# Patient Record
Sex: MALE | Race: WHITE | NOT HISPANIC OR LATINO | Employment: OTHER | URBAN - METROPOLITAN AREA
[De-identification: names, ages, dates, MRNs, and addresses within clinical notes are randomized per-mention and may not be internally consistent; named-entity substitution may affect disease eponyms.]

---

## 2019-05-01 ENCOUNTER — NURSE TRIAGE (OUTPATIENT)
Dept: NURSING | Facility: CLINIC | Age: 79
End: 2019-05-01

## 2019-05-16 ENCOUNTER — ANESTHESIA EVENT (OUTPATIENT)
Dept: SURGERY | Facility: CLINIC | Age: 79
DRG: 470 | End: 2019-05-16
Payer: COMMERCIAL

## 2019-05-16 ASSESSMENT — COPD QUESTIONNAIRES: COPD: 1

## 2019-05-16 NOTE — ANESTHESIA PREPROCEDURE EVALUATION
Anesthesia Pre-Procedure Evaluation    Patient: Noe Goetz [facility]   MRN: [id number] : 1940          Preoperative Diagnosis: RIGHT KNEE OSTEOARTHROSIS    Procedure(s):  ARTHROPLASTY, KNEE    Past Medical History:   Diagnosis Date     Hypertension      Past Surgical History:   Procedure Laterality Date     ARTHROTOMY SHOULDER, ROTATOR CUFF REPAIR, COMBINED  2012    Procedure: COMBINED ARTHROTOMY SHOULDER, ROTATOR CUFF REPAIR;  Right shoulder biceps tendodesis;  Surgeon: Reynaldo Barnes MD;  Location: [place] OR       Anesthesia Evaluation     . Pt has had prior anesthetic.     No history of anesthetic complications          ROS/MED HX    ENT/Pulmonary: Comment: Hx of bronchitis    (+)sleep apnea, HODA risk factors hypertension, obese, allergic rhinitis, COPD, uses CPAP , . Other pulmonary disease SOB with walking/treadmill , hx of asbestosis.    Neurologic:     (+)other neuro "[employer]--hearing aids    Cardiovascular: Comment: Hx of positive stress test     (+) hypertension--CAD, --. : . . . :. . Previous cardiac testing date:results:date: results:ECG reviewed date:19 results:NSR date: results:          METS/Exercise Tolerance:  >4 METS   Hematologic:  - neg hematologic  ROS       Musculoskeletal:   (+) arthritis,  other musculoskeletal- LBP; DJD, sarcoma left hand      GI/Hepatic:     (+) GERD Other GI/Hepatic hx of ETOH abuse, hepatic steatosis       Renal/Genitourinary: Comment: ED        Endo:     (+) Obesity, .      Psychiatric:     (+) psychiatric history anxiety      Infectious Disease:  - neg infectious disease ROS       Malignancy:      - no malignancy   Other:    (+) H/O Chronic Pain,                        Physical Exam  Normal systems: cardiovascular, pulmonary and dental    Airway   Mallampati: II  TM distance: >3 FB  Neck ROM: full    Dental     Cardiovascular       Pulmonary             Lab Results   Component Value Date    WBC 6.7 2016    HGB 13.1 (L) 2016    HCT 40.1  "09/01/2016     09/01/2016     09/01/2016    POTASSIUM 3.8 09/01/2016    CHLORIDE 105 09/01/2016    CO2 29 09/01/2016    BUN 18 09/01/2016    CR 0.92 09/01/2016     (H) 09/01/2016    CHERY 8.8 09/01/2016    ALBUMIN 3.4 09/01/2016    PROTTOTAL 6.3 (L) 09/01/2016    ALT 20 09/01/2016    AST 13 09/01/2016    ALKPHOS 67 09/01/2016    BILITOTAL 0.4 09/01/2016    LIPASE 38 09/05/2013    PTT 29 09/10/2011    INR 1.03 09/10/2011       Preop Vitals  BP Readings from Last 3 Encounters:   09/01/16 131/83   08/02/16 128/84   03/21/14 110/76    Pulse Readings from Last 3 Encounters:   09/01/16 61   03/21/14 76   09/07/13 95      Resp Readings from Last 3 Encounters:   09/01/16 19   08/02/16 16   03/21/14 18    SpO2 Readings from Last 3 Encounters:   09/01/16 95%   08/02/16 96%   03/21/14 93%      Temp Readings from Last 1 Encounters:   09/01/16 37.1  C (98.7  F) (Oral)    Ht Readings from Last 1 Encounters:   09/06/13 1.727 m (5' 8\")      Wt Readings from Last 1 Encounters:   07/30/12 112.5 kg (248 lb)    Estimated body mass index is 37.71 kg/m  as calculated from the following:    Height as of 9/6/13: 1.727 m (5' 8\").    Weight as of 7/30/12: 112.5 kg (248 lb).       Anesthesia Plan      History & Physical Review  History and physical reviewed and following examination; no interval change.    ASA Status:  3 .    NPO Status:  > 8 hours    Plan for General and Spinal with Propofol and Intravenous induction. Maintenance will be Balanced.    PONV prophylaxis:  Ondansetron (or other 5HT-3) and Dexamethasone or Solumedrol       Postoperative Care  Postoperative pain management:  IV analgesics, Oral pain medications and Peripheral nerve block (Single Shot).      Consents  Anesthetic plan, risks, benefits and alternatives discussed with:  Spouse and Patient..                 Dinora Chase, APRN CRNA  "

## 2019-05-20 ENCOUNTER — HOSPITAL ENCOUNTER (INPATIENT)
Facility: CLINIC | Age: 79
LOS: 2 days | Discharge: HOME | DRG: 470 | End: 2019-05-22
Attending: ORTHOPAEDIC SURGERY | Admitting: ORTHOPAEDIC SURGERY
Payer: COMMERCIAL

## 2019-05-20 ENCOUNTER — ANESTHESIA (OUTPATIENT)
Dept: SURGERY | Facility: CLINIC | Age: 79
DRG: 470 | End: 2019-05-20
Payer: COMMERCIAL

## 2019-05-20 DIAGNOSIS — M17.11 PRIMARY OSTEOARTHRITIS OF RIGHT KNEE: Primary | ICD-10-CM

## 2019-05-20 PROBLEM — J44.89 COPD WITH CHRONIC BRONCHITIS (H): Status: ACTIVE | Noted: 2017-04-26

## 2019-05-20 PROBLEM — F10.11 HISTORY OF ALCOHOL ABUSE: Status: ACTIVE | Noted: 2019-05-20

## 2019-05-20 PROCEDURE — 25800030 ZZH RX IP 258 OP 636: Performed by: NURSE ANESTHETIST, CERTIFIED REGISTERED

## 2019-05-20 PROCEDURE — 25000125 ZZHC RX 250: Performed by: NURSE ANESTHETIST, CERTIFIED REGISTERED

## 2019-05-20 PROCEDURE — 36000063 ZZH SURGERY LEVEL 4 EA 15 ADDTL MIN: Performed by: ORTHOPAEDIC SURGERY

## 2019-05-20 PROCEDURE — 93005 ELECTROCARDIOGRAM TRACING: CPT

## 2019-05-20 PROCEDURE — 27810169 ZZH OR IMPLANT GENERAL: Performed by: ORTHOPAEDIC SURGERY

## 2019-05-20 PROCEDURE — 37000009 ZZH ANESTHESIA TECHNICAL FEE, EACH ADDTL 15 MIN: Performed by: ORTHOPAEDIC SURGERY

## 2019-05-20 PROCEDURE — 25000128 H RX IP 250 OP 636: Performed by: NURSE ANESTHETIST, CERTIFIED REGISTERED

## 2019-05-20 PROCEDURE — 25000132 ZZH RX MED GY IP 250 OP 250 PS 637: Performed by: PHYSICIAN ASSISTANT

## 2019-05-20 PROCEDURE — 71000012 ZZH RECOVERY PHASE 1 LEVEL 1 FIRST HR: Performed by: ORTHOPAEDIC SURGERY

## 2019-05-20 PROCEDURE — 25000132 ZZH RX MED GY IP 250 OP 250 PS 637: Performed by: ORTHOPAEDIC SURGERY

## 2019-05-20 PROCEDURE — 36000093 ZZH SURGERY LEVEL 4 1ST 30 MIN: Performed by: ORTHOPAEDIC SURGERY

## 2019-05-20 PROCEDURE — 71000013 ZZH RECOVERY PHASE 1 LEVEL 1 EA ADDTL HR: Performed by: ORTHOPAEDIC SURGERY

## 2019-05-20 PROCEDURE — 12000000 ZZH R&B MED SURG/OB

## 2019-05-20 PROCEDURE — C1776 JOINT DEVICE (IMPLANTABLE): HCPCS | Performed by: ORTHOPAEDIC SURGERY

## 2019-05-20 PROCEDURE — 25800030 ZZH RX IP 258 OP 636: Performed by: ORTHOPAEDIC SURGERY

## 2019-05-20 PROCEDURE — 25000128 H RX IP 250 OP 636: Performed by: PHYSICIAN ASSISTANT

## 2019-05-20 PROCEDURE — 0SRC0J9 REPLACEMENT OF RIGHT KNEE JOINT WITH SYNTHETIC SUBSTITUTE, CEMENTED, OPEN APPROACH: ICD-10-PCS | Performed by: ORTHOPAEDIC SURGERY

## 2019-05-20 PROCEDURE — 40000275 ZZH STATISTIC RCP TIME EA 10 MIN

## 2019-05-20 PROCEDURE — 37000008 ZZH ANESTHESIA TECHNICAL FEE, 1ST 30 MIN: Performed by: ORTHOPAEDIC SURGERY

## 2019-05-20 PROCEDURE — 40000305 ZZH STATISTIC PRE PROC ASSESS I: Performed by: ORTHOPAEDIC SURGERY

## 2019-05-20 PROCEDURE — 27210794 ZZH OR GENERAL SUPPLY STERILE: Performed by: ORTHOPAEDIC SURGERY

## 2019-05-20 PROCEDURE — 25000128 H RX IP 250 OP 636: Performed by: ORTHOPAEDIC SURGERY

## 2019-05-20 PROCEDURE — 27110028 ZZH OR GENERAL SUPPLY NON-STERILE: Performed by: ORTHOPAEDIC SURGERY

## 2019-05-20 DEVICE — IMPLANTABLE DEVICE: Type: IMPLANTABLE DEVICE | Site: KNEE | Status: FUNCTIONAL

## 2019-05-20 DEVICE — BONE CEMENT DEPUY FAST SET 20GM CMW2: Type: IMPLANTABLE DEVICE | Site: KNEE | Status: FUNCTIONAL

## 2019-05-20 RX ORDER — LIDOCAINE HYDROCHLORIDE AND EPINEPHRINE BITARTRATE 20; .01 MG/ML; MG/ML
INJECTION, SOLUTION SUBCUTANEOUS PRN
Status: DISCONTINUED | OUTPATIENT
Start: 2019-05-20 | End: 2019-05-20

## 2019-05-20 RX ORDER — ONDANSETRON 4 MG/1
4 TABLET, ORALLY DISINTEGRATING ORAL EVERY 6 HOURS PRN
Status: DISCONTINUED | OUTPATIENT
Start: 2019-05-20 | End: 2019-05-22 | Stop reason: HOSPADM

## 2019-05-20 RX ORDER — CEFAZOLIN SODIUM 2 G/100ML
2 INJECTION, SOLUTION INTRAVENOUS
Status: COMPLETED | OUTPATIENT
Start: 2019-05-20 | End: 2019-05-20

## 2019-05-20 RX ORDER — GABAPENTIN 100 MG/1
100 CAPSULE ORAL 3 TIMES DAILY
Status: DISCONTINUED | OUTPATIENT
Start: 2019-05-20 | End: 2019-05-22 | Stop reason: HOSPADM

## 2019-05-20 RX ORDER — DEXAMETHASONE SODIUM PHOSPHATE 4 MG/ML
INJECTION, SOLUTION INTRA-ARTICULAR; INTRALESIONAL; INTRAMUSCULAR; INTRAVENOUS; SOFT TISSUE PRN
Status: DISCONTINUED | OUTPATIENT
Start: 2019-05-20 | End: 2019-05-20

## 2019-05-20 RX ORDER — SIMVASTATIN 20 MG
20 TABLET ORAL AT BEDTIME
Status: DISCONTINUED | OUTPATIENT
Start: 2019-05-20 | End: 2019-05-22 | Stop reason: HOSPADM

## 2019-05-20 RX ORDER — HYDROMORPHONE HYDROCHLORIDE 2 MG/1
2-4 TABLET ORAL EVERY 4 HOURS PRN
Status: DISCONTINUED | OUTPATIENT
Start: 2019-05-20 | End: 2019-05-21

## 2019-05-20 RX ORDER — NALOXONE HYDROCHLORIDE 0.4 MG/ML
.1-.4 INJECTION, SOLUTION INTRAMUSCULAR; INTRAVENOUS; SUBCUTANEOUS
Status: DISCONTINUED | OUTPATIENT
Start: 2019-05-20 | End: 2019-05-22 | Stop reason: HOSPADM

## 2019-05-20 RX ORDER — ROPIVACAINE HYDROCHLORIDE 5 MG/ML
INJECTION, SOLUTION EPIDURAL; INFILTRATION; PERINEURAL PRN
Status: DISCONTINUED | OUTPATIENT
Start: 2019-05-20 | End: 2019-05-20

## 2019-05-20 RX ORDER — ONDANSETRON 2 MG/ML
4 INJECTION INTRAMUSCULAR; INTRAVENOUS EVERY 30 MIN PRN
Status: DISCONTINUED | OUTPATIENT
Start: 2019-05-20 | End: 2019-05-20 | Stop reason: HOSPADM

## 2019-05-20 RX ORDER — HYDROCODONE BITARTRATE AND ACETAMINOPHEN 5; 325 MG/1; MG/1
1 TABLET ORAL EVERY 4 HOURS PRN
Status: ON HOLD | COMMUNITY
Start: 2019-03-28 | End: 2019-05-22

## 2019-05-20 RX ORDER — ONDANSETRON 2 MG/ML
INJECTION INTRAMUSCULAR; INTRAVENOUS PRN
Status: DISCONTINUED | OUTPATIENT
Start: 2019-05-20 | End: 2019-05-20

## 2019-05-20 RX ORDER — LIDOCAINE 40 MG/G
CREAM TOPICAL
Status: DISCONTINUED | OUTPATIENT
Start: 2019-05-20 | End: 2019-05-20 | Stop reason: HOSPADM

## 2019-05-20 RX ORDER — CEFAZOLIN SODIUM 1 G/3ML
1 INJECTION, POWDER, FOR SOLUTION INTRAMUSCULAR; INTRAVENOUS SEE ADMIN INSTRUCTIONS
Status: DISCONTINUED | OUTPATIENT
Start: 2019-05-20 | End: 2019-05-20 | Stop reason: HOSPADM

## 2019-05-20 RX ORDER — DEXTROSE MONOHYDRATE, SODIUM CHLORIDE, AND POTASSIUM CHLORIDE 50; 1.49; 4.5 G/1000ML; G/1000ML; G/1000ML
INJECTION, SOLUTION INTRAVENOUS CONTINUOUS
Status: DISCONTINUED | OUTPATIENT
Start: 2019-05-20 | End: 2019-05-22 | Stop reason: HOSPADM

## 2019-05-20 RX ORDER — FUROSEMIDE 40 MG
40 TABLET ORAL 2 TIMES DAILY
Status: DISCONTINUED | OUTPATIENT
Start: 2019-05-21 | End: 2019-05-22 | Stop reason: HOSPADM

## 2019-05-20 RX ORDER — HYDROXYZINE HYDROCHLORIDE 50 MG/1
50 TABLET, FILM COATED ORAL EVERY 6 HOURS PRN
Status: DISCONTINUED | OUTPATIENT
Start: 2019-05-20 | End: 2019-05-20

## 2019-05-20 RX ORDER — FLUTICASONE PROPIONATE 50 MCG
2 SPRAY, SUSPENSION (ML) NASAL DAILY
Status: DISCONTINUED | OUTPATIENT
Start: 2019-05-20 | End: 2019-05-22 | Stop reason: HOSPADM

## 2019-05-20 RX ORDER — FENTANYL CITRATE 50 UG/ML
25-50 INJECTION, SOLUTION INTRAMUSCULAR; INTRAVENOUS
Status: DISCONTINUED | OUTPATIENT
Start: 2019-05-20 | End: 2019-05-20 | Stop reason: HOSPADM

## 2019-05-20 RX ORDER — DOXAZOSIN 2 MG/1
2 TABLET ORAL AT BEDTIME
Status: DISCONTINUED | OUTPATIENT
Start: 2019-05-20 | End: 2019-05-22 | Stop reason: HOSPADM

## 2019-05-20 RX ORDER — AMOXICILLIN 250 MG
2 CAPSULE ORAL 2 TIMES DAILY
Status: DISCONTINUED | OUTPATIENT
Start: 2019-05-20 | End: 2019-05-22 | Stop reason: HOSPADM

## 2019-05-20 RX ORDER — ACETAMINOPHEN 325 MG/1
975 TABLET ORAL EVERY 8 HOURS
Status: DISCONTINUED | OUTPATIENT
Start: 2019-05-20 | End: 2019-05-21

## 2019-05-20 RX ORDER — DOXAZOSIN 2 MG/1
4 TABLET ORAL AT BEDTIME
Status: DISCONTINUED | OUTPATIENT
Start: 2019-05-20 | End: 2019-05-20

## 2019-05-20 RX ORDER — SODIUM CHLORIDE, SODIUM LACTATE, POTASSIUM CHLORIDE, CALCIUM CHLORIDE 600; 310; 30; 20 MG/100ML; MG/100ML; MG/100ML; MG/100ML
INJECTION, SOLUTION INTRAVENOUS CONTINUOUS
Status: DISCONTINUED | OUTPATIENT
Start: 2019-05-20 | End: 2019-05-20 | Stop reason: HOSPADM

## 2019-05-20 RX ORDER — HYDROMORPHONE HYDROCHLORIDE 1 MG/ML
.3-.5 INJECTION, SOLUTION INTRAMUSCULAR; INTRAVENOUS; SUBCUTANEOUS
Status: DISCONTINUED | OUTPATIENT
Start: 2019-05-20 | End: 2019-05-22 | Stop reason: HOSPADM

## 2019-05-20 RX ORDER — HYDROMORPHONE HYDROCHLORIDE 1 MG/ML
.3-.5 INJECTION, SOLUTION INTRAMUSCULAR; INTRAVENOUS; SUBCUTANEOUS EVERY 5 MIN PRN
Status: DISCONTINUED | OUTPATIENT
Start: 2019-05-20 | End: 2019-05-20 | Stop reason: HOSPADM

## 2019-05-20 RX ORDER — PROPOFOL 10 MG/ML
INJECTION, EMULSION INTRAVENOUS CONTINUOUS PRN
Status: DISCONTINUED | OUTPATIENT
Start: 2019-05-20 | End: 2019-05-20

## 2019-05-20 RX ORDER — FERROUS SULFATE 325(65) MG
325 TABLET ORAL
Status: DISCONTINUED | OUTPATIENT
Start: 2019-05-21 | End: 2019-05-22 | Stop reason: HOSPADM

## 2019-05-20 RX ORDER — ALBUTEROL SULFATE 0.83 MG/ML
2.5 SOLUTION RESPIRATORY (INHALATION) EVERY 4 HOURS PRN
Status: DISCONTINUED | OUTPATIENT
Start: 2019-05-20 | End: 2019-05-20 | Stop reason: HOSPADM

## 2019-05-20 RX ORDER — LIDOCAINE 40 MG/G
CREAM TOPICAL
Status: DISCONTINUED | OUTPATIENT
Start: 2019-05-20 | End: 2019-05-22 | Stop reason: HOSPADM

## 2019-05-20 RX ORDER — LIDOCAINE HYDROCHLORIDE 10 MG/ML
INJECTION, SOLUTION EPIDURAL; INFILTRATION; INTRACAUDAL; PERINEURAL PRN
Status: DISCONTINUED | OUTPATIENT
Start: 2019-05-20 | End: 2019-05-20

## 2019-05-20 RX ORDER — HYDROXYZINE HYDROCHLORIDE 10 MG/1
10 TABLET, FILM COATED ORAL EVERY 6 HOURS PRN
Status: DISCONTINUED | OUTPATIENT
Start: 2019-05-20 | End: 2019-05-21

## 2019-05-20 RX ORDER — HYDROXYZINE HYDROCHLORIDE 25 MG/1
25 TABLET, FILM COATED ORAL EVERY 6 HOURS PRN
Status: DISCONTINUED | OUTPATIENT
Start: 2019-05-20 | End: 2019-05-20

## 2019-05-20 RX ORDER — LIDOCAINE HYDROCHLORIDE 10 MG/ML
INJECTION, SOLUTION INFILTRATION; PERINEURAL PRN
Status: DISCONTINUED | OUTPATIENT
Start: 2019-05-20 | End: 2019-05-20

## 2019-05-20 RX ORDER — GABAPENTIN 600 MG/1
1200 TABLET ORAL AT BEDTIME
Status: DISCONTINUED | OUTPATIENT
Start: 2019-05-20 | End: 2019-05-22 | Stop reason: HOSPADM

## 2019-05-20 RX ORDER — FENTANYL CITRATE 50 UG/ML
INJECTION, SOLUTION INTRAMUSCULAR; INTRAVENOUS PRN
Status: DISCONTINUED | OUTPATIENT
Start: 2019-05-20 | End: 2019-05-20

## 2019-05-20 RX ORDER — AMOXICILLIN 250 MG
1 CAPSULE ORAL 2 TIMES DAILY
Status: DISCONTINUED | OUTPATIENT
Start: 2019-05-20 | End: 2019-05-22 | Stop reason: HOSPADM

## 2019-05-20 RX ORDER — KETOROLAC TROMETHAMINE 15 MG/ML
15 INJECTION, SOLUTION INTRAMUSCULAR; INTRAVENOUS
Status: DISCONTINUED | OUTPATIENT
Start: 2019-05-20 | End: 2019-05-20 | Stop reason: HOSPADM

## 2019-05-20 RX ORDER — ONDANSETRON 2 MG/ML
4 INJECTION INTRAMUSCULAR; INTRAVENOUS EVERY 6 HOURS PRN
Status: DISCONTINUED | OUTPATIENT
Start: 2019-05-20 | End: 2019-05-22 | Stop reason: HOSPADM

## 2019-05-20 RX ORDER — FEXOFENADINE HCL 180 MG/1
180 TABLET ORAL DAILY
Status: DISCONTINUED | OUTPATIENT
Start: 2019-05-21 | End: 2019-05-22 | Stop reason: HOSPADM

## 2019-05-20 RX ORDER — PROCHLORPERAZINE MALEATE 5 MG
5 TABLET ORAL EVERY 6 HOURS PRN
Status: DISCONTINUED | OUTPATIENT
Start: 2019-05-20 | End: 2019-05-22 | Stop reason: HOSPADM

## 2019-05-20 RX ORDER — ACETAMINOPHEN 325 MG/1
650 TABLET ORAL EVERY 4 HOURS PRN
Status: DISCONTINUED | OUTPATIENT
Start: 2019-05-23 | End: 2019-05-21

## 2019-05-20 RX ORDER — NALOXONE HYDROCHLORIDE 0.4 MG/ML
.1-.4 INJECTION, SOLUTION INTRAMUSCULAR; INTRAVENOUS; SUBCUTANEOUS
Status: DISCONTINUED | OUTPATIENT
Start: 2019-05-20 | End: 2019-05-20

## 2019-05-20 RX ORDER — METOPROLOL TARTRATE 1 MG/ML
1-2 INJECTION, SOLUTION INTRAVENOUS EVERY 5 MIN PRN
Status: DISCONTINUED | OUTPATIENT
Start: 2019-05-20 | End: 2019-05-20 | Stop reason: HOSPADM

## 2019-05-20 RX ORDER — LOVASTATIN 20 MG
40 TABLET ORAL AT BEDTIME
Status: DISCONTINUED | OUTPATIENT
Start: 2019-05-20 | End: 2019-05-20 | Stop reason: CLARIF

## 2019-05-20 RX ORDER — ALBUTEROL SULFATE 0.83 MG/ML
2.5 SOLUTION RESPIRATORY (INHALATION) EVERY 6 HOURS PRN
Status: DISCONTINUED | OUTPATIENT
Start: 2019-05-20 | End: 2019-05-22 | Stop reason: HOSPADM

## 2019-05-20 RX ORDER — GABAPENTIN 100 MG/1
100 CAPSULE ORAL
Status: COMPLETED | OUTPATIENT
Start: 2019-05-20 | End: 2019-05-20

## 2019-05-20 RX ORDER — POTASSIUM CHLORIDE 1500 MG/1
20 TABLET, EXTENDED RELEASE ORAL DAILY
Status: DISCONTINUED | OUTPATIENT
Start: 2019-05-21 | End: 2019-05-22 | Stop reason: HOSPADM

## 2019-05-20 RX ORDER — ONDANSETRON 4 MG/1
4 TABLET, ORALLY DISINTEGRATING ORAL EVERY 30 MIN PRN
Status: DISCONTINUED | OUTPATIENT
Start: 2019-05-20 | End: 2019-05-20 | Stop reason: HOSPADM

## 2019-05-20 RX ORDER — BUPIVACAINE HYDROCHLORIDE 7.5 MG/ML
INJECTION, SOLUTION INTRASPINAL PRN
Status: DISCONTINUED | OUTPATIENT
Start: 2019-05-20 | End: 2019-05-20

## 2019-05-20 RX ORDER — EPINEPHRINE 1 MG/ML
INJECTION, SOLUTION, CONCENTRATE INTRAVENOUS PRN
Status: DISCONTINUED | OUTPATIENT
Start: 2019-05-20 | End: 2019-05-20

## 2019-05-20 RX ORDER — CEFAZOLIN SODIUM 2 G/100ML
2 INJECTION, SOLUTION INTRAVENOUS EVERY 8 HOURS
Status: COMPLETED | OUTPATIENT
Start: 2019-05-20 | End: 2019-05-21

## 2019-05-20 RX ORDER — ATENOLOL 50 MG/1
50 TABLET ORAL DAILY
Status: DISCONTINUED | OUTPATIENT
Start: 2019-05-21 | End: 2019-05-22 | Stop reason: HOSPADM

## 2019-05-20 RX ORDER — DULOXETIN HYDROCHLORIDE 30 MG/1
120 CAPSULE, DELAYED RELEASE ORAL EVERY EVENING
Status: DISCONTINUED | OUTPATIENT
Start: 2019-05-20 | End: 2019-05-22 | Stop reason: HOSPADM

## 2019-05-20 RX ADMIN — CEFAZOLIN SODIUM 2 G: 2 INJECTION, SOLUTION INTRAVENOUS at 19:26

## 2019-05-20 RX ADMIN — GABAPENTIN 100 MG: 100 CAPSULE ORAL at 15:51

## 2019-05-20 RX ADMIN — FLUTICASONE FUROATE 1 PUFF: 200 POWDER RESPIRATORY (INHALATION) at 19:26

## 2019-05-20 RX ADMIN — SIMVASTATIN 20 MG: 20 TABLET, FILM COATED ORAL at 21:19

## 2019-05-20 RX ADMIN — EPINEPHRINE 0.2 MG: 1 INJECTION, SOLUTION INTRAMUSCULAR; SUBCUTANEOUS at 11:51

## 2019-05-20 RX ADMIN — CEFAZOLIN SODIUM 2 G: 2 INJECTION, SOLUTION INTRAVENOUS at 11:43

## 2019-05-20 RX ADMIN — GABAPENTIN 1200 MG: 600 TABLET, FILM COATED ORAL at 21:20

## 2019-05-20 RX ADMIN — HYDROMORPHONE HYDROCHLORIDE 4 MG: 2 TABLET ORAL at 16:24

## 2019-05-20 RX ADMIN — ACETAMINOPHEN 975 MG: 325 TABLET, FILM COATED ORAL at 23:24

## 2019-05-20 RX ADMIN — ACETAMINOPHEN 975 MG: 325 TABLET, FILM COATED ORAL at 15:51

## 2019-05-20 RX ADMIN — Medication 5 ML: at 13:18

## 2019-05-20 RX ADMIN — OMEPRAZOLE 20 MG: 20 CAPSULE, DELAYED RELEASE ORAL at 21:20

## 2019-05-20 RX ADMIN — PROPOFOL 50 MCG/KG/MIN: 10 INJECTION, EMULSION INTRAVENOUS at 11:45

## 2019-05-20 RX ADMIN — DEXAMETHASONE SODIUM PHOSPHATE 2 MG: 4 INJECTION, SOLUTION INTRA-ARTICULAR; INTRALESIONAL; INTRAMUSCULAR; INTRAVENOUS; SOFT TISSUE at 13:19

## 2019-05-20 RX ADMIN — HYDROMORPHONE HYDROCHLORIDE 0.5 MG: 1 INJECTION, SOLUTION INTRAMUSCULAR; INTRAVENOUS; SUBCUTANEOUS at 17:32

## 2019-05-20 RX ADMIN — LIDOCAINE HYDROCHLORIDE 5 ML: 10 INJECTION, SOLUTION EPIDURAL; INFILTRATION; INTRACAUDAL; PERINEURAL at 11:45

## 2019-05-20 RX ADMIN — PSYLLIUM HUSK 1 PACKET: 3.4 POWDER ORAL at 19:12

## 2019-05-20 RX ADMIN — DULOXETINE HYDROCHLORIDE 120 MG: 30 CAPSULE, DELAYED RELEASE ORAL at 17:32

## 2019-05-20 RX ADMIN — POTASSIUM CHLORIDE, DEXTROSE MONOHYDRATE AND SODIUM CHLORIDE: 150; 5; 450 INJECTION, SOLUTION INTRAVENOUS at 17:32

## 2019-05-20 RX ADMIN — DOXAZOSIN 2 MG: 2 TABLET ORAL at 21:20

## 2019-05-20 RX ADMIN — ONDANSETRON 4 MG: 2 INJECTION INTRAMUSCULAR; INTRAVENOUS at 12:38

## 2019-05-20 RX ADMIN — SENNOSIDES AND DOCUSATE SODIUM 1 TABLET: 8.6; 5 TABLET ORAL at 21:19

## 2019-05-20 RX ADMIN — LIDOCAINE HYDROCHLORIDE 30 MG: 10 INJECTION, SOLUTION INFILTRATION; PERINEURAL at 11:47

## 2019-05-20 RX ADMIN — SODIUM CHLORIDE, POTASSIUM CHLORIDE, SODIUM LACTATE AND CALCIUM CHLORIDE: 600; 310; 30; 20 INJECTION, SOLUTION INTRAVENOUS at 11:04

## 2019-05-20 RX ADMIN — SODIUM CHLORIDE, POTASSIUM CHLORIDE, SODIUM LACTATE AND CALCIUM CHLORIDE: 600; 310; 30; 20 INJECTION, SOLUTION INTRAVENOUS at 13:34

## 2019-05-20 RX ADMIN — FLUTICASONE PROPIONATE 2 SPRAY: 50 SPRAY, METERED NASAL at 19:25

## 2019-05-20 RX ADMIN — GABAPENTIN 100 MG: 100 CAPSULE ORAL at 11:30

## 2019-05-20 RX ADMIN — ROPIVACAINE HYDROCHLORIDE 15 ML: 5 INJECTION, SOLUTION EPIDURAL; INFILTRATION; PERINEURAL at 13:19

## 2019-05-20 RX ADMIN — BUPIVACAINE HYDROCHLORIDE IN DEXTROSE 1.6 ML: 7.5 INJECTION, SOLUTION SUBARACHNOID at 11:51

## 2019-05-20 RX ADMIN — HYDROXYZINE HYDROCHLORIDE 10 MG: 10 TABLET ORAL at 16:26

## 2019-05-20 RX ADMIN — DEXAMETHASONE SODIUM PHOSPHATE 4 MG: 4 INJECTION, SOLUTION INTRA-ARTICULAR; INTRALESIONAL; INTRAMUSCULAR; INTRAVENOUS; SOFT TISSUE at 12:00

## 2019-05-20 RX ADMIN — MIDAZOLAM 2 MG: 1 INJECTION INTRAMUSCULAR; INTRAVENOUS at 11:40

## 2019-05-20 RX ADMIN — FENTANYL CITRATE 50 MCG: 50 INJECTION, SOLUTION INTRAMUSCULAR; INTRAVENOUS at 11:48

## 2019-05-20 RX ADMIN — LIDOCAINE HYDROCHLORIDE 1 ML: 10 INJECTION, SOLUTION EPIDURAL; INFILTRATION; INTRACAUDAL; PERINEURAL at 11:30

## 2019-05-20 RX ADMIN — FENTANYL CITRATE 50 MCG: 50 INJECTION, SOLUTION INTRAMUSCULAR; INTRAVENOUS at 12:02

## 2019-05-20 RX ADMIN — HYDROMORPHONE HYDROCHLORIDE 4 MG: 2 TABLET ORAL at 21:20

## 2019-05-20 ASSESSMENT — ACTIVITIES OF DAILY LIVING (ADL)
WHICH_OF_THE_ABOVE_FUNCTIONAL_RISKS_HAD_A_RECENT_ONSET_OR_CHANGE?: AMBULATION
ADLS_ACUITY_SCORE: 15
TOILETING: 0-->INDEPENDENT
TRANSFERRING: 0-->INDEPENDENT
DRESS: 0-->INDEPENDENT
FALL_HISTORY_WITHIN_LAST_SIX_MONTHS: NO
AMBULATION: 0-->INDEPENDENT
COGNITION: 0 - NO COGNITION ISSUES REPORTED
ADLS_ACUITY_SCORE: 15
BATHING: 0-->INDEPENDENT
SWALLOWING: 0-->SWALLOWS FOODS/LIQUIDS WITHOUT DIFFICULTY
RETIRED_COMMUNICATION: 0-->UNDERSTANDS/COMMUNICATES WITHOUT DIFFICULTY
RETIRED_EATING: 0-->INDEPENDENT

## 2019-05-20 NOTE — OR NURSING
12 lead showed acute no changes.Chest pain only lasted 10 minutes. Okay received to transfer to med surg.

## 2019-05-20 NOTE — OR NURSING
Pt has a scrape on right shin from 3 days ago . Scabbed and bruised reddened area. Also right wrist and palm swollen since yesterday. No fall just tender uses ice to palm for comfort. preop med given.wife with pt preop

## 2019-05-20 NOTE — OR NURSING
Pt. ready to be transported to med/surg when complained of chest pain across sternum. No pain radiating to other parts of body. Urbano Llanos CRNA notified. Order recieved to do a 12 lead. Nette GUTIERREZ

## 2019-05-20 NOTE — BRIEF OP NOTE
[facility]    Brief Operative Note    Pre-operative diagnosis: RIGHT KNEE OSTEOARTHROSIS  Post-operative diagnosis Primary Osteoarthrosis Right Knee  Procedure: Procedure(s):  ARTHROPLASTY, KNEE  Surgeon: Surgeon(s) and Role:     * Reynaldo Barnes MD - Primary     * Josias Lama PA-C - Assisting  Anesthesia: General   Estimated blood loss: Minimal  Drains: Hemovac  Specimens: * No specimens in log *  Findings:   None.  Complications: None.  Implants:    Implant Name Type Inv. Item Serial No.  Lot No. LRB No. Used   BONE CEMENT DEPUY FAST SET 20GM CMW2 Cement, Bone BONE CEMENT DEPUY FAST SET 20GM CMW2  [employer]C [id number] Right 2   IMP COMP PATELLA JJ 41MM Total Joint Component/Insert IMP COMP PATELLA JJ 41MM  [employer]   [id number] Right 1   IMP COMP FEMORAL DEPUY RT SIZE 5  Total Joint Component/Insert IMP COMP FEMORAL DEPUY RT SIZE 5   [employer]   [id number] Right 1   IMP TIBIAL TRAY MOD 5 SIGMA 1581- Total Joint Component/Insert IMP TIBIAL TRAY MOD 5 SIGMA 1581-  [employer] [id number] Right 1   P.F.C sigma tibial insert fixed bearing curved 5, 8mm    Depuy [id number] Right 1

## 2019-05-20 NOTE — ANESTHESIA PROCEDURE NOTES
Peripheral nerve/Neuraxial procedure note : intrathecal  Pre-Procedure  Performed by  Dinora Chase APRN CRNA   Location: OR      Pre-Anesthestic Checklist: patient identified, IV checked, site marked, risks and benefits discussed, informed consent, monitors and equipment checked, pre-op evaluation and at physician/surgeon's request    Timeout  Correct Patient: Yes   Correct Procedure: Yes   Correct Site: Yes   Correct Laterality: N/A   Correct Position: Yes   Site Marked: N/A   .   Procedure Documentation  ASA 3  Diagnosis:OA.    Procedure:    Intrathecal.  Insertion Site:L3-4  (midline approach)      Patient Prep;mask, sterile gloves, povidone-iodine 7.5% surgical scrub, patient draped.  .  Needle: Rebeca tip Spinal Needle (gauge): 22  Spinal/LP Needle Length (inches): 3.5 # of attempts: 1 and  # of redirects:  1 No introducer used .       Assessment/Narrative  Paresthesias: No.  .  .  clear CSF fluid removed . Time Injected: 11:51  Comments:  VAS pain score prior to injection:    VAS pain score after injection:    FHR stable, pt. Tolerated well.

## 2019-05-20 NOTE — ANESTHESIA CARE TRANSFER NOTE
Patient: Noe Goetz [facility]    Procedure(s):  ARTHROPLASTY, KNEE    Diagnosis: RIGHT KNEE OSTEOARTHROSIS  Diagnosis Additional Information: No value filed.    Anesthesia Type:   No value filed.     Note:  Airway :Nasal Cannula  Patient transferred to:PACU  Handoff Report: Identifed the Patient, Identified the Reponsible Provider, Reviewed the pertinent medical history, Discussed the surgical course, Reviewed Intra-OP anesthesia mangement and issues during anesthesia, Set expectations for post-procedure period and Allowed opportunity for questions and acknowledgement of understanding      Vitals: (Last set prior to Anesthesia Care Transfer)    CRNA VITALS  5/20/2019 1230 - 5/20/2019 1330      5/20/2019             EKG:  NSR                Electronically Signed By: MARCY Francisco CRNA  May 20, 2019  1:31 PM

## 2019-05-20 NOTE — OP NOTE
Procedure Date: 05/20/2019      PREOPERATIVE DIAGNOSIS:  Primary osteoarthrosis, right knee.      POSTOPERATIVE DIAGNOSIS:  Primary osteoarthrosis, right knee.      PROCEDURE:  DePuy Sigma right total knee arthroplasty.      COMPONENTS:   FEMUR:  #5 cruciate-retaining retaining, cemented.   TIBIA:  #5 fixed bearing, cemented inserts.  #8   PATELLA:  41.      SURGEON:  Reynaldo Barnes MD      ASSISTANT:  AILEEN Joseph      ANESTHESIA:  Spinal.      ESTIMATED BLOOD LOSS:  Minimal.      TOURNIQUET TIME:  Approximately 45 minutes at 325 mmHg.      COMPLICATIONS:  None apparent.      INDICATIONS:  Noe is a 79-year-old gentleman who presented with end-stage primary osteoarthrosis of the right knee recalcitrant to conservative treatment.  After alternatives, risks, and possible complications were carefully discussed, the patient desired surgical intervention; therefore, consent was obtained.      OPERATION:  The patient was brought to main operating after spinal anesthesia was obtained, positioned supine.  Tourniquet was placed on the right proximal thigh.  Two grams of Ancef were given intravenously, right lower extremity was prepped and draped in the usual sterile fashion.  The limb was elevated and tourniquet inflated.      A linear longitudinal incision was made for an anterior approach to the right knee.  Subcutaneous tissue divided sharply.  A paramedian arthrotomy was made.  Effusion was evacuated.  I then resected the undersurface of patella with oscillating saw, removed bony fragments, placed lug holes for a 41 mm insert.  A metal tray was placed over the cut surfaces.  I retracted the patella laterally.      With knee in flexion and retractors in place, cut the distal femur.  This is intramedullary guide to fit a #5 femoral component.  Extramedullary guide was used to assist in cutting proximal tibial plateau.  Bony fragment was excised.  I utilized a laminar  to assist in resection of the remains  of the meniscus and posterior osteophytes and debris was removed.      With trial components in place, no soft tissue releases were required.  Immediately had excellent range of motion and good stability.  Therefore, correct for rotation, made cruciform and cut the proximal tibial plateau, placed lug holes in the distal femur and removed trial components.      While we mixed a batch of bone cement on the back table, we pulse-lavaged surfaces clean and meticulously dried them.  We then sequentially cemented tibial tray, the femoral component of the patella.  An 8 mm trial insert was placed in the knee and brought out in extension.  Axial compression was held while I removed excess cement in the doughy stage.      Once cement solidified, we removed the trial spacer and the patellar clamp was placed over the glenoid, removed excess bone and bone cement debris.  Again performed trial reduction, elected to go with a good 8 mm insert, which was loaded and locked into place.  Once again we had full extension, full flexion, stable varus and valgus stress throughout the arc of motion.  The patella tracked centrally.  Therefore, we soaked the knee in weak Betadine solution, the pulse lavage surfaces clean, placed a drain deep within the wound.  I closed the deep fascia with #1 Stratafix, closed subcutaneous tissue with 2-0 Stratafix, completed closure with 3-0 Stratafix.  Prineo was placed on the wound followed by sterile compression bandage.  Hemovac was connected to suction.  Tourniquet was deflated.  The patient was returned to [facility] in stable condition, without apparent complication.         MALINDA BOJORQUEZ MD             D: 2019   T: 2019   MT: ROSA      Name:     ANDRIA SOLORZANO   MRN:      [id number]        Account:        [id number]   :      1940           Procedure Date: 2019      Document: [id number]

## 2019-05-20 NOTE — ANESTHESIA POSTPROCEDURE EVALUATION
Patient: Noe Goetz [facility]    Procedure(s):  ARTHROPLASTY, KNEE    Diagnosis:RIGHT KNEE OSTEOARTHROSIS  Diagnosis Additional Information: No value filed.    Anesthesia Type:  No value filed.    Note:  Anesthesia Post Evaluation    Patient location during evaluation: PACU and Bedside  Patient participation: Able to participate in evaluation but full recovery from regional anesthesia has not yet ocurrred but is anticipated to occur within 48 hours  Level of consciousness: awake and alert  Pain management: adequate  Airway patency: patent  Cardiovascular status: acceptable and hemodynamically stable  Respiratory status: acceptable  Hydration status: acceptable  PONV: none     Anesthetic complications: None          Last vitals:  Vitals:    05/20/19 1303 05/20/19 1312 05/20/19 1319   BP: 97/71 108/72 99/75   Resp: 16 16 16   Temp: 36.4  C (97.5  F)     SpO2: 95% 97% 96%         Electronically Signed By: MARCY Francisco CRNA  May 20, 2019  1:31 PM

## 2019-05-20 NOTE — ANESTHESIA PROCEDURE NOTES
Peripheral nerve/Neuraxial procedure note : Adductor canal  Pre-Procedure  Performed by  Hazel Llanos APRN CRNA   Location: post-op      Pre-Anesthestic Checklist: patient identified, IV checked, site marked, risks and benefits discussed, informed consent, monitors and equipment checked, pre-op evaluation, at physician/surgeon's request and post-op pain management    Timeout  Correct Patient: Yes   Correct Procedure: Yes   Correct Site: Yes   Correct Laterality: Yes   Correct Position: Yes   Site Marked: Yes   .   Procedure Documentation    .    Procedure:  right  Adductor canal.     Ultrasound used to identify targeted nerve, plexus, or vascular marker and placed a needle adjacent to it., Ultrasound was used to visualize the spread of the anesthetic in close proximity to the above stated nerve. A permanent image is entered into the patient's record.  Patient Prep;mask, sterile gloves, chlorhexidine gluconate and isopropyl alcohol, patient draped.  .  Needle: insulated Needle Gauge: 20.    Needle Length (Inches) 4  Insertion Method: Single Shot.       Assessment/Narrative  Paresthesias: No.  .  The placement was negative for: blood aspirated, painful injection and site bleeding.  Bolus given via needle..   Secured via.   Complications: none. Test dose of 5 mL lidocaine 2% w/ 1:200,000 epinephrine at 13:18. .

## 2019-05-20 NOTE — PROGRESS NOTES
[facility] ADMISS[facility] NOTE    Patient admitted to room 2314 at approximately 1455 via cart from surgery. Patient was accompanied by transport tech.     Verbal SBAR report received from Rowan prior to patient arrival.     Patient trasferred to bed via air kaity. Patient alert and oriented X 3. The patient is not having any pain.  . Admission vital signs: Blood pressure 128/84, temperature 97.5  F (36.4  C), temperature source Oral, resp. rate 16, weight 112.5 kg (248 lb), SpO2 96 %. Patient was oriented to plan of care, call light, bed controls, tv, telephone, bathroom and visiting hours.     Risk Assessment    The following safety risks were identified during admission: none. Yellow risk band applied: NO.     Skin Initial Assessment    This writer admitted this patient and completed a full skin assessment and Reddy score in the Adult PCS flowsheet. Appropriate interventions initiated as needed.     Secondary skin check completed by NICHOLE Trevino RN.         Mell Gruber RN BSN

## 2019-05-21 ENCOUNTER — APPOINTMENT (OUTPATIENT)
Dept: OCCUPATIONAL THERAPY | Facility: CLINIC | Age: 79
DRG: 470 | End: 2019-05-21
Attending: ORTHOPAEDIC SURGERY
Payer: COMMERCIAL

## 2019-05-21 ENCOUNTER — APPOINTMENT (OUTPATIENT)
Dept: PHYSICAL THERAPY | Facility: CLINIC | Age: 79
DRG: 470 | End: 2019-05-21
Attending: ORTHOPAEDIC SURGERY
Payer: COMMERCIAL

## 2019-05-21 LAB
CREAT SERPL-MCNC: 1.2 MG/DL (ref 0.66–1.25)
GFR SERPL CREATININE-BSD FRML MDRD: 58 ML/MIN/{1.73_M2}
GLUCOSE SERPL-MCNC: 146 MG/DL (ref 70–99)
HGB BLD-MCNC: 13.3 G/DL (ref 13.3–17.7)
PLATELET # BLD AUTO: 157 10E9/L (ref 150–450)

## 2019-05-21 PROCEDURE — 82565 ASSAY OF CREATININE: CPT | Performed by: ORTHOPAEDIC SURGERY

## 2019-05-21 PROCEDURE — 97535 SELF CARE MNGMENT TRAINING: CPT | Mod: GO

## 2019-05-21 PROCEDURE — 25000132 ZZH RX MED GY IP 250 OP 250 PS 637: Performed by: PHYSICIAN ASSISTANT

## 2019-05-21 PROCEDURE — 82947 ASSAY GLUCOSE BLOOD QUANT: CPT | Performed by: ORTHOPAEDIC SURGERY

## 2019-05-21 PROCEDURE — 25000132 ZZH RX MED GY IP 250 OP 250 PS 637: Performed by: ORTHOPAEDIC SURGERY

## 2019-05-21 PROCEDURE — 99231 SBSQ HOSP IP/OBS SF/LOW 25: CPT | Performed by: PHYSICIAN ASSISTANT

## 2019-05-21 PROCEDURE — 97161 PT EVAL LOW COMPLEX 20 MIN: CPT | Mod: GP | Performed by: PHYSICAL THERAPIST

## 2019-05-21 PROCEDURE — 25000128 H RX IP 250 OP 636: Performed by: ORTHOPAEDIC SURGERY

## 2019-05-21 PROCEDURE — 97165 OT EVAL LOW COMPLEX 30 MIN: CPT | Mod: GO

## 2019-05-21 PROCEDURE — 12000000 ZZH R&B MED SURG/OB

## 2019-05-21 PROCEDURE — 36415 COLL VENOUS BLD VENIPUNCTURE: CPT | Performed by: ORTHOPAEDIC SURGERY

## 2019-05-21 PROCEDURE — 97116 GAIT TRAINING THERAPY: CPT | Mod: GP | Performed by: PHYSICAL THERAPIST

## 2019-05-21 PROCEDURE — 40000193 ZZH STATISTIC PT WARD VISIT: Performed by: PHYSICAL THERAPIST

## 2019-05-21 PROCEDURE — 40000274 ZZH STATISTIC RCP CONSULT EA 30 MIN

## 2019-05-21 PROCEDURE — 85018 HEMOGLOBIN: CPT | Performed by: ORTHOPAEDIC SURGERY

## 2019-05-21 PROCEDURE — 97110 THERAPEUTIC EXERCISES: CPT | Mod: GP | Performed by: PHYSICAL THERAPIST

## 2019-05-21 PROCEDURE — 85049 AUTOMATED PLATELET COUNT: CPT | Performed by: ORTHOPAEDIC SURGERY

## 2019-05-21 RX ORDER — KETOROLAC TROMETHAMINE 15 MG/ML
15 INJECTION, SOLUTION INTRAMUSCULAR; INTRAVENOUS EVERY 8 HOURS PRN
Status: DISCONTINUED | OUTPATIENT
Start: 2019-05-21 | End: 2019-05-22 | Stop reason: HOSPADM

## 2019-05-21 RX ORDER — ALBUTEROL SULFATE 0.83 MG/ML
2.5 SOLUTION RESPIRATORY (INHALATION)
Status: DISCONTINUED | OUTPATIENT
Start: 2019-05-21 | End: 2019-05-22 | Stop reason: HOSPADM

## 2019-05-21 RX ORDER — HYDROCODONE BITARTRATE AND ACETAMINOPHEN 5; 325 MG/1; MG/1
1-2 TABLET ORAL EVERY 4 HOURS PRN
Status: DISCONTINUED | OUTPATIENT
Start: 2019-05-21 | End: 2019-05-22 | Stop reason: HOSPADM

## 2019-05-21 RX ORDER — HYDROXYZINE HYDROCHLORIDE 25 MG/1
25-50 TABLET, FILM COATED ORAL EVERY 4 HOURS PRN
Status: DISCONTINUED | OUTPATIENT
Start: 2019-05-21 | End: 2019-05-22 | Stop reason: HOSPADM

## 2019-05-21 RX ADMIN — SENNOSIDES AND DOCUSATE SODIUM 1 TABLET: 8.6; 5 TABLET ORAL at 09:34

## 2019-05-21 RX ADMIN — ENOXAPARIN SODIUM 40 MG: 40 INJECTION SUBCUTANEOUS at 12:27

## 2019-05-21 RX ADMIN — HYDROCODONE BITARTRATE AND ACETAMINOPHEN 1 TABLET: 5; 325 TABLET ORAL at 22:33

## 2019-05-21 RX ADMIN — ATENOLOL 50 MG: 50 TABLET ORAL at 09:33

## 2019-05-21 RX ADMIN — CEFAZOLIN SODIUM 2 G: 2 INJECTION, SOLUTION INTRAVENOUS at 03:39

## 2019-05-21 RX ADMIN — FUROSEMIDE 40 MG: 40 TABLET ORAL at 20:19

## 2019-05-21 RX ADMIN — KETOROLAC TROMETHAMINE 15 MG: 15 INJECTION, SOLUTION INTRAMUSCULAR; INTRAVENOUS at 17:11

## 2019-05-21 RX ADMIN — OMEPRAZOLE 20 MG: 20 CAPSULE, DELAYED RELEASE ORAL at 20:20

## 2019-05-21 RX ADMIN — FLUTICASONE PROPIONATE 2 SPRAY: 50 SPRAY, METERED NASAL at 09:37

## 2019-05-21 RX ADMIN — SENNOSIDES AND DOCUSATE SODIUM 1 TABLET: 8.6; 5 TABLET ORAL at 20:20

## 2019-05-21 RX ADMIN — HYDROCODONE BITARTRATE AND ACETAMINOPHEN 2 TABLET: 5; 325 TABLET ORAL at 18:30

## 2019-05-21 RX ADMIN — DOXAZOSIN 2 MG: 2 TABLET ORAL at 22:32

## 2019-05-21 RX ADMIN — HYDROMORPHONE HYDROCHLORIDE 4 MG: 2 TABLET ORAL at 05:15

## 2019-05-21 RX ADMIN — HYDROXYZINE HYDROCHLORIDE 10 MG: 10 TABLET ORAL at 01:43

## 2019-05-21 RX ADMIN — HYDROMORPHONE HYDROCHLORIDE 4 MG: 2 TABLET ORAL at 01:43

## 2019-05-21 RX ADMIN — PSYLLIUM HUSK 1 PACKET: 3.4 POWDER ORAL at 09:32

## 2019-05-21 RX ADMIN — HYDROCODONE BITARTRATE AND ACETAMINOPHEN 1 TABLET: 5; 325 TABLET ORAL at 10:10

## 2019-05-21 RX ADMIN — DULOXETINE HYDROCHLORIDE 120 MG: 30 CAPSULE, DELAYED RELEASE ORAL at 16:54

## 2019-05-21 RX ADMIN — SIMVASTATIN 20 MG: 20 TABLET, FILM COATED ORAL at 22:32

## 2019-05-21 RX ADMIN — HYDROMORPHONE HYDROCHLORIDE 0.5 MG: 1 INJECTION, SOLUTION INTRAMUSCULAR; INTRAVENOUS; SUBCUTANEOUS at 16:54

## 2019-05-21 RX ADMIN — GABAPENTIN 1200 MG: 600 TABLET, FILM COATED ORAL at 22:34

## 2019-05-21 RX ADMIN — GABAPENTIN 100 MG: 100 CAPSULE ORAL at 13:41

## 2019-05-21 RX ADMIN — ACETAMINOPHEN 975 MG: 325 TABLET, FILM COATED ORAL at 07:04

## 2019-05-21 RX ADMIN — FEXOFENADINE HYDROCHLORIDE 180 MG: 180 TABLET, FILM COATED ORAL at 09:37

## 2019-05-21 RX ADMIN — GABAPENTIN 100 MG: 100 CAPSULE ORAL at 09:32

## 2019-05-21 RX ADMIN — FERROUS SULFATE TAB 325 MG (65 MG ELEMENTAL FE) 325 MG: 325 (65 FE) TAB at 09:33

## 2019-05-21 RX ADMIN — HYDROCODONE BITARTRATE AND ACETAMINOPHEN 2 TABLET: 5; 325 TABLET ORAL at 13:41

## 2019-05-21 RX ADMIN — FLUTICASONE FUROATE 1 PUFF: 200 POWDER RESPIRATORY (INHALATION) at 09:38

## 2019-05-21 RX ADMIN — FUROSEMIDE 40 MG: 40 TABLET ORAL at 09:33

## 2019-05-21 RX ADMIN — HYDROMORPHONE HYDROCHLORIDE 0.3 MG: 1 INJECTION, SOLUTION INTRAMUSCULAR; INTRAVENOUS; SUBCUTANEOUS at 15:04

## 2019-05-21 RX ADMIN — GABAPENTIN 100 MG: 100 CAPSULE ORAL at 20:19

## 2019-05-21 RX ADMIN — POTASSIUM CHLORIDE 20 MEQ: 1500 TABLET, EXTENDED RELEASE ORAL at 09:33

## 2019-05-21 RX ADMIN — OMEPRAZOLE 20 MG: 20 CAPSULE, DELAYED RELEASE ORAL at 09:34

## 2019-05-21 ASSESSMENT — ACTIVITIES OF DAILY LIVING (ADL)
ADLS_ACUITY_SCORE: 15
ADLS_ACUITY_SCORE: 15
ADLS_ACUITY_SCORE: 17
ADLS_ACUITY_SCORE: 15

## 2019-05-21 NOTE — PLAN OF CARE
Discharge Planner OT   Patient plan for discharge: Home with spouse    Current status: Eval complete. Participated in education on techniques for ADLs. Following treatment pt able to demo ADLs with modified independence-SBA. States wfie can assist with socks as needed- not interested in AE.     Barriers to return to prior living situation: None    Recommendations for discharge: Home with spouse    Rationale for recommendations: Doing well POD #1, has assist from spouse as needed. All IP OT goals met    Occupational Therapy Discharge Summary    Reason for therapy discharge:    All goals and outcomes met, no further needs identified.    Progress towards therapy goal(s). See goals on Care Plan in [facility] electronic health record for goal details.  Goals met    Therapy recommendation(s):    No further OT needs identified at this time.             Entered by: Jud Prak 05/21/2019 12:28 PM

## 2019-05-21 NOTE — PROGRESS NOTES
Discharge Planner PT   Patient plan for discharge: Home  Current status: Rikki completed- see functional status below  Barriers to return to prior living situation: none anticipated  Recommendations for discharge: home, outpatient PT  Rationale for recommendations: POD 1 status       Entered by: Shannon Davenport 05/21/2019 10:52 AM      05/21/19 0800   Quick Adds   Type of Visit Initial PT Evaluation   Living Environment   Lives With spouse   Living Arrangements other (see comments)  ([facility]); one step to enter   Home Accessibility no concerns   Self-Care   Regular Exercise Yes   Activity/Exercise/Self-Care Comment treadmill, pool ex, Nu step    Functional Level Prior   Ambulation 0-->independent   Transferring 0-->independent   Toileting 0-->independent   Bathing 0-->independent   Communication 0-->understands/communicates without difficulty   Swallowing 0-->swallows foods/liquids without difficulty   Cognition 0 - no cognition issues reported   Fall history within last six months no   Which of the above functional risks had a recent onset or change? ambulation   Prior Functional Level Comment PLOF- Pt reports indep. ambulation with no device   General Information   Onset of Illness/Injury or Date of Surgery - Date 05/20/19   Referring Physician Cameron   Patient/Family Goals Statement Pt w/ goal of Returning home   Pertinent History of Current Problem (include personal factors and/or comorbidities that impact the POC) Primary osteoarthrosis, right knee. ; s/p  right total knee arthroplasty   Weight-Bearing Status - RLE weight-bearing as tolerated   General Observations Pt alert- sitting at EOB- reports pain at 5/10    Pain Assessment   Patient Currently in Pain Yes, see Vital Sign flowsheet   Integumentary/Edema   Integumentary/Edema Comments NT- compression ace wrap, hemovac  in place   Right Knee Extension/Flexion ROM   Right Knee Extension/Flexion AROM - degrees   (5-66 degrees )   Bed Mobility   Bed Mobility  "Comments SBA to indep  with sitting> supine   Transfer Skills   Transfer Comments SBA sit> stand w/ RW. SBA    Gait   Gait Comments Pt instructed onWBAT, gait sequence. AMb 60 feet x1 wiith PUW. SBA. steady gait w/ PUW.   Discussed  ht adjustment for home walker    Balance   Balance Comments good dynamic standing balance   Sensory Examination   Sensory Perception no deficits were identified   General Therapy Interventions   Planned Therapy Interventions gait training;ROM;strengthening;home program guidelines   Clinical Impression   Criteria for Skilled Therapeutic Intervention yes, treatment indicated   PT Diagnosis right total knee arthroplasty   Influenced by the following impairments Decreased ROM/ strength, Pain   Functional limitations due to impairments ALtered mobility   Clinical Presentation Stable/Uncomplicated   Clinical Presentation Rationale clinical judgment   Clinical Decision Making (Complexity) Low complexity   Therapy Frequency` 2 times/day   Predicted Duration of Therapy Intervention (days/wks) 1 day   Anticipated Equipment Needs at Discharge front wheeled walker   Anticipated Discharge Disposition Home with Outpatient Therapy   Risk & Benefits of therapy have been explained Yes   Patient, Family & other staff in agreement with plan of care Yes   [facility] AM-PAC  \"6 Clicks\" V.2 Basic Mobility Inpatient Short Form   1. Turning from your back to your side while in a flat bed without using bedrails? 4 - None   2. Moving from lying on your back to sitting on the side of a flat bed without using bedrails? 3 - A Little   3. Moving to and from a bed to a chair (including a wheelchair)? 3 - A Little   4. Standing up from a chair using your arms (e.g., wheelchair, or bedside chair)? 3 - A Little   5. To walk in hospital room? 3 - A Little   6. Climbing 3-5 steps with a railing? 3 - A Little   Basic Mobility Raw Score (Score out of 24.Lower scores equate to lower levels of function) 19   Total " Evaluation Time   Total Evaluation Time (Minutes) 10

## 2019-05-21 NOTE — PROGRESS NOTES
"[facility] Orthopaedics Progress Note      Post-operative Day: 1 Day Post-Op    Procedure(s):  ARTHROPLASTY, KNEE      Subjective:    Pain: minimal - slightly increased after working with PT. Pt states he has had better luck in past with hydrocodone, requests change in medication to this  Chest pain, SOB:  No      Objective:  Blood pressure 128/78, pulse 74, temperature 98.1  F (36.7  C), temperature source Oral, resp. rate 18, height 1.715 m (5' 7.5\"), weight 112.5 kg (248 lb), SpO2 95 %.    Patient Vitals for the past 24 hrs:   BP Temp Temp src Pulse Heart Rate Resp SpO2 Height Weight   05/21/19 0731 128/78 98.1  F (36.7  C) Oral 74 -- 18 95 % -- --   05/21/19 0422 134/79 -- -- 77 -- 18 93 % -- --   05/21/19 0003 119/65 99  F (37.2  C) Oral 73 -- 18 92 % -- --   05/20/19 1725 126/67 98.6  F (37  C) Oral 62 -- 18 92 % -- --   05/20/19 1503 116/77 97.7  F (36.5  C) Oral 55 -- 18 94 % -- --   05/20/19 1502 -- -- -- -- -- -- -- 1.715 m (5' 7.5\") --   05/20/19 1429 128/84 -- -- -- 54 16 96 % -- --   05/20/19 1413 124/79 -- -- -- 55 16 97 % -- --   05/20/19 1406 -- -- -- -- -- -- 98 % -- --   05/20/19 1348 119/76 -- -- -- 55 16 97 % -- --   05/20/19 1332 116/79 -- -- -- 56 16 95 % -- --   05/20/19 1319 99/75 -- -- -- 60 16 96 % -- --   05/20/19 1312 108/72 -- -- -- 61 16 97 % -- --   05/20/19 1303 97/71 97.5  F (36.4  C) Oral -- 63 16 95 % -- --   05/20/19 1058 154/89 98.2  F (36.8  C) Oral -- 64 16 98 % -- 112.5 kg (248 lb)       Wt Readings from Last 4 Encounters:   05/20/19 112.5 kg (248 lb)   07/30/12 112.5 kg (248 lb)   09/10/11 118.4 kg (261 lb)         Motor function, sensation, and circulation intact   Yes  Wound status: incisions are clean dry and intact. dressings c/d/i  Calf tenderness: Bilateral  No    Pertinent Labs   Lab Results: personally reviewed.     Recent Labs   Lab Test 05/21/19  0525 09/01/16  0423 03/21/14 2040 09/06/13  0634  09/10/11  0045   INR  --   --   --   --   --  1.03   HGB 13.3 13.1* " 9.8* 11.8*   < > 14.6   HCT  --  40.1 32.7* 38.1*   < > 44.6   MCV  --  94 75* 86   < > 93    155 178 231   < > 144*   NA  --  140 135 138   < > 132*    < > = values in this interval not displayed.       Plan: Anticoagulation protocol: Lovenox inpatient and then  mg daily at discharge  x 42  days            Pain medications:  dilaudid, tylenol and vistaril currently - will discharge dilaudid and Tylenol and initiate Vicodin 1-2tabs q4h PRN            Weight bearing status:  WBAT            Disposition:  Home w/outpt services anticipated Wednesday             Continue cares and rehabilitation     Report completed by:  Maribell Jones PA-C  Date: 5/21/2019  Time: 9:03 AM

## 2019-05-21 NOTE — PROGRESS NOTES
"   05/21/19 1000   Quick Adds   Type of Visit Initial Occupational Therapy Evaluation   Living Environment   Lives With spouse   Living Arrangements other (see comments)  ([facility])   Home Accessibility no concerns   Living Environment Comment walk-in shower with built in shower chair.    Self-Care   Regular Exercise Yes   Equipment Currently Used at Home raised toilet;shower chair   Activity/Exercise/Self-Care Comment treadmill, pool ex, Nu step.    Functional Level   Ambulation 0-->independent   Transferring 0-->independent   Toileting 0-->independent  (has sink right next to it. )   Bathing 0-->independent   Dressing 2-->assistive person  (wife assists with socks/shoes at baseline. )   Eating 0-->independent   Communication 0-->understands/communicates without difficulty   Swallowing 0-->swallows foods/liquids without difficulty   Cognition 0 - no cognition issues reported   Fall history within last six months yes   Number of times patient has fallen within last six months 1   Prior Functional Level Comment fall 3 weeks ago. able to get up from fall.    General Information   Onset of Illness/Injury or Date of Surgery - Date 05/20/19   Referring Physician Reynaldo Barnes MD   Patient/Family Goals Statement to return home with assist from wife.    Additional Occupational Profile Info/Pertinent History of Current Problem s/p R TKA   Weight-Bearing Status - RLE weight-bearing as tolerated   Cognitive Status Examination   Orientation orientation to person, place and time   Pain Assessment   Patient Currently in Pain Yes, see Vital Sign flowsheet  (\"5/10\")   Transfer Skill: Bed to Chair/Chair to Bed   Level of [place]: Bed to Chair stand-by assist   Physical Assist/Nonphysical Assist: Bed to Chair supervision   Weight-Bearing Restrictions weight-bearing as tolerated   Assistive Device - Transfer Skill Bed to Chair Chair to Bed Rehab Eval standard walker   Transfer Skill: Sit to Stand   Level of " "[place]: Sit/Stand stand-by assist   Physical Assist/Nonphysical Assist: Sit/Stand supervision   Transfer Skill: Sit to Stand weight-bearing as tolerated   Assistive Device for Transfer: Sit/Stand standard walker   Transfer Skill: Toilet Transfer   Level of [place]: Toilet stand-by assist   Physical Assist/Nonphysical Assist: Toilet supervision   Weight-Bearing Restrictions: Toilet weight-bearing as tolerated   Assistive Device standard walker   Upper Body Dressing   Level of [place]: Dress Upper Body independent   Lower Body Dressing   Level of [place]: Dress Lower Body   (able to demo SBA for pants/underwear. )   Instrumental Activities of Daily Living (IADL)   IADL Comments wife states she can assist with IADLs upon discharge.    Activities of Daily Living Analysis   Impairments Contributing to Impaired Activities of Daily Living pain;post surgical precautions   General Therapy Interventions   Planned Therapy Interventions ADL retraining   Clinical Impression   Criteria for Skilled Therapeutic Interventions Met yes, treatment indicated   OT Diagnosis decreased independence with ADLs and functional mobility   Influenced by the following impairments decreased ROM in knee, pain   Assessment of Occupational Performance 1-3 Performance Deficits   Identified Performance Deficits LB dressing, car transfer shower transfer   Clinical Decision Making (Complexity) Low complexity   Therapy Frequency daily   Predicted Duration of Therapy Intervention (days/wks) 1x treat   Anticipated Discharge Disposition Home with Assist   Risks and Benefits of Treatment have been explained. Yes   Patient, Family & other staff in agreement with plan of care Yes   [facility] AM-PAC  \"6 Clicks\" Daily Activity Inpatient Short Form   1. Putting on and taking off regular lower body clothing? 3 - A Little   2. Bathing (including washing, rinsing, drying)? 3 - A Little   3. Toileting, which includes using toilet, bedpan " or urinal? 4 - None   4. Putting on and taking off regular upper body clothing? 4 - None   5. Taking care of personal grooming such as brushing teeth? 4 - None   6. Eating meals? 4 - None   Daily Activity Raw Score (Score out of 24.Lower scores equate to lower levels of function) 22   Total Evaluation Time   Total Evaluation Time (Minutes) 6

## 2019-05-21 NOTE — PLAN OF CARE
Alert and oriented. VSS. Up with standby assist w/walker.  new dressing applied, C/D/I. Hemovac accidentally removed by patient. Reported inadequate pain control, Md paged and PRN dose of Toradol received and given to the patient with stated adequate results.  Will continue to apply cold and use of IS.

## 2019-05-21 NOTE — PROGRESS NOTES
[facility] Medicine Progress Note  Date of Service: 05/21/2019    Assessment & Plan   Noe Goetz [facility] is a 79 year old male who presented on 5/20/2019 for scheduled Procedure(s):  ARTHROPLASTY, KNEE by Reynaldo Barnes MD and is being followed by the hospital medicine service for co-management of acute and/or chronic perioperative medical problems.      S/p Procedure(s):  ARTHROPLASTY, KNEE   1 Day Post-Op    - pain control, wound cares, physical therapy, occupational therapy and DVT prophylaxis per orthopedic surgery service    CAD   Stress echo 10/1/14 showed no evidence of stress-induced ischemia.  - Continue PTA atenolol 50 mg, doxazosin.  - Resume  mg.    COPD with chronic bronchitis   No PFTs available in Epic. Managed at home with albuterol MDI and Advair.   - Albuterol nebs PRN  - Continue Advair    Essential hypertension  Outpatient BP is reviewed, appears well controlled with atenolol 50 mg daily, Lasix 40 mg daily, and doxazosin 4 mg.  - Continue atenolol, Lasix, doxazosin.    Intractable back pain  Cervical spondylarthritis  - Continue Cymbalta.  - Further pain management per ortho.    Esophageal reflux  - Continue omeprazole 20 mg.    Hyperlipidemia  Managed with lovastatin 40 mg at home.  Switch to simvastatin 20 mg for admission due to formulary.  - Simvastatin 20 mg.    Sleep apnea  Brought CPAP from home.  - Use home CPAP.        DVT Prophylaxis: as per orthopedic surgery service - Lovenox inpatient, then Aspirin 325 mg daily x 42 days  Code Status: Full Code    Lines: Left PIV   Kennedy catheter: No    Discussion: Medically, the patient appears well and stable.    Disposition: Anticipate discharge 1-2 days.     Discussed assessment and plan with Dr. Ramos.    Attestation:  I have reviewed today's vital signs, notes, medications, labs and imaging.    Jordan Ochoa PA-C       Interval History   Slept well overnight, some knee pain last night  but improving this morning.  Pain is been well controlled.  He describes some intermittent wheezing is responding well to his home albuterol MDI, does seem a bit worse than it has been at home.  No fevers, chills, cough, rhinorrhea, or nasal congestion.  Lungs are clear.    Producing urine and flatus and reports a small bowel movement this morning.  Tolerating p.o. without difficulty.    Denies headache, dizziness, lightheadedness, chest pain, palpitations, cough, dyspnea, nausea, vomiting, abdominal pain, numbness/tingling in extremities.    Physical Exam   Temp:  [97.5  F (36.4  C)-99  F (37.2  C)] 98.1  F (36.7  C)  Pulse:  [55-77] 74  Heart Rate:  [54-64] 54  Resp:  [16-18] 18  BP: ()/(65-89) 128/78  SpO2:  [92 %-98 %] 95 %    Weights:   Vitals:    05/20/19 1058   Weight: 112.5 kg (248 lb)    Body mass index is 38.27 kg/m .    General: Appears calm, comfortable. Answers questions quickly and appropriately with clear speech.  CV: RRR, clear S1/S2 without murmur, rub, or gallop.  Respiratory: CTA bilaterally. Normal respiratory effort.  GI: Soft, nontender. Bowel sounds active.  Skin: Warm and dry. Dressing on right knee is clean and dry, no surrounding erythema.  Musculoskeletal: Moving all extremities well except decreased movement in right leg due to pain and dressing.  Toes are pink and warm bilaterally. Negative Rosanne's bilaterally.  Neuro: Memory and cognition appear normal. CN II - XII grossly intact. Symmetrical extremity strength. Sensation grossly intact to light touch in all extremities.     Data   Recent Labs   Lab 05/21/19  0525   HGB 13.3      CR 1.20   *       Recent Labs   Lab 05/21/19  0525   *        Unresulted Labs Ordered in the Past 30 Days of this Admission     No orders found for last 61 day(s).           Imaging  No results found for this or any previous visit (from the past 24 hour(s)).     I reviewed all new labs and imaging results over the last 24 hours. I  personally reviewed the EKG tracing showing SB without ischemic changes.    Medications     dextrose 5% and 0.45% NaCl + KCl 20 mEq/L 125 mL/hr at 05/20/19 2116       atenolol  50 mg Oral Daily     doxazosin  2 mg Oral At Bedtime     DULoxetine  120 mg Oral QPM     enoxaparin  40 mg Subcutaneous Q24H     ferrous sulfate  325 mg Oral Daily with breakfast     fexofenadine  180 mg Oral Daily     fluticasone  1 puff Inhalation Daily     fluticasone  2 spray Both Nostrils Daily     fluticasone-salmeterol  1 puff Inhalation Q12H     furosemide  40 mg Oral BID     gabapentin  100 mg Oral TID     gabapentin  1,200 mg Oral At Bedtime     omeprazole  20 mg Oral BID     potassium chloride  20 mEq Oral Daily     psyllium  1 packet Oral Daily     senna-docusate  1 tablet Oral BID    Or     senna-docusate  2 tablet Oral BID     simvastatin  20 mg Oral At Bedtime     sodium chloride (PF)  3 mL Intracatheter Q8H       Jordan Ochoa PA-C

## 2019-05-22 ENCOUNTER — APPOINTMENT (OUTPATIENT)
Dept: PHYSICAL THERAPY | Facility: CLINIC | Age: 79
DRG: 470 | End: 2019-05-22
Attending: ORTHOPAEDIC SURGERY
Payer: COMMERCIAL

## 2019-05-22 VITALS
HEIGHT: 68 IN | SYSTOLIC BLOOD PRESSURE: 130 MMHG | OXYGEN SATURATION: 92 % | TEMPERATURE: 98.1 F | HEART RATE: 72 BPM | DIASTOLIC BLOOD PRESSURE: 72 MMHG | BODY MASS INDEX: 37.59 KG/M2 | RESPIRATION RATE: 20 BRPM | WEIGHT: 248 LBS

## 2019-05-22 LAB — HGB BLD-MCNC: 12.7 G/DL (ref 13.3–17.7)

## 2019-05-22 PROCEDURE — 97116 GAIT TRAINING THERAPY: CPT | Mod: GP | Performed by: PHYSICAL THERAPIST

## 2019-05-22 PROCEDURE — 25000132 ZZH RX MED GY IP 250 OP 250 PS 637: Performed by: ORTHOPAEDIC SURGERY

## 2019-05-22 PROCEDURE — 99231 SBSQ HOSP IP/OBS SF/LOW 25: CPT | Performed by: PHYSICIAN ASSISTANT

## 2019-05-22 PROCEDURE — 40000193 ZZH STATISTIC PT WARD VISIT: Performed by: PHYSICAL THERAPIST

## 2019-05-22 PROCEDURE — 25000132 ZZH RX MED GY IP 250 OP 250 PS 637: Performed by: PHYSICIAN ASSISTANT

## 2019-05-22 PROCEDURE — 85018 HEMOGLOBIN: CPT | Performed by: ORTHOPAEDIC SURGERY

## 2019-05-22 PROCEDURE — 25000128 H RX IP 250 OP 636: Performed by: ORTHOPAEDIC SURGERY

## 2019-05-22 PROCEDURE — 36415 COLL VENOUS BLD VENIPUNCTURE: CPT | Performed by: ORTHOPAEDIC SURGERY

## 2019-05-22 RX ORDER — AMOXICILLIN 250 MG
1-2 CAPSULE ORAL 2 TIMES DAILY
COMMUNITY
Start: 2019-05-22

## 2019-05-22 RX ORDER — ASPIRIN 325 MG
325 TABLET ORAL DAILY
Qty: 40 TABLET | Refills: 0 | Status: SHIPPED | OUTPATIENT
Start: 2019-05-22 | End: 2024-01-01

## 2019-05-22 RX ORDER — HYDROCODONE BITARTRATE AND ACETAMINOPHEN 5; 325 MG/1; MG/1
1-2 TABLET ORAL EVERY 4 HOURS PRN
Qty: 60 TABLET | Refills: 0 | Status: SHIPPED | OUTPATIENT
Start: 2019-05-22 | End: 2024-01-01

## 2019-05-22 RX ORDER — HYDROXYZINE HYDROCHLORIDE 25 MG/1
25-50 TABLET, FILM COATED ORAL EVERY 6 HOURS PRN
Qty: 60 TABLET | Refills: 1 | Status: SHIPPED | OUTPATIENT
Start: 2019-05-22 | End: 2024-01-01

## 2019-05-22 RX ADMIN — ENOXAPARIN SODIUM 40 MG: 40 INJECTION SUBCUTANEOUS at 11:27

## 2019-05-22 RX ADMIN — FEXOFENADINE HYDROCHLORIDE 180 MG: 180 TABLET, FILM COATED ORAL at 07:54

## 2019-05-22 RX ADMIN — ATENOLOL 50 MG: 50 TABLET ORAL at 07:53

## 2019-05-22 RX ADMIN — HYDROCODONE BITARTRATE AND ACETAMINOPHEN 2 TABLET: 5; 325 TABLET ORAL at 07:53

## 2019-05-22 RX ADMIN — SENNOSIDES AND DOCUSATE SODIUM 1 TABLET: 8.6; 5 TABLET ORAL at 07:53

## 2019-05-22 RX ADMIN — PSYLLIUM HUSK 1 PACKET: 3.4 POWDER ORAL at 07:52

## 2019-05-22 RX ADMIN — FUROSEMIDE 40 MG: 40 TABLET ORAL at 07:53

## 2019-05-22 RX ADMIN — HYDROXYZINE HYDROCHLORIDE 25 MG: 25 TABLET ORAL at 01:38

## 2019-05-22 RX ADMIN — FERROUS SULFATE TAB 325 MG (65 MG ELEMENTAL FE) 325 MG: 325 (65 FE) TAB at 07:53

## 2019-05-22 RX ADMIN — HYDROCODONE BITARTRATE AND ACETAMINOPHEN 2 TABLET: 5; 325 TABLET ORAL at 11:52

## 2019-05-22 RX ADMIN — GABAPENTIN 100 MG: 100 CAPSULE ORAL at 07:53

## 2019-05-22 RX ADMIN — HYDROCODONE BITARTRATE AND ACETAMINOPHEN 1 TABLET: 5; 325 TABLET ORAL at 01:36

## 2019-05-22 RX ADMIN — POTASSIUM CHLORIDE 20 MEQ: 1500 TABLET, EXTENDED RELEASE ORAL at 07:53

## 2019-05-22 RX ADMIN — OMEPRAZOLE 20 MG: 20 CAPSULE, DELAYED RELEASE ORAL at 07:53

## 2019-05-22 RX ADMIN — FLUTICASONE PROPIONATE 2 SPRAY: 50 SPRAY, METERED NASAL at 07:52

## 2019-05-22 ASSESSMENT — ACTIVITIES OF DAILY LIVING (ADL)
ADLS_ACUITY_SCORE: 17

## 2019-05-22 NOTE — DISCHARGE SUMMARY
[facility] Orthopedics Discharge Summary                                  [facility]     [place] [facility] [id number]   Age: 79 year old  PCP: Semmler, Steven Duane, [phone] 1940     Date of Admission:  5/20/2019  Date of Discharge::  5/22/2019  Discharge Physician:  Maribell Jones    Code status:  Full Code    Admission Information:  Admission Diagnosis:  RIGHT KNEE OSTEOARTHROSIS  Right knee DJD    Post-Operative Day: 2 Days Post-Op     Reason for admission:  The patient was admitted for the following:Procedure(s) (LRB):  ARTHROPLASTY, KNEE (Right)    Active Problems:    Intractable back pain    Back pain    Right knee DJD    CAD (coronary artery disease)    Cervical spondylarthritis    COPD with chronic bronchitis (H)    Esophageal reflux    Essential hypertension    Hyperlipidemia    Sleep apnea    History of alcohol abuse      Allergies:  Nkda [no known drug allergies]    Following the procedure noted above the patient was transferred to the post-op floor and started on:    Therapy:  physical therapy and occupational therapy  Anticoagulation Plan: Lovenox inpatient and then  mg daily at discharge  for 42 days  Pain Management: norco and vistaril  Weight bearing status: Weight bearing as tolerated     The patient was followed and co-managed by the hospitalist service during the inpatient treatment course  Complications:  None  Consultations:  None     Pertinent Labs   Lab Results: personally reviewed.     Recent Labs   Lab Test 05/22/19  0501 05/21/19  0525 09/01/16  0423 03/21/14  2040 09/06/13  0634  09/10/11  0045   INR  --   --   --   --   --   --  1.03   HGB 12.7* 13.3 13.1* 9.8* 11.8*   < > 14.6   HCT  --   --  40.1 32.7* 38.1*   < > 44.6   MCV  --   --  94 75* 86   < > 93   PLT  --  157 155 178 231   < > 144*   NA  --   --  140 135 138   < > 132*    < > = values in this interval not displayed.          Discharge Information:  Condition at discharge:  Stable  Discharge destination:  Discharged to home     Medications at discharge:  Current Discharge Medication List      START taking these medications    Details   hydrOXYzine (ATARAX) 25 MG tablet Take 1-2 tablets (25-50 mg) by mouth every 6 hours as needed for itching or other (For breakthrough pain.  Pain rating 1-5 give 25mg; pain rating 6-10 give 50mg)  Qty: 60 tablet, Refills: 1    Associated Diagnoses: Primary osteoarthritis of right knee      order for DME Equipment being ordered: Walker ()  Treatment Diagnosis: R knee osteoarthritis s/p R TKA  Qty: 1 Units, Refills: 0    Associated Diagnoses: Primary osteoarthritis of right knee      senna-docusate (SENOKOT-S/PERICOLACE) 8.6-50 MG tablet Take 1-2 tablets by mouth 2 times daily    Associated Diagnoses: Primary osteoarthritis of right knee         CONTINUE these medications which have CHANGED    Details   aspirin (ASA) 325 MG tablet Take 1 tablet (325 mg) by mouth daily  Qty: 40 tablet, Refills: 0    Associated Diagnoses: Primary osteoarthritis of right knee      HYDROcodone-acetaminophen (NORCO) 5-325 MG tablet Take 1-2 tablets by mouth every 4 hours as needed for severe pain  Qty: 60 tablet, Refills: 0    Associated Diagnoses: Primary osteoarthritis of right knee         CONTINUE these medications which have NOT CHANGED    Details   albuterol 90 MCG/ACT inhaler Inhale 2 puffs into the lungs every 6 hours as needed.        ATENOLOL 50 MG OR TABS 1 TABLET ORALLY DAILY      doxazosin (CARDURA) 4 MG tablet Take 2 mg by mouth At Bedtime       DULOXETINE HCL PO Take 120 mg by mouth every evening      ferrous sulfate (IRON) 325 (65 FE) MG tablet Take 325 mg by mouth daily (with breakfast)      FEXOFENADINE HCL PO Take 180 mg by mouth daily      fluticasone (FLONASE) 50 MCG/ACT nasal spray Spray 2 sprays into both nostrils daily      fluticasone-salmeterol (ADVAIR) 250-50 MCG/DOSE inhaler Inhale 1 puff into the lungs every 12 hours      FUROSEMIDE PO Take 40  mg by mouth 2 times daily Am and 5 pm      GABAPENTIN PO Take 1,200 mg by mouth At Bedtime      LOVASTATIN PO Take 40 mg by mouth At Bedtime.        multivitamin  with lutein (OCUVITE WITH LTEIN) CAPS Take 1 capsule by mouth every evening      Potassium Chloride (KLOR-CON PO) Take 20 mEq by mouth daily      PRILOSEC OTC 20 MG OR TBEC 1 Capsule by mouth TWICE DAILY      psyllium (METAMUCIL) 58.6 % POWD Take 1 Tablespoonful by mouth daily      VITAMIN D, CHOLECALCIFEROL, PO Take 2,000 Units by mouth daily       fluticasone (FLOVENT DISKUS) 250 MCG/BLIST AEPB Inhale 1 puff into the lungs every 12 hours         STOP taking these medications       aspirin (ASA) 81 MG EC tablet Comments:   Reason for Stopping:         oxyCODONE-acetaminophen (PERCOCET) 5-325 MG per tablet Comments:   Reason for Stopping:                          Follow-Up Care:  Patient should be seen in the office in 10-14 days by the Orthopedic Surgeon/Physician Assistant.  Call [phone] for appointment or questions.    Maribell Jones

## 2019-05-22 NOTE — DISCHARGE INSTRUCTIONS
Plan: Anticoagulation protocol: Lovenox inpatient and then  mg daily at discharge  x 42  days            Pain medications:  norco and vistaril            Weight bearing status:  WBAT            Disposition:  Home today             Continue cares and rehabilitation     Make follow up appointment with [facility],

## 2019-05-22 NOTE — PROGRESS NOTES
[facility] Medicine Progress Note  Date of Service: 05/22/2019    Assessment & Plan   Noe Goetz [facility] is a 79 year old male who presented on 5/20/2019 for scheduled Procedure(s):  ARTHROPLASTY, KNEE by Reynaldo Barnes MD and is being followed by the hospital medicine service for co-management of acute and/or chronic perioperative medical problems.      S/p Procedure(s):  ARTHROPLASTY, KNEE   2 Days Post-Op    - pain control, wound cares, physical therapy, occupational therapy and DVT prophylaxis per orthopedic surgery service    CAD   Stress echo 10/1/14 showed no evidence of stress-induced ischemia.  - Continue PTA atenolol 50 mg, doxazosin.  - Resume  mg.    COPD with chronic bronchitis   No PFTs available in Epic. Managed at home with albuterol MDI and Advair.   - Albuterol nebs PRN  - Continue Advair    Essential hypertension  Outpatient BP is reviewed, appears well controlled with atenolol 50 mg daily, Lasix 40 mg daily, and doxazosin 4 mg.  - Continue atenolol, Lasix, doxazosin.    Intractable back pain  Cervical spondylarthritis  - Continue Cymbalta.  - Further pain management per ortho.    Esophageal reflux  - Continue omeprazole 20 mg.    Hyperlipidemia  Managed with lovastatin 40 mg at home.  Switch to simvastatin 20 mg for admission due to formulary.  - Simvastatin 20 mg.    Sleep apnea  Brought CPAP from home.  - Use home CPAP.        DVT Prophylaxis: as per orthopedic surgery service - Lovenox inpatient, then Aspirin 325 mg daily x 42 days  Code Status: Full Code    Lines: Left PIV   Kennedy catheter: No    Discussion: Medically, the patient appears Well and stable. No medical barriers to discharge today.    Disposition: Anticipate discharge this afternoon.     Discussed assessment and plan with Dr. Ramos.    Attestation:  I have reviewed today's vital signs, notes, medications, labs and imaging.    Jordan Ochoa PA-C       Interval History    Slept well overnight.  Pain is well controlled.  Had some mild wheezing after physical therapy this morning which resolved quickly with his home albuterol MDI.    Producing urine and flatus.  Small BM this morning.    Denies fevers, chills, headache, dizziness, chest pain, palpitations, dyspnea, TATUM, numbness/tingling in extremities    Physical Exam   Temp:  [98  F (36.7  C)-98.8  F (37.1  C)] 98.1  F (36.7  C)  Pulse:  [72] 72  Heart Rate:  [62-76] 76  Resp:  [18-20] 20  BP: (130-145)/(72-80) 130/72  SpO2:  [92 %-94 %] 92 %    Weights:   Vitals:    05/20/19 1058   Weight: 112.5 kg (248 lb)    Body mass index is 38.27 kg/m .    General: Appears calm, comfortable. Answers questions quickly and appropriately with clear speech.  CV: RRR, clear S1/S2 without murmur, rub, or gallop.  Respiratory: CTA bilaterally. Normal respiratory effort.  GI: Soft, nontender. Bowel sounds active.  Skin: Warm and dry. Dressing on right knee is clean and dry, no surrounding erythema.  Musculoskeletal: Moving all extremities well except decreased movement in right leg due to pain.  Toes are pink and warm bilaterally. Negative Rosanne's bilaterally.  Neuro: Memory and cognition appear normal. CN II - XII grossly intact. Symmetrical extremity strength. Sensation grossly intact to light touch in all extremities.     Data   Recent Labs   Lab 05/22/19  0501 05/21/19  0525   HGB 12.7* 13.3   PLT  --  157   CR  --  1.20   GLC  --  146*       Recent Labs   Lab 05/21/19  0525   *        Unresulted Labs Ordered in the Past 30 Days of this Admission     No orders found from 3/21/2019 to 5/21/2019.           Imaging  No results found for this or any previous visit (from the past 24 hour(s)).     I reviewed all new labs and imaging results over the last 24 hours. I personally reviewed no images or EKG's today.    Medications     dextrose 5% and 0.45% NaCl + KCl 20 mEq/L 125 mL/hr at 05/20/19 2116       atenolol  50 mg Oral Daily     doxazosin   2 mg Oral At Bedtime     DULoxetine  120 mg Oral QPM     enoxaparin  40 mg Subcutaneous Q24H     ferrous sulfate  325 mg Oral Daily with breakfast     fexofenadine  180 mg Oral Daily     fluticasone  2 spray Both Nostrils Daily     fluticasone-salmeterol  1 puff Inhalation Q12H     furosemide  40 mg Oral BID     gabapentin  100 mg Oral TID     gabapentin  1,200 mg Oral At Bedtime     omeprazole  20 mg Oral BID     potassium chloride  20 mEq Oral Daily     psyllium  1 packet Oral Daily     senna-docusate  1 tablet Oral BID    Or     senna-docusate  2 tablet Oral BID     simvastatin  20 mg Oral At Bedtime     sodium chloride (PF)  3 mL Intracatheter Q8H       Jordan Ochoa PA-C

## 2019-05-22 NOTE — PROGRESS NOTES
"Patient alert & oriented, wearing his own CPAP.  Patient up to BATHROOM independently without help,  \"Could not wait, as it was I didn't make it.\"  Patient awoke during nite stating, \"Only 1 pain pill, I am starting to itch all over my head & back.\"  No redness seen, patient has umesh tone to his skin.  Norco X1 administered with 25 mg Atarax to help with itching.  Pain is much better controlled now.  Encouraged patient not to get up on his own, BA on for safety.  "

## 2019-05-22 NOTE — PLAN OF CARE
Physical Therapy Discharge Summary    Reason for therapy discharge:    Discharged to home.    Progress towards therapy goal(s). See goals on Care Plan in [facility] electronic health record for goal details.  Goals met    Pt ready to discharge to home; modified indep. W/ transfers. AMb 160 feet x1 with RW and SBA; Practiced stair amb-  NBQC and railing, cues for sequence./ SBA   ;.     HEP issued    Therapy recommendation(s):    Continued therapy is recommended.  Rationale/Recommendations:  outpatient PT to progress ROM/ strength and gait activities .

## 2019-05-22 NOTE — PROGRESS NOTES
"[facility] Orthopaedics Progress Note      Post-operative Day: 2 Days Post-Op    Procedure(s):  ARTHROPLASTY, KNEE      Subjective:    Pain: minimal - much better controlled after changing to Norco, tolerating well  Chest pain, SOB:  No      Objective:  Blood pressure 130/72, pulse 72, temperature 98.1  F (36.7  C), temperature source Oral, resp. rate 20, height 1.715 m (5' 7.5\"), weight 112.5 kg (248 lb), SpO2 92 %.    Patient Vitals for the past 24 hrs:   BP Temp Temp src Pulse Heart Rate Resp SpO2   05/22/19 0757 130/72 98.1  F (36.7  C) Oral -- 76 20 --   05/21/19 2243 145/80 98.8  F (37.1  C) Oral 72 -- 18 92 %   05/21/19 1720 -- -- -- -- -- 20 --   05/21/19 1557 145/79 98  F (36.7  C) Oral -- 62 18 94 %   05/21/19 1415 -- -- -- -- -- 18 --   05/21/19 1100 -- -- -- -- -- 18 --       Wt Readings from Last 4 Encounters:   05/20/19 112.5 kg (248 lb)   07/30/12 112.5 kg (248 lb)   09/10/11 118.4 kg (261 lb)         Motor function, sensation, and circulation intact   Yes  Wound status: incisions are clean dry and intact. Yes  Calf tenderness: Bilateral  No    Pertinent Labs   Lab Results: personally reviewed.     Recent Labs   Lab Test 05/22/19  0501 05/21/19  0525 09/01/16  0423 03/21/14  2040 09/06/13  0634  09/10/11  0045   INR  --   --   --   --   --   --  1.03   HGB 12.7* 13.3 13.1* 9.8* 11.8*   < > 14.6   HCT  --   --  40.1 32.7* 38.1*   < > 44.6   MCV  --   --  94 75* 86   < > 93   PLT  --  157 155 178 231   < > 144*   NA  --   --  140 135 138   < > 132*    < > = values in this interval not displayed.       Plan: Anticoagulation protocol: Lovenox inpatient and then  mg daily at discharge  x 42  days            Pain medications:  norco and vistaril            Weight bearing status:  WBAT            Disposition:  Home today             Continue cares and rehabilitation     Report completed by:  Maribell Jones PA-C  Date: 5/22/2019  Time: 10:50 AM    "

## 2019-05-22 NOTE — PROGRESS NOTES
[facility] DISCHARGE NOTE    Patient discharged to home at 1:16 PM via wheel chair. Accompanied by spouse and staff. Discharge instructions reviewed with patient and spouse, opportunity offered to ask questions. Prescriptions sent to patients preferred pharmacy. All belongings sent with patient.    Danii Song

## 2019-05-24 ENCOUNTER — HOSPITAL ENCOUNTER (OUTPATIENT)
Dept: PHYSICAL THERAPY | Facility: CLINIC | Age: 79
Setting detail: THERAPIES SERIES
End: 2019-05-24
Attending: ORTHOPAEDIC SURGERY
Payer: COMMERCIAL

## 2019-05-24 PROCEDURE — 97116 GAIT TRAINING THERAPY: CPT | Mod: GP | Performed by: PHYSICAL THERAPIST

## 2019-05-24 PROCEDURE — 97161 PT EVAL LOW COMPLEX 20 MIN: CPT | Mod: GP | Performed by: PHYSICAL THERAPIST

## 2019-05-24 PROCEDURE — 97110 THERAPEUTIC EXERCISES: CPT | Mod: GP | Performed by: PHYSICAL THERAPIST

## 2019-05-24 NOTE — PROGRESS NOTES
PHYSICAL THERAPY INITIAL EVALUATION  05/24/19 1300   General Information   Type of Visit Initial OP Ortho PT Evaluation   Start of Care Date 05/24/19   Referring Physician Dr. Barnes   Patient/Family Goals Statement Get the knee back to normal, bend the knee without any pain   Orders Evaluate and Treat   Date of Order 05/20/19   Certification Required? No   Medical Diagnosis right TKA   Surgical/Medical history reviewed Yes   Precautions/Limitations no known precautions/limitations   Body Part(s)   Body Part(s) Knee   Presentation and Etiology   Pertinent history of current problem (include personal factors and/or comorbidities that impact the POC) Patient had R TKA on 5/20/19 by Dr. Barnes. Had a 2 day hospital stay and discharged to home. Currently using the cane to get around. Has 2 week post op appointment on 5/31.  Current HEP: ankle pumps, thigh squeezes, hamstring sets, heel slides, SAQ, SLR, seated knee extension, seated knee bends.    Impairments A. Pain;E. Decreased flexibility;F. Decreased strength and endurance   Functional Limitations perform activities of daily living;perform desired leisure / sports activities   Symptom Location R knee    How/Where did it occur Other  (post-surgical )   Onset date of current episode/exacerbation 05/20/19   Chronicity New   Pain rating (0-10 point scale) Best (/10);Worst (/10)   Best (/10) 5   Worst (/10) 7   Pain quality C. Aching;F. Stabbing   Frequency of pain/symptoms B. Intermittent   Pain/symptoms exacerbated by B. Walking;J. ADL   Pain/symptoms eased by A. Sitting;C. Rest   Progression of symptoms since onset: Improved   Prior Level of Function   Prior Level of Function-Mobility independent   Prior Level of Function-ADLs independent   Current Level of Function   Patient role/employment history F. Retired   Home/community accessibility [facility], 1 level. 1 step going into the house, garage is a half step down   Current equipment-Gait/Locomotion Standard cane    Fall Risk Screen   Fall screen completed by PT   Have you fallen 2 or more times in the past year? No   Have you fallen and had an injury in the past year? No   Is patient a fall risk? No   Functional Scales   Functional Scales Other   Other Scales  LEFS: 30/80   Knee Objective Findings   Side (if bilateral, select both right and left) Left   Observation incision is in-tact, no drainage. Leg is swollen and lower leg is slightly red but normal per patient report and similar to L LE. Pt notes issues with swelling in both legs.   Integumentary  Joint Line Swelling: R 40.7 cm L 47.5 cm   Gait/Locomotion using standard cane in R UE with excessive elbow flexion    Palpation tender posterior knee   Accessory Motion/Joint Mobility hypomobil patella   Left Knee Extension AROM 7   Left Knee Flexion AROM 98   Left Quad Set Strength fair   L VMO Strength fair   Left Hamstring Flexibility severe tight   Left Quadricep Flexibility severe tight   Planned Therapy Interventions   Planned Therapy Interventions balance training;gait training;joint mobilization;manual therapy;neuromuscular re-education;ROM;strengthening;stretching   Planned Modality Interventions   Planned Modality Interventions Cryotherapy   Clinical Impression   Criteria for Skilled Therapeutic Interventions Met yes, treatment indicated   PT Diagnosis R knee pain, decreased ROM, decreased strength and impaired gait s/p R TKA   Influenced by the following impairments R knee pain, decreased ROM, decreased strength and impaired gait s/p R TKA   Functional limitations due to impairments walking, stairs, squatting, standing, lifting   Clinical Presentation Stable/Uncomplicated   Clinical Presentation Rationale pain well controlled, no complications with surgery   Clinical Decision Making (Complexity) Low complexity   Therapy Frequency 2 times/Week   Predicted Duration of Therapy Intervention (days/wks) 6 weeks   Risk & Benefits of therapy have been explained Yes    Patient, Family & other staff in agreement with plan of care Yes   Clinical Impression Comments s/p R TKA on 5/20/19 by Dr. Barnes   Education Assessment   Preferred Learning Style Listening;Demonstration;Pictures/video   Barriers to Learning No barriers   ORTHO GOALS   PT Ortho Eval Goals 1;2;3;4   Ortho Goal 1   Goal Identifier 1   Goal Description Patient will be able to walk without an AD with minimal to no limp and no imbalance.   Target Date 07/05/19   Ortho Goal 2   Goal Identifier 2   Goal Description Patient will be able to ascend/descend stairs with 1 rail, reciprocal pattern with minimal pain or difficulty and demonstrating a normal gait pattern.    Target Date 07/05/19   Ortho Goal 3   Goal Identifier 3   Goal Description Patient will be able to squat to pick items up off floor without difficulty or pain.    Target Date 07/05/19   Ortho Goal 4   Goal Identifier 4   Goal Description Patient will be independent and compliant with HEP to aid functional recovery.    Target Date 07/05/19   Total Evaluation Time   PT Eval, Low Complexity Minutes (70323) 15       Please contact me with any questions or concerns.  Thank you for your referral.    Kelsea Sagastume, PT, DPT, OCS  Physical Therapist, Orthopedic Certified Specialist  [facility]  [phone]

## 2019-05-28 ENCOUNTER — APPOINTMENT (OUTPATIENT)
Dept: ULTRASOUND IMAGING | Facility: CLINIC | Age: 79
End: 2019-05-28
Attending: EMERGENCY MEDICINE
Payer: COMMERCIAL

## 2019-05-28 ENCOUNTER — HOSPITAL ENCOUNTER (EMERGENCY)
Facility: CLINIC | Age: 79
Discharge: HOME | End: 2019-05-28
Attending: EMERGENCY MEDICINE | Admitting: EMERGENCY MEDICINE
Payer: COMMERCIAL

## 2019-05-28 VITALS
OXYGEN SATURATION: 95 % | TEMPERATURE: 97.9 F | BODY MASS INDEX: 41.37 KG/M2 | SYSTOLIC BLOOD PRESSURE: 121 MMHG | RESPIRATION RATE: 18 BRPM | WEIGHT: 273 LBS | DIASTOLIC BLOOD PRESSURE: 75 MMHG | HEIGHT: 68 IN | HEART RATE: 68 BPM

## 2019-05-28 DIAGNOSIS — L03.115 CELLULITIS OF RIGHT LEG: ICD-10-CM

## 2019-05-28 DIAGNOSIS — M79.89 RIGHT LEG SWELLING: ICD-10-CM

## 2019-05-28 PROCEDURE — 99284 EMERGENCY DEPT VISIT MOD MDM: CPT | Mod: Z6 | Performed by: EMERGENCY MEDICINE

## 2019-05-28 PROCEDURE — 93971 EXTREMITY STUDY: CPT | Mod: RT

## 2019-05-28 PROCEDURE — 99284 EMERGENCY DEPT VISIT MOD MDM: CPT | Mod: 25 | Performed by: EMERGENCY MEDICINE

## 2019-05-28 RX ORDER — CEPHALEXIN 500 MG/1
500 CAPSULE ORAL 4 TIMES DAILY
Qty: 40 CAPSULE | Refills: 0 | Status: SHIPPED | OUTPATIENT
Start: 2019-05-28 | End: 2019-06-07

## 2019-05-28 ASSESSMENT — ENCOUNTER SYMPTOMS
FEVER: 0
ABDOMINAL PAIN: 0
SHORTNESS OF BREATH: 0

## 2019-05-28 ASSESSMENT — MIFFLIN-ST. JEOR: SCORE: 1929.88

## 2019-05-28 NOTE — ED PROVIDER NOTES
History     Chief Complaint   Patient presents with     Post-op Problem     RLE red,swollen and painful-had rt total knee     HPI  Noe Morales is a 79 year old male who has past medical history significant for right total knee arthroplasty that was performed on May 20, and patient was hospitalized for 2 nights, and now presents to the emergency department because of red, swollen right lower extremity, primarily in the calf region.  Patient is concerned regarding possibility of clot.  He denies any fever, or chills.  Patient is on furosemide twice daily.  No recent changes in those medications.  Denies any prior history of blood clot.  No left lower extremity symptoms.  Does have chronic lower extremity skin discolorations, which has not changed.    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     Priority: Medium     CAD (coronary artery disease) 07/15/2010     Priority: Medium     Sleep apnea 03/15/2007     Priority: Medium     Esophageal reflux 01/17/2007     Priority: Medium     Essential hypertension 01/17/2007     Priority: Medium     Hay fever 01/17/2007     Priority: Medium     Asbestosis (H) 01/17/2007     Priority: Medium     Pos but stable ct lung with plaques 2007 no change in 2010,       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, 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     Priority: Medium     Overview:   1998-excised       Other and unspecified disc disorder 01/17/2007     Priority: Medium      Overview:   lumbar          Past Medical History:    Past Medical History:   Diagnosis Date     Hypertension        Past Surgical History:    Past Surgical History:   Procedure Laterality Date     ARTHROPLASTY KNEE Right 5/20/2019    Procedure: ARTHROPLASTY, KNEE;  Surgeon: Reynaldo Barnes MD;  Location: [facility] OR     ARTHROTOMY SHOULDER, ROTATOR CUFF REPAIR, COMBINED  7/9/2012    Procedure: COMBINED ARTHROTOMY SHOULDER, ROTATOR CUFF REPAIR;  Right shoulder biceps tendodesis;  Surgeon: Reynaldo Barnes MD;  Location: [facility] OR       Family History:    No family history on file.    Social History:  Marital Status:   [2]  Social History     Tobacco Use     Smoking status: Never Smoker   Substance Use Topics     Alcohol use: Not on file     Drug use: Not on file        Medications:      albuterol 90 MCG/ACT inhaler   aspirin (ASA) 325 MG tablet   ATENOLOL 50 MG OR TABS   cephALEXin (KEFLEX) 500 MG capsule   doxazosin (CARDURA) 4 MG tablet   DULOXETINE HCL PO   ferrous sulfate (IRON) 325 (65 FE) MG tablet   FEXOFENADINE HCL PO   fluticasone (FLONASE) 50 MCG/ACT nasal spray   fluticasone-salmeterol (ADVAIR) 250-50 MCG/DOSE inhaler   FUROSEMIDE PO   GABAPENTIN PO   HYDROcodone-acetaminophen (NORCO) 5-325 MG tablet   hydrOXYzine (ATARAX) 25 MG tablet   LOVASTATIN PO   Magnesium 70 MG CAPS   multivitamin  with lutein (OCUVITE WITH LTEIN) CAPS   Potassium Chloride (KLOR-CON PO)   PRILOSEC OTC 20 MG OR TBEC   psyllium (METAMUCIL) 58.6 % POWD   VITAMIN D, CHOLECALCIFEROL, PO   order for DME   senna-docusate (SENOKOT-S/PERICOLACE) 8.6-50 MG tablet         Review of Systems   Constitutional: Negative for fever.   Respiratory: Negative for shortness of breath.    Cardiovascular: Negative for chest pain.   Gastrointestinal: Negative for abdominal pain.   Musculoskeletal:        Right leg swelling and redness     All other systems reviewed and are negative.      Physical Exam   BP: 119/77  Pulse: 68  Heart Rate:  "70  Temp: 97.9  F (36.6  C)  Resp: 16  Height: 171.5 cm (5' 7.5\")  Weight: 123.8 kg (273 lb)  SpO2: 95 %      Physical Exam  /75   Pulse 68   Temp 97.9  F (36.6  C) (Temporal)   Resp 18   Ht 1.715 m (5' 7.5\")   Wt 123.8 kg (273 lb)   SpO2 95%   BMI 42.13 kg/m    General: alert and in no acute distress  Head: atraumatic, normocephalic  Abd: Soft, nontender, nondistended, no peritoneal signs  Musculoskel/Extremities: Right lower extremity with notable edema.  The incision site which is vertical and bandage in place appears clean, dry, and intact.  No surrounding erythema of the wound itself.  Does have distal right leg swelling, and redness, definitely different compared to the left.  Pitting edema is present.                Neuro: Patient awake, alert, oriented, speech is fluent,   Psychiatric: affect/mood normal, cooperative, normal judgement/insight and memory intact      ED Course        Procedures               Critical Care time:  none               Results for orders placed or performed during the hospital encounter of 05/28/19 (from the past 24 hour(s))   US Lower Extremity Venous Duplex Right    Narrative    ULTRASOUND VENOUS LOWER EXTREMITY UNILATERAL RIGHT  5/28/2019 3:22 PM     HISTORY: Recent right knee replacement.  Pain and swelling.    COMPARISON: None.    TECHNIQUE: Ultrasound gray scale, Color Doppler flow, and spectral  Doppler waveform analysis performed.    FINDINGS: The right common femoral, superficial femoral, popliteal and  posterior tibial veins are patent and fully compressible and  demonstrate normal venous Doppler flow. The visualized greater  saphenous vein is negative for thrombus. For comparison the left  common femoral vein was evaluated and was unremarkable. Fluid  collection in the popliteal fossa noted measuring 7.9 x 1.3 x 4.2 cm  which is nonspecific in the postoperative setting.      Impression    IMPRESSION: No DVT demonstrated.    SVETA POMPA MD       Medications " - No data to display    Assessments & Plan (with Medical Decision Making)  79 year old male, with recent total knee arthroplasty that was performed, presenting to the emergency department with pain, in addition to swelling primarily of the right calf region.  Symptoms present over the past couple days.  Concern is for possible DVT versus cellulitis versus postoperative changes versus edematous changes.    Ultrasound is performed that shows no evidence of DVT.  Patient with slight amounts of redness, which may be reactive secondary to the swelling, however given the procedure, with notable change of the right compared to left, will treat for cellulitis with Keflex.  Follow-up with primary care provider as needed.  Return if worsening symptoms.  Keep leg elevated when possible at home.     I have reviewed the nursing notes.    I have reviewed the findings, diagnosis, plan and need for follow up with the patient.          Medication List      Started    cephALEXin 500 MG capsule  Commonly known as:  KEFLEX  500 mg, Oral, 4 TIMES DAILY            Final diagnoses:   Cellulitis of right leg   Right leg swelling       5/28/2019   [facility] EMERGENCY DEPARTMENT     Darryl Mooney MD  05/28/19 5423

## 2019-05-28 NOTE — ED AVS SNAPSHOT
[facility]  [address] [address]  Phone:  [phone]  Fax:  [phone]                                    Noe Goetz [facility]   MRN: [id number]    Department:  [facility]   Date of Visit:  5/28/2019           After Visit Summary Signature Page    I have received my discharge instructions, and my questions have been answered. I have discussed any challenges I see with this plan with the nurse or doctor.    ..........................................................................................................................................  Patient/Patient Representative Signature      ..........................................................................................................................................  Patient Representative Print Name and Relationship to Patient    ..................................................               ................................................  Date                                   Time    ..........................................................................................................................................  Reviewed by Signature/Title    ...................................................              ..............................................  Date                                               Time          22EPIC Rev 08/18

## 2019-05-28 NOTE — DISCHARGE INSTRUCTIONS
No clot on ultrasound.    Keep the leg elevated above the heart  Be seen if worsening redness or fever.

## 2019-05-31 ENCOUNTER — HOSPITAL ENCOUNTER (OUTPATIENT)
Dept: PHYSICAL THERAPY | Facility: CLINIC | Age: 79
Setting detail: THERAPIES SERIES
End: 2019-05-31
Attending: ORTHOPAEDIC SURGERY
Payer: COMMERCIAL

## 2019-05-31 ENCOUNTER — AMBULATORY - HEALTHEAST (OUTPATIENT)
Dept: OTHER | Facility: CLINIC | Age: 79
End: 2019-05-31

## 2019-05-31 ENCOUNTER — DOCUMENTATION ONLY (OUTPATIENT)
Dept: OTHER | Facility: CLINIC | Age: 79
End: 2019-05-31

## 2019-05-31 PROCEDURE — 97110 THERAPEUTIC EXERCISES: CPT | Mod: GP | Performed by: PHYSICAL THERAPIST

## 2019-06-06 ENCOUNTER — HOSPITAL ENCOUNTER (OUTPATIENT)
Dept: PHYSICAL THERAPY | Facility: CLINIC | Age: 79
Setting detail: THERAPIES SERIES
End: 2019-06-06
Attending: ORTHOPAEDIC SURGERY
Payer: COMMERCIAL

## 2019-06-06 PROCEDURE — 97110 THERAPEUTIC EXERCISES: CPT | Mod: GP | Performed by: PHYSICAL THERAPIST

## 2019-06-11 ENCOUNTER — HOSPITAL ENCOUNTER (OUTPATIENT)
Dept: PHYSICAL THERAPY | Facility: CLINIC | Age: 79
Setting detail: THERAPIES SERIES
End: 2019-06-11
Attending: ORTHOPAEDIC SURGERY
Payer: COMMERCIAL

## 2019-06-11 PROCEDURE — 97110 THERAPEUTIC EXERCISES: CPT | Mod: GP | Performed by: PHYSICAL THERAPIST

## 2019-06-14 ENCOUNTER — HOSPITAL ENCOUNTER (OUTPATIENT)
Dept: PHYSICAL THERAPY | Facility: CLINIC | Age: 79
Setting detail: THERAPIES SERIES
End: 2019-06-14
Attending: ORTHOPAEDIC SURGERY
Payer: COMMERCIAL

## 2019-06-14 PROCEDURE — 97110 THERAPEUTIC EXERCISES: CPT | Mod: GP | Performed by: PHYSICAL THERAPIST

## 2019-06-17 ENCOUNTER — HOSPITAL ENCOUNTER (OUTPATIENT)
Dept: PHYSICAL THERAPY | Facility: CLINIC | Age: 79
Setting detail: THERAPIES SERIES
End: 2019-06-17
Attending: ORTHOPAEDIC SURGERY
Payer: COMMERCIAL

## 2019-06-17 PROCEDURE — 97110 THERAPEUTIC EXERCISES: CPT | Mod: GP | Performed by: PHYSICAL THERAPIST

## 2019-06-25 ENCOUNTER — HOSPITAL ENCOUNTER (OUTPATIENT)
Dept: PHYSICAL THERAPY | Facility: CLINIC | Age: 79
Setting detail: THERAPIES SERIES
End: 2019-06-25
Attending: ORTHOPAEDIC SURGERY
Payer: COMMERCIAL

## 2019-06-25 PROCEDURE — 97110 THERAPEUTIC EXERCISES: CPT | Mod: GP | Performed by: PHYSICAL THERAPIST

## 2019-06-25 NOTE — PROGRESS NOTES
Outpatient Physical Therapy Progress Note     Patient: Noe Goetz [facility]  : 1940    Beginning/End Dates of Reporting Period:  2019 to 2019    Referring Provider: Dr. Barnes    Therapy Diagnosis: s/p R TKA     Client Self Report: Knee still hurts, still needing the pain pills.     Objective Measurements:  Objective Measure: R Knee AAROM  Details: pre-tx: 0 - 3 - 120. post-tx: 0 - 0 - 120    Objective Measure: R Knee PROM  Details: 0 - 0 - 120       Goals:  Goal Identifier 1   Goal Description Patient will be able to walk without an AD with minimal to no limp and no imbalance.   Target Date 19   Date Met  19   Progress:     Goal Identifier 2   Goal Description Patient will be able to ascend/descend stairs with 1 rail, reciprocal pattern with minimal pain or difficulty and demonstrating a normal gait pattern.    Target Date 19   Date Met  19   Progress:     Goal Identifier 3   Goal Description Patient will be able to squat to pick items up off floor without difficulty or pain.    Target Date 19   Date Met  (still very difficult)   Progress:     Goal Identifier 4   Goal Description Patient will be independent and compliant with HEP to aid functional recovery.    Target Date 19   Date Met  (compliant, continuing to progress)   Progress:     Progress Toward Goals:   Progress this reporting period: Patient progressing very well. No longer needs assistive device. Going up/down stairs with reciprocal pattern. ROM after stretching is 0-120. Still having some pain and weakness, especially with squatting. Patient would benefit from just a few more visits to focus on strengthening to help with pain.      Plan:  1x/week to work on strengthening    Discharge:  No        Please contact me with any questions or concerns.  Thank you for your referral.    Kelsea Sagastume, PT, DPT, OCS  Physical Therapist, Orthopedic Certified Specialist  [facility]  [facility]  [phone]

## 2019-07-09 ENCOUNTER — HOSPITAL ENCOUNTER (OUTPATIENT)
Dept: PHYSICAL THERAPY | Facility: CLINIC | Age: 79
Setting detail: THERAPIES SERIES
End: 2019-07-09
Attending: ORTHOPAEDIC SURGERY
Payer: COMMERCIAL

## 2019-07-09 PROCEDURE — 97110 THERAPEUTIC EXERCISES: CPT | Mod: GP | Performed by: PHYSICAL THERAPIST

## 2019-07-19 ENCOUNTER — HOSPITAL ENCOUNTER (OUTPATIENT)
Dept: PHYSICAL THERAPY | Facility: CLINIC | Age: 79
Setting detail: THERAPIES SERIES
End: 2019-07-19
Attending: ORTHOPAEDIC SURGERY
Payer: COMMERCIAL

## 2019-07-19 PROCEDURE — 97110 THERAPEUTIC EXERCISES: CPT | Mod: GP | Performed by: PHYSICAL THERAPIST

## 2019-07-22 NOTE — ADDENDUM NOTE
Encounter addended by: Kelsea Sagastume, PT on: 7/22/2019 7:12 AM   Actions taken: Sign clinical note, Flowsheet accepted, Episode resolved

## 2019-07-22 NOTE — PROGRESS NOTES
PHYSICAL THERAPY DISCHARGE NOTE  07/19/19 1600   Signing Clinician's Name / Credentials   Signing clinician's name / credentials Kelsea Sagastume, PT, DPT, OCS   Session Number   Session Number 8 [employer]   Progress Note/Recertification   Progress Note Due Date 07/05/19   Adult Goals   PT Ortho Eval Goals 1;2;3;4   Ortho Goal 1   Goal Identifier 1   Goal Description Patient will be able to walk without an AD with minimal to no limp and no imbalance.   Target Date 07/05/19   Date Met 06/25/19   Ortho Goal 2   Goal Identifier 2   Goal Description Patient will be able to ascend/descend stairs with 1 rail, reciprocal pattern with minimal pain or difficulty and demonstrating a normal gait pattern.    Target Date 07/05/19   Date Met 06/25/19   Ortho Goal 3   Goal Identifier 3   Goal Description Patient will be able to squat to pick items up off floor without difficulty or pain.    Target Date 07/05/19   Date Met 07/19/19   Ortho Goal 4   Goal Identifier 4   Goal Description Patient will be independent and compliant with HEP to aid functional recovery.    Target Date 07/05/19   Date Met 07/19/19   Subjective Report   Subjective Report Patient reports exercies going well, been doing them more. Having less pain.    Objective Measures   Objective Measures Objective Measure 1;Objective Measure 2   Objective Measure 1   Objective Measure R Knee AAROM   Details pre-tx: 0 - 0 - 120. post-tx: 0 - 0 - 120   Objective Measure 2   Objective Measure R Knee PROM   Details 0 - 0 - 120   Treatment Interventions   Interventions Therapeutic Procedure/Exercise;Gait Training;Neuromuscular Re-education   Therapeutic Procedure/exercise   Therapeutic Procedures: strength, endurance, ROM, flexibillity minutes (00797) 23   Skilled Intervention HEP instruction, ROM, strength to decrease pain and improve function   Patient Response improved ROM and strength   Treatment Detail bike seat 8 L5 x 5 min, supine knee extension str x 1 min, supine heel  "slides w/sheet x 10. . LAQ 9# x 20. mini squats x 20. 6\" lateral step up x 20. stairs recipcrocal pattern x 2  a little pain descending but much improves strength and confidence as well as less pain. instr pt in continuatino of home [facility] and [facility] for at least 6 months post-op.    Neuromuscular Re-education   Neuromuscular re-ed of mvmt, balance, coord, kinesthetic sense, posture, proprioception minutes (89509) 2   Skilled Intervention balance, proprioception to increase stability with gait   Patient Response see below   Treatment Detail tandem walk - still minor LOB but much improved   Education   Learner Patient;Significant Other   Readiness Eager   Method Booklet/handout;Explanation;Demonstration   Response Verbalizes Understanding;Demonstrates Understanding   Plan   Home program above ex   Updates to plan of care all goals met   Plan for next session d/c with HEP   Total Session Time   Timed Code Treatment Minutes 25   Total Treatment Time (sum of timed and untimed services) 25, te2   AMBULATORY CLINICS ONLY-MEDICAL AND TREATMENT DIAGNOSIS   PT Diagnosis R knee pain, decreased ROM, decreased strength and impaired gait s/p R TKA       Please contact me with any questions or concerns.  Thank you for your referral.    Kelsea Sagastume, PT, DPT, OCS  Physical Therapist, Orthopedic Certified Specialist  [facility]  [phone]    "

## 2021-03-14 ENCOUNTER — HEALTH MAINTENANCE LETTER (OUTPATIENT)
Age: 81
End: 2021-03-14

## 2021-07-26 ENCOUNTER — HOSPITAL ENCOUNTER (EMERGENCY)
Facility: CLINIC | Age: 81
Discharge: HOME | End: 2021-07-26
Payer: MEDICARE

## 2021-09-20 ENCOUNTER — HOSPITAL ENCOUNTER (EMERGENCY)
Facility: CLINIC | Age: 81
Discharge: LEFT BEFORE DISCHARGE | End: 2021-09-20
Payer: COMMERCIAL

## 2021-09-20 VITALS
OXYGEN SATURATION: 94 % | SYSTOLIC BLOOD PRESSURE: 107 MMHG | DIASTOLIC BLOOD PRESSURE: 74 MMHG | HEART RATE: 70 BPM | BODY MASS INDEX: 41.66 KG/M2 | TEMPERATURE: 98.1 F | WEIGHT: 270 LBS | RESPIRATION RATE: 16 BRPM

## 2021-09-25 ENCOUNTER — APPOINTMENT (OUTPATIENT)
Dept: GENERAL RADIOLOGY | Facility: CLINIC | Age: 81
End: 2021-09-25
Attending: EMERGENCY MEDICINE
Payer: COMMERCIAL

## 2021-09-25 ENCOUNTER — PATIENT OUTREACH (OUTPATIENT)
Dept: CARE COORDINATION | Facility: CLINIC | Age: 81
End: 2021-09-25

## 2021-09-25 ENCOUNTER — HOSPITAL ENCOUNTER (EMERGENCY)
Facility: CLINIC | Age: 81
Discharge: HOME | End: 2021-09-25
Attending: EMERGENCY MEDICINE | Admitting: EMERGENCY MEDICINE
Payer: COMMERCIAL

## 2021-09-25 VITALS
SYSTOLIC BLOOD PRESSURE: 149 MMHG | OXYGEN SATURATION: 95 % | TEMPERATURE: 98.2 F | RESPIRATION RATE: 16 BRPM | DIASTOLIC BLOOD PRESSURE: 84 MMHG | HEART RATE: 63 BPM

## 2021-09-25 DIAGNOSIS — U07.1 PNEUMONIA DUE TO 2019 NOVEL CORONAVIRUS: ICD-10-CM

## 2021-09-25 DIAGNOSIS — J12.82 PNEUMONIA DUE TO 2019 NOVEL CORONAVIRUS: ICD-10-CM

## 2021-09-25 DIAGNOSIS — U07.1 COVID-19: ICD-10-CM

## 2021-09-25 LAB
ALBUMIN SERPL-MCNC: 3.1 G/DL (ref 3.4–5)
ALP SERPL-CCNC: 64 U/L (ref 40–150)
ALT SERPL W P-5'-P-CCNC: 49 U/L (ref 0–70)
ANION GAP SERPL CALCULATED.3IONS-SCNC: 5 MMOL/L (ref 3–14)
AST SERPL W P-5'-P-CCNC: 48 U/L (ref 0–45)
BASE EXCESS BLDV CALC-SCNC: 5.2 MMOL/L (ref -7.7–1.9)
BASOPHILS # BLD AUTO: 0 10E3/UL (ref 0–0.2)
BASOPHILS NFR BLD AUTO: 0 %
BILIRUB SERPL-MCNC: 0.6 MG/DL (ref 0.2–1.3)
BUN SERPL-MCNC: 9 MG/DL (ref 7–30)
CALCIUM SERPL-MCNC: 8.7 MG/DL (ref 8.5–10.1)
CHLORIDE BLD-SCNC: 100 MMOL/L (ref 94–109)
CO2 SERPL-SCNC: 27 MMOL/L (ref 20–32)
CREAT SERPL-MCNC: 0.79 MG/DL (ref 0.66–1.25)
CRP SERPL-MCNC: 30.7 MG/L (ref 0–8)
D DIMER PPP FEU-MCNC: 0.66 UG/ML FEU (ref 0–0.5)
EOSINOPHIL # BLD AUTO: 0.1 10E3/UL (ref 0–0.7)
EOSINOPHIL NFR BLD AUTO: 2 %
ERYTHROCYTE [DISTWIDTH] IN BLOOD BY AUTOMATED COUNT: 12.8 % (ref 10–15)
GFR SERPL CREATININE-BSD FRML MDRD: 85 ML/MIN/1.73M2
GLUCOSE BLD-MCNC: 106 MG/DL (ref 70–99)
HCO3 BLDV-SCNC: 29 MMOL/L (ref 21–28)
HCT VFR BLD AUTO: 44.2 % (ref 40–53)
HGB BLD-MCNC: 15.1 G/DL (ref 13.3–17.7)
IMM GRANULOCYTES # BLD: 0 10E3/UL
IMM GRANULOCYTES NFR BLD: 1 %
LYMPHOCYTES # BLD AUTO: 1.3 10E3/UL (ref 0.8–5.3)
LYMPHOCYTES NFR BLD AUTO: 20 %
MCH RBC QN AUTO: 31.5 PG (ref 26.5–33)
MCHC RBC AUTO-ENTMCNC: 34.2 G/DL (ref 31.5–36.5)
MCV RBC AUTO: 92 FL (ref 78–100)
MONOCYTES # BLD AUTO: 0.8 10E3/UL (ref 0–1.3)
MONOCYTES NFR BLD AUTO: 12 %
NEUTROPHILS # BLD AUTO: 4.2 10E3/UL (ref 1.6–8.3)
NEUTROPHILS NFR BLD AUTO: 65 %
NRBC # BLD AUTO: 0 10E3/UL
NRBC BLD AUTO-RTO: 0 /100
O2/TOTAL GAS SETTING VFR VENT: 0 %
PCO2 BLDV: 40 MM HG (ref 40–50)
PH BLDV: 7.47 [PH] (ref 7.32–7.43)
PLATELET # BLD AUTO: 172 10E3/UL (ref 150–450)
PO2 BLDV: 30 MM HG (ref 25–47)
POTASSIUM BLD-SCNC: 4.3 MMOL/L (ref 3.4–5.3)
PROT SERPL-MCNC: 6.8 G/DL (ref 6.8–8.8)
RBC # BLD AUTO: 4.8 10E6/UL (ref 4.4–5.9)
SODIUM SERPL-SCNC: 132 MMOL/L (ref 133–144)
WBC # BLD AUTO: 6.4 10E3/UL (ref 4–11)

## 2021-09-25 PROCEDURE — 80053 COMPREHEN METABOLIC PANEL: CPT | Performed by: EMERGENCY MEDICINE

## 2021-09-25 PROCEDURE — 250N000013 HC RX MED GY IP 250 OP 250 PS 637: Performed by: EMERGENCY MEDICINE

## 2021-09-25 PROCEDURE — 258N000003 HC RX IP 258 OP 636: Performed by: EMERGENCY MEDICINE

## 2021-09-25 PROCEDURE — 250N000011 HC RX IP 250 OP 636: Performed by: EMERGENCY MEDICINE

## 2021-09-25 PROCEDURE — 99285 EMERGENCY DEPT VISIT HI MDM: CPT | Mod: 25 | Performed by: EMERGENCY MEDICINE

## 2021-09-25 PROCEDURE — 71045 X-RAY EXAM CHEST 1 VIEW: CPT

## 2021-09-25 PROCEDURE — 85379 FIBRIN DEGRADATION QUANT: CPT | Performed by: EMERGENCY MEDICINE

## 2021-09-25 PROCEDURE — 36415 COLL VENOUS BLD VENIPUNCTURE: CPT | Performed by: EMERGENCY MEDICINE

## 2021-09-25 PROCEDURE — M0243 CASIRIVI AND IMDEVI INFUSION: HCPCS

## 2021-09-25 PROCEDURE — 85014 HEMATOCRIT: CPT | Performed by: EMERGENCY MEDICINE

## 2021-09-25 PROCEDURE — 93010 ELECTROCARDIOGRAM REPORT: CPT | Performed by: EMERGENCY MEDICINE

## 2021-09-25 PROCEDURE — 93005 ELECTROCARDIOGRAM TRACING: CPT | Performed by: EMERGENCY MEDICINE

## 2021-09-25 PROCEDURE — 86140 C-REACTIVE PROTEIN: CPT | Performed by: EMERGENCY MEDICINE

## 2021-09-25 PROCEDURE — 82803 BLOOD GASES ANY COMBINATION: CPT | Performed by: EMERGENCY MEDICINE

## 2021-09-25 RX ORDER — IBUPROFEN 400 MG/1
400 TABLET, FILM COATED ORAL ONCE
Status: COMPLETED | OUTPATIENT
Start: 2021-09-25 | End: 2021-09-25

## 2021-09-25 RX ADMIN — CASIRIVIMAB AND IMDEVIMAB: 600; 600 INJECTION, SOLUTION, CONCENTRATE INTRAVENOUS at 07:02

## 2021-09-25 RX ADMIN — IBUPROFEN 400 MG: 400 TABLET ORAL at 07:04

## 2021-09-25 NOTE — PROGRESS NOTES
Care Coordination ED Discharge Follow up Note  Pt on home virtual monitoring program for COVID-19, call made to do assessment, verify follow-up appt and offer care coordination, no answer. VM is wife's, no consent to communicate. RN will call back tomorrow.

## 2021-09-25 NOTE — ED PROVIDER NOTES
History     Chief Complaint   Patient presents with     Shortness of Breath     HPI  Noe Morales is a 81 year old male who is vaccinated against COVID-19 and presents with fever, headache, cough, body aches, fatigue, lightheadedness with the known diagnosis of COVID-19.  Symptoms ongoing for the past 4 days.  He has been using acetaminophen.  Rates his symptoms as severe.  Cough is nonproductive.  No vomiting or diarrhea.  No abdominal pain.  No rash.    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     Priority: Medium     CAD (coronary artery disease) 07/15/2010     Priority: Medium     Sleep apnea 03/15/2007     Priority: Medium     Esophageal reflux 01/17/2007     Priority: Medium     Essential hypertension 01/17/2007     Priority: Medium     Hay fever 01/17/2007     Priority: Medium     Asbestosis (H) 01/17/2007     Priority: Medium     Pos but stable ct lung with plaques 2007 no change in 2010,       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, 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     Priority: Medium     Overview:   1998-excised       Other and unspecified disc disorder 01/17/2007     Priority: Medium     Overview:   lumbar          Past Medical History:    Past Medical History:   Diagnosis Date     Hypertension        Past Surgical History:    Past Surgical History:   Procedure Laterality Date     ARTHROPLASTY KNEE Right 5/20/2019    Procedure: ARTHROPLASTY, KNEE;  Surgeon: Reynaldo Barnes MD;   Location: [facility] OR     ARTHROTOMY SHOULDER, ROTATOR CUFF REPAIR, COMBINED  7/9/2012    Procedure: COMBINED ARTHROTOMY SHOULDER, ROTATOR CUFF REPAIR;  Right shoulder biceps tendodesis;  Surgeon: Reynaldo Barnes MD;  Location: [facility] OR       Family History:    No family history on file.    Social History:  Marital Status:   [2]  Social History     Tobacco Use     Smoking status: Never Smoker   Substance Use Topics     Alcohol use: Not on file     Drug use: Not on file        Medications:    albuterol 90 MCG/ACT inhaler  aspirin (ASA) 325 MG tablet  ATENOLOL 50 MG OR TABS  doxazosin (CARDURA) 4 MG tablet  DULOXETINE HCL PO  ferrous sulfate (IRON) 325 (65 FE) MG tablet  FEXOFENADINE HCL PO  fluticasone (FLONASE) 50 MCG/ACT nasal spray  fluticasone-salmeterol (ADVAIR) 250-50 MCG/DOSE inhaler  FUROSEMIDE PO  GABAPENTIN PO  HYDROcodone-acetaminophen (NORCO) 5-325 MG tablet  hydrOXYzine (ATARAX) 25 MG tablet  LOVASTATIN PO  Magnesium 70 MG CAPS  multivitamin  with lutein (OCUVITE WITH LTEIN) CAPS  order for DME  Potassium Chloride (KLOR-CON PO)  PRILOSEC OTC 20 MG OR TBEC  psyllium (METAMUCIL) 58.6 % POWD  senna-docusate (SENOKOT-S/PERICOLACE) 8.6-50 MG tablet  VITAMIN D, CHOLECALCIFEROL, PO          Review of Systems  Pertinent positives and negatives listed in the HPI, all other systems reviewed and are negative.    Physical Exam   BP: (!) 165/88  Pulse: 64  Temp: 98.2  F (36.8  C)  Resp: 20  SpO2: 95 %      Physical Exam  Vitals and nursing note reviewed.   Constitutional:       General: He is in acute distress.      Appearance: He is well-developed. He is not diaphoretic.   HENT:      Head: Normocephalic and atraumatic.      Right Ear: External ear normal.      Left Ear: External ear normal.      Nose: Nose normal.   Eyes:      General: No scleral icterus.     Conjunctiva/sclera: Conjunctivae normal.   Cardiovascular:      Rate and Rhythm: Normal rate and regular rhythm.   Pulmonary:      Effort: Pulmonary  effort is normal. No respiratory distress.      Breath sounds: No stridor.   Abdominal:      General: There is no distension.      Palpations: Abdomen is soft.      Tenderness: There is no abdominal tenderness.   Musculoskeletal:      Cervical back: Normal range of motion.   Skin:     General: Skin is warm and dry.   Neurological:      Mental Status: He is alert and oriented to person, place, and time.   Psychiatric:         Behavior: Behavior normal.         ED Course        Procedures              EKG Interpretation:      Interpreted by Leonel Guthrie MD  Time reviewed: 0600  Symptoms at time of EKG: dyspnea   Rhythm: normal sinus   Rate: normal  Axis: normal  Ectopy: none  Conduction: Nonspecific QRS widening  ST Segments/ T Waves: No ST-T wave changes  Q Waves: none  Comparison to prior: Unchanged    Clinical Impression: normal EKG    Critical Care time:  none               Results for orders placed or performed during the hospital encounter of 09/25/21 (from the past 24 hour(s))   CBC with platelets differential    Narrative    The following orders were created for panel order CBC with platelets differential.  Procedure                               Abnormality         Status                     ---------                               -----------         ------                     CBC with platelets and d...[[id number]]                      Final result                 Please view results for these tests on the individual orders.   Comprehensive metabolic panel   Result Value Ref Range    Sodium 132 (L) 133 - 144 mmol/L    Potassium 4.3 3.4 - 5.3 mmol/L    Chloride 100 94 - 109 mmol/L    Carbon Dioxide (CO2) 27 20 - 32 mmol/L    Anion Gap 5 3 - 14 mmol/L    Urea Nitrogen 9 7 - 30 mg/dL    Creatinine 0.79 0.66 - 1.25 mg/dL    Calcium 8.7 8.5 - 10.1 mg/dL    Glucose 106 (H) 70 - 99 mg/dL    Alkaline Phosphatase 64 40 - 150 U/L    AST 48 (H) 0 - 45 U/L    ALT 49 0 - 70 U/L    Protein Total 6.8 6.8 - 8.8 g/dL     Albumin 3.1 (L) 3.4 - 5.0 g/dL    Bilirubin Total 0.6 0.2 - 1.3 mg/dL    GFR Estimate 85 >60 mL/min/1.73m2   Blood gas venous   Result Value Ref Range    pH Venous 7.47 (H) 7.32 - 7.43    pCO2 Venous 40 40 - 50 mm Hg    pO2 Venous 30 25 - 47 mm Hg    Bicarbonate Venous 29 (H) 21 - 28 mmol/L    Base Excess/Deficit (+/-) 5.2 (H) -7.7 - 1.9 mmol/L    FIO2 0    CRP inflammation   Result Value Ref Range    CRP Inflammation 30.7 (H) 0.0 - 8.0 mg/L   D dimer quantitative   Result Value Ref Range    D-Dimer Quantitative 0.66 (H) 0.00 - 0.50 ug/mL FEU    Narrative    This D-dimer assay is intended for use in conjunction with a clinical pretest probability assessment model to exclude pulmonary embolism (PE) and deep venous thrombosis (DVT) in outpatients suspected of PE or DVT. The cut-off value is 0.50 ug/mL FEU.   CBC with platelets and differential   Result Value Ref Range    WBC Count 6.4 4.0 - 11.0 10e3/uL    RBC Count 4.80 4.40 - 5.90 10e6/uL    Hemoglobin 15.1 13.3 - 17.7 g/dL    Hematocrit 44.2 40.0 - 53.0 %    MCV 92 78 - 100 fL    MCH 31.5 26.5 - 33.0 pg    MCHC 34.2 31.5 - 36.5 g/dL    RDW 12.8 10.0 - 15.0 %    Platelet Count 172 150 - 450 10e3/uL    % Neutrophils 65 %    % Lymphocytes 20 %    % Monocytes 12 %    % Eosinophils 2 %    % Basophils 0 %    % Immature Granulocytes 1 %    NRBCs per 100 WBC 0 <1 /100    Absolute Neutrophils 4.2 1.6 - 8.3 10e3/uL    Absolute Lymphocytes 1.3 0.8 - 5.3 10e3/uL    Absolute Monocytes 0.8 0.0 - 1.3 10e3/uL    Absolute Eosinophils 0.1 0.0 - 0.7 10e3/uL    Absolute Basophils 0.0 0.0 - 0.2 10e3/uL    Absolute Immature Granulocytes 0.0 <=0.0 10e3/uL    Absolute NRBCs 0.0 10e3/uL   [place] Draw *Canceled*    Narrative    The following orders were created for panel order [place] Draw.  Procedure                               Abnormality         Status                     ---------                               -----------         ------                       Please view results  for these tests on the individual orders.   [place] Draw *Canceled*    Narrative    The following orders were created for panel order [place] Draw.  Procedure                               Abnormality         Status                     ---------                               -----------         ------                     Extra Red Top Tube[[id number]]                                                          Extra Green Top (Lithium...[[id number]]                                                   Please view results for these tests on the individual orders.   XR Chest Port 1 View    Narrative    EXAM: XR CHEST PORT 1 VIEW  LOCATION: [facility]  DATE/TIME: 9/25/2021 5:57 AM    INDICATION: covid.  cough.  sob  COMPARISON: None.      Impression    IMPRESSION: Cardiac silhouette is enlarged. Mild patchy airspace opacities in the left perihilar and right basilar regions concerning for pneumonia. No visible pneumothorax. Partially imaged left shoulder reverse arthroplasty.       Medications   ibuprofen (ADVIL/MOTRIN) tablet 400 mg (400 mg Oral Given 9/25/21 0704)   casirivimab-imdevimab (REGEN-COV) 1,200 mg in sodium chloride 0.9 % 60 mL intermittent infusion ( Intravenous Stopped 9/25/21 0742)       Assessments & Plan (with Medical Decision Making)   81-year-old male who is vaccinated against COVID-19 and presents with known COVID-19 infection.  Blood pressure is 124/72, temperature is 36.8  C, heart 74, SPO2 is 94% on room air.  Blood cell count is 6.4 and hemoglobin 15.1, no signs of anemia.  VBG is reassuring with a pH of 7.47.  His CRP is only mildly elevated at 30.7 and his D-dimer is in the normal range when adjusted for age.  His sodium is only very mildly low at 132 but otherwise his electrolytes are normal and he has normal kidney function with a creatinine of 0.79.  Chest x-ray obtained, images reviewed independently as well as radiology read reviewed, positive for bilateral airspace  opacities consistent with COVID-19 infection.  He is given ibuprofen here.  He is well-appearing and breathing comfortably on room air although he is very anxious about his symptoms and illness.  Given his age and obesity he qualifies for [employer] and is given an infusion here in the emergency department.  He is observed for several hours and is well-appearing and maintaining oxygen saturations on normal room air.  He is safe to discharge home with a home pulse oximeter for monitoring and close outpatient follow-up with instructions to return if he has worsening symptoms or other concerns.  The patient is in agreement with this plan.    I have reviewed the nursing notes.    I have reviewed the findings, diagnosis, plan and need for follow up with the patient.       Discharge Medication List as of 9/25/2021  8:39 AM          Final diagnoses:   COVID-19   Pneumonia due to 2019 novel coronavirus       9/25/2021   [facility] EMERGENCY DEPT     Leonel Guthrie MD  09/25/21 8836

## 2021-09-27 ENCOUNTER — PATIENT OUTREACH (OUTPATIENT)
Dept: CARE COORDINATION | Facility: CLINIC | Age: 81
End: 2021-09-27

## 2021-09-27 NOTE — TELEPHONE ENCOUNTER
Care Coordination Hospital/ED Discharge Follow up Note    Hospital/ED Discharge date: 9/    Reason/Diagnosis for Hospital/ED visit: shortness of breath    Are you feeling better, the same, or worse since your Hospital/ED visit? unchanged    Symptoms:     Cough -  Occasional; sometimes dry and sometimes productive    Shortness of breath:  improved shortness of breath since last visit     Chest pain:  No    Fever: No    Current temperature:  NA    Headache:  Yes    Sore throat:  No    Nasal congestion:  No    Nausea/vomiting/diarrhea:  No    Body aches/joint pains:  Yes    Fatigue:  Yes    Home treatment measures used and outcome:  NA    Pulse Oximeter/Oxygen Questions:    Were you sent home with a pulse oximeter?  Yes    Are you currently utilizing the pulse oximeter?  Yes    Do you understand how to use the home oximeter?  Yes    What are your current oxygen saturation levels?  88% at rest before returning back to bed this morning per wife    Oxygen saturation levels in ED/IP:  95%    Were you sent home with home oxygen?  No      Medications:    Were you prescribed any new medications?  No        Follow Up:    Do you have a follow up appointment scheduled with your PCP or specialist?  No-wife to consider    Do you have a plan in place in the event of an emergency?  Yes    If patient is established or planning to establish primary care within [facility], Care Coordination was offered. Care Coordination accepted/declined:  NA    GetWell Loop invitation received?  Yes    GetWell Loop invitation accepted, pending patient activation or declined:  yes    Merna Goodson RN  [facility] Care Coordination    
- HTN. Normotensive   - Hold Valsartan in the setting of SHEYLA.   - C/w Carvedilol
HTN. Normotensive   - Hold valsartan in the setting of SHEYLA.   - C/w carvedilol
- Hx of CHF. TTE in 2017 showing EF 62%, nm LV, mild MR and AS.   - C/w Carvedilol 6.125mg BID   - Outpatient follow up with Dr. Po Jimenes Cardiologist, repeat ECHO as outpt.
HTN. Normotensive   - Hold valsartan in the setting of SHEYLA.   - C/w carvedilol

## 2021-10-24 ENCOUNTER — HEALTH MAINTENANCE LETTER (OUTPATIENT)
Age: 81
End: 2021-10-24

## 2022-04-10 ENCOUNTER — HEALTH MAINTENANCE LETTER (OUTPATIENT)
Age: 82
End: 2022-04-10

## 2022-10-15 ENCOUNTER — HEALTH MAINTENANCE LETTER (OUTPATIENT)
Age: 82
End: 2022-10-15

## 2022-12-06 ENCOUNTER — HOSPITAL ENCOUNTER (EMERGENCY)
Facility: CLINIC | Age: 82
Discharge: HOME | End: 2022-12-06
Attending: EMERGENCY MEDICINE | Admitting: EMERGENCY MEDICINE
Payer: COMMERCIAL

## 2022-12-06 ENCOUNTER — HOSPITAL ENCOUNTER (OUTPATIENT)
Dept: ULTRASOUND IMAGING | Facility: CLINIC | Age: 82
Discharge: HOME | End: 2022-12-06
Attending: ORTHOPAEDIC SURGERY | Admitting: ORTHOPAEDIC SURGERY
Payer: COMMERCIAL

## 2022-12-06 VITALS
OXYGEN SATURATION: 92 % | WEIGHT: 275 LBS | HEART RATE: 86 BPM | TEMPERATURE: 99.3 F | BODY MASS INDEX: 41.68 KG/M2 | HEIGHT: 68 IN | RESPIRATION RATE: 20 BRPM | SYSTOLIC BLOOD PRESSURE: 147 MMHG | DIASTOLIC BLOOD PRESSURE: 88 MMHG

## 2022-12-06 DIAGNOSIS — R09.89 SUSPECTED DEEP VEIN THROMBOSIS: ICD-10-CM

## 2022-12-06 DIAGNOSIS — M79.669 CALF PAIN: ICD-10-CM

## 2022-12-06 DIAGNOSIS — I82.441 ACUTE DEEP VEIN THROMBOSIS (DVT) OF TIBIAL VEIN OF RIGHT LOWER EXTREMITY (H): ICD-10-CM

## 2022-12-06 DIAGNOSIS — M79.89 SWELLING OF CALF: ICD-10-CM

## 2022-12-06 PROCEDURE — 93970 EXTREMITY STUDY: CPT

## 2022-12-06 PROCEDURE — 99283 EMERGENCY DEPT VISIT LOW MDM: CPT | Performed by: EMERGENCY MEDICINE

## 2022-12-06 PROCEDURE — 99284 EMERGENCY DEPT VISIT MOD MDM: CPT | Performed by: EMERGENCY MEDICINE

## 2022-12-06 RX ORDER — APIXABAN 5 MG (74)
KIT ORAL
Qty: 74 EACH | Refills: 0 | Status: SHIPPED | OUTPATIENT
Start: 2022-12-06 | End: 2023-01-05

## 2022-12-06 NOTE — DISCHARGE INSTRUCTIONS
Start taking apixaban to decrease the risk of further blood clots.  Do not take ibuprofen, naproxen, or aspirin while you are taking this medication.  Return to the emergency department for worsening symptoms or other concerns.  Follow-up in your primary clinic for recheck in the next 2 to 3 weeks.

## 2022-12-06 NOTE — ED TRIAGE NOTES
RLE swelling-a few months, from imaging-DVT seen on RLE ultrasound     Triage Assessment     Row Name 12/06/22 3533       Triage Assessment (Adult)    Airway WDL WDL       Respiratory WDL    Respiratory WDL WDL       Skin Circulation/Temperature WDL    Skin Circulation/Temperature WDL WDL       Cardiac WDL    Cardiac WDL WDL       Peripheral/Neurovascular WDL    Peripheral Neurovascular WDL X;neurovascular assessment lower  RLE swelling       Cognitive/Neuro/Behavioral WDL    Cognitive/Neuro/Behavioral WDL WDL

## 2022-12-06 NOTE — ED PROVIDER NOTES
History     Chief Complaint   Patient presents with     Deep Vein Thrombosis     RLE swelling-a few months, from imaging-DVT seen on RLE ultrasound     HPI  Noe Goetz [facility] is a 82 year old male who presents from imaging for evaluation of swelling of his right lower extremity.  Ongoing for the past several weeks.  His primary provider ordered an ultrasound of the lower extremity for evaluation which he had done today and they sent him here for further evaluation.  He says it feels stable, no increasing pain.  It has been sore slightly for this time.  No new rash.  No new injury.  He denies any chest pain or worsening of his breathing.  No fevers or chills.    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     Priority: Medium     CAD (coronary artery disease) 07/15/2010     Priority: Medium     Sleep apnea 03/15/2007     Priority: Medium     Esophageal reflux 01/17/2007     Priority: Medium     Essential hypertension 01/17/2007     Priority: Medium     Hay fever 01/17/2007     Priority: Medium     Asbestosis (H) 01/17/2007     Priority: Medium     Pos but stable ct lung with plaques 2007 no change in 2010,       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, 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     Priority: Medium     Overview:   1998-excised       Other and unspecified disc disorder 01/17/2007     Priority: Medium     Overview:   lumbar          Past Medical History:    Past Medical History:   Diagnosis Date      "Hypertension        Past Surgical History:    Past Surgical History:   Procedure Laterality Date     ARTHROPLASTY KNEE Right 5/20/2019    Procedure: ARTHROPLASTY, KNEE;  Surgeon: Reynaldo Barnes MD;  Location: [facility] OR     ARTHROTOMY SHOULDER, ROTATOR CUFF REPAIR, COMBINED  7/9/2012    Procedure: COMBINED ARTHROTOMY SHOULDER, ROTATOR CUFF REPAIR;  Right shoulder biceps tendodesis;  Surgeon: Reynaldo Barnes MD;  Location: [facility] OR       Family History:    No family history on file.    Social History:  Marital Status:   [2]  Social History     Tobacco Use     Smoking status: Never        Medications:    Apixaban Starter Pack (ELIQUIS DVT/PE STARTER PACK) 5 MG TBPK  albuterol 90 MCG/ACT inhaler  aspirin (ASA) 325 MG tablet  ATENOLOL 50 MG OR TABS  doxazosin (CARDURA) 4 MG tablet  DULOXETINE HCL PO  ferrous sulfate (IRON) 325 (65 FE) MG tablet  FEXOFENADINE HCL PO  fluticasone (FLONASE) 50 MCG/ACT nasal spray  fluticasone-salmeterol (ADVAIR) 250-50 MCG/DOSE inhaler  FUROSEMIDE PO  GABAPENTIN PO  HYDROcodone-acetaminophen (NORCO) 5-325 MG tablet  hydrOXYzine (ATARAX) 25 MG tablet  LOVASTATIN PO  Magnesium 70 MG CAPS  multivitamin  with lutein (OCUVITE WITH LTEIN) CAPS  order for DME  Potassium Chloride (KLOR-CON PO)  PRILOSEC OTC 20 MG OR TBEC  psyllium (METAMUCIL) 58.6 % POWD  senna-docusate (SENOKOT-S/PERICOLACE) 8.6-50 MG tablet  VITAMIN D, CHOLECALCIFEROL, PO          Review of Systems  Pertinent positives and negatives listed in the HPI, all other systems reviewed and are negative.    Physical Exam   BP: (!) 147/88  Pulse: 86  Temp: 99.3  F (37.4  C)  Resp: 20  Height: 172.7 cm (5' 8\")  Weight: 124.7 kg (275 lb)  SpO2: 92 %      Physical Exam  Constitutional:       General: He is not in acute distress.     Appearance: He is well-developed. He is not diaphoretic.   HENT:      Head: Normocephalic and atraumatic.   Eyes:      General: No scleral icterus.  Musculoskeletal:      Cervical back: Normal range " of motion and neck supple.      Right lower leg: Edema present.   Skin:     General: Skin is warm and dry.      Findings: No rash.   Neurological:      Mental Status: He is alert and oriented to person, place, and time.         ED Course                 Procedures              Critical Care time:  none               Results for orders placed or performed during the hospital encounter of 12/06/22 (from the past 24 hour(s))   US Lower Extremity Venous Duplex Bilateral    Narrative    US LOWER EXTREMITY VENOUS DUPLEX BILATERAL 12/6/2022 4:28 PM    HISTORY: Bilateral leg pain.    TECHNIQUE: Imaged deep venous structures of each lower extremity  include the common femoral veins, superficial femoral veins, popliteal  veins, and visualized posterior deep calf veins.  Color flow and  spectral Doppler with waveform analysis performed.    COMPARISON: None.    FINDINGS: No evidence of DVT through the deep venous system of the  right thigh. In the right popliteal vein in the calf there is  nonocclusive thrombus. Some nonocclusive thrombus is seen in the right  greater saphenous vein through its mid to distal portion as well.    No evidence for DVT in the left lower extremity.      Impression    IMPRESSION:   1. The study is positive for nonocclusive DVT in the right posterior  tibial vein.  2. No evidence for DVT in the left lower extremity.  3. These findings were called to Dr. Cohen upon completion of  the examination.       Medications - No data to display    Assessments & Plan (with Medical Decision Making)   82-year-old male presents from imaging for right lower extremity swelling and pain.  Vital signs are reassuring with a temperature of 37.4  C, heart is 86, SPO2 is 92% on room air.  No signs of cellulitis on exam.  Ultrasound of the lower extremity was reviewed by myself, I also read the radiology read, positive for DVT.  I will start the patient on apixaban.  He is told to protect his head and avoid head  injuries, return if he has any falls or head injuries for a recheck.  He is told to avoid NSAIDs while he is on apixaban and to follow-up with his primary doctor in the next 2 to 3 weeks for recheck.  The patient is in agreement with this plan.    I have reviewed the nursing notes.    I have reviewed the findings, diagnosis, plan and need for follow up with the patient.       New Prescriptions    APIXABAN STARTER PACK (ELIQUIS DVT/PE STARTER PACK) 5 MG TBPK    Take 10 mg by mouth 2 times daily for 7 days, THEN 5 mg 2 times daily for 23 days.       Final diagnoses:   Acute deep vein thrombosis (DVT) of tibial vein of right lower extremity (H)       12/6/2022   [facility] EMERGENCY DEPT     Leonel Guthrie MD  12/06/22 5233

## 2023-01-01 ENCOUNTER — ANCILLARY PROCEDURE (OUTPATIENT)
Dept: GENERAL RADIOLOGY | Facility: CLINIC | Age: 83
End: 2023-01-01
Attending: STUDENT IN AN ORGANIZED HEALTH CARE EDUCATION/TRAINING PROGRAM
Payer: COMMERCIAL

## 2023-01-01 ENCOUNTER — PRE VISIT (OUTPATIENT)
Dept: SURGERY | Facility: CLINIC | Age: 83
End: 2023-01-01

## 2023-01-01 ENCOUNTER — LAB REQUISITION (OUTPATIENT)
Dept: LAB | Facility: CLINIC | Age: 83
End: 2023-01-01
Payer: COMMERCIAL

## 2023-01-01 ENCOUNTER — HOSPITAL ENCOUNTER (OUTPATIENT)
Facility: CLINIC | Age: 83
End: 2023-01-01
Attending: STUDENT IN AN ORGANIZED HEALTH CARE EDUCATION/TRAINING PROGRAM | Admitting: STUDENT IN AN ORGANIZED HEALTH CARE EDUCATION/TRAINING PROGRAM
Payer: COMMERCIAL

## 2023-01-01 ENCOUNTER — OFFICE VISIT (OUTPATIENT)
Dept: ORTHOPEDICS | Facility: CLINIC | Age: 83
End: 2023-01-01
Payer: COMMERCIAL

## 2023-01-01 ENCOUNTER — TELEPHONE (OUTPATIENT)
Dept: ORTHOPEDICS | Facility: CLINIC | Age: 83
End: 2023-01-01
Payer: COMMERCIAL

## 2023-01-01 ENCOUNTER — PRE VISIT (OUTPATIENT)
Dept: ORTHOPEDICS | Facility: CLINIC | Age: 83
End: 2023-01-01

## 2023-01-01 DIAGNOSIS — S61.402A: Primary | ICD-10-CM

## 2023-01-01 DIAGNOSIS — S61.402A: ICD-10-CM

## 2023-01-01 DIAGNOSIS — R22.32 LOCALIZED SWELLING, MASS AND LUMP, LEFT UPPER LIMB: ICD-10-CM

## 2023-01-01 LAB
BACTERIA TISS BX CULT: NO GROWTH
BACTERIA TISS BX CULT: NO GROWTH
BACTERIA TISS BX CULT: NORMAL
GRAM STAIN RESULT: NORMAL
GRAM STAIN RESULT: NORMAL
PATH REPORT.COMMENTS IMP SPEC: NORMAL
PATH REPORT.COMMENTS IMP SPEC: NORMAL
PATH REPORT.FINAL DX SPEC: NORMAL
PATH REPORT.GROSS SPEC: NORMAL
PATH REPORT.RELEVANT HX SPEC: NORMAL
PHOTO IMAGE: NORMAL

## 2023-01-01 PROCEDURE — 87205 SMEAR GRAM STAIN: CPT | Mod: ORL | Performed by: ORTHOPAEDIC SURGERY

## 2023-01-01 PROCEDURE — 87176 TISSUE HOMOGENIZATION CULTR: CPT | Mod: ORL | Performed by: ORTHOPAEDIC SURGERY

## 2023-01-01 PROCEDURE — 88305 TISSUE EXAM BY PATHOLOGIST: CPT | Mod: 26 | Performed by: PATHOLOGY

## 2023-01-01 PROCEDURE — 88305 TISSUE EXAM BY PATHOLOGIST: CPT | Mod: TC,ORL | Performed by: ORTHOPAEDIC SURGERY

## 2023-01-01 PROCEDURE — 87102 FUNGUS ISOLATION CULTURE: CPT | Mod: ORL | Performed by: ORTHOPAEDIC SURGERY

## 2023-01-01 PROCEDURE — 99204 OFFICE O/P NEW MOD 45 MIN: CPT | Performed by: STUDENT IN AN ORGANIZED HEALTH CARE EDUCATION/TRAINING PROGRAM

## 2023-01-01 PROCEDURE — 87075 CULTR BACTERIA EXCEPT BLOOD: CPT | Mod: ORL | Performed by: ORTHOPAEDIC SURGERY

## 2023-01-01 PROCEDURE — 73130 X-RAY EXAM OF HAND: CPT | Mod: LT | Performed by: RADIOLOGY

## 2023-01-01 RX ORDER — CIPROFLOXACIN 500 MG/1
500 TABLET, FILM COATED ORAL 2 TIMES DAILY
Qty: 28 TABLET | Refills: 0 | Status: SHIPPED | OUTPATIENT
Start: 2023-01-01 | End: 2024-01-01

## 2023-01-01 RX ORDER — CEFAZOLIN SODIUM IN 0.9 % NACL 3 G/100 ML
3 INTRAVENOUS SOLUTION, PIGGYBACK (ML) INTRAVENOUS
Status: CANCELLED | OUTPATIENT
Start: 2023-01-01

## 2023-01-01 RX ORDER — HYDROCODONE BITARTRATE AND ACETAMINOPHEN 5; 325 MG/1; MG/1
1 TABLET ORAL EVERY 6 HOURS PRN
Qty: 15 TABLET | Refills: 0 | Status: SHIPPED | OUTPATIENT
Start: 2023-01-01 | End: 2023-01-01

## 2023-01-01 RX ORDER — DOXYCYCLINE 100 MG/1
100 CAPSULE ORAL 2 TIMES DAILY
Qty: 28 CAPSULE | Refills: 0 | Status: SHIPPED | OUTPATIENT
Start: 2023-01-01 | End: 2024-01-01

## 2023-01-01 RX ORDER — CEFAZOLIN SODIUM IN 0.9 % NACL 3 G/100 ML
3 INTRAVENOUS SOLUTION, PIGGYBACK (ML) INTRAVENOUS SEE ADMIN INSTRUCTIONS
Status: CANCELLED | OUTPATIENT
Start: 2023-01-01

## 2023-03-27 ENCOUNTER — LAB REQUISITION (OUTPATIENT)
Dept: LAB | Facility: CLINIC | Age: 83
End: 2023-03-27
Payer: COMMERCIAL

## 2023-03-27 DIAGNOSIS — M10.9 GOUT, UNSPECIFIED: ICD-10-CM

## 2023-03-27 DIAGNOSIS — L08.9 LOCAL INFECTION OF THE SKIN AND SUBCUTANEOUS TISSUE, UNSPECIFIED: ICD-10-CM

## 2023-03-27 PROCEDURE — 87077 CULTURE AEROBIC IDENTIFY: CPT | Mod: ORL | Performed by: PHYSICIAN ASSISTANT

## 2023-03-27 PROCEDURE — 87075 CULTR BACTERIA EXCEPT BLOOD: CPT | Mod: ORL | Performed by: PHYSICIAN ASSISTANT

## 2023-03-31 LAB
BACTERIA SPEC CULT: ABNORMAL

## 2023-04-04 LAB — BACTERIA SPEC CULT: NORMAL

## 2023-06-01 ENCOUNTER — HEALTH MAINTENANCE LETTER (OUTPATIENT)
Age: 83
End: 2023-06-01

## 2023-11-14 NOTE — NURSING NOTE
Reason For Visit:   Chief Complaint   Patient presents with    Consult     Consult for left hand surgical wound that is not healing       Primary MD: Semmler, Steven Duane  Ref. MD: Dr. Canales    Age: 83 year old    ?  No      There were no vitals taken for this visit.      Pain Assessment  Patient Currently in Pain: Yes  0-10 Pain Scale: 8  Primary Pain Location: Hand (left)  Pain Descriptors: Stabbing, Intermittent  Alleviating Factors: Pain medication    Hand Dominance Evaluation  Hand Dominance: Right          QuickDASH Assessment       No data to display                   Current Outpatient Medications   Medication Sig Dispense Refill    albuterol 90 MCG/ACT inhaler Inhale 2 puffs into the lungs every 6 hours as needed.        aspirin (ASA) 325 MG tablet Take 1 tablet (325 mg) by mouth daily 40 tablet 0    ATENOLOL 50 MG OR TABS 1 TABLET ORALLY DAILY      doxazosin (CARDURA) 4 MG tablet Take 2 mg by mouth At Bedtime       DULOXETINE HCL PO Take 120 mg by mouth every evening      ferrous sulfate (IRON) 325 (65 FE) MG tablet Take 325 mg by mouth daily (with breakfast)      FEXOFENADINE HCL PO Take 180 mg by mouth daily      fluticasone (FLONASE) 50 MCG/ACT nasal spray Spray 2 sprays into both nostrils daily      fluticasone-salmeterol (ADVAIR) 250-50 MCG/DOSE inhaler Inhale 1 puff into the lungs every 12 hours      FUROSEMIDE PO Take 40 mg by mouth 2 times daily Am and 5 pm      GABAPENTIN PO Take 1,200 mg by mouth At Bedtime      HYDROcodone-acetaminophen (NORCO) 5-325 MG tablet Take 1-2 tablets by mouth every 4 hours as needed for severe pain 60 tablet 0    hydrOXYzine (ATARAX) 25 MG tablet Take 1-2 tablets (25-50 mg) by mouth every 6 hours as needed for itching or other (For breakthrough pain.  Pain rating 1-5 give 25mg; pain rating 6-10 give 50mg) 60 tablet 1    LOVASTATIN PO Take 40 mg by mouth At Bedtime.        Magnesium 70 MG CAPS Take 1 capsule by mouth every evening      multivitamin  with  lutein (OCUVITE WITH LTEIN) CAPS Take 1 capsule by mouth every evening      order for DME Equipment being ordered: Walker ()  Treatment Diagnosis: R knee osteoarthritis s/p R TKA 1 Units 0    Potassium Chloride (KLOR-CON PO) Take 20 mEq by mouth daily      PRILOSEC OTC 20 MG OR TBEC 1 Capsule by mouth TWICE DAILY      psyllium (METAMUCIL) 58.6 % POWD Take 1 Tablespoonful by mouth daily      senna-docusate (SENOKOT-S/PERICOLACE) 8.6-50 MG tablet Take 1-2 tablets by mouth 2 times daily      VITAMIN D, CHOLECALCIFEROL, PO Take 2,000 Units by mouth daily          Allergies   Allergen Reactions    Nkda [No Known Drug Allergy]        Mamie Bryan, ATC

## 2023-11-14 NOTE — LETTER
11/14/2023         RE: Noe Morales  [address] [address]        Dear Colleague,    Thank you for referring your patient, Noe Morales, to the [facility] [place]. Please see a copy of my visit note below.    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 Patient was diagnosed with sarcoma in 1985 and had excision with 39 XRT treatments extending into the 1990s. Patient re-developed a L palm wound 3-4 months ago, was evaluated at [place], biopsied by dermatology to reveal no evidence of recurrence and underwent a tenosynovectomy on 10/9. Since then, the incisions broke down and the tendons became exposed. He has pain, no fevers, some drainage and remains on Bactrim.     ROS: Negative, see HPI  PMH: Obesity, CPOD, nondiabetic  PSH: As above  Medications: , Atarax, Albuterol, Atenolol, statin, Doxazosin, Lasix, Prilosec  Allergies: None  SH: Nonsmoker, no tobacco or nicotine use  FH: No bleeding or clotting issues, or problems with anesthesia    Examination:  Nonlabored breathing  Not distressed  Left palm wound with exposed denuded and desiccated flexor tendons and surrounding skin irritation but no obvious cellulitis, no purulence, some serous drainage  L MF with some blanching erythema and tenderness  L MF is stiff especially at the PIP joint with no motion  Minimal sensation at the L MF tip, but entire finger is viable  Left Bishnu's test is ulnar dominant    XR: Extensive osteopenia and small joint OA    Path 4/4/23: Mild spongiotic dermatitis, no malignancy    MRI 9/19/23: Tenosynovitis    Path/Cultures 10/9/23: No malignancy, negative cultures    A/P: 83M RHD NS hx L palm epithelioid sarcoma, 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  Since patient has exposed denuded tendons and an open wound with associated pain, it is very reasonable to  consider additional surgical intervention.  The wound will need to be debrided along with skin margins of the wound for permanent pathology to ensure no recurrence of sarcoma.  Additionally, we will likely take postdebridement cultures.  Finally, the wound will have to be reconstructed.  Since some of the tendon will need to be debrided, this may further compromise and already limited functioning middle finger.  If so, it may be best to amputate the middle finger while preserving the skin for a fillet flap wound reconstruction of the palm.  This would be the simplest solution that does not involve borrowing tissue for example from the forearm using an ipsilateral reverse radial forearm flap or doing a random patterned thoracoabdominal flap reconstruction that involves a second surgery.  Patient is very reasonable and would like the middle finger to be removed and is agreeable to a fillet flap reconstruction.  We did discuss that given the radiation to the palm, despite surgery, patient may still develop wound healing issues which may require prolonged wound care, additional intervention and/or surgery.  Patient understands all this.  -Discussed the risks of surgery, including but not limited to: Infection, bleeding, pain, poor scarring, injury to nerves or tendons, neuroma formation, complex regional pain syndrome, prolonged wound healing, loss of fillet flap, need for additional revision surgery, need for more proximal level amputation, anesthesia related complication, DVT, PE, death.  Despite these risks, patient consents to and would like to proceed with left palm wound irrigation and debridement, possible complex closure, left middle finger amputation with fillet flap reconstruction of the left palm wound.  -Case request placed  -To be scheduled within the next few weeks  -Continue antibiotics for now  -A total of 45 minutes was devoted to review of chart, direct face-to-face patient counseling and documentation  during this encounter, exclusive of any procedure performed.    Sean Carlos MD, PhD

## 2023-11-14 NOTE — TELEPHONE ENCOUNTER
Action November 14, 2023 10:07 AM MT   Action Taken Sent a STAT request to [facility] for imaging. *RESOLVED*       DIAGNOSIS:    APPOINTMENT DATE: 11/14/2023   NOTES STATUS DETAILS   OFFICE NOTE from referring provider SELF    OFFICE NOTE from other specialist [facility]   OPERATIVE REPORT [facility] 10/09/2023, 05/17/2023, 05/02/2023 - [facility] - [facility]    MEDICATION LIST [facility]    MRI PACS [facility]:  09/19/2032 - LT Hand   XRAYS (IMAGES & REPORTS) PACS [facility]:  03/27/2023, 01/05/2023 - LT Hand  12/07/2022, 07/17/2017 - Bilateral Hand

## 2023-11-16 NOTE — TELEPHONE ENCOUNTER
COPD, sleep apnea, hypertension. I think this Pt should see PAC for clearance.     Procedure: Left palm wound irrigation and debridement, possible complex closure, left middle finger amputation with fillet flap reconstructions of the left palm wound  Facility: [facility]  Length: 75 minutes  Anesthesia: Local, MAC  Post-op appointments needed: 2 weeks with surgeon or PA, 6 weeks with surgeon only.  Surgery packet/instructions given to patient?  Yes     Berry Cazares RN

## 2023-11-16 NOTE — TELEPHONE ENCOUNTER
RN Care Coordinator: Berry Cazares; [phone]    Surgery is scheduled with Dr. Carlos  Date: 11/30   Location: [facility]        H&P to be completed by: PAC clinic - scheduled on 11/20, video visit    Post-op: 12/8 with Dr. Carlos  Additional visits: N/A - no additional visits requested        Patient will receive surgery arrival and start time from PAC. Patient is aware that if times change after they see PAC, they will receive a call from the pre-admission nurses 1-2 days prior to surgery with updated arrival time and NPO instructions.       Spoke with Joana and was able to confirm all scheduled information.       Patient questions/concerns: N/A       Surgery packet was provided to patient during appointment    __    Keila Nolan, on 11/16/2023 at 10:27 AM  P: [phone]

## 2023-11-16 NOTE — TELEPHONE ENCOUNTER
FUTURE VISIT INFORMATION      SURGERY INFORMATION:  Date: 11/30/23  Location: [facility]  Surgeon:  Sean Carlos MD   Anesthesia Type:  MAC with Local  Procedure: LEFT PALM WOUND IRRIGATION AND DEBRIDEMENT, LEFT MIDDLE FINGER AMPUTATION WITH FILLET FLAP RECONSTRUCTION OF THE LEFT PALM WOUND POSSIBLE COMPLEX CLOSURE,   Consult: ov 11/14/23    RECORDS REQUESTED FROM:       Primary Care Provider: Semmler, Steven Duane, MD  - Jael    Pertinent Medical History: COPD, CAD, hypertension    Most recent EKG+ Tracing: 10/4/23- Jael

## 2023-11-16 NOTE — TELEPHONE ENCOUNTER
It was brought to our attention that this Pt has [employer], which was not know at the time of scheduling the appt with Dr. Moran. I am not sure if Pt was referred directly to Dr Carlos (formally) or even if that would matter. Will send a message to our PA team to see what options we have.     Berry Cazares RN

## 2023-11-16 NOTE — TELEPHONE ENCOUNTER
[facility] Call Center    Phone Message    May a detailed message be left on voicemail: yes     Reason for Call: Other: Joana Liu was seen yesterday 11/15 by Dr. Carlos and was told that someone would be calling them yesterday to schedule surgery for Noe and they have not received a call. Please call patient to get them scheduled for surgery she wants to speak to someone as soon as possible. Thank you     Action Taken: Other: [facility]    Travel Screening: Not Applicable

## 2023-11-20 NOTE — TELEPHONE ENCOUNTER
Discussed with financial team, Pt will need to cancel surgery. I relayed this to Joana. Financial team called as well. Joana is going to look into health systems that are covered and let us know so we can help with a referral. They may also request a referral from the provider that referred to Dr. Carlos. Pre-op physical cancelled today (PAC) and post op appt cancelled.    Berry Cazares RNCC

## 2023-11-20 NOTE — TELEPHONE ENCOUNTER
"Received voicemail from Joana on 11/20.    Joana is requesting a referral to a \"plastic surgeon outside of the [facility] system\". She is requesting a call back at [phone].    Called Joana to confirm that her voicemail was received. She stated she is on the phone with [employer] and they informed her there is no certification on file. Writer explained that [facility] does not accept [employer], so it was likely not submitted.    Informed her that she should see who is in network with [facility] for us to send the referral. Joana would like Dr. Carlos to provide names of surgeons he would recommend, and then Joana will verify with [employer] that they are in network so we may send the referral.    Informed Joana that someone from the team will reach out with names of providers within the next few days. Routing to Dr. Carlos and RNCC Justin    Joana is appreciative of this.  __    Keila Nolan, Senior Perioperative Coordinator, on 11/20/2023  P: [phone]    "

## 2023-11-28 NOTE — TELEPHONE ENCOUNTER
Spoke with Joana regarding rescheduling surgery with Dr. Carlos to early January as their insurance will no longer be [employer] in 2024.    RNCC Berry discussed the patient's request to delay surgery until 2024 with Dr. Carlos.    Surgery is scheduled with Dr. Carlos  Date: 1/10   Location: [facility]        H&P to be completed by: PAC clinic - scheduled on 12/22, video visit    Post-op: 1/19 with Dr. Carlos  Additional visits: N/A - no additional visits requested        Patient will receive a phone call from pre-admission nurses 1-2 days prior to surgery with arrival time and NPO instructions.       Patient questions/concerns:  Joana would like to verify that Dr. Carlos is comfortable doing this procedure in [place]. Writer already received approval from anesthesia prior to scheduling, but will send a message to Dr. Carlos as well.        Surgery packet to be sent via [employer]      __    Keila Nolan, Senior Perioperative Coordinator, on 11/28/2023 at 11:08 AM  P: [phone]

## 2023-11-29 NOTE — TELEPHONE ENCOUNTER
FUTURE VISIT INFORMATION      SURGERY INFORMATION:  Date: 1/10/24  Location:  OR  Surgeon:  Sean Carlos MD  Anesthesia Type:  MAC with Local  Procedure: LEFT PALM WOUND IRRIGATION AND DEBRIDEMENT; LEFT MIDDLE FINGER AMPUTATION WITH FILLET FLAP RECONSTRUCTION OF THE LEFT PALM WOUND; POSSIBLE COMPLEX CLOSURE  Consult: 11/14/23    RECORDS REQUESTED FROM:       Primary Care Provider: Semmler, Steven Duane, MD - [facility]     Pertinent Medical History: COPD, CAD, hypertension     Most recent EKG+ Tracing: 10/4/23- Jael

## 2024-01-01 ENCOUNTER — TELEPHONE (OUTPATIENT)
Dept: PLASTIC SURGERY | Facility: CLINIC | Age: 84
End: 2024-01-01
Payer: COMMERCIAL

## 2024-01-01 ENCOUNTER — APPOINTMENT (OUTPATIENT)
Dept: CT IMAGING | Facility: CLINIC | Age: 84
DRG: 299 | End: 2024-01-01
Attending: EMERGENCY MEDICINE
Payer: COMMERCIAL

## 2024-01-01 ENCOUNTER — APPOINTMENT (OUTPATIENT)
Dept: ULTRASOUND IMAGING | Facility: CLINIC | Age: 84
DRG: 299 | End: 2024-01-01
Payer: COMMERCIAL

## 2024-01-01 ENCOUNTER — APPOINTMENT (OUTPATIENT)
Dept: GENERAL RADIOLOGY | Facility: CLINIC | Age: 84
End: 2024-01-01
Attending: ORTHOPAEDIC SURGERY
Payer: COMMERCIAL

## 2024-01-01 ENCOUNTER — APPOINTMENT (OUTPATIENT)
Dept: GENERAL RADIOLOGY | Facility: CLINIC | Age: 84
DRG: 299 | End: 2024-01-01
Attending: SURGERY
Payer: COMMERCIAL

## 2024-01-01 ENCOUNTER — HOSPITAL ENCOUNTER (INPATIENT)
Facility: CLINIC | Age: 84
LOS: 6 days | DRG: 299 | End: 2024-08-16
Attending: SURGERY | Admitting: SURGERY
Payer: COMMERCIAL

## 2024-01-01 ENCOUNTER — THERAPY VISIT (OUTPATIENT)
Dept: PHYSICAL THERAPY | Facility: CLINIC | Age: 84
End: 2024-01-01
Payer: COMMERCIAL

## 2024-01-01 ENCOUNTER — HOSPITAL ENCOUNTER (OUTPATIENT)
Dept: ULTRASOUND IMAGING | Facility: CLINIC | Age: 84
Discharge: HOME | End: 2024-05-10
Attending: PODIATRIST | Admitting: PODIATRIST
Payer: COMMERCIAL

## 2024-01-01 ENCOUNTER — TRANSCRIBE ORDERS (OUTPATIENT)
Dept: OTHER | Age: 84
End: 2024-01-01

## 2024-01-01 ENCOUNTER — OFFICE VISIT (OUTPATIENT)
Dept: SURGERY | Facility: CLINIC | Age: 84
End: 2024-01-01
Payer: COMMERCIAL

## 2024-01-01 ENCOUNTER — HOSPITAL ENCOUNTER (EMERGENCY)
Facility: CLINIC | Age: 84
Discharge: ANOTHER HOSPITAL | DRG: 299 | End: 2024-08-10
Attending: EMERGENCY MEDICINE | Admitting: EMERGENCY MEDICINE
Payer: COMMERCIAL

## 2024-01-01 ENCOUNTER — ANESTHESIA EVENT (OUTPATIENT)
Dept: SURGERY | Facility: AMBULATORY SURGERY CENTER | Age: 84
End: 2024-01-01
Payer: COMMERCIAL

## 2024-01-01 ENCOUNTER — APPOINTMENT (OUTPATIENT)
Dept: CARDIOLOGY | Facility: CLINIC | Age: 84
DRG: 299 | End: 2024-01-01
Payer: COMMERCIAL

## 2024-01-01 ENCOUNTER — ANESTHESIA EVENT (OUTPATIENT)
Dept: SURGERY | Facility: CLINIC | Age: 84
End: 2024-01-01
Payer: COMMERCIAL

## 2024-01-01 ENCOUNTER — PRE VISIT (OUTPATIENT)
Dept: SURGERY | Facility: CLINIC | Age: 84
End: 2024-01-01

## 2024-01-01 ENCOUNTER — APPOINTMENT (OUTPATIENT)
Dept: CT IMAGING | Facility: CLINIC | Age: 84
DRG: 299 | End: 2024-01-01
Payer: COMMERCIAL

## 2024-01-01 ENCOUNTER — ANESTHESIA (OUTPATIENT)
Dept: SURGERY | Facility: CLINIC | Age: 84
End: 2024-01-01
Payer: COMMERCIAL

## 2024-01-01 ENCOUNTER — NURSE TRIAGE (OUTPATIENT)
Dept: NURSING | Facility: CLINIC | Age: 84
End: 2024-01-01
Payer: COMMERCIAL

## 2024-01-01 ENCOUNTER — OFFICE VISIT (OUTPATIENT)
Dept: ORTHOPEDICS | Facility: CLINIC | Age: 84
End: 2024-01-01
Payer: COMMERCIAL

## 2024-01-01 ENCOUNTER — APPOINTMENT (OUTPATIENT)
Dept: GENERAL RADIOLOGY | Facility: CLINIC | Age: 84
DRG: 177 | End: 2024-01-01
Attending: EMERGENCY MEDICINE
Payer: COMMERCIAL

## 2024-01-01 ENCOUNTER — ANESTHESIA (OUTPATIENT)
Dept: SURGERY | Facility: AMBULATORY SURGERY CENTER | Age: 84
End: 2024-01-01
Payer: COMMERCIAL

## 2024-01-01 ENCOUNTER — HOSPITAL ENCOUNTER (INPATIENT)
Facility: CLINIC | Age: 84
LOS: 1 days | Discharge: HOME | DRG: 177 | End: 2024-02-10
Attending: EMERGENCY MEDICINE | Admitting: HOSPITALIST
Payer: COMMERCIAL

## 2024-01-01 ENCOUNTER — OFFICE VISIT (OUTPATIENT)
Dept: VASCULAR SURGERY | Facility: CLINIC | Age: 84
End: 2024-01-01
Attending: FAMILY MEDICINE
Payer: COMMERCIAL

## 2024-01-01 ENCOUNTER — HOSPITAL ENCOUNTER (OUTPATIENT)
Facility: AMBULATORY SURGERY CENTER | Age: 84
Discharge: HOME | End: 2024-01-10
Attending: STUDENT IN AN ORGANIZED HEALTH CARE EDUCATION/TRAINING PROGRAM | Admitting: STUDENT IN AN ORGANIZED HEALTH CARE EDUCATION/TRAINING PROGRAM
Payer: COMMERCIAL

## 2024-01-01 ENCOUNTER — MYC MEDICAL ADVICE (OUTPATIENT)
Dept: ORTHOPEDICS | Facility: CLINIC | Age: 84
End: 2024-01-01
Payer: COMMERCIAL

## 2024-01-01 ENCOUNTER — THERAPY VISIT (OUTPATIENT)
Dept: PHYSICAL THERAPY | Facility: CLINIC | Age: 84
End: 2024-01-01
Attending: PHYSICIAN ASSISTANT
Payer: COMMERCIAL

## 2024-01-01 ENCOUNTER — HOSPITAL ENCOUNTER (OUTPATIENT)
Facility: CLINIC | Age: 84
Discharge: HOME | End: 2024-08-08
Attending: ORTHOPAEDIC SURGERY | Admitting: ORTHOPAEDIC SURGERY
Payer: COMMERCIAL

## 2024-01-01 ENCOUNTER — ALLIED HEALTH/NURSE VISIT (OUTPATIENT)
Dept: NURSING | Facility: CLINIC | Age: 84
End: 2024-01-01
Payer: COMMERCIAL

## 2024-01-01 ENCOUNTER — TELEPHONE (OUTPATIENT)
Dept: PLASTIC SURGERY | Facility: CLINIC | Age: 84
End: 2024-01-01

## 2024-01-01 ENCOUNTER — HOSPITAL ENCOUNTER (EMERGENCY)
Facility: CLINIC | Age: 84
Discharge: HOME | End: 2024-04-17
Attending: PHYSICIAN ASSISTANT | Admitting: PHYSICIAN ASSISTANT
Payer: COMMERCIAL

## 2024-01-01 ENCOUNTER — VIRTUAL VISIT (OUTPATIENT)
Dept: SURGERY | Facility: CLINIC | Age: 84
End: 2024-01-01
Payer: COMMERCIAL

## 2024-01-01 ENCOUNTER — APPOINTMENT (OUTPATIENT)
Dept: OCCUPATIONAL THERAPY | Facility: CLINIC | Age: 84
End: 2024-01-01
Attending: ORTHOPAEDIC SURGERY
Payer: COMMERCIAL

## 2024-01-01 ENCOUNTER — ANCILLARY PROCEDURE (OUTPATIENT)
Dept: ULTRASOUND IMAGING | Facility: CLINIC | Age: 84
End: 2024-01-01
Attending: NURSE ANESTHETIST, CERTIFIED REGISTERED
Payer: COMMERCIAL

## 2024-01-01 ENCOUNTER — APPOINTMENT (OUTPATIENT)
Dept: PHYSICAL THERAPY | Facility: CLINIC | Age: 84
DRG: 177 | End: 2024-01-01
Payer: COMMERCIAL

## 2024-01-01 ENCOUNTER — TELEPHONE (OUTPATIENT)
Dept: VASCULAR SURGERY | Facility: CLINIC | Age: 84
End: 2024-01-01
Payer: COMMERCIAL

## 2024-01-01 VITALS
SYSTOLIC BLOOD PRESSURE: 103 MMHG | DIASTOLIC BLOOD PRESSURE: 57 MMHG | WEIGHT: 280 LBS | HEART RATE: 85 BPM | OXYGEN SATURATION: 95 % | TEMPERATURE: 98.7 F | BODY MASS INDEX: 43.95 KG/M2 | HEIGHT: 67 IN | RESPIRATION RATE: 16 BRPM

## 2024-01-01 VITALS
SYSTOLIC BLOOD PRESSURE: 124 MMHG | TEMPERATURE: 98.1 F | OXYGEN SATURATION: 95 % | RESPIRATION RATE: 17 BRPM | WEIGHT: 288 LBS | BODY MASS INDEX: 43.79 KG/M2 | HEART RATE: 83 BPM | DIASTOLIC BLOOD PRESSURE: 73 MMHG

## 2024-01-01 VITALS
BODY MASS INDEX: 48.69 KG/M2 | DIASTOLIC BLOOD PRESSURE: 69 MMHG | TEMPERATURE: 100.7 F | WEIGHT: 310.85 LBS | SYSTOLIC BLOOD PRESSURE: 118 MMHG | OXYGEN SATURATION: 90 % | RESPIRATION RATE: 35 BRPM | HEART RATE: 79 BPM

## 2024-01-01 VITALS
DIASTOLIC BLOOD PRESSURE: 82 MMHG | BODY MASS INDEX: 43.18 KG/M2 | WEIGHT: 284 LBS | SYSTOLIC BLOOD PRESSURE: 126 MMHG | TEMPERATURE: 97.7 F | HEART RATE: 66 BPM

## 2024-01-01 VITALS
BODY MASS INDEX: 44.57 KG/M2 | RESPIRATION RATE: 18 BRPM | HEIGHT: 67 IN | HEART RATE: 71 BPM | OXYGEN SATURATION: 91 % | TEMPERATURE: 98 F | WEIGHT: 284 LBS | SYSTOLIC BLOOD PRESSURE: 110 MMHG | DIASTOLIC BLOOD PRESSURE: 56 MMHG

## 2024-01-01 VITALS
OXYGEN SATURATION: 95 % | RESPIRATION RATE: 18 BRPM | HEART RATE: 86 BPM | TEMPERATURE: 98.1 F | DIASTOLIC BLOOD PRESSURE: 85 MMHG | SYSTOLIC BLOOD PRESSURE: 137 MMHG

## 2024-01-01 VITALS — HEART RATE: 70 BPM | SYSTOLIC BLOOD PRESSURE: 135 MMHG | OXYGEN SATURATION: 93 % | DIASTOLIC BLOOD PRESSURE: 80 MMHG

## 2024-01-01 VITALS
OXYGEN SATURATION: 100 % | SYSTOLIC BLOOD PRESSURE: 125 MMHG | HEART RATE: 83 BPM | RESPIRATION RATE: 24 BRPM | TEMPERATURE: 97.9 F | DIASTOLIC BLOOD PRESSURE: 63 MMHG

## 2024-01-01 VITALS
OXYGEN SATURATION: 92 % | DIASTOLIC BLOOD PRESSURE: 63 MMHG | RESPIRATION RATE: 16 BRPM | TEMPERATURE: 97.1 F | HEART RATE: 71 BPM | SYSTOLIC BLOOD PRESSURE: 114 MMHG

## 2024-01-01 VITALS — DIASTOLIC BLOOD PRESSURE: 78 MMHG | HEART RATE: 70 BPM | OXYGEN SATURATION: 94 % | SYSTOLIC BLOOD PRESSURE: 134 MMHG

## 2024-01-01 DIAGNOSIS — E66.813 CLASS 3 SEVERE OBESITY WITH BODY MASS INDEX (BMI) OF 40.0 TO 44.9 IN ADULT, UNSPECIFIED OBESITY TYPE, UNSPECIFIED WHETHER SERIOUS COMORBIDITY PRESENT (H): ICD-10-CM

## 2024-01-01 DIAGNOSIS — Z01.818 PREOP EXAMINATION: Primary | ICD-10-CM

## 2024-01-01 DIAGNOSIS — S61.402A: ICD-10-CM

## 2024-01-01 DIAGNOSIS — R09.02 HYPOXIA: ICD-10-CM

## 2024-01-01 DIAGNOSIS — M54.50 LOW BACK PAIN POTENTIALLY ASSOCIATED WITH SPINAL STENOSIS: Primary | ICD-10-CM

## 2024-01-01 DIAGNOSIS — R60.0 LOWER EXTREMITY EDEMA: ICD-10-CM

## 2024-01-01 DIAGNOSIS — F10.11 HISTORY OF ALCOHOL ABUSE: ICD-10-CM

## 2024-01-01 DIAGNOSIS — I83.014 VENOUS STASIS ULCER OF RIGHT MIDFOOT LIMITED TO BREAKDOWN OF SKIN WITH VARICOSE VEINS (H): ICD-10-CM

## 2024-01-01 DIAGNOSIS — Z96.611 STATUS POST REVERSE TOTAL REPLACEMENT OF RIGHT SHOULDER: Primary | ICD-10-CM

## 2024-01-01 DIAGNOSIS — S61.402A: Primary | ICD-10-CM

## 2024-01-01 DIAGNOSIS — J44.89 COPD WITH CHRONIC BRONCHITIS (H): ICD-10-CM

## 2024-01-01 DIAGNOSIS — T81.42XA INFECTION OF DEEP INCISIONAL SURGICAL SITE AFTER PROCEDURE, INITIAL ENCOUNTER: ICD-10-CM

## 2024-01-01 DIAGNOSIS — I73.9 PAD (PERIPHERAL ARTERY DISEASE) (H): Primary | ICD-10-CM

## 2024-01-01 DIAGNOSIS — K42.9 UMBILICAL HERNIA WITHOUT OBSTRUCTION AND WITHOUT GANGRENE: Primary | ICD-10-CM

## 2024-01-01 DIAGNOSIS — L97.411 VENOUS STASIS ULCER OF RIGHT MIDFOOT LIMITED TO BREAKDOWN OF SKIN WITH VARICOSE VEINS (H): Primary | ICD-10-CM

## 2024-01-01 DIAGNOSIS — G47.30 SLEEP APNEA, UNSPECIFIED TYPE: ICD-10-CM

## 2024-01-01 DIAGNOSIS — L97.912 SKIN ULCER OF RIGHT LOWER LEG WITH FAT LAYER EXPOSED (H): Primary | ICD-10-CM

## 2024-01-01 DIAGNOSIS — S61.402S: Primary | ICD-10-CM

## 2024-01-01 DIAGNOSIS — U07.1 COVID-19 VIRUS INFECTION: ICD-10-CM

## 2024-01-01 DIAGNOSIS — I71.00 AORTIC DISSECTION (H): Primary | ICD-10-CM

## 2024-01-01 DIAGNOSIS — E66.9 OBESITY, UNSPECIFIED CLASSIFICATION, UNSPECIFIED OBESITY TYPE, UNSPECIFIED WHETHER SERIOUS COMORBIDITY PRESENT: Primary | ICD-10-CM

## 2024-01-01 DIAGNOSIS — E66.01 CLASS 3 SEVERE OBESITY WITH BODY MASS INDEX (BMI) OF 40.0 TO 44.9 IN ADULT, UNSPECIFIED OBESITY TYPE, UNSPECIFIED WHETHER SERIOUS COMORBIDITY PRESENT (H): ICD-10-CM

## 2024-01-01 DIAGNOSIS — I73.9 PAD (PERIPHERAL ARTERY DISEASE) (H): ICD-10-CM

## 2024-01-01 DIAGNOSIS — L97.411 VENOUS STASIS ULCER OF RIGHT MIDFOOT LIMITED TO BREAKDOWN OF SKIN WITH VARICOSE VEINS (H): ICD-10-CM

## 2024-01-01 DIAGNOSIS — I83.014 VENOUS STASIS ULCER OF RIGHT MIDFOOT LIMITED TO BREAKDOWN OF SKIN WITH VARICOSE VEINS (H): Primary | ICD-10-CM

## 2024-01-01 DIAGNOSIS — L97.519 RIGHT FOOT ULCER (H): ICD-10-CM

## 2024-01-01 DIAGNOSIS — I71.03 DISSECTION OF THORACOABDOMINAL AORTA (H): ICD-10-CM

## 2024-01-01 LAB
ALBUMIN SERPL BCG-MCNC: 3.2 G/DL (ref 3.5–5.2)
ALBUMIN SERPL BCG-MCNC: 3.3 G/DL (ref 3.5–5.2)
ALBUMIN SERPL BCG-MCNC: 3.4 G/DL (ref 3.5–5.2)
ALBUMIN SERPL BCG-MCNC: 3.6 G/DL (ref 3.5–5.2)
ALBUMIN SERPL BCG-MCNC: 4.1 G/DL (ref 3.5–5.2)
ALBUMIN UR-MCNC: 10 MG/DL
ALBUMIN UR-MCNC: NEGATIVE MG/DL
ALLEN'S TEST: ABNORMAL
ALLEN'S TEST: YES
ALP SERPL-CCNC: 54 U/L (ref 40–150)
ALP SERPL-CCNC: 58 U/L (ref 40–150)
ALP SERPL-CCNC: 59 U/L (ref 40–150)
ALP SERPL-CCNC: 62 U/L (ref 40–150)
ALT SERPL W P-5'-P-CCNC: 10 U/L (ref 0–70)
ALT SERPL W P-5'-P-CCNC: 10 U/L (ref 0–70)
ALT SERPL W P-5'-P-CCNC: 11 U/L (ref 0–70)
ALT SERPL W P-5'-P-CCNC: 19 U/L (ref 0–70)
ANION GAP SERPL CALCULATED.3IONS-SCNC: 10 MMOL/L (ref 7–15)
ANION GAP SERPL CALCULATED.3IONS-SCNC: 11 MMOL/L (ref 7–15)
ANION GAP SERPL CALCULATED.3IONS-SCNC: 12 MMOL/L (ref 7–15)
ANION GAP SERPL CALCULATED.3IONS-SCNC: 12 MMOL/L (ref 7–15)
ANION GAP SERPL CALCULATED.3IONS-SCNC: 13 MMOL/L (ref 7–15)
ANION GAP SERPL CALCULATED.3IONS-SCNC: 14 MMOL/L (ref 7–15)
ANION GAP SERPL CALCULATED.3IONS-SCNC: 23 MMOL/L (ref 7–15)
ANION GAP SERPL CALCULATED.3IONS-SCNC: 9 MMOL/L (ref 7–15)
APPEARANCE UR: CLEAR
APPEARANCE UR: CLEAR
AST SERPL W P-5'-P-CCNC: 20 U/L (ref 0–45)
AST SERPL W P-5'-P-CCNC: 21 U/L (ref 0–45)
AST SERPL W P-5'-P-CCNC: 24 U/L (ref 0–45)
AST SERPL W P-5'-P-CCNC: 24 U/L (ref 0–45)
ATRIAL RATE - MUSE: 75 BPM
ATRIAL RATE - MUSE: 77 BPM
BACTERIA BLD CULT: NO GROWTH
BACTERIA UR CULT: NO GROWTH
BASE EXCESS BLDA CALC-SCNC: -1.9 MMOL/L (ref -3–3)
BASE EXCESS BLDA CALC-SCNC: -2.9 MMOL/L (ref -3–3)
BASE EXCESS BLDA CALC-SCNC: 0.1 MMOL/L (ref -3–3)
BASE EXCESS BLDA CALC-SCNC: 1.9 MMOL/L (ref -3–3)
BASE EXCESS BLDA CALC-SCNC: 3.2 MMOL/L (ref -3–3)
BASOPHILS # BLD AUTO: 0 10E3/UL (ref 0–0.2)
BASOPHILS # BLD AUTO: 0 10E3/UL (ref 0–0.2)
BASOPHILS # BLD MANUAL: 0 10E3/UL (ref 0–0.2)
BASOPHILS NFR BLD AUTO: 0 %
BASOPHILS NFR BLD AUTO: 1 %
BASOPHILS NFR BLD MANUAL: 0 %
BILIRUB SERPL-MCNC: 0.6 MG/DL
BILIRUB SERPL-MCNC: 0.8 MG/DL
BILIRUB SERPL-MCNC: 0.9 MG/DL
BILIRUB SERPL-MCNC: 1 MG/DL
BILIRUB UR QL STRIP: NEGATIVE
BILIRUB UR QL STRIP: NEGATIVE
BUN SERPL-MCNC: 15.1 MG/DL (ref 8–23)
BUN SERPL-MCNC: 15.6 MG/DL (ref 8–23)
BUN SERPL-MCNC: 17.1 MG/DL (ref 8–23)
BUN SERPL-MCNC: 22.1 MG/DL (ref 8–23)
BUN SERPL-MCNC: 24.8 MG/DL (ref 8–23)
BUN SERPL-MCNC: 32.8 MG/DL (ref 8–23)
BUN SERPL-MCNC: 35.9 MG/DL (ref 8–23)
BUN SERPL-MCNC: 38.5 MG/DL (ref 8–23)
BUN SERPL-MCNC: 40.9 MG/DL (ref 8–23)
BUN SERPL-MCNC: 43.7 MG/DL (ref 8–23)
BUN SERPL-MCNC: 44.2 MG/DL (ref 8–23)
BUN SERPL-MCNC: 45.2 MG/DL (ref 8–23)
CA-I BLD-MCNC: 4.5 MG/DL (ref 4.4–5.2)
CALCIUM SERPL-MCNC: 6.1 MG/DL (ref 8.8–10.4)
CALCIUM SERPL-MCNC: 8.4 MG/DL (ref 8.8–10.4)
CALCIUM SERPL-MCNC: 8.5 MG/DL (ref 8.8–10.4)
CALCIUM SERPL-MCNC: 8.5 MG/DL (ref 8.8–10.4)
CALCIUM SERPL-MCNC: 8.6 MG/DL (ref 8.8–10.4)
CALCIUM SERPL-MCNC: 8.6 MG/DL (ref 8.8–10.4)
CALCIUM SERPL-MCNC: 8.7 MG/DL (ref 8.8–10.4)
CALCIUM SERPL-MCNC: 8.9 MG/DL (ref 8.8–10.4)
CALCIUM SERPL-MCNC: 9 MG/DL (ref 8.8–10.2)
CALCIUM SERPL-MCNC: 9.1 MG/DL (ref 8.8–10.2)
CHLORIDE SERPL-SCNC: 100 MMOL/L (ref 98–107)
CHLORIDE SERPL-SCNC: 100 MMOL/L (ref 98–107)
CHLORIDE SERPL-SCNC: 103 MMOL/L (ref 98–107)
CHLORIDE SERPL-SCNC: 105 MMOL/L (ref 98–107)
CHLORIDE SERPL-SCNC: 108 MMOL/L (ref 98–107)
CHLORIDE SERPL-SCNC: 109 MMOL/L (ref 98–107)
CHLORIDE SERPL-SCNC: 113 MMOL/L (ref 98–107)
CHLORIDE SERPL-SCNC: 116 MMOL/L (ref 98–107)
CHLORIDE SERPL-SCNC: 118 MMOL/L (ref 98–107)
CHLORIDE SERPL-SCNC: 122 MMOL/L (ref 98–107)
CHLORIDE SERPL-SCNC: 96 MMOL/L (ref 98–107)
CHLORIDE SERPL-SCNC: 97 MMOL/L (ref 98–107)
COHGB MFR BLD: 91.8 % (ref 95–96)
COHGB MFR BLD: 93 % (ref 95–96)
COHGB MFR BLD: 96.9 % (ref 95–96)
COHGB MFR BLD: 97 % (ref 95–96)
COHGB MFR BLD: 98 % (ref 95–96)
COLOR UR AUTO: YELLOW
COLOR UR AUTO: YELLOW
CREAT SERPL-MCNC: 0.93 MG/DL (ref 0.67–1.17)
CREAT SERPL-MCNC: 0.96 MG/DL (ref 0.67–1.17)
CREAT SERPL-MCNC: 0.98 MG/DL (ref 0.67–1.17)
CREAT SERPL-MCNC: 0.98 MG/DL (ref 0.67–1.17)
CREAT SERPL-MCNC: 0.99 MG/DL (ref 0.67–1.17)
CREAT SERPL-MCNC: 1.07 MG/DL (ref 0.67–1.17)
CREAT SERPL-MCNC: 1.16 MG/DL (ref 0.67–1.17)
CREAT SERPL-MCNC: 1.24 MG/DL (ref 0.67–1.17)
CREAT SERPL-MCNC: 1.34 MG/DL (ref 0.67–1.17)
CREAT SERPL-MCNC: 1.44 MG/DL (ref 0.67–1.17)
CREAT SERPL-MCNC: 1.45 MG/DL (ref 0.67–1.17)
CREAT SERPL-MCNC: 1.57 MG/DL (ref 0.67–1.17)
CREAT SERPL-MCNC: 1.68 MG/DL (ref 0.67–1.17)
CRP SERPL-MCNC: 275.85 MG/L
CRP SERPL-MCNC: 90.6 MG/L
DEPRECATED HCO3 PLAS-SCNC: 26 MMOL/L (ref 22–29)
DEPRECATED HCO3 PLAS-SCNC: 28 MMOL/L (ref 22–29)
DIASTOLIC BLOOD PRESSURE - MUSE: NORMAL MMHG
DIASTOLIC BLOOD PRESSURE - MUSE: NORMAL MMHG
EGFRCR SERPLBLD CKD-EPI 2021: 40 ML/MIN/1.73M2
EGFRCR SERPLBLD CKD-EPI 2021: 44 ML/MIN/1.73M2
EGFRCR SERPLBLD CKD-EPI 2021: 48 ML/MIN/1.73M2
EGFRCR SERPLBLD CKD-EPI 2021: 49 ML/MIN/1.73M2
EGFRCR SERPLBLD CKD-EPI 2021: 53 ML/MIN/1.73M2
EGFRCR SERPLBLD CKD-EPI 2021: 58 ML/MIN/1.73M2
EGFRCR SERPLBLD CKD-EPI 2021: 63 ML/MIN/1.73M2
EGFRCR SERPLBLD CKD-EPI 2021: 70 ML/MIN/1.73M2
EGFRCR SERPLBLD CKD-EPI 2021: 76 ML/MIN/1.73M2
EGFRCR SERPLBLD CKD-EPI 2021: 77 ML/MIN/1.73M2
EGFRCR SERPLBLD CKD-EPI 2021: 77 ML/MIN/1.73M2
EGFRCR SERPLBLD CKD-EPI 2021: 79 ML/MIN/1.73M2
EGFRCR SERPLBLD CKD-EPI 2021: 82 ML/MIN/1.73M2
EOSINOPHIL # BLD AUTO: 0 10E3/UL (ref 0–0.7)
EOSINOPHIL # BLD AUTO: 0.1 10E3/UL (ref 0–0.7)
EOSINOPHIL # BLD MANUAL: 0 10E3/UL (ref 0–0.7)
EOSINOPHIL NFR BLD AUTO: 1 %
EOSINOPHIL NFR BLD AUTO: 1 %
EOSINOPHIL NFR BLD MANUAL: 0 %
ERYTHROCYTE [DISTWIDTH] IN BLOOD BY AUTOMATED COUNT: 13 % (ref 10–15)
ERYTHROCYTE [DISTWIDTH] IN BLOOD BY AUTOMATED COUNT: 13.1 % (ref 10–15)
ERYTHROCYTE [DISTWIDTH] IN BLOOD BY AUTOMATED COUNT: 13.2 % (ref 10–15)
ERYTHROCYTE [DISTWIDTH] IN BLOOD BY AUTOMATED COUNT: 13.2 % (ref 10–15)
ERYTHROCYTE [DISTWIDTH] IN BLOOD BY AUTOMATED COUNT: 13.4 % (ref 10–15)
ERYTHROCYTE [DISTWIDTH] IN BLOOD BY AUTOMATED COUNT: 13.4 % (ref 10–15)
ERYTHROCYTE [DISTWIDTH] IN BLOOD BY AUTOMATED COUNT: 13.5 % (ref 10–15)
ERYTHROCYTE [DISTWIDTH] IN BLOOD BY AUTOMATED COUNT: 13.5 % (ref 10–15)
ERYTHROCYTE [DISTWIDTH] IN BLOOD BY AUTOMATED COUNT: 13.9 % (ref 10–15)
ERYTHROCYTE [DISTWIDTH] IN BLOOD BY AUTOMATED COUNT: 14.1 % (ref 10–15)
ERYTHROCYTE [DISTWIDTH] IN BLOOD BY AUTOMATED COUNT: 14.4 % (ref 10–15)
ERYTHROCYTE [SEDIMENTATION RATE] IN BLOOD BY WESTERGREN METHOD: 18 MM/HR (ref 0–20)
FLUAV RNA SPEC QL NAA+PROBE: NEGATIVE
FLUBV RNA RESP QL NAA+PROBE: NEGATIVE
GLUCOSE BLDC GLUCOMTR-MCNC: 109 MG/DL (ref 70–99)
GLUCOSE BLDC GLUCOMTR-MCNC: 111 MG/DL (ref 70–99)
GLUCOSE BLDC GLUCOMTR-MCNC: 114 MG/DL (ref 70–99)
GLUCOSE BLDC GLUCOMTR-MCNC: 116 MG/DL (ref 70–99)
GLUCOSE BLDC GLUCOMTR-MCNC: 131 MG/DL (ref 70–99)
GLUCOSE BLDC GLUCOMTR-MCNC: 131 MG/DL (ref 70–99)
GLUCOSE BLDC GLUCOMTR-MCNC: 133 MG/DL (ref 70–99)
GLUCOSE BLDC GLUCOMTR-MCNC: 135 MG/DL (ref 70–99)
GLUCOSE BLDC GLUCOMTR-MCNC: 136 MG/DL (ref 70–99)
GLUCOSE BLDC GLUCOMTR-MCNC: 137 MG/DL (ref 70–99)
GLUCOSE BLDC GLUCOMTR-MCNC: 139 MG/DL (ref 70–99)
GLUCOSE BLDC GLUCOMTR-MCNC: 142 MG/DL (ref 70–99)
GLUCOSE BLDC GLUCOMTR-MCNC: 145 MG/DL (ref 70–99)
GLUCOSE BLDC GLUCOMTR-MCNC: 151 MG/DL (ref 70–99)
GLUCOSE BLDC GLUCOMTR-MCNC: 153 MG/DL (ref 70–99)
GLUCOSE BLDC GLUCOMTR-MCNC: 155 MG/DL (ref 70–99)
GLUCOSE BLDC GLUCOMTR-MCNC: 156 MG/DL (ref 70–99)
GLUCOSE BLDC GLUCOMTR-MCNC: 158 MG/DL (ref 70–99)
GLUCOSE BLDC GLUCOMTR-MCNC: 159 MG/DL (ref 70–99)
GLUCOSE BLDC GLUCOMTR-MCNC: 161 MG/DL (ref 70–99)
GLUCOSE BLDC GLUCOMTR-MCNC: 162 MG/DL (ref 70–99)
GLUCOSE BLDC GLUCOMTR-MCNC: 167 MG/DL (ref 70–99)
GLUCOSE BLDC GLUCOMTR-MCNC: 94 MG/DL (ref 70–99)
GLUCOSE SERPL-MCNC: 101 MG/DL (ref 70–99)
GLUCOSE SERPL-MCNC: 117 MG/DL (ref 70–99)
GLUCOSE SERPL-MCNC: 118 MG/DL (ref 70–99)
GLUCOSE SERPL-MCNC: 125 MG/DL (ref 70–99)
GLUCOSE SERPL-MCNC: 132 MG/DL (ref 70–99)
GLUCOSE SERPL-MCNC: 134 MG/DL (ref 70–99)
GLUCOSE SERPL-MCNC: 141 MG/DL (ref 70–99)
GLUCOSE SERPL-MCNC: 142 MG/DL (ref 70–99)
GLUCOSE SERPL-MCNC: 143 MG/DL (ref 70–99)
GLUCOSE SERPL-MCNC: 147 MG/DL (ref 70–99)
GLUCOSE SERPL-MCNC: 153 MG/DL (ref 70–99)
GLUCOSE SERPL-MCNC: 157 MG/DL (ref 70–99)
GLUCOSE SERPL-MCNC: 165 MG/DL (ref 70–99)
GLUCOSE UR STRIP-MCNC: NEGATIVE MG/DL
GLUCOSE UR STRIP-MCNC: NEGATIVE MG/DL
HCO3 BLD-SCNC: 20 MMOL/L (ref 21–28)
HCO3 BLD-SCNC: 23 MMOL/L (ref 21–28)
HCO3 BLD-SCNC: 24 MMOL/L (ref 21–28)
HCO3 BLD-SCNC: 26 MMOL/L (ref 21–28)
HCO3 BLD-SCNC: 28 MMOL/L (ref 21–28)
HCO3 SERPL-SCNC: 18 MMOL/L (ref 22–29)
HCO3 SERPL-SCNC: 20 MMOL/L (ref 22–29)
HCO3 SERPL-SCNC: 21 MMOL/L (ref 22–29)
HCO3 SERPL-SCNC: 22 MMOL/L (ref 22–29)
HCO3 SERPL-SCNC: 22 MMOL/L (ref 22–29)
HCO3 SERPL-SCNC: 23 MMOL/L (ref 22–29)
HCO3 SERPL-SCNC: 24 MMOL/L (ref 22–29)
HCO3 SERPL-SCNC: 24 MMOL/L (ref 22–29)
HCO3 SERPL-SCNC: 27 MMOL/L (ref 22–29)
HCO3 SERPL-SCNC: 27 MMOL/L (ref 22–29)
HCT VFR BLD AUTO: 22.2 % (ref 40–53)
HCT VFR BLD AUTO: 27.8 % (ref 40–53)
HCT VFR BLD AUTO: 29.3 % (ref 40–53)
HCT VFR BLD AUTO: 29.9 % (ref 40–53)
HCT VFR BLD AUTO: 29.9 % (ref 40–53)
HCT VFR BLD AUTO: 30.2 % (ref 40–53)
HCT VFR BLD AUTO: 32 % (ref 40–53)
HCT VFR BLD AUTO: 35.1 % (ref 40–53)
HCT VFR BLD AUTO: 39.7 % (ref 40–53)
HCT VFR BLD AUTO: 41.5 % (ref 40–53)
HCT VFR BLD AUTO: 41.7 % (ref 40–53)
HGB BLD-MCNC: 10.2 G/DL (ref 13.3–17.7)
HGB BLD-MCNC: 10.3 G/DL (ref 13.3–17.7)
HGB BLD-MCNC: 10.6 G/DL (ref 13.3–17.7)
HGB BLD-MCNC: 11.5 G/DL (ref 13.3–17.7)
HGB BLD-MCNC: 13.1 G/DL (ref 13.3–17.7)
HGB BLD-MCNC: 13.5 G/DL (ref 13.3–17.7)
HGB BLD-MCNC: 13.7 G/DL (ref 13.3–17.7)
HGB BLD-MCNC: 14.1 G/DL (ref 13.3–17.7)
HGB BLD-MCNC: 7.2 G/DL (ref 13.3–17.7)
HGB BLD-MCNC: 9.3 G/DL (ref 13.3–17.7)
HGB BLD-MCNC: 9.8 G/DL (ref 13.3–17.7)
HGB BLD-MCNC: 9.8 G/DL (ref 13.3–17.7)
HGB UR QL STRIP: ABNORMAL
HGB UR QL STRIP: NEGATIVE
HOLD SPECIMEN: NORMAL
HYALINE CASTS: 1 /LPF
IMM GRANULOCYTES # BLD: 0 10E3/UL
IMM GRANULOCYTES # BLD: 0.1 10E3/UL
IMM GRANULOCYTES NFR BLD: 1 %
IMM GRANULOCYTES NFR BLD: 1 %
INTERPRETATION ECG - MUSE: NORMAL
INTERPRETATION ECG - MUSE: NORMAL
KETONES UR STRIP-MCNC: NEGATIVE MG/DL
KETONES UR STRIP-MCNC: NEGATIVE MG/DL
LACTATE SERPL-SCNC: 0.9 MMOL/L (ref 0.7–2)
LACTATE SERPL-SCNC: 1.1 MMOL/L (ref 0.7–2)
LEUKOCYTE ESTERASE UR QL STRIP: ABNORMAL
LEUKOCYTE ESTERASE UR QL STRIP: NEGATIVE
LVEF ECHO: NORMAL
LYMPHOCYTES # BLD AUTO: 0.6 10E3/UL (ref 0.8–5.3)
LYMPHOCYTES # BLD AUTO: 0.9 10E3/UL (ref 0.8–5.3)
LYMPHOCYTES # BLD MANUAL: 0.7 10E3/UL (ref 0.8–5.3)
LYMPHOCYTES NFR BLD AUTO: 8 %
LYMPHOCYTES NFR BLD AUTO: 9 %
LYMPHOCYTES NFR BLD MANUAL: 6 %
MAGNESIUM SERPL-MCNC: 1.7 MG/DL (ref 1.7–2.3)
MAGNESIUM SERPL-MCNC: 2.1 MG/DL (ref 1.7–2.3)
MAGNESIUM SERPL-MCNC: 2.3 MG/DL (ref 1.7–2.3)
MAGNESIUM SERPL-MCNC: 2.4 MG/DL (ref 1.7–2.3)
MAGNESIUM SERPL-MCNC: 2.4 MG/DL (ref 1.7–2.3)
MAGNESIUM SERPL-MCNC: 2.5 MG/DL (ref 1.7–2.3)
MAGNESIUM SERPL-MCNC: 2.5 MG/DL (ref 1.7–2.3)
MAGNESIUM SERPL-MCNC: 2.6 MG/DL (ref 1.7–2.3)
MCH RBC QN AUTO: 30.3 PG (ref 26.5–33)
MCH RBC QN AUTO: 30.3 PG (ref 26.5–33)
MCH RBC QN AUTO: 30.4 PG (ref 26.5–33)
MCH RBC QN AUTO: 30.6 PG (ref 26.5–33)
MCH RBC QN AUTO: 30.8 PG (ref 26.5–33)
MCH RBC QN AUTO: 31.2 PG (ref 26.5–33)
MCH RBC QN AUTO: 31.3 PG (ref 26.5–33)
MCH RBC QN AUTO: 31.4 PG (ref 26.5–33)
MCH RBC QN AUTO: 31.5 PG (ref 26.5–33)
MCH RBC QN AUTO: 31.8 PG (ref 26.5–33)
MCH RBC QN AUTO: 31.9 PG (ref 26.5–33)
MCHC RBC AUTO-ENTMCNC: 32.4 G/DL (ref 31.5–36.5)
MCHC RBC AUTO-ENTMCNC: 32.5 G/DL (ref 31.5–36.5)
MCHC RBC AUTO-ENTMCNC: 32.8 G/DL (ref 31.5–36.5)
MCHC RBC AUTO-ENTMCNC: 32.8 G/DL (ref 31.5–36.5)
MCHC RBC AUTO-ENTMCNC: 33.1 G/DL (ref 31.5–36.5)
MCHC RBC AUTO-ENTMCNC: 33.4 G/DL (ref 31.5–36.5)
MCHC RBC AUTO-ENTMCNC: 33.5 G/DL (ref 31.5–36.5)
MCHC RBC AUTO-ENTMCNC: 33.8 G/DL (ref 31.5–36.5)
MCHC RBC AUTO-ENTMCNC: 34.1 G/DL (ref 31.5–36.5)
MCHC RBC AUTO-ENTMCNC: 34.1 G/DL (ref 31.5–36.5)
MCHC RBC AUTO-ENTMCNC: 34.5 G/DL (ref 31.5–36.5)
MCV RBC AUTO: 91 FL (ref 78–100)
MCV RBC AUTO: 91 FL (ref 78–100)
MCV RBC AUTO: 92 FL (ref 78–100)
MCV RBC AUTO: 93 FL (ref 78–100)
MCV RBC AUTO: 94 FL (ref 78–100)
MCV RBC AUTO: 95 FL (ref 78–100)
MCV RBC AUTO: 97 FL (ref 78–100)
MONOCYTES # BLD AUTO: 1.2 10E3/UL (ref 0–1.3)
MONOCYTES # BLD AUTO: 1.2 10E3/UL (ref 0–1.3)
MONOCYTES # BLD MANUAL: 0.4 10E3/UL (ref 0–1.3)
MONOCYTES NFR BLD AUTO: 12 %
MONOCYTES NFR BLD AUTO: 15 %
MONOCYTES NFR BLD MANUAL: 4 %
MRSA DNA SPEC QL NAA+PROBE: POSITIVE
MUCOUS THREADS #/AREA URNS LPF: PRESENT /LPF
NEUTROPHILS # BLD AUTO: 6 10E3/UL (ref 1.6–8.3)
NEUTROPHILS # BLD AUTO: 7.4 10E3/UL (ref 1.6–8.3)
NEUTROPHILS # BLD MANUAL: 10.1 10E3/UL (ref 1.6–8.3)
NEUTROPHILS NFR BLD AUTO: 74 %
NEUTROPHILS NFR BLD AUTO: 77 %
NEUTROPHILS NFR BLD MANUAL: 90 %
NITRATE UR QL: NEGATIVE
NITRATE UR QL: NEGATIVE
NRBC # BLD AUTO: 0 10E3/UL
NRBC BLD AUTO-RTO: 0 /100
O2/TOTAL GAS SETTING VFR VENT: 100 %
O2/TOTAL GAS SETTING VFR VENT: 100 %
O2/TOTAL GAS SETTING VFR VENT: 15 %
O2/TOTAL GAS SETTING VFR VENT: 55 %
O2/TOTAL GAS SETTING VFR VENT: 6 %
P AXIS - MUSE: 45 DEGREES
P AXIS - MUSE: 58 DEGREES
PATH REPORT.COMMENTS IMP SPEC: NORMAL
PATH REPORT.COMMENTS IMP SPEC: NORMAL
PATH REPORT.FINAL DX SPEC: NORMAL
PATH REPORT.GROSS SPEC: NORMAL
PATH REPORT.MICROSCOPIC SPEC OTHER STN: NORMAL
PATH REPORT.RELEVANT HX SPEC: NORMAL
PCO2 BLD: 29 MM HG (ref 35–45)
PCO2 BLD: 35 MM HG (ref 35–45)
PCO2 BLD: 37 MM HG (ref 35–45)
PCO2 BLD: 38 MM HG (ref 35–45)
PCO2 BLD: 44 MM HG (ref 35–45)
PEEP: 6 CM H2O
PH BLD: 7.4 [PH] (ref 7.35–7.45)
PH BLD: 7.41 [PH] (ref 7.35–7.45)
PH BLD: 7.45 [PH] (ref 7.35–7.45)
PH UR STRIP: 6 [PH] (ref 5–7)
PH UR STRIP: 6 [PH] (ref 5–7)
PHOSPHATE SERPL-MCNC: 1.5 MG/DL (ref 2.5–4.5)
PHOSPHATE SERPL-MCNC: 2.4 MG/DL (ref 2.5–4.5)
PHOSPHATE SERPL-MCNC: 2.6 MG/DL (ref 2.5–4.5)
PHOSPHATE SERPL-MCNC: 3.1 MG/DL (ref 2.5–4.5)
PHOSPHATE SERPL-MCNC: 3.4 MG/DL (ref 2.5–4.5)
PHOSPHATE SERPL-MCNC: 3.4 MG/DL (ref 2.5–4.5)
PHOSPHATE SERPL-MCNC: 3.6 MG/DL (ref 2.5–4.5)
PHOSPHATE SERPL-MCNC: 3.8 MG/DL (ref 2.5–4.5)
PHOSPHATE SERPL-MCNC: 4.2 MG/DL (ref 2.5–4.5)
PHOTO IMAGE: NORMAL
PLAT MORPH BLD: ABNORMAL
PLATELET # BLD AUTO: 129 10E3/UL (ref 150–450)
PLATELET # BLD AUTO: 139 10E3/UL (ref 150–450)
PLATELET # BLD AUTO: 142 10E3/UL (ref 150–450)
PLATELET # BLD AUTO: 148 10E3/UL (ref 150–450)
PLATELET # BLD AUTO: 176 10E3/UL (ref 150–450)
PLATELET # BLD AUTO: 187 10E3/UL (ref 150–450)
PLATELET # BLD AUTO: 226 10E3/UL (ref 150–450)
PLATELET # BLD AUTO: 227 10E3/UL (ref 150–450)
PLATELET # BLD AUTO: 271 10E3/UL (ref 150–450)
PLATELET # BLD AUTO: 281 10E3/UL (ref 150–450)
PLATELET # BLD AUTO: 330 10E3/UL (ref 150–450)
PO2 BLD: 60 MM HG (ref 80–105)
PO2 BLD: 64 MM HG (ref 80–105)
PO2 BLD: 79 MM HG (ref 80–105)
PO2 BLD: 81 MM HG (ref 80–105)
PO2 BLD: 91 MM HG (ref 80–105)
POTASSIUM SERPL-SCNC: 2.6 MMOL/L (ref 3.4–5.3)
POTASSIUM SERPL-SCNC: 3.4 MMOL/L (ref 3.4–5.3)
POTASSIUM SERPL-SCNC: 3.4 MMOL/L (ref 3.4–5.3)
POTASSIUM SERPL-SCNC: 3.5 MMOL/L (ref 3.4–5.3)
POTASSIUM SERPL-SCNC: 3.5 MMOL/L (ref 3.4–5.3)
POTASSIUM SERPL-SCNC: 3.6 MMOL/L (ref 3.4–5.3)
POTASSIUM SERPL-SCNC: 3.8 MMOL/L (ref 3.4–5.3)
POTASSIUM SERPL-SCNC: 3.8 MMOL/L (ref 3.4–5.3)
POTASSIUM SERPL-SCNC: 3.9 MMOL/L (ref 3.4–5.3)
POTASSIUM SERPL-SCNC: 4 MMOL/L (ref 3.4–5.3)
POTASSIUM SERPL-SCNC: 4 MMOL/L (ref 3.4–5.3)
POTASSIUM SERPL-SCNC: 4.3 MMOL/L (ref 3.4–5.3)
POTASSIUM SERPL-SCNC: 4.4 MMOL/L (ref 3.4–5.3)
PR INTERVAL - MUSE: 188 MS
PR INTERVAL - MUSE: 196 MS
PROCALCITONIN SERPL IA-MCNC: 0.13 NG/ML
PROCALCITONIN SERPL IA-MCNC: 0.25 NG/ML
PROCALCITONIN SERPL IA-MCNC: 0.33 NG/ML
PROT SERPL-MCNC: 6 G/DL (ref 6.4–8.3)
PROT SERPL-MCNC: 6 G/DL (ref 6.4–8.3)
PROT SERPL-MCNC: 6.2 G/DL (ref 6.4–8.3)
PROT SERPL-MCNC: 6.9 G/DL (ref 6.4–8.3)
QRS DURATION - MUSE: 112 MS
QRS DURATION - MUSE: 126 MS
QT - MUSE: 398 MS
QT - MUSE: 402 MS
QTC - MUSE: 448 MS
QTC - MUSE: 450 MS
R AXIS - MUSE: 66 DEGREES
R AXIS - MUSE: 68 DEGREES
RBC # BLD AUTO: 2.38 10E6/UL (ref 4.4–5.9)
RBC # BLD AUTO: 3.06 10E6/UL (ref 4.4–5.9)
RBC # BLD AUTO: 3.2 10E6/UL (ref 4.4–5.9)
RBC # BLD AUTO: 3.23 10E6/UL (ref 4.4–5.9)
RBC # BLD AUTO: 3.27 10E6/UL (ref 4.4–5.9)
RBC # BLD AUTO: 3.28 10E6/UL (ref 4.4–5.9)
RBC # BLD AUTO: 3.44 10E6/UL (ref 4.4–5.9)
RBC # BLD AUTO: 3.68 10E6/UL (ref 4.4–5.9)
RBC # BLD AUTO: 4.29 10E6/UL (ref 4.4–5.9)
RBC # BLD AUTO: 4.3 10E6/UL (ref 4.4–5.9)
RBC # BLD AUTO: 4.44 10E6/UL (ref 4.4–5.9)
RBC MORPH BLD: ABNORMAL
RBC URINE: 1 /HPF
RBC URINE: 7 /HPF
RSV RNA SPEC NAA+PROBE: NEGATIVE
SA TARGET DNA: POSITIVE
SAO2 % BLDA: 90 % (ref 92–100)
SAO2 % BLDA: 91 % (ref 92–100)
SAO2 % BLDA: 96 % (ref 92–100)
SARS-COV-2 RNA RESP QL NAA+PROBE: POSITIVE
SODIUM SERPL-SCNC: 132 MMOL/L (ref 135–145)
SODIUM SERPL-SCNC: 134 MMOL/L (ref 135–145)
SODIUM SERPL-SCNC: 137 MMOL/L (ref 135–145)
SODIUM SERPL-SCNC: 137 MMOL/L (ref 135–145)
SODIUM SERPL-SCNC: 138 MMOL/L (ref 135–145)
SODIUM SERPL-SCNC: 139 MMOL/L (ref 135–145)
SODIUM SERPL-SCNC: 142 MMOL/L (ref 135–145)
SODIUM SERPL-SCNC: 143 MMOL/L (ref 135–145)
SODIUM SERPL-SCNC: 147 MMOL/L (ref 135–145)
SODIUM SERPL-SCNC: 150 MMOL/L (ref 135–145)
SODIUM SERPL-SCNC: 151 MMOL/L (ref 135–145)
SODIUM SERPL-SCNC: 163 MMOL/L (ref 135–145)
SODIUM UR-SCNC: 40 MMOL/L
SP GR UR STRIP: 1 (ref 1–1.03)
SP GR UR STRIP: 1.01 (ref 1–1.03)
SYSTOLIC BLOOD PRESSURE - MUSE: NORMAL MMHG
SYSTOLIC BLOOD PRESSURE - MUSE: NORMAL MMHG
T AXIS - MUSE: 61 DEGREES
T AXIS - MUSE: 62 DEGREES
TROPONIN T SERPL HS-MCNC: 10 NG/L
TROPONIN T SERPL HS-MCNC: 16 NG/L
UROBILINOGEN UR STRIP-MCNC: NORMAL MG/DL
UROBILINOGEN UR STRIP-MCNC: NORMAL MG/DL
VANCOMYCIN SERPL-MCNC: 16.1 UG/ML
VENTRICULAR RATE- MUSE: 75 BPM
VENTRICULAR RATE- MUSE: 77 BPM
WBC # BLD AUTO: 10.2 10E3/UL (ref 4–11)
WBC # BLD AUTO: 10.8 10E3/UL (ref 4–11)
WBC # BLD AUTO: 11.2 10E3/UL (ref 4–11)
WBC # BLD AUTO: 12.2 10E3/UL (ref 4–11)
WBC # BLD AUTO: 12.4 10E3/UL (ref 4–11)
WBC # BLD AUTO: 12.4 10E3/UL (ref 4–11)
WBC # BLD AUTO: 6.1 10E3/UL (ref 4–11)
WBC # BLD AUTO: 6.4 10E3/UL (ref 4–11)
WBC # BLD AUTO: 7.9 10E3/UL (ref 4–11)
WBC # BLD AUTO: 9.5 10E3/UL (ref 4–11)
WBC # BLD AUTO: 9.6 10E3/UL (ref 4–11)
WBC URINE: 89 /HPF
WBC URINE: <1 /HPF

## 2024-01-01 PROCEDURE — 250N000013 HC RX MED GY IP 250 OP 250 PS 637: Performed by: INTERNAL MEDICINE

## 2024-01-01 PROCEDURE — 82805 BLOOD GASES W/O2 SATURATION: CPT | Performed by: SURGERY

## 2024-01-01 PROCEDURE — 999N000157 HC STATISTIC RCP TIME EA 10 MIN

## 2024-01-01 PROCEDURE — 82565 ASSAY OF CREATININE: CPT | Performed by: INTERNAL MEDICINE

## 2024-01-01 PROCEDURE — 83735 ASSAY OF MAGNESIUM: CPT

## 2024-01-01 PROCEDURE — 80202 ASSAY OF VANCOMYCIN: CPT | Performed by: INTERNAL MEDICINE

## 2024-01-01 PROCEDURE — 97161 PT EVAL LOW COMPLEX 20 MIN: CPT | Mod: GP | Performed by: PHYSICAL MEDICINE & REHABILITATION

## 2024-01-01 PROCEDURE — 250N000011 HC RX IP 250 OP 636: Performed by: STUDENT IN AN ORGANIZED HEALTH CARE EDUCATION/TRAINING PROGRAM

## 2024-01-01 PROCEDURE — 84484 ASSAY OF TROPONIN QUANT: CPT

## 2024-01-01 PROCEDURE — 94640 AIRWAY INHALATION TREATMENT: CPT

## 2024-01-01 PROCEDURE — 99232 SBSQ HOSP IP/OBS MODERATE 35: CPT | Mod: GC | Performed by: SURGERY

## 2024-01-01 PROCEDURE — 258N000003 HC RX IP 258 OP 636: Performed by: NURSE ANESTHETIST, CERTIFIED REGISTERED

## 2024-01-01 PROCEDURE — 250N000009 HC RX 250: Performed by: PHYSICIAN ASSISTANT

## 2024-01-01 PROCEDURE — 80053 COMPREHEN METABOLIC PANEL: CPT | Performed by: INTERNAL MEDICINE

## 2024-01-01 PROCEDURE — 250N000013 HC RX MED GY IP 250 OP 250 PS 637

## 2024-01-01 PROCEDURE — 82947 ASSAY GLUCOSE BLOOD QUANT: CPT | Performed by: ORTHOPAEDIC SURGERY

## 2024-01-01 PROCEDURE — 250N000009 HC RX 250: Performed by: NURSE ANESTHETIST, CERTIFIED REGISTERED

## 2024-01-01 PROCEDURE — 85027 COMPLETE CBC AUTOMATED: CPT | Performed by: EMERGENCY MEDICINE

## 2024-01-01 PROCEDURE — 710N000009 HC RECOVERY PHASE 1, LEVEL 1, PER MIN: Performed by: ORTHOPAEDIC SURGERY

## 2024-01-01 PROCEDURE — 250N000011 HC RX IP 250 OP 636: Performed by: NURSE ANESTHETIST, CERTIFIED REGISTERED

## 2024-01-01 PROCEDURE — 36415 COLL VENOUS BLD VENIPUNCTURE: CPT | Performed by: EMERGENCY MEDICINE

## 2024-01-01 PROCEDURE — 250N000011 HC RX IP 250 OP 636: Performed by: SURGERY

## 2024-01-01 PROCEDURE — 200N000001 HC R&B ICU

## 2024-01-01 PROCEDURE — 120N000001 HC R&B MED SURG/OB

## 2024-01-01 PROCEDURE — 83735 ASSAY OF MAGNESIUM: CPT | Performed by: INTERNAL MEDICINE

## 2024-01-01 PROCEDURE — 250N000013 HC RX MED GY IP 250 OP 250 PS 637: Performed by: STUDENT IN AN ORGANIZED HEALTH CARE EDUCATION/TRAINING PROGRAM

## 2024-01-01 PROCEDURE — 250N000013 HC RX MED GY IP 250 OP 250 PS 637: Performed by: PHYSICIAN ASSISTANT

## 2024-01-01 PROCEDURE — 84100 ASSAY OF PHOSPHORUS: CPT | Performed by: INTERNAL MEDICINE

## 2024-01-01 PROCEDURE — 36415 COLL VENOUS BLD VENIPUNCTURE: CPT | Performed by: HOSPITALIST

## 2024-01-01 PROCEDURE — 99223 1ST HOSP IP/OBS HIGH 75: CPT | Performed by: NURSE PRACTITIONER

## 2024-01-01 PROCEDURE — 250N000013 HC RX MED GY IP 250 OP 250 PS 637: Performed by: SURGERY

## 2024-01-01 PROCEDURE — 250N000009 HC RX 250: Performed by: ORTHOPAEDIC SURGERY

## 2024-01-01 PROCEDURE — 83605 ASSAY OF LACTIC ACID: CPT | Performed by: EMERGENCY MEDICINE

## 2024-01-01 PROCEDURE — 99213 OFFICE O/P EST LOW 20 MIN: CPT | Performed by: PHYSICIAN ASSISTANT

## 2024-01-01 PROCEDURE — 36569 INSJ PICC 5 YR+ W/O IMAGING: CPT

## 2024-01-01 PROCEDURE — 85027 COMPLETE CBC AUTOMATED: CPT | Performed by: INTERNAL MEDICINE

## 2024-01-01 PROCEDURE — 250N000011 HC RX IP 250 OP 636: Performed by: HOSPITALIST

## 2024-01-01 PROCEDURE — 99207 PR APP CREDIT; MD BILLING SHARED VISIT: CPT | Performed by: STUDENT IN AN ORGANIZED HEALTH CARE EDUCATION/TRAINING PROGRAM

## 2024-01-01 PROCEDURE — 99207 PR NO BILLABLE SERVICE THIS VISIT: CPT | Performed by: NURSE PRACTITIONER

## 2024-01-01 PROCEDURE — 36415 COLL VENOUS BLD VENIPUNCTURE: CPT | Performed by: STUDENT IN AN ORGANIZED HEALTH CARE EDUCATION/TRAINING PROGRAM

## 2024-01-01 PROCEDURE — 272N000001 HC OR GENERAL SUPPLY STERILE: Performed by: ORTHOPAEDIC SURGERY

## 2024-01-01 PROCEDURE — 87040 BLOOD CULTURE FOR BACTERIA: CPT | Performed by: EMERGENCY MEDICINE

## 2024-01-01 PROCEDURE — 94660 CPAP INITIATION&MGMT: CPT

## 2024-01-01 PROCEDURE — 250N000011 HC RX IP 250 OP 636: Performed by: PHYSICIAN ASSISTANT

## 2024-01-01 PROCEDURE — 99291 CRITICAL CARE FIRST HOUR: CPT | Mod: GC | Performed by: INTERNAL MEDICINE

## 2024-01-01 PROCEDURE — 250N000009 HC RX 250: Performed by: STUDENT IN AN ORGANIZED HEALTH CARE EDUCATION/TRAINING PROGRAM

## 2024-01-01 PROCEDURE — 250N000009 HC RX 250: Performed by: INTERNAL MEDICINE

## 2024-01-01 PROCEDURE — 36600 WITHDRAWAL OF ARTERIAL BLOOD: CPT

## 2024-01-01 PROCEDURE — 258N000003 HC RX IP 258 OP 636: Performed by: ORTHOPAEDIC SURGERY

## 2024-01-01 PROCEDURE — 84145 PROCALCITONIN (PCT): CPT | Performed by: EMERGENCY MEDICINE

## 2024-01-01 PROCEDURE — 370N000017 HC ANESTHESIA TECHNICAL FEE, PER MIN: Performed by: ORTHOPAEDIC SURGERY

## 2024-01-01 PROCEDURE — 85048 AUTOMATED LEUKOCYTE COUNT: CPT | Performed by: INTERNAL MEDICINE

## 2024-01-01 PROCEDURE — 85007 BL SMEAR W/DIFF WBC COUNT: CPT | Performed by: EMERGENCY MEDICINE

## 2024-01-01 PROCEDURE — 99024 POSTOP FOLLOW-UP VISIT: CPT | Performed by: STUDENT IN AN ORGANIZED HEALTH CARE EDUCATION/TRAINING PROGRAM

## 2024-01-01 PROCEDURE — C9290 INJ, BUPIVACAINE LIPOSOME: HCPCS

## 2024-01-01 PROCEDURE — 88311 DECALCIFY TISSUE: CPT | Performed by: PATHOLOGY

## 2024-01-01 PROCEDURE — 250N000011 HC RX IP 250 OP 636

## 2024-01-01 PROCEDURE — 250N000011 HC RX IP 250 OP 636: Mod: JZ | Performed by: SURGERY

## 2024-01-01 PROCEDURE — 250N000013 HC RX MED GY IP 250 OP 250 PS 637: Performed by: HOSPITALIST

## 2024-01-01 PROCEDURE — 81001 URINALYSIS AUTO W/SCOPE: CPT | Performed by: STUDENT IN AN ORGANIZED HEALTH CARE EDUCATION/TRAINING PROGRAM

## 2024-01-01 PROCEDURE — 97110 THERAPEUTIC EXERCISES: CPT | Mod: GO

## 2024-01-01 PROCEDURE — 272N000272 HC CONTINUOUS NEBULIZER MICRO PUMP

## 2024-01-01 PROCEDURE — 250N000011 HC RX IP 250 OP 636: Performed by: INTERNAL MEDICINE

## 2024-01-01 PROCEDURE — 93970 EXTREMITY STUDY: CPT

## 2024-01-01 PROCEDURE — 999N000065 XR SHOULDER RIGHT PORT G/E 2 VIEWS: Mod: RT

## 2024-01-01 PROCEDURE — 99222 1ST HOSP IP/OBS MODERATE 55: CPT | Performed by: HOSPITALIST

## 2024-01-01 PROCEDURE — 99222 1ST HOSP IP/OBS MODERATE 55: CPT | Performed by: SURGERY

## 2024-01-01 PROCEDURE — 84100 ASSAY OF PHOSPHORUS: CPT

## 2024-01-01 PROCEDURE — 250N000011 HC RX IP 250 OP 636: Performed by: ORTHOPAEDIC SURGERY

## 2024-01-01 PROCEDURE — 94640 AIRWAY INHALATION TREATMENT: CPT | Mod: 76

## 2024-01-01 PROCEDURE — 96374 THER/PROPH/DIAG INJ IV PUSH: CPT | Performed by: EMERGENCY MEDICINE

## 2024-01-01 PROCEDURE — 93010 ELECTROCARDIOGRAM REPORT: CPT | Performed by: INTERNAL MEDICINE

## 2024-01-01 PROCEDURE — 84100 ASSAY OF PHOSPHORUS: CPT | Performed by: STUDENT IN AN ORGANIZED HEALTH CARE EDUCATION/TRAINING PROGRAM

## 2024-01-01 PROCEDURE — 250N000009 HC RX 250: Performed by: EMERGENCY MEDICINE

## 2024-01-01 PROCEDURE — 97110 THERAPEUTIC EXERCISES: CPT | Mod: GP | Performed by: PHYSICAL MEDICINE & REHABILITATION

## 2024-01-01 PROCEDURE — 93922 UPR/L XTREMITY ART 2 LEVELS: CPT

## 2024-01-01 PROCEDURE — 99291 CRITICAL CARE FIRST HOUR: CPT | Mod: GC | Performed by: SURGERY

## 2024-01-01 PROCEDURE — 99204 OFFICE O/P NEW MOD 45 MIN: CPT | Mod: VID | Performed by: NURSE PRACTITIONER

## 2024-01-01 PROCEDURE — 14350 FILLETED FINGER/TOE FLAP: CPT

## 2024-01-01 PROCEDURE — 87186 SC STD MICRODIL/AGAR DIL: CPT

## 2024-01-01 PROCEDURE — 93005 ELECTROCARDIOGRAM TRACING: CPT

## 2024-01-01 PROCEDURE — 85027 COMPLETE CBC AUTOMATED: CPT | Performed by: PHYSICIAN ASSISTANT

## 2024-01-01 PROCEDURE — 360N000077 HC SURGERY LEVEL 4, PER MIN: Performed by: ORTHOPAEDIC SURGERY

## 2024-01-01 PROCEDURE — 84145 PROCALCITONIN (PCT): CPT | Performed by: INTERNAL MEDICINE

## 2024-01-01 PROCEDURE — 82805 BLOOD GASES W/O2 SATURATION: CPT | Performed by: STUDENT IN AN ORGANIZED HEALTH CARE EDUCATION/TRAINING PROGRAM

## 2024-01-01 PROCEDURE — C1776 JOINT DEVICE (IMPLANTABLE): HCPCS | Performed by: ORTHOPAEDIC SURGERY

## 2024-01-01 PROCEDURE — G8916 PT W IV AB GIVEN ON TIME: HCPCS

## 2024-01-01 PROCEDURE — 86140 C-REACTIVE PROTEIN: CPT | Performed by: EMERGENCY MEDICINE

## 2024-01-01 PROCEDURE — 82565 ASSAY OF CREATININE: CPT | Performed by: PHYSICIAN ASSISTANT

## 2024-01-01 PROCEDURE — 82330 ASSAY OF CALCIUM: CPT | Performed by: STUDENT IN AN ORGANIZED HEALTH CARE EDUCATION/TRAINING PROGRAM

## 2024-01-01 PROCEDURE — 84484 ASSAY OF TROPONIN QUANT: CPT | Performed by: FAMILY MEDICINE

## 2024-01-01 PROCEDURE — 96360 HYDRATION IV INFUSION INIT: CPT | Performed by: EMERGENCY MEDICINE

## 2024-01-01 PROCEDURE — C1713 ANCHOR/SCREW BN/BN,TIS/BN: HCPCS | Performed by: ORTHOPAEDIC SURGERY

## 2024-01-01 PROCEDURE — 85027 COMPLETE CBC AUTOMATED: CPT | Performed by: STUDENT IN AN ORGANIZED HEALTH CARE EDUCATION/TRAINING PROGRAM

## 2024-01-01 PROCEDURE — 99203 OFFICE O/P NEW LOW 30 MIN: CPT | Mod: 25 | Performed by: PODIATRIST

## 2024-01-01 PROCEDURE — 82962 GLUCOSE BLOOD TEST: CPT

## 2024-01-01 PROCEDURE — 71045 X-RAY EXAM CHEST 1 VIEW: CPT

## 2024-01-01 PROCEDURE — 36415 COLL VENOUS BLD VENIPUNCTURE: CPT | Performed by: PHYSICIAN ASSISTANT

## 2024-01-01 PROCEDURE — 82310 ASSAY OF CALCIUM: CPT

## 2024-01-01 PROCEDURE — 999N000141 HC STATISTIC PRE-PROCEDURE NURSING ASSESSMENT: Performed by: ORTHOPAEDIC SURGERY

## 2024-01-01 PROCEDURE — 80048 BASIC METABOLIC PNL TOTAL CA: CPT | Performed by: HOSPITALIST

## 2024-01-01 PROCEDURE — 99215 OFFICE O/P EST HI 40 MIN: CPT | Performed by: FAMILY MEDICINE

## 2024-01-01 PROCEDURE — G0463 HOSPITAL OUTPT CLINIC VISIT: HCPCS | Performed by: PHYSICIAN ASSISTANT

## 2024-01-01 PROCEDURE — 84132 ASSAY OF SERUM POTASSIUM: CPT | Performed by: INTERNAL MEDICINE

## 2024-01-01 PROCEDURE — 99291 CRITICAL CARE FIRST HOUR: CPT | Mod: 25 | Performed by: EMERGENCY MEDICINE

## 2024-01-01 PROCEDURE — 99285 EMERGENCY DEPT VISIT HI MDM: CPT | Performed by: EMERGENCY MEDICINE

## 2024-01-01 PROCEDURE — 99291 CRITICAL CARE FIRST HOUR: CPT | Performed by: EMERGENCY MEDICINE

## 2024-01-01 PROCEDURE — G0463 HOSPITAL OUTPT CLINIC VISIT: HCPCS | Performed by: PODIATRIST

## 2024-01-01 PROCEDURE — 99223 1ST HOSP IP/OBS HIGH 75: CPT | Mod: 25 | Performed by: SURGERY

## 2024-01-01 PROCEDURE — 87637 SARSCOV2&INF A&B&RSV AMP PRB: CPT | Performed by: EMERGENCY MEDICINE

## 2024-01-01 PROCEDURE — 272N000482 HC MASK, CPAP TOTAL HEADGEAR, LATEX FREE

## 2024-01-01 PROCEDURE — 999N000287 HC ICU ADULT ROUNDING, EACH 10 MINS

## 2024-01-01 PROCEDURE — 999N000111 HC STATISTIC OT IP EVAL DEFER: Performed by: OCCUPATIONAL THERAPIST

## 2024-01-01 PROCEDURE — 99214 OFFICE O/P EST MOD 30 MIN: CPT | Performed by: FAMILY MEDICINE

## 2024-01-01 PROCEDURE — 99285 EMERGENCY DEPT VISIT HI MDM: CPT | Mod: 25 | Performed by: EMERGENCY MEDICINE

## 2024-01-01 PROCEDURE — HZ2ZZZZ DETOXIFICATION SERVICES FOR SUBSTANCE ABUSE TREATMENT: ICD-10-PCS | Performed by: SURGERY

## 2024-01-01 PROCEDURE — 80048 BASIC METABOLIC PNL TOTAL CA: CPT | Performed by: INTERNAL MEDICINE

## 2024-01-01 PROCEDURE — 99291 CRITICAL CARE FIRST HOUR: CPT | Mod: 25 | Performed by: INTERNAL MEDICINE

## 2024-01-01 PROCEDURE — 36620 INSERTION CATHETER ARTERY: CPT

## 2024-01-01 PROCEDURE — 83735 ASSAY OF MAGNESIUM: CPT | Performed by: STUDENT IN AN ORGANIZED HEALTH CARE EDUCATION/TRAINING PROGRAM

## 2024-01-01 PROCEDURE — 82374 ASSAY BLOOD CARBON DIOXIDE: CPT | Performed by: INTERNAL MEDICINE

## 2024-01-01 PROCEDURE — 999N000040 HC STATISTIC CONSULT NO CHARGE VASC ACCESS

## 2024-01-01 PROCEDURE — 36620 INSERTION CATHETER ARTERY: CPT | Mod: GC | Performed by: SURGERY

## 2024-01-01 PROCEDURE — 271N000001 HC OR GENERAL SUPPLY NON-STERILE: Performed by: ORTHOPAEDIC SURGERY

## 2024-01-01 PROCEDURE — 36415 COLL VENOUS BLD VENIPUNCTURE: CPT | Performed by: INTERNAL MEDICINE

## 2024-01-01 PROCEDURE — 250N000009 HC RX 250: Performed by: HOSPITALIST

## 2024-01-01 PROCEDURE — G8918 PT W/O PREOP ORDER IV AB PRO: HCPCS

## 2024-01-01 PROCEDURE — 250N000009 HC RX 250: Performed by: SURGERY

## 2024-01-01 PROCEDURE — 255N000002 HC RX 255 OP 636: Performed by: SURGERY

## 2024-01-01 PROCEDURE — 96361 HYDRATE IV INFUSION ADD-ON: CPT | Performed by: EMERGENCY MEDICINE

## 2024-01-01 PROCEDURE — 250N000011 HC RX IP 250 OP 636: Mod: JZ | Performed by: STUDENT IN AN ORGANIZED HEALTH CARE EDUCATION/TRAINING PROGRAM

## 2024-01-01 PROCEDURE — 250N000025 HC SEVOFLURANE, PER MIN: Performed by: ORTHOPAEDIC SURGERY

## 2024-01-01 PROCEDURE — 74174 CTA ABD&PLVS W/CONTRAST: CPT

## 2024-01-01 PROCEDURE — 84132 ASSAY OF SERUM POTASSIUM: CPT | Performed by: PHYSICIAN ASSISTANT

## 2024-01-01 PROCEDURE — 250N000013 HC RX MED GY IP 250 OP 250 PS 637: Performed by: ORTHOPAEDIC SURGERY

## 2024-01-01 PROCEDURE — 81001 URINALYSIS AUTO W/SCOPE: CPT | Performed by: EMERGENCY MEDICINE

## 2024-01-01 PROCEDURE — 36415 COLL VENOUS BLD VENIPUNCTURE: CPT

## 2024-01-01 PROCEDURE — 26951 AMPUTATION OF FINGER/THUMB: CPT | Mod: F2

## 2024-01-01 PROCEDURE — 258N000003 HC RX IP 258 OP 636

## 2024-01-01 PROCEDURE — 99214 OFFICE O/P EST MOD 30 MIN: CPT

## 2024-01-01 PROCEDURE — 97161 PT EVAL LOW COMPLEX 20 MIN: CPT | Mod: GP | Performed by: PHYSICAL THERAPIST

## 2024-01-01 PROCEDURE — 97165 OT EVAL LOW COMPLEX 30 MIN: CPT | Mod: GO

## 2024-01-01 PROCEDURE — 999N000065 XR CHEST PORT 1 VIEW

## 2024-01-01 PROCEDURE — 999N000065 XR ABDOMEN PORT 1 VIEW

## 2024-01-01 PROCEDURE — 99233 SBSQ HOSP IP/OBS HIGH 50: CPT | Performed by: NURSE PRACTITIONER

## 2024-01-01 PROCEDURE — 36415 COLL VENOUS BLD VENIPUNCTURE: CPT | Performed by: FAMILY MEDICINE

## 2024-01-01 PROCEDURE — 84300 ASSAY OF URINE SODIUM: CPT

## 2024-01-01 PROCEDURE — 99222 1ST HOSP IP/OBS MODERATE 55: CPT | Mod: GC | Performed by: SURGERY

## 2024-01-01 PROCEDURE — 97597 DBRDMT OPN WND 1ST 20 CM/<: CPT | Performed by: PODIATRIST

## 2024-01-01 PROCEDURE — 99213 OFFICE O/P EST LOW 20 MIN: CPT | Performed by: STUDENT IN AN ORGANIZED HEALTH CARE EDUCATION/TRAINING PROGRAM

## 2024-01-01 PROCEDURE — 93306 TTE W/DOPPLER COMPLETE: CPT | Mod: 26 | Performed by: INTERNAL MEDICINE

## 2024-01-01 PROCEDURE — 88305 TISSUE EXAM BY PATHOLOGIST: CPT | Performed by: PATHOLOGY

## 2024-01-01 PROCEDURE — 74018 RADEX ABDOMEN 1 VIEW: CPT

## 2024-01-01 PROCEDURE — 80053 COMPREHEN METABOLIC PANEL: CPT | Performed by: EMERGENCY MEDICINE

## 2024-01-01 PROCEDURE — 272N000452 HC KIT SHRLOCK 5FR POWER PICC TRIPLE LUMEN

## 2024-01-01 PROCEDURE — 85025 COMPLETE CBC W/AUTO DIFF WBC: CPT | Performed by: EMERGENCY MEDICINE

## 2024-01-01 PROCEDURE — 85018 HEMOGLOBIN: CPT | Performed by: ORTHOPAEDIC SURGERY

## 2024-01-01 PROCEDURE — 250N000011 HC RX IP 250 OP 636: Performed by: EMERGENCY MEDICINE

## 2024-01-01 PROCEDURE — 87086 URINE CULTURE/COLONY COUNT: CPT | Performed by: STUDENT IN AN ORGANIZED HEALTH CARE EDUCATION/TRAINING PROGRAM

## 2024-01-01 PROCEDURE — 258N000003 HC RX IP 258 OP 636: Performed by: EMERGENCY MEDICINE

## 2024-01-01 PROCEDURE — 250N000009 HC RX 250

## 2024-01-01 PROCEDURE — 85027 COMPLETE CBC AUTOMATED: CPT | Performed by: HOSPITALIST

## 2024-01-01 PROCEDURE — 5A09557 ASSISTANCE WITH RESPIRATORY VENTILATION, GREATER THAN 96 CONSECUTIVE HOURS, CONTINUOUS POSITIVE AIRWAY PRESSURE: ICD-10-PCS | Performed by: SURGERY

## 2024-01-01 PROCEDURE — 71260 CT THORAX DX C+: CPT

## 2024-01-01 PROCEDURE — 999N000208 ECHOCARDIOGRAM COMPLETE

## 2024-01-01 PROCEDURE — 80048 BASIC METABOLIC PNL TOTAL CA: CPT | Performed by: STUDENT IN AN ORGANIZED HEALTH CARE EDUCATION/TRAINING PROGRAM

## 2024-01-01 PROCEDURE — 87640 STAPH A DNA AMP PROBE: CPT

## 2024-01-01 PROCEDURE — 85652 RBC SED RATE AUTOMATED: CPT | Performed by: EMERGENCY MEDICINE

## 2024-01-01 PROCEDURE — G8907 PT DOC NO EVENTS ON DISCHARG: HCPCS

## 2024-01-01 PROCEDURE — G0463 HOSPITAL OUTPT CLINIC VISIT: HCPCS | Performed by: FAMILY MEDICINE

## 2024-01-01 PROCEDURE — 87641 MR-STAPH DNA AMP PROBE: CPT

## 2024-01-01 PROCEDURE — 96375 TX/PRO/DX INJ NEW DRUG ADDON: CPT | Performed by: EMERGENCY MEDICINE

## 2024-01-01 PROCEDURE — 99207 PR NO CHARGE NURSE ONLY: CPT

## 2024-01-01 PROCEDURE — 82040 ASSAY OF SERUM ALBUMIN: CPT | Performed by: EMERGENCY MEDICINE

## 2024-01-01 PROCEDURE — 36415 COLL VENOUS BLD VENIPUNCTURE: CPT | Performed by: ORTHOPAEDIC SURGERY

## 2024-01-01 DEVICE — IMPLANTABLE DEVICE: Type: IMPLANTABLE DEVICE | Site: SHOULDER | Status: FUNCTIONAL

## 2024-01-01 DEVICE — IMPLANTABLE DEVICE
Type: IMPLANTABLE DEVICE | Site: SHOULDER | Status: FUNCTIONAL
Brand: COMPREHENSIVE® REVERSE SHOULDER

## 2024-01-01 DEVICE — IMPLANTABLE DEVICE
Type: IMPLANTABLE DEVICE | Site: SHOULDER | Status: FUNCTIONAL
Brand: COMPREHENSIVE® PROLONG®

## 2024-01-01 DEVICE — IMPLANTABLE DEVICE
Type: IMPLANTABLE DEVICE | Site: SHOULDER | Status: FUNCTIONAL
Brand: COMPREHENSIVE® SHOULDER SYSTEM

## 2024-01-01 DEVICE — IMPLANTABLE DEVICE
Type: IMPLANTABLE DEVICE | Site: SHOULDER | Status: FUNCTIONAL
Brand: COMPREHENSIVE®

## 2024-01-01 DEVICE — IMPLANTABLE DEVICE
Type: IMPLANTABLE DEVICE | Site: SHOULDER | Status: FUNCTIONAL
Brand: COMPREHENSIVE REVERSE SHOULDER

## 2024-01-01 RX ORDER — LIDOCAINE 40 MG/G
CREAM TOPICAL
Status: DISCONTINUED | OUTPATIENT
Start: 2024-01-01 | End: 2024-01-01

## 2024-01-01 RX ORDER — DULOXETIN HYDROCHLORIDE 30 MG/1
120 CAPSULE, DELAYED RELEASE ORAL EVERY EVENING
Status: DISCONTINUED | OUTPATIENT
Start: 2024-01-01 | End: 2024-01-01 | Stop reason: HOSPADM

## 2024-01-01 RX ORDER — ACETAMINOPHEN 325 MG/1
975 TABLET ORAL EVERY 8 HOURS
Status: DISCONTINUED | OUTPATIENT
Start: 2024-01-01 | End: 2024-01-01

## 2024-01-01 RX ORDER — GABAPENTIN 300 MG/1
600-1200 CAPSULE ORAL 3 TIMES DAILY
Status: DISCONTINUED | OUTPATIENT
Start: 2024-01-01 | End: 2024-01-01

## 2024-01-01 RX ORDER — ENOXAPARIN SODIUM 100 MG/ML
40 INJECTION SUBCUTANEOUS EVERY 12 HOURS
Status: DISCONTINUED | OUTPATIENT
Start: 2024-01-01 | End: 2024-01-01 | Stop reason: HOSPADM

## 2024-01-01 RX ORDER — ONDANSETRON 2 MG/ML
4 INJECTION INTRAMUSCULAR; INTRAVENOUS EVERY 6 HOURS PRN
Status: DISCONTINUED | OUTPATIENT
Start: 2024-01-01 | End: 2024-01-01 | Stop reason: HOSPADM

## 2024-01-01 RX ORDER — GABAPENTIN 600 MG/1
600 TABLET ORAL 2 TIMES DAILY
COMMUNITY

## 2024-01-01 RX ORDER — HYDROMORPHONE HCL IN WATER/PF 6 MG/30 ML
0.2 PATIENT CONTROLLED ANALGESIA SYRINGE INTRAVENOUS
Status: DISCONTINUED | OUTPATIENT
Start: 2024-01-01 | End: 2024-01-01 | Stop reason: HOSPADM

## 2024-01-01 RX ORDER — GLYCOPYRROLATE 0.2 MG/ML
0.2 INJECTION, SOLUTION INTRAMUSCULAR; INTRAVENOUS ONCE
Status: CANCELLED | OUTPATIENT
Start: 2024-01-01 | End: 2024-01-01

## 2024-01-01 RX ORDER — FUROSEMIDE 10 MG/ML
40 INJECTION INTRAMUSCULAR; INTRAVENOUS EVERY 12 HOURS
Status: DISCONTINUED | OUTPATIENT
Start: 2024-01-01 | End: 2024-01-01

## 2024-01-01 RX ORDER — TAMSULOSIN HYDROCHLORIDE 0.4 MG/1
0.4 CAPSULE ORAL DAILY
Status: DISCONTINUED | OUTPATIENT
Start: 2024-01-01 | End: 2024-01-01

## 2024-01-01 RX ORDER — CLONIDINE HYDROCHLORIDE 0.1 MG/1
0.2 TABLET ORAL EVERY 8 HOURS
Status: DISCONTINUED | OUTPATIENT
Start: 2024-01-01 | End: 2024-01-01

## 2024-01-01 RX ORDER — ONDANSETRON 4 MG/1
4 TABLET, FILM COATED ORAL EVERY 6 HOURS PRN
Status: DISCONTINUED | OUTPATIENT
Start: 2024-01-01 | End: 2024-01-01

## 2024-01-01 RX ORDER — GABAPENTIN 600 MG/1
600 TABLET ORAL 2 TIMES DAILY
Status: DISCONTINUED | OUTPATIENT
Start: 2024-01-01 | End: 2024-01-01 | Stop reason: HOSPADM

## 2024-01-01 RX ORDER — ATENOLOL 50 MG/1
50 TABLET ORAL EVERY MORNING
Status: DISCONTINUED | OUTPATIENT
Start: 2024-01-01 | End: 2024-01-01

## 2024-01-01 RX ORDER — CARBOXYMETHYLCELLULOSE SODIUM 5 MG/ML
1-2 SOLUTION/ DROPS OPHTHALMIC
Status: DISCONTINUED | OUTPATIENT
Start: 2024-01-01 | End: 2024-01-01 | Stop reason: HOSPADM

## 2024-01-01 RX ORDER — CHOLECALCIFEROL (VITAMIN D3) 50 MCG
50 TABLET ORAL EVERY MORNING
COMMUNITY

## 2024-01-01 RX ORDER — ATENOLOL 50 MG/1
50 TABLET ORAL EVERY MORNING
Status: DISCONTINUED | OUTPATIENT
Start: 2024-01-01 | End: 2024-01-01 | Stop reason: HOSPADM

## 2024-01-01 RX ORDER — ACETAMINOPHEN 325 MG/1
975 TABLET ORAL ONCE
Status: DISCONTINUED | OUTPATIENT
Start: 2024-01-01 | End: 2024-01-01

## 2024-01-01 RX ORDER — AMOXICILLIN 250 MG
2 CAPSULE ORAL 2 TIMES DAILY PRN
Status: DISCONTINUED | OUTPATIENT
Start: 2024-01-01 | End: 2024-01-01

## 2024-01-01 RX ORDER — GABAPENTIN 600 MG/1
1200 TABLET ORAL AT BEDTIME
Status: DISCONTINUED | OUTPATIENT
Start: 2024-01-01 | End: 2024-01-01 | Stop reason: HOSPADM

## 2024-01-01 RX ORDER — ONDANSETRON 4 MG/1
4 TABLET, ORALLY DISINTEGRATING ORAL EVERY 30 MIN PRN
Status: DISCONTINUED | OUTPATIENT
Start: 2024-01-01 | End: 2024-01-01 | Stop reason: HOSPADM

## 2024-01-01 RX ORDER — ALLOPURINOL 300 MG/1
300 TABLET ORAL DAILY
Status: DISCONTINUED | OUTPATIENT
Start: 2024-01-01 | End: 2024-01-01

## 2024-01-01 RX ORDER — IOPAMIDOL 755 MG/ML
72 INJECTION, SOLUTION INTRAVASCULAR ONCE
Status: COMPLETED | OUTPATIENT
Start: 2024-01-01 | End: 2024-01-01

## 2024-01-01 RX ORDER — IOPAMIDOL 755 MG/ML
137 INJECTION, SOLUTION INTRAVASCULAR ONCE
Status: COMPLETED | OUTPATIENT
Start: 2024-01-01 | End: 2024-01-01

## 2024-01-01 RX ORDER — POTASSIUM CHLORIDE 1500 MG/1
20 TABLET, EXTENDED RELEASE ORAL DAILY
COMMUNITY
Start: 2024-01-01

## 2024-01-01 RX ORDER — GLYCOPYRROLATE 0.2 MG/ML
0.2 INJECTION, SOLUTION INTRAMUSCULAR; INTRAVENOUS EVERY 4 HOURS PRN
Status: DISCONTINUED | OUTPATIENT
Start: 2024-01-01 | End: 2024-01-01 | Stop reason: HOSPADM

## 2024-01-01 RX ORDER — NALOXONE HYDROCHLORIDE 0.4 MG/ML
0.2 INJECTION, SOLUTION INTRAMUSCULAR; INTRAVENOUS; SUBCUTANEOUS
Status: DISCONTINUED | OUTPATIENT
Start: 2024-01-01 | End: 2024-01-01 | Stop reason: HOSPADM

## 2024-01-01 RX ORDER — NALOXONE HYDROCHLORIDE 0.4 MG/ML
0.4 INJECTION, SOLUTION INTRAMUSCULAR; INTRAVENOUS; SUBCUTANEOUS
Status: DISCONTINUED | OUTPATIENT
Start: 2024-01-01 | End: 2024-01-01

## 2024-01-01 RX ORDER — GLYCOPYRROLATE 0.2 MG/ML
INJECTION, SOLUTION INTRAMUSCULAR; INTRAVENOUS PRN
Status: DISCONTINUED | OUTPATIENT
Start: 2024-01-01 | End: 2024-01-01

## 2024-01-01 RX ORDER — GLYCOPYRROLATE 0.2 MG/ML
0.1 INJECTION, SOLUTION INTRAMUSCULAR; INTRAVENOUS EVERY 4 HOURS PRN
Status: CANCELLED | OUTPATIENT
Start: 2024-01-01

## 2024-01-01 RX ORDER — POTASSIUM CHLORIDE 20MEQ/15ML
20 LIQUID (ML) ORAL ONCE
Status: COMPLETED | OUTPATIENT
Start: 2024-01-01 | End: 2024-01-01

## 2024-01-01 RX ORDER — HYDROCODONE BITARTRATE AND ACETAMINOPHEN 5; 325 MG/1; MG/1
1 TABLET ORAL EVERY 12 HOURS PRN
Qty: 10 TABLET | Refills: 0 | Status: ON HOLD | OUTPATIENT
Start: 2024-01-01 | End: 2024-01-01

## 2024-01-01 RX ORDER — VANCOMYCIN HYDROCHLORIDE 1 G/200ML
1000 INJECTION, SOLUTION INTRAVENOUS ONCE
Status: COMPLETED | OUTPATIENT
Start: 2024-01-01 | End: 2024-01-01

## 2024-01-01 RX ORDER — GABAPENTIN 250 MG/5ML
800 SOLUTION ORAL AT BEDTIME
Status: DISCONTINUED | OUTPATIENT
Start: 2024-01-01 | End: 2024-01-01

## 2024-01-01 RX ORDER — ACETAMINOPHEN 325 MG/1
650 TABLET ORAL EVERY 4 HOURS PRN
Status: SHIPPED
Start: 2024-01-01

## 2024-01-01 RX ORDER — METHOCARBAMOL 500 MG/1
500 TABLET, FILM COATED ORAL 4 TIMES DAILY PRN
Status: DISCONTINUED | OUTPATIENT
Start: 2024-01-01 | End: 2024-01-01 | Stop reason: HOSPADM

## 2024-01-01 RX ORDER — FINASTERIDE 5 MG/1
5 TABLET, FILM COATED ORAL EVERY EVENING
Status: DISCONTINUED | OUTPATIENT
Start: 2024-01-01 | End: 2024-01-01 | Stop reason: HOSPADM

## 2024-01-01 RX ORDER — POTASSIUM CHLORIDE 1.5 G/1.58G
40 POWDER, FOR SOLUTION ORAL ONCE
Status: COMPLETED | OUTPATIENT
Start: 2024-01-01 | End: 2024-01-01

## 2024-01-01 RX ORDER — PROPOFOL 10 MG/ML
INJECTION, EMULSION INTRAVENOUS CONTINUOUS PRN
Status: DISCONTINUED | OUTPATIENT
Start: 2024-01-01 | End: 2024-01-01

## 2024-01-01 RX ORDER — SODIUM CHLORIDE, SODIUM LACTATE, POTASSIUM CHLORIDE, CALCIUM CHLORIDE 600; 310; 30; 20 MG/100ML; MG/100ML; MG/100ML; MG/100ML
INJECTION, SOLUTION INTRAVENOUS CONTINUOUS
Status: DISCONTINUED | OUTPATIENT
Start: 2024-01-01 | End: 2024-01-01 | Stop reason: HOSPADM

## 2024-01-01 RX ORDER — OXYCODONE HYDROCHLORIDE 5 MG/1
5 TABLET ORAL EVERY 12 HOURS PRN
Qty: 12 TABLET | Refills: 0 | Status: SHIPPED | OUTPATIENT
Start: 2024-01-01 | End: 2024-01-01

## 2024-01-01 RX ORDER — FUROSEMIDE 40 MG
40 TABLET ORAL 2 TIMES DAILY
Status: DISCONTINUED | OUTPATIENT
Start: 2024-01-01 | End: 2024-01-01 | Stop reason: HOSPADM

## 2024-01-01 RX ORDER — LOVASTATIN 40 MG
40 TABLET ORAL AT BEDTIME
Status: ON HOLD | COMMUNITY
Start: 2024-01-01 | End: 2024-01-01

## 2024-01-01 RX ORDER — POLYETHYLENE GLYCOL 3350 17 G/17G
17 POWDER, FOR SOLUTION ORAL DAILY PRN
Status: DISCONTINUED | OUTPATIENT
Start: 2024-01-01 | End: 2024-01-01 | Stop reason: HOSPADM

## 2024-01-01 RX ORDER — DOXYCYCLINE 100 MG/1
100 CAPSULE ORAL 2 TIMES DAILY
Qty: 14 CAPSULE | Refills: 0 | Status: ON HOLD | OUTPATIENT
Start: 2024-01-01 | End: 2024-01-01

## 2024-01-01 RX ORDER — CIPROFLOXACIN 500 MG/1
500 TABLET, FILM COATED ORAL 2 TIMES DAILY
Status: DISCONTINUED | OUTPATIENT
Start: 2024-01-01 | End: 2024-01-01 | Stop reason: HOSPADM

## 2024-01-01 RX ORDER — FUROSEMIDE 40 MG
40 TABLET ORAL 2 TIMES DAILY
COMMUNITY

## 2024-01-01 RX ORDER — ONDANSETRON 4 MG/1
4 TABLET, ORALLY DISINTEGRATING ORAL EVERY 6 HOURS PRN
Status: DISCONTINUED | OUTPATIENT
Start: 2024-01-01 | End: 2024-01-01 | Stop reason: HOSPADM

## 2024-01-01 RX ORDER — QUETIAPINE FUMARATE 25 MG/1
25 TABLET, FILM COATED ORAL 3 TIMES DAILY
Status: DISCONTINUED | OUTPATIENT
Start: 2024-01-01 | End: 2024-01-01 | Stop reason: HOSPADM

## 2024-01-01 RX ORDER — POTASSIUM CHLORIDE 1500 MG/1
20 TABLET, EXTENDED RELEASE ORAL ONCE
Status: COMPLETED | OUTPATIENT
Start: 2024-01-01 | End: 2024-01-01

## 2024-01-01 RX ORDER — GABAPENTIN 250 MG/5ML
300 SOLUTION ORAL 2 TIMES DAILY
Status: DISCONTINUED | OUTPATIENT
Start: 2024-01-01 | End: 2024-01-01

## 2024-01-01 RX ORDER — ALBUTEROL SULFATE 90 UG/1
2 AEROSOL, METERED RESPIRATORY (INHALATION) EVERY 4 HOURS PRN
Status: DISCONTINUED | OUTPATIENT
Start: 2024-01-01 | End: 2024-01-01 | Stop reason: HOSPADM

## 2024-01-01 RX ORDER — CLONIDINE HYDROCHLORIDE 0.2 MG/1
0.2 TABLET ORAL EVERY 8 HOURS
Status: DISCONTINUED | OUTPATIENT
Start: 2024-01-01 | End: 2024-01-01

## 2024-01-01 RX ORDER — FOLIC ACID 5 MG/ML
1 INJECTION, SOLUTION INTRAMUSCULAR; INTRAVENOUS; SUBCUTANEOUS ONCE
Status: COMPLETED | OUTPATIENT
Start: 2024-01-01 | End: 2024-01-01

## 2024-01-01 RX ORDER — NALOXONE HYDROCHLORIDE 0.4 MG/ML
0.2 INJECTION, SOLUTION INTRAMUSCULAR; INTRAVENOUS; SUBCUTANEOUS
Status: DISCONTINUED | OUTPATIENT
Start: 2024-01-01 | End: 2024-01-01

## 2024-01-01 RX ORDER — NALOXONE HYDROCHLORIDE 0.4 MG/ML
0.4 INJECTION, SOLUTION INTRAMUSCULAR; INTRAVENOUS; SUBCUTANEOUS
Status: DISCONTINUED | OUTPATIENT
Start: 2024-01-01 | End: 2024-01-01 | Stop reason: HOSPADM

## 2024-01-01 RX ORDER — DIPHENHYDRAMINE HCL 12.5 MG/5ML
12.5 SOLUTION ORAL EVERY 6 HOURS PRN
Status: DISCONTINUED | OUTPATIENT
Start: 2024-01-01 | End: 2024-01-01 | Stop reason: HOSPADM

## 2024-01-01 RX ORDER — AMOXICILLIN 250 MG
1 CAPSULE ORAL 2 TIMES DAILY PRN
Status: DISCONTINUED | OUTPATIENT
Start: 2024-01-01 | End: 2024-01-01 | Stop reason: HOSPADM

## 2024-01-01 RX ORDER — FLUTICASONE FUROATE, UMECLIDINIUM BROMIDE AND VILANTEROL TRIFENATATE 100; 62.5; 25 UG/1; UG/1; UG/1
1 POWDER RESPIRATORY (INHALATION) DAILY
COMMUNITY

## 2024-01-01 RX ORDER — NALOXONE HYDROCHLORIDE 0.4 MG/ML
0.1 INJECTION, SOLUTION INTRAMUSCULAR; INTRAVENOUS; SUBCUTANEOUS
Status: DISCONTINUED | OUTPATIENT
Start: 2024-01-01 | End: 2024-01-01 | Stop reason: HOSPADM

## 2024-01-01 RX ORDER — FLUTICASONE FUROATE AND VILANTEROL 100; 25 UG/1; UG/1
1 POWDER RESPIRATORY (INHALATION) DAILY
Status: DISCONTINUED | OUTPATIENT
Start: 2024-01-01 | End: 2024-01-01 | Stop reason: HOSPADM

## 2024-01-01 RX ORDER — PANTOPRAZOLE SODIUM 40 MG/1
40 TABLET, DELAYED RELEASE ORAL
Status: DISCONTINUED | OUTPATIENT
Start: 2024-01-01 | End: 2024-01-01 | Stop reason: HOSPADM

## 2024-01-01 RX ORDER — POTASSIUM CHLORIDE 1500 MG/1
20 TABLET, EXTENDED RELEASE ORAL DAILY
Status: DISCONTINUED | OUTPATIENT
Start: 2024-01-01 | End: 2024-01-01 | Stop reason: HOSPADM

## 2024-01-01 RX ORDER — ASPIRIN 81 MG/1
81 TABLET ORAL 2 TIMES DAILY
Status: DISCONTINUED | OUTPATIENT
Start: 2024-01-01 | End: 2024-01-01

## 2024-01-01 RX ORDER — ESMOLOL HYDROCHLORIDE 20 MG/ML
50-300 INJECTION, SOLUTION INTRAVENOUS CONTINUOUS
Status: DISCONTINUED | OUTPATIENT
Start: 2024-01-01 | End: 2024-01-01

## 2024-01-01 RX ORDER — GABAPENTIN 600 MG/1
600 TABLET ORAL 3 TIMES DAILY
Status: DISCONTINUED | OUTPATIENT
Start: 2024-01-01 | End: 2024-01-01

## 2024-01-01 RX ORDER — MUPIROCIN 20 MG/G
OINTMENT TOPICAL 2 TIMES DAILY
Status: DISCONTINUED | OUTPATIENT
Start: 2024-01-01 | End: 2024-01-01

## 2024-01-01 RX ORDER — ACETAMINOPHEN 325 MG/10.15ML
650 LIQUID ORAL EVERY 4 HOURS PRN
Status: DISCONTINUED | OUTPATIENT
Start: 2024-01-01 | End: 2024-01-01

## 2024-01-01 RX ORDER — LIDOCAINE 40 MG/G
CREAM TOPICAL
Status: DISCONTINUED | OUTPATIENT
Start: 2024-01-01 | End: 2024-01-01 | Stop reason: HOSPADM

## 2024-01-01 RX ORDER — VANCOMYCIN HYDROCHLORIDE 1 G/200ML
1000 INJECTION, SOLUTION INTRAVENOUS EVERY 24 HOURS
Status: DISCONTINUED | OUTPATIENT
Start: 2024-01-01 | End: 2024-01-01

## 2024-01-01 RX ORDER — ATORVASTATIN CALCIUM 10 MG/1
10 TABLET, FILM COATED ORAL AT BEDTIME
Status: DISCONTINUED | OUTPATIENT
Start: 2024-01-01 | End: 2024-01-01

## 2024-01-01 RX ORDER — OXYCODONE HYDROCHLORIDE 5 MG/1
10 TABLET ORAL EVERY 4 HOURS PRN
Status: DISCONTINUED | OUTPATIENT
Start: 2024-01-01 | End: 2024-01-01 | Stop reason: HOSPADM

## 2024-01-01 RX ORDER — GABAPENTIN 300 MG/1
300 CAPSULE ORAL 2 TIMES DAILY
Status: DISCONTINUED | OUTPATIENT
Start: 2024-01-01 | End: 2024-01-01

## 2024-01-01 RX ORDER — CIPROFLOXACIN 500 MG/1
500 TABLET, FILM COATED ORAL 2 TIMES DAILY
Qty: 14 TABLET | Refills: 0 | Status: ON HOLD | OUTPATIENT
Start: 2024-01-01 | End: 2024-01-01

## 2024-01-01 RX ORDER — FUROSEMIDE 10 MG/ML
40 INJECTION INTRAMUSCULAR; INTRAVENOUS ONCE
Status: COMPLETED | OUTPATIENT
Start: 2024-01-01 | End: 2024-01-01

## 2024-01-01 RX ORDER — FUROSEMIDE 40 MG
40 TABLET ORAL 2 TIMES DAILY
Status: DISCONTINUED | OUTPATIENT
Start: 2024-01-01 | End: 2024-01-01

## 2024-01-01 RX ORDER — HYDROMORPHONE HCL IN WATER/PF 6 MG/30 ML
0.4 PATIENT CONTROLLED ANALGESIA SYRINGE INTRAVENOUS
Status: DISCONTINUED | OUTPATIENT
Start: 2024-01-01 | End: 2024-01-01 | Stop reason: HOSPADM

## 2024-01-01 RX ORDER — OXYCODONE HYDROCHLORIDE 5 MG/1
5 TABLET ORAL EVERY 4 HOURS PRN
Status: DISCONTINUED | OUTPATIENT
Start: 2024-01-01 | End: 2024-01-01 | Stop reason: HOSPADM

## 2024-01-01 RX ORDER — AMOXICILLIN 250 MG
2 CAPSULE ORAL 2 TIMES DAILY PRN
Status: DISCONTINUED | OUTPATIENT
Start: 2024-01-01 | End: 2024-01-01 | Stop reason: HOSPADM

## 2024-01-01 RX ORDER — FENTANYL CITRATE 50 UG/ML
INJECTION, SOLUTION INTRAMUSCULAR; INTRAVENOUS PRN
Status: DISCONTINUED | OUTPATIENT
Start: 2024-01-01 | End: 2024-01-01

## 2024-01-01 RX ORDER — ATENOLOL 25 MG/1
25 TABLET ORAL 2 TIMES DAILY
Status: DISCONTINUED | OUTPATIENT
Start: 2024-01-01 | End: 2024-01-01

## 2024-01-01 RX ORDER — HYDROMORPHONE HCL IN WATER/PF 6 MG/30 ML
0.4 PATIENT CONTROLLED ANALGESIA SYRINGE INTRAVENOUS EVERY 5 MIN PRN
Status: DISCONTINUED | OUTPATIENT
Start: 2024-01-01 | End: 2024-01-01 | Stop reason: HOSPADM

## 2024-01-01 RX ORDER — DULOXETIN HYDROCHLORIDE 60 MG/1
120 CAPSULE, DELAYED RELEASE ORAL DAILY
Status: DISCONTINUED | OUTPATIENT
Start: 2024-01-01 | End: 2024-01-01

## 2024-01-01 RX ORDER — POLYETHYLENE GLYCOL 3350 17 G/17G
17 POWDER, FOR SOLUTION ORAL DAILY
Status: DISCONTINUED | OUTPATIENT
Start: 2024-01-01 | End: 2024-01-01 | Stop reason: HOSPADM

## 2024-01-01 RX ORDER — DOXYCYCLINE 100 MG/1
100 CAPSULE ORAL 2 TIMES DAILY
Status: DISCONTINUED | OUTPATIENT
Start: 2024-01-01 | End: 2024-01-01 | Stop reason: HOSPADM

## 2024-01-01 RX ORDER — OXYCODONE HYDROCHLORIDE 5 MG/1
10 TABLET ORAL
Status: DISCONTINUED | OUTPATIENT
Start: 2024-01-01 | End: 2024-01-01 | Stop reason: HOSPADM

## 2024-01-01 RX ORDER — ALBUTEROL SULFATE 90 UG/1
2 AEROSOL, METERED RESPIRATORY (INHALATION) EVERY 4 HOURS PRN
COMMUNITY
Start: 2024-01-01

## 2024-01-01 RX ORDER — ACETAMINOPHEN 325 MG/1
975 TABLET ORAL ONCE
Status: COMPLETED | OUTPATIENT
Start: 2024-01-01 | End: 2024-01-01

## 2024-01-01 RX ORDER — AMINO ACIDS/PROTEIN HYDROLYS 11G-40/45
1 LIQUID IN PACKET (ML) ORAL 2 TIMES DAILY
Status: DISCONTINUED | OUTPATIENT
Start: 2024-01-01 | End: 2024-01-01

## 2024-01-01 RX ORDER — ONDANSETRON 2 MG/ML
4 INJECTION INTRAMUSCULAR; INTRAVENOUS EVERY 30 MIN PRN
Status: DISCONTINUED | OUTPATIENT
Start: 2024-01-01 | End: 2024-01-01 | Stop reason: HOSPADM

## 2024-01-01 RX ORDER — FOLIC ACID 1 MG/1
1 TABLET ORAL DAILY
Status: DISCONTINUED | OUTPATIENT
Start: 2024-01-01 | End: 2024-01-01

## 2024-01-01 RX ORDER — PIPERACILLIN SODIUM, TAZOBACTAM SODIUM 3; .375 G/15ML; G/15ML
3.38 INJECTION, POWDER, LYOPHILIZED, FOR SOLUTION INTRAVENOUS EVERY 6 HOURS
Status: DISCONTINUED | OUTPATIENT
Start: 2024-01-01 | End: 2024-01-01

## 2024-01-01 RX ORDER — OXYCODONE HYDROCHLORIDE 5 MG/1
5 TABLET ORAL
Status: COMPLETED | OUTPATIENT
Start: 2024-01-01 | End: 2024-01-01

## 2024-01-01 RX ORDER — LOVASTATIN 40 MG
40 TABLET ORAL AT BEDTIME
COMMUNITY

## 2024-01-01 RX ORDER — FENTANYL CITRATE 50 UG/ML
50 INJECTION, SOLUTION INTRAMUSCULAR; INTRAVENOUS EVERY 5 MIN PRN
Status: DISCONTINUED | OUTPATIENT
Start: 2024-01-01 | End: 2024-01-01 | Stop reason: HOSPADM

## 2024-01-01 RX ORDER — DEXTROSE MONOHYDRATE 100 MG/ML
INJECTION, SOLUTION INTRAVENOUS CONTINUOUS PRN
Status: DISCONTINUED | OUTPATIENT
Start: 2024-01-01 | End: 2024-01-01 | Stop reason: HOSPADM

## 2024-01-01 RX ORDER — POTASSIUM CHLORIDE 20MEQ/15ML
40 LIQUID (ML) ORAL ONCE
Status: COMPLETED | OUTPATIENT
Start: 2024-01-01 | End: 2024-01-01

## 2024-01-01 RX ORDER — LORAZEPAM 1 MG/1
1-2 TABLET ORAL EVERY 30 MIN PRN
Status: DISCONTINUED | OUTPATIENT
Start: 2024-01-01 | End: 2024-01-01 | Stop reason: HOSPADM

## 2024-01-01 RX ORDER — VIT C/E/ZN/COPPR/LUTEIN/ZEAXAN 60 MG-6 MG
1 CAPSULE ORAL EVERY MORNING
Status: DISCONTINUED | OUTPATIENT
Start: 2024-01-01 | End: 2024-01-01

## 2024-01-01 RX ORDER — DEXAMETHASONE SODIUM PHOSPHATE 4 MG/ML
INJECTION, SOLUTION INTRA-ARTICULAR; INTRALESIONAL; INTRAMUSCULAR; INTRAVENOUS; SOFT TISSUE PRN
Status: DISCONTINUED | OUTPATIENT
Start: 2024-01-01 | End: 2024-01-01

## 2024-01-01 RX ORDER — GABAPENTIN 300 MG/1
300 CAPSULE ORAL ONCE
Status: COMPLETED | OUTPATIENT
Start: 2024-01-01 | End: 2024-01-01

## 2024-01-01 RX ORDER — METHOCARBAMOL 500 MG/1
500 TABLET, FILM COATED ORAL 4 TIMES DAILY PRN
Qty: 60 TABLET | Refills: 1 | Status: SHIPPED | OUTPATIENT
Start: 2024-01-01

## 2024-01-01 RX ORDER — AMLODIPINE BESYLATE 10 MG/1
10 TABLET ORAL DAILY
Status: DISCONTINUED | OUTPATIENT
Start: 2024-01-01 | End: 2024-01-01

## 2024-01-01 RX ORDER — DOXAZOSIN 2 MG/1
2 TABLET ORAL AT BEDTIME
Status: DISCONTINUED | OUTPATIENT
Start: 2024-01-01 | End: 2024-01-01 | Stop reason: HOSPADM

## 2024-01-01 RX ORDER — CEFAZOLIN SODIUM 1 G/3ML
INJECTION, POWDER, FOR SOLUTION INTRAMUSCULAR; INTRAVENOUS PRN
Status: DISCONTINUED | OUTPATIENT
Start: 2024-01-01 | End: 2024-01-01

## 2024-01-01 RX ORDER — MUPIROCIN 20 MG/G
OINTMENT TOPICAL
COMMUNITY
Start: 2023-01-01

## 2024-01-01 RX ORDER — DEXMEDETOMIDINE HYDROCHLORIDE 4 UG/ML
.1-1.2 INJECTION, SOLUTION INTRAVENOUS CONTINUOUS
Status: DISCONTINUED | OUTPATIENT
Start: 2024-01-01 | End: 2024-01-01 | Stop reason: HOSPADM

## 2024-01-01 RX ORDER — FUROSEMIDE 10 MG/ML
80 INJECTION INTRAMUSCULAR; INTRAVENOUS ONCE
Status: DISCONTINUED | OUTPATIENT
Start: 2024-01-01 | End: 2024-01-01

## 2024-01-01 RX ORDER — FUROSEMIDE 10 MG/ML
80 INJECTION INTRAMUSCULAR; INTRAVENOUS ONCE
Status: COMPLETED | OUTPATIENT
Start: 2024-01-01 | End: 2024-01-01

## 2024-01-01 RX ORDER — LIDOCAINE 50 MG/G
OINTMENT TOPICAL DAILY PRN
Status: CANCELLED | OUTPATIENT
Start: 2024-01-01

## 2024-01-01 RX ORDER — CEFAZOLIN SODIUM/WATER 3 G/30 ML
3 SYRINGE (ML) INTRAVENOUS SEE ADMIN INSTRUCTIONS
Status: DISCONTINUED | OUTPATIENT
Start: 2024-01-01 | End: 2024-01-01 | Stop reason: HOSPADM

## 2024-01-01 RX ORDER — FINASTERIDE 5 MG/1
5 TABLET, FILM COATED ORAL DAILY
Status: DISCONTINUED | OUTPATIENT
Start: 2024-01-01 | End: 2024-01-01

## 2024-01-01 RX ORDER — NICOTINE POLACRILEX 4 MG
15-30 LOZENGE BUCCAL
Status: DISCONTINUED | OUTPATIENT
Start: 2024-01-01 | End: 2024-01-01 | Stop reason: HOSPADM

## 2024-01-01 RX ORDER — GUAIFENESIN/DEXTROMETHORPHAN 100-10MG/5
10 SYRUP ORAL EVERY 4 HOURS PRN
Status: DISCONTINUED | OUTPATIENT
Start: 2024-01-01 | End: 2024-01-01 | Stop reason: HOSPADM

## 2024-01-01 RX ORDER — FLUTICASONE PROPIONATE 50 MCG
2 SPRAY, SUSPENSION (ML) NASAL DAILY
Status: DISCONTINUED | OUTPATIENT
Start: 2024-01-01 | End: 2024-01-01

## 2024-01-01 RX ORDER — IPRATROPIUM BROMIDE AND ALBUTEROL SULFATE 2.5; .5 MG/3ML; MG/3ML
3 SOLUTION RESPIRATORY (INHALATION)
Status: DISCONTINUED | OUTPATIENT
Start: 2024-01-01 | End: 2024-01-01

## 2024-01-01 RX ORDER — HYDRALAZINE HYDROCHLORIDE 20 MG/ML
10 INJECTION INTRAMUSCULAR; INTRAVENOUS EVERY 4 HOURS PRN
Status: DISCONTINUED | OUTPATIENT
Start: 2024-01-01 | End: 2024-01-01 | Stop reason: HOSPADM

## 2024-01-01 RX ORDER — CEFAZOLIN 2 G/1
2 INJECTION, POWDER, FOR SOLUTION INTRAVENOUS ONCE
Status: COMPLETED | OUTPATIENT
Start: 2024-01-01 | End: 2024-01-01

## 2024-01-01 RX ORDER — ACETAMINOPHEN 325 MG/10.15ML
975 LIQUID ORAL EVERY 8 HOURS
Status: DISCONTINUED | OUTPATIENT
Start: 2024-01-01 | End: 2024-01-01

## 2024-01-01 RX ORDER — FINASTERIDE 5 MG/1
5 TABLET, FILM COATED ORAL DAILY
COMMUNITY

## 2024-01-01 RX ORDER — BUMETANIDE 0.25 MG/ML
2 INJECTION INTRAMUSCULAR; INTRAVENOUS ONCE
Status: COMPLETED | OUTPATIENT
Start: 2024-01-01 | End: 2024-01-01

## 2024-01-01 RX ORDER — ESMOLOL HYDROCHLORIDE 20 MG/ML
50-300 INJECTION, SOLUTION INTRAVENOUS CONTINUOUS
Status: DISCONTINUED | OUTPATIENT
Start: 2024-01-01 | End: 2024-01-01 | Stop reason: HOSPADM

## 2024-01-01 RX ORDER — HYDRALAZINE HYDROCHLORIDE 50 MG/1
50 TABLET, FILM COATED ORAL 3 TIMES DAILY
Status: DISCONTINUED | OUTPATIENT
Start: 2024-01-01 | End: 2024-01-01

## 2024-01-01 RX ORDER — FENTANYL CITRATE 50 UG/ML
25-100 INJECTION, SOLUTION INTRAMUSCULAR; INTRAVENOUS
Status: DISCONTINUED | OUTPATIENT
Start: 2024-01-01 | End: 2024-01-01 | Stop reason: HOSPADM

## 2024-01-01 RX ORDER — MEPERIDINE HYDROCHLORIDE 25 MG/ML
12.5 INJECTION INTRAMUSCULAR; INTRAVENOUS; SUBCUTANEOUS EVERY 5 MIN PRN
Status: DISCONTINUED | OUTPATIENT
Start: 2024-01-01 | End: 2024-01-01 | Stop reason: HOSPADM

## 2024-01-01 RX ORDER — ATENOLOL 25 MG/1
100 TABLET ORAL 2 TIMES DAILY
Status: DISCONTINUED | OUTPATIENT
Start: 2024-01-01 | End: 2024-01-01

## 2024-01-01 RX ORDER — POTASSIUM CHLORIDE 1.5 G/1.58G
20 POWDER, FOR SOLUTION ORAL EVERY MORNING
Status: DISCONTINUED | OUTPATIENT
Start: 2024-01-01 | End: 2024-01-01 | Stop reason: HOSPADM

## 2024-01-01 RX ORDER — ACETAMINOPHEN 325 MG/1
650 TABLET ORAL EVERY 4 HOURS PRN
Status: DISCONTINUED | OUTPATIENT
Start: 2024-01-01 | End: 2024-01-01 | Stop reason: HOSPADM

## 2024-01-01 RX ORDER — ATROPINE SULFATE 10 MG/ML
2 SOLUTION/ DROPS OPHTHALMIC EVERY 4 HOURS PRN
Status: DISCONTINUED | OUTPATIENT
Start: 2024-01-01 | End: 2024-01-01 | Stop reason: HOSPADM

## 2024-01-01 RX ORDER — CEFAZOLIN SODIUM/WATER 3 G/30 ML
3 SYRINGE (ML) INTRAVENOUS
Status: COMPLETED | OUTPATIENT
Start: 2024-01-01 | End: 2024-01-01

## 2024-01-01 RX ORDER — PANTOPRAZOLE SODIUM 40 MG/1
40 TABLET, DELAYED RELEASE ORAL
Status: DISCONTINUED | OUTPATIENT
Start: 2024-01-01 | End: 2024-01-01

## 2024-01-01 RX ORDER — ATORVASTATIN CALCIUM 10 MG/1
10 TABLET, FILM COATED ORAL EVERY EVENING
Status: DISCONTINUED | OUTPATIENT
Start: 2024-01-01 | End: 2024-01-01 | Stop reason: HOSPADM

## 2024-01-01 RX ORDER — AMOXICILLIN 250 MG
1 CAPSULE ORAL 2 TIMES DAILY PRN
Status: DISCONTINUED | OUTPATIENT
Start: 2024-01-01 | End: 2024-01-01

## 2024-01-01 RX ORDER — FLUTICASONE FUROATE AND VILANTEROL 100; 25 UG/1; UG/1
1 POWDER RESPIRATORY (INHALATION) DAILY
Status: DISCONTINUED | OUTPATIENT
Start: 2024-01-01 | End: 2024-01-01

## 2024-01-01 RX ORDER — FOLIC ACID 5 MG/ML
1 INJECTION, SOLUTION INTRAMUSCULAR; INTRAVENOUS; SUBCUTANEOUS DAILY
Status: COMPLETED | OUTPATIENT
Start: 2024-01-01 | End: 2024-01-01

## 2024-01-01 RX ORDER — OLANZAPINE 5 MG/1
5 TABLET, ORALLY DISINTEGRATING ORAL EVERY 6 HOURS PRN
Status: DISCONTINUED | OUTPATIENT
Start: 2024-01-01 | End: 2024-01-01 | Stop reason: HOSPADM

## 2024-01-01 RX ORDER — ACETAMINOPHEN 325 MG/1
650 TABLET ORAL EVERY 4 HOURS PRN
Status: DISCONTINUED | OUTPATIENT
Start: 2024-01-01 | End: 2024-01-01

## 2024-01-01 RX ORDER — ATENOLOL 50 MG/1
50 TABLET ORAL 2 TIMES DAILY
Status: DISCONTINUED | OUTPATIENT
Start: 2024-01-01 | End: 2024-01-01

## 2024-01-01 RX ORDER — ALBUTEROL SULFATE 0.83 MG/ML
2.5 SOLUTION RESPIRATORY (INHALATION) EVERY 4 HOURS PRN
Status: DISCONTINUED | OUTPATIENT
Start: 2024-01-01 | End: 2024-01-01 | Stop reason: HOSPADM

## 2024-01-01 RX ORDER — GABAPENTIN 600 MG/1
1200 TABLET ORAL AT BEDTIME
COMMUNITY
Start: 2024-01-01

## 2024-01-01 RX ORDER — MULTIPLE VITAMINS W/ MINERALS TAB 9MG-400MCG
1 TAB ORAL DAILY
Status: DISCONTINUED | OUTPATIENT
Start: 2024-01-01 | End: 2024-01-01

## 2024-01-01 RX ORDER — OXYCODONE HYDROCHLORIDE 5 MG/1
5 TABLET ORAL EVERY 8 HOURS PRN
Qty: 12 TABLET | Refills: 0 | Status: CANCELLED | OUTPATIENT
Start: 2024-01-01

## 2024-01-01 RX ORDER — SALIVA STIMULANT COMB. NO.3
1 SPRAY, NON-AEROSOL (ML) MUCOUS MEMBRANE
Status: DISCONTINUED | OUTPATIENT
Start: 2024-01-01 | End: 2024-01-01 | Stop reason: HOSPADM

## 2024-01-01 RX ORDER — FERROUS SULFATE 325(65) MG
325 TABLET ORAL
Status: DISCONTINUED | OUTPATIENT
Start: 2024-01-01 | End: 2024-01-01

## 2024-01-01 RX ORDER — LIDOCAINE HYDROCHLORIDE 20 MG/ML
INJECTION, SOLUTION INFILTRATION; PERINEURAL PRN
Status: DISCONTINUED | OUTPATIENT
Start: 2024-01-01 | End: 2024-01-01

## 2024-01-01 RX ORDER — CLONIDINE HYDROCHLORIDE 0.1 MG/1
0.1 TABLET ORAL EVERY 8 HOURS
Status: DISCONTINUED | OUTPATIENT
Start: 2024-01-01 | End: 2024-01-01

## 2024-01-01 RX ORDER — ASPIRIN 81 MG/1
81 TABLET ORAL 2 TIMES DAILY
Status: DISCONTINUED | OUTPATIENT
Start: 2024-01-01 | End: 2024-01-01 | Stop reason: HOSPADM

## 2024-01-01 RX ORDER — DEXAMETHASONE SODIUM PHOSPHATE 4 MG/ML
4 INJECTION, SOLUTION INTRA-ARTICULAR; INTRALESIONAL; INTRAMUSCULAR; INTRAVENOUS; SOFT TISSUE
Status: DISCONTINUED | OUTPATIENT
Start: 2024-01-01 | End: 2024-01-01 | Stop reason: HOSPADM

## 2024-01-01 RX ORDER — PROCHLORPERAZINE MALEATE 5 MG
5 TABLET ORAL EVERY 6 HOURS PRN
Status: DISCONTINUED | OUTPATIENT
Start: 2024-01-01 | End: 2024-01-01 | Stop reason: HOSPADM

## 2024-01-01 RX ORDER — GABAPENTIN 300 MG/1
600 CAPSULE ORAL 2 TIMES DAILY
Status: DISCONTINUED | OUTPATIENT
Start: 2024-01-01 | End: 2024-01-01

## 2024-01-01 RX ORDER — CIPROFLOXACIN 500 MG/1
500 TABLET, FILM COATED ORAL 2 TIMES DAILY
Qty: 28 TABLET | Refills: 0 | Status: ON HOLD | OUTPATIENT
Start: 2024-01-01 | End: 2024-01-01

## 2024-01-01 RX ORDER — HEPARIN SODIUM 10000 [USP'U]/ML
7500 INJECTION, SOLUTION INTRAVENOUS; SUBCUTANEOUS EVERY 8 HOURS
Status: DISCONTINUED | OUTPATIENT
Start: 2024-01-01 | End: 2024-01-01

## 2024-01-01 RX ORDER — BUPIVACAINE HYDROCHLORIDE 5 MG/ML
INJECTION, SOLUTION EPIDURAL; INTRACAUDAL
Status: COMPLETED | OUTPATIENT
Start: 2024-01-01 | End: 2024-01-01

## 2024-01-01 RX ORDER — HYDROCODONE BITARTRATE AND ACETAMINOPHEN 5; 325 MG/1; MG/1
1 TABLET ORAL EVERY 6 HOURS PRN
Qty: 12 TABLET | Refills: 0 | Status: SHIPPED | OUTPATIENT
Start: 2024-01-01 | End: 2024-01-01

## 2024-01-01 RX ORDER — HYDRALAZINE HYDROCHLORIDE 10 MG/1
10 TABLET, FILM COATED ORAL EVERY 4 HOURS PRN
Status: DISCONTINUED | OUTPATIENT
Start: 2024-01-01 | End: 2024-01-01 | Stop reason: HOSPADM

## 2024-01-01 RX ORDER — LOVASTATIN 20 MG
40 TABLET ORAL AT BEDTIME
Status: DISCONTINUED | OUTPATIENT
Start: 2024-01-01 | End: 2024-01-01

## 2024-01-01 RX ORDER — DEXMEDETOMIDINE HYDROCHLORIDE 4 UG/ML
.1-.6 INJECTION, SOLUTION INTRAVENOUS CONTINUOUS
Status: DISCONTINUED | OUTPATIENT
Start: 2024-01-01 | End: 2024-01-01

## 2024-01-01 RX ORDER — DULOXETIN HYDROCHLORIDE 60 MG/1
120 CAPSULE, DELAYED RELEASE ORAL DAILY
COMMUNITY
Start: 2024-01-01

## 2024-01-01 RX ORDER — CHOLECALCIFEROL (VITAMIN D3) 50 MCG
50 TABLET ORAL EVERY MORNING
Status: DISCONTINUED | OUTPATIENT
Start: 2024-01-01 | End: 2024-01-01

## 2024-01-01 RX ORDER — EPHEDRINE SULFATE 50 MG/ML
INJECTION, SOLUTION INTRAMUSCULAR; INTRAVENOUS; SUBCUTANEOUS PRN
Status: DISCONTINUED | OUTPATIENT
Start: 2024-01-01 | End: 2024-01-01

## 2024-01-01 RX ORDER — FAMOTIDINE 20 MG/1
20 TABLET, FILM COATED ORAL 2 TIMES DAILY
Status: DISCONTINUED | OUTPATIENT
Start: 2024-01-01 | End: 2024-01-01 | Stop reason: HOSPADM

## 2024-01-01 RX ORDER — HYDROMORPHONE HYDROCHLORIDE 1 MG/ML
0.5 INJECTION, SOLUTION INTRAMUSCULAR; INTRAVENOUS; SUBCUTANEOUS
Status: DISCONTINUED | OUTPATIENT
Start: 2024-01-01 | End: 2024-01-01 | Stop reason: HOSPADM

## 2024-01-01 RX ORDER — ALLOPURINOL 300 MG/1
300 TABLET ORAL DAILY
Status: DISCONTINUED | OUTPATIENT
Start: 2024-01-01 | End: 2024-01-01 | Stop reason: HOSPADM

## 2024-01-01 RX ORDER — HYDROMORPHONE HYDROCHLORIDE 1 MG/ML
0.5 INJECTION, SOLUTION INTRAMUSCULAR; INTRAVENOUS; SUBCUTANEOUS ONCE
Status: COMPLETED | OUTPATIENT
Start: 2024-01-01 | End: 2024-01-01

## 2024-01-01 RX ORDER — AMOXICILLIN 250 MG
1 CAPSULE ORAL 2 TIMES DAILY
Status: DISCONTINUED | OUTPATIENT
Start: 2024-01-01 | End: 2024-01-01 | Stop reason: HOSPADM

## 2024-01-01 RX ORDER — FENTANYL CITRATE 50 UG/ML
25 INJECTION, SOLUTION INTRAMUSCULAR; INTRAVENOUS EVERY 5 MIN PRN
Status: DISCONTINUED | OUTPATIENT
Start: 2024-01-01 | End: 2024-01-01 | Stop reason: HOSPADM

## 2024-01-01 RX ORDER — AMOXICILLIN 250 MG
1-2 CAPSULE ORAL 2 TIMES DAILY
Status: DISCONTINUED | OUTPATIENT
Start: 2024-01-01 | End: 2024-01-01

## 2024-01-01 RX ORDER — GLYCOPYRROLATE 1 MG/1
2 TABLET ORAL EVERY 4 HOURS PRN
Status: DISCONTINUED | OUTPATIENT
Start: 2024-01-01 | End: 2024-01-01 | Stop reason: HOSPADM

## 2024-01-01 RX ORDER — FLUMAZENIL 0.1 MG/ML
0.2 INJECTION, SOLUTION INTRAVENOUS
Status: DISCONTINUED | OUTPATIENT
Start: 2024-01-01 | End: 2024-01-01 | Stop reason: HOSPADM

## 2024-01-01 RX ORDER — METHOCARBAMOL 500 MG/1
500 TABLET, FILM COATED ORAL EVERY 6 HOURS PRN
Status: DISCONTINUED | OUTPATIENT
Start: 2024-01-01 | End: 2024-01-01 | Stop reason: HOSPADM

## 2024-01-01 RX ORDER — ASPIRIN 81 MG/1
81 TABLET ORAL 2 TIMES DAILY
Status: SHIPPED
Start: 2024-01-01

## 2024-01-01 RX ORDER — TRANEXAMIC ACID 650 MG/1
1950 TABLET ORAL ONCE
Status: COMPLETED | OUTPATIENT
Start: 2024-01-01 | End: 2024-01-01

## 2024-01-01 RX ORDER — KETAMINE HYDROCHLORIDE 10 MG/ML
INJECTION INTRAMUSCULAR; INTRAVENOUS PRN
Status: DISCONTINUED | OUTPATIENT
Start: 2024-01-01 | End: 2024-01-01

## 2024-01-01 RX ORDER — SENNOSIDES 8.6 MG
2 CAPSULE ORAL 2 TIMES DAILY
Status: ON HOLD | COMMUNITY
End: 2024-01-01

## 2024-01-01 RX ORDER — LORAZEPAM 2 MG/ML
1-2 INJECTION INTRAMUSCULAR EVERY 30 MIN PRN
Status: DISCONTINUED | OUTPATIENT
Start: 2024-01-01 | End: 2024-01-01 | Stop reason: HOSPADM

## 2024-01-01 RX ORDER — CALCIUM CARBONATE 500 MG/1
1000 TABLET, CHEWABLE ORAL 4 TIMES DAILY PRN
Status: DISCONTINUED | OUTPATIENT
Start: 2024-01-01 | End: 2024-01-01 | Stop reason: HOSPADM

## 2024-01-01 RX ORDER — HYDRALAZINE HYDROCHLORIDE 20 MG/ML
2.5-5 INJECTION INTRAMUSCULAR; INTRAVENOUS EVERY 10 MIN PRN
Status: DISCONTINUED | OUTPATIENT
Start: 2024-01-01 | End: 2024-01-01 | Stop reason: HOSPADM

## 2024-01-01 RX ORDER — FUROSEMIDE 40 MG
40 TABLET ORAL DAILY
Status: DISCONTINUED | OUTPATIENT
Start: 2024-01-01 | End: 2024-01-01

## 2024-01-01 RX ORDER — DOXYCYCLINE 100 MG/1
100 CAPSULE ORAL 2 TIMES DAILY
Qty: 28 CAPSULE | Refills: 0 | Status: ON HOLD | OUTPATIENT
Start: 2024-01-01 | End: 2024-01-01

## 2024-01-01 RX ORDER — QUETIAPINE FUMARATE 50 MG/1
50 TABLET, FILM COATED ORAL ONCE
Status: COMPLETED | OUTPATIENT
Start: 2024-01-01 | End: 2024-01-01

## 2024-01-01 RX ORDER — OXYCODONE HYDROCHLORIDE 5 MG/1
5-10 TABLET ORAL EVERY 4 HOURS PRN
Qty: 26 TABLET | Refills: 0 | Status: SHIPPED | OUTPATIENT
Start: 2024-01-01

## 2024-01-01 RX ORDER — DEXTROSE MONOHYDRATE 25 G/50ML
25-50 INJECTION, SOLUTION INTRAVENOUS
Status: DISCONTINUED | OUTPATIENT
Start: 2024-01-01 | End: 2024-01-01 | Stop reason: HOSPADM

## 2024-01-01 RX ORDER — FUROSEMIDE 10 MG/ML
20 INJECTION INTRAMUSCULAR; INTRAVENOUS ONCE
Status: COMPLETED | OUTPATIENT
Start: 2024-01-01 | End: 2024-01-01

## 2024-01-01 RX ORDER — HALOPERIDOL 5 MG/ML
2.5-5 INJECTION INTRAMUSCULAR EVERY 4 HOURS PRN
Status: DISCONTINUED | OUTPATIENT
Start: 2024-01-01 | End: 2024-01-01 | Stop reason: HOSPADM

## 2024-01-01 RX ORDER — DOXAZOSIN 1 MG/1
2 TABLET ORAL AT BEDTIME
Status: DISCONTINUED | OUTPATIENT
Start: 2024-01-01 | End: 2024-01-01

## 2024-01-01 RX ORDER — ONDANSETRON 4 MG/1
4 TABLET, FILM COATED ORAL EVERY 6 HOURS PRN
Qty: 12 TABLET | Refills: 0 | Status: SHIPPED | OUTPATIENT
Start: 2024-01-01 | End: 2024-01-01

## 2024-01-01 RX ORDER — THIAMINE HYDROCHLORIDE 100 MG/ML
200 INJECTION, SOLUTION INTRAMUSCULAR; INTRAVENOUS 3 TIMES DAILY
Status: DISPENSED | OUTPATIENT
Start: 2024-01-01 | End: 2024-01-01

## 2024-01-01 RX ORDER — ONDANSETRON 2 MG/ML
INJECTION INTRAMUSCULAR; INTRAVENOUS PRN
Status: DISCONTINUED | OUTPATIENT
Start: 2024-01-01 | End: 2024-01-01

## 2024-01-01 RX ORDER — ASPIRIN 81 MG/1
81 TABLET, CHEWABLE ORAL 2 TIMES DAILY
Status: DISCONTINUED | OUTPATIENT
Start: 2024-01-01 | End: 2024-01-01

## 2024-01-01 RX ORDER — GABAPENTIN 300 MG/1
1200 CAPSULE ORAL AT BEDTIME
Status: DISCONTINUED | OUTPATIENT
Start: 2024-01-01 | End: 2024-01-01

## 2024-01-01 RX ORDER — CEPHALEXIN 500 MG/1
500 CAPSULE ORAL 4 TIMES DAILY
Qty: 20 CAPSULE | Refills: 0 | Status: ON HOLD | OUTPATIENT
Start: 2024-01-01 | End: 2024-01-01

## 2024-01-01 RX ORDER — GINSENG 100 MG
CAPSULE ORAL PRN
Status: DISCONTINUED | OUTPATIENT
Start: 2024-01-01 | End: 2024-01-01 | Stop reason: HOSPADM

## 2024-01-01 RX ORDER — CEFAZOLIN 2 G/1
2 INJECTION, POWDER, FOR SOLUTION INTRAVENOUS EVERY 8 HOURS
Status: COMPLETED | OUTPATIENT
Start: 2024-01-01 | End: 2024-01-01

## 2024-01-01 RX ORDER — MINERAL OIL/HYDROPHIL PETROLAT
OINTMENT (GRAM) TOPICAL
Status: DISCONTINUED | OUTPATIENT
Start: 2024-01-01 | End: 2024-01-01 | Stop reason: HOSPADM

## 2024-01-01 RX ORDER — ALLOPURINOL 300 MG/1
300 TABLET ORAL DAILY
COMMUNITY
Start: 2024-01-01

## 2024-01-01 RX ORDER — ATORVASTATIN CALCIUM 10 MG/1
10 TABLET, FILM COATED ORAL AT BEDTIME
Status: DISCONTINUED | OUTPATIENT
Start: 2024-01-01 | End: 2024-01-01 | Stop reason: HOSPADM

## 2024-01-01 RX ORDER — IPRATROPIUM BROMIDE AND ALBUTEROL SULFATE 2.5; .5 MG/3ML; MG/3ML
3 SOLUTION RESPIRATORY (INHALATION) EVERY 4 HOURS PRN
Status: DISCONTINUED | OUTPATIENT
Start: 2024-01-01 | End: 2024-01-01 | Stop reason: HOSPADM

## 2024-01-01 RX ORDER — DEXMEDETOMIDINE HYDROCHLORIDE 4 UG/ML
.1-.2 INJECTION, SOLUTION INTRAVENOUS CONTINUOUS
Status: DISCONTINUED | OUTPATIENT
Start: 2024-01-01 | End: 2024-01-01

## 2024-01-01 RX ORDER — ASPIRIN 81 MG/1
81 TABLET ORAL 2 TIMES DAILY
Status: SHIPPED
Start: 2024-01-01 | End: 2024-01-01

## 2024-01-01 RX ORDER — HYDRALAZINE HYDROCHLORIDE 10 MG/1
10 TABLET, FILM COATED ORAL EVERY 4 HOURS PRN
Status: DISCONTINUED | OUTPATIENT
Start: 2024-01-01 | End: 2024-01-01

## 2024-01-01 RX ORDER — HYDROCODONE BITARTRATE AND ACETAMINOPHEN 5; 325 MG/1; MG/1
1 TABLET ORAL EVERY 12 HOURS PRN
Qty: 12 TABLET | Refills: 0 | Status: SHIPPED | OUTPATIENT
Start: 2024-01-01 | End: 2024-01-01

## 2024-01-01 RX ORDER — HEPARIN SODIUM 5000 [USP'U]/.5ML
5000 INJECTION, SOLUTION INTRAVENOUS; SUBCUTANEOUS EVERY 8 HOURS
Status: DISCONTINUED | OUTPATIENT
Start: 2024-01-01 | End: 2024-01-01

## 2024-01-01 RX ORDER — SENNOSIDES 8.6 MG
1 TABLET ORAL 2 TIMES DAILY PRN
Status: DISCONTINUED | OUTPATIENT
Start: 2024-01-01 | End: 2024-01-01 | Stop reason: HOSPADM

## 2024-01-01 RX ORDER — LIDOCAINE 40 MG/G
CREAM TOPICAL
Status: CANCELLED | OUTPATIENT
Start: 2024-01-01

## 2024-01-01 RX ORDER — QUETIAPINE FUMARATE 25 MG/1
25 TABLET, FILM COATED ORAL ONCE
Status: COMPLETED | OUTPATIENT
Start: 2024-01-01 | End: 2024-01-01

## 2024-01-01 RX ORDER — PROPOFOL 10 MG/ML
INJECTION, EMULSION INTRAVENOUS PRN
Status: DISCONTINUED | OUTPATIENT
Start: 2024-01-01 | End: 2024-01-01

## 2024-01-01 RX ORDER — ACETAMINOPHEN 325 MG/1
975 TABLET ORAL EVERY 8 HOURS
Status: DISCONTINUED | OUTPATIENT
Start: 2024-01-01 | End: 2024-01-01 | Stop reason: HOSPADM

## 2024-01-01 RX ORDER — OXYCODONE HYDROCHLORIDE 5 MG/1
5 TABLET ORAL EVERY 6 HOURS PRN
Qty: 12 TABLET | Refills: 0 | Status: SHIPPED | OUTPATIENT
Start: 2024-01-01 | End: 2024-01-01

## 2024-01-01 RX ORDER — SODIUM CHLORIDE, SODIUM LACTATE, POTASSIUM CHLORIDE, CALCIUM CHLORIDE 600; 310; 30; 20 MG/100ML; MG/100ML; MG/100ML; MG/100ML
INJECTION, SOLUTION INTRAVENOUS CONTINUOUS
Status: DISCONTINUED | OUTPATIENT
Start: 2024-01-01 | End: 2024-01-01

## 2024-01-01 RX ORDER — HYDRALAZINE HYDROCHLORIDE 25 MG/1
25 TABLET, FILM COATED ORAL 3 TIMES DAILY
Status: DISCONTINUED | OUTPATIENT
Start: 2024-01-01 | End: 2024-01-01

## 2024-01-01 RX ORDER — BISACODYL 10 MG
10 SUPPOSITORY, RECTAL RECTAL DAILY PRN
Status: DISCONTINUED | OUTPATIENT
Start: 2024-01-01 | End: 2024-01-01 | Stop reason: HOSPADM

## 2024-01-01 RX ORDER — SULFAMETHOXAZOLE/TRIMETHOPRIM 800-160 MG
TABLET ORAL
Status: ON HOLD | COMMUNITY
Start: 2023-01-01 | End: 2024-01-01

## 2024-01-01 RX ORDER — HYDRALAZINE HYDROCHLORIDE 25 MG/1
50 TABLET, FILM COATED ORAL 3 TIMES DAILY
Status: DISCONTINUED | OUTPATIENT
Start: 2024-01-01 | End: 2024-01-01

## 2024-01-01 RX ORDER — HYDROXYZINE HYDROCHLORIDE 25 MG/1
25 TABLET, FILM COATED ORAL EVERY 6 HOURS PRN
Status: DISCONTINUED | OUTPATIENT
Start: 2024-01-01 | End: 2024-01-01 | Stop reason: HOSPADM

## 2024-01-01 RX ORDER — BISACODYL 10 MG
10 SUPPOSITORY, RECTAL RECTAL
Status: DISCONTINUED | OUTPATIENT
Start: 2024-08-19 | End: 2024-01-01 | Stop reason: HOSPADM

## 2024-01-01 RX ORDER — HYDRALAZINE HYDROCHLORIDE 20 MG/ML
10 INJECTION INTRAMUSCULAR; INTRAVENOUS EVERY 4 HOURS PRN
Status: DISCONTINUED | OUTPATIENT
Start: 2024-01-01 | End: 2024-01-01

## 2024-01-01 RX ORDER — TAMSULOSIN HYDROCHLORIDE 0.4 MG/1
0.4 CAPSULE ORAL DAILY
COMMUNITY
Start: 2024-01-01

## 2024-01-01 RX ORDER — HYDROMORPHONE HYDROCHLORIDE 1 MG/ML
0.5 INJECTION, SOLUTION INTRAMUSCULAR; INTRAVENOUS; SUBCUTANEOUS
Status: DISCONTINUED | OUTPATIENT
Start: 2024-01-01 | End: 2024-01-01

## 2024-01-01 RX ORDER — VANCOMYCIN 2 GRAM/500 ML IN 0.9 % SODIUM CHLORIDE INTRAVENOUS
2000 ONCE
Status: COMPLETED | OUTPATIENT
Start: 2024-01-01 | End: 2024-01-01

## 2024-01-01 RX ADMIN — GABAPENTIN 600 MG: 300 CAPSULE ORAL at 09:28

## 2024-01-01 RX ADMIN — ATENOLOL 25 MG: 25 TABLET ORAL at 08:10

## 2024-01-01 RX ADMIN — ASPIRIN 81 MG: 81 TABLET, COATED ORAL at 20:37

## 2024-01-01 RX ADMIN — METHOCARBAMOL 500 MG: 500 TABLET ORAL at 23:10

## 2024-01-01 RX ADMIN — NICARDIPINE HYDROCHLORIDE 5 MG/HR: 0.2 INJECTION INTRAVENOUS at 23:43

## 2024-01-01 RX ADMIN — NICARDIPINE HYDROCHLORIDE 15 MG/HR: 0.2 INJECTION INTRAVENOUS at 02:45

## 2024-01-01 RX ADMIN — GABAPENTIN 1200 MG: 300 CAPSULE ORAL at 21:01

## 2024-01-01 RX ADMIN — GLYCOPYRROLATE 0.2 MG: 0.2 INJECTION, SOLUTION INTRAMUSCULAR; INTRAVENOUS at 12:00

## 2024-01-01 RX ADMIN — PANTOPRAZOLE SODIUM 40 MG: 40 TABLET, DELAYED RELEASE ORAL at 09:29

## 2024-01-01 RX ADMIN — Medication 3 G: at 11:51

## 2024-01-01 RX ADMIN — SENNOSIDES AND DOCUSATE SODIUM 2 TABLET: 50; 8.6 TABLET ORAL at 20:27

## 2024-01-01 RX ADMIN — NICARDIPINE HYDROCHLORIDE 10 MG/HR: 0.2 INJECTION INTRAVENOUS at 03:37

## 2024-01-01 RX ADMIN — DEXMEDETOMIDINE HYDROCHLORIDE 1.2 MCG/KG/HR: 400 INJECTION INTRAVENOUS at 08:12

## 2024-01-01 RX ADMIN — DEXMEDETOMIDINE HYDROCHLORIDE 1.2 MCG/KG/HR: 400 INJECTION INTRAVENOUS at 14:12

## 2024-01-01 RX ADMIN — CEFAZOLIN 2 G: 2 INJECTION, POWDER, LYOPHILIZED, FOR SOLUTION INTRAVENOUS at 03:16

## 2024-01-01 RX ADMIN — HUMAN ALBUMIN MICROSPHERES AND PERFLUTREN 9 ML: 10; .22 INJECTION, SOLUTION INTRAVENOUS at 09:39

## 2024-01-01 RX ADMIN — PROPOFOL 50 MCG/KG/MIN: 10 INJECTION, EMULSION INTRAVENOUS at 08:03

## 2024-01-01 RX ADMIN — PROPOFOL 80 MG: 10 INJECTION, EMULSION INTRAVENOUS at 08:07

## 2024-01-01 RX ADMIN — HYDROMORPHONE HYDROCHLORIDE 0.2 MG: 0.2 INJECTION, SOLUTION INTRAMUSCULAR; INTRAVENOUS; SUBCUTANEOUS at 21:18

## 2024-01-01 RX ADMIN — LORAZEPAM 2 MG: 2 INJECTION INTRAMUSCULAR; INTRAVENOUS at 22:48

## 2024-01-01 RX ADMIN — ESMOLOL HYDROCHLORIDE IN SODIUM CHLORIDE 50 MCG/KG/MIN: 20 INJECTION INTRAVENOUS at 14:17

## 2024-01-01 RX ADMIN — HYDROMORPHONE HYDROCHLORIDE 0.2 MG: 0.2 INJECTION, SOLUTION INTRAMUSCULAR; INTRAVENOUS; SUBCUTANEOUS at 09:51

## 2024-01-01 RX ADMIN — TAMSULOSIN HYDROCHLORIDE 0.4 MG: 0.4 CAPSULE ORAL at 18:29

## 2024-01-01 RX ADMIN — DULOXETINE HYDROCHLORIDE 120 MG: 60 CAPSULE, DELAYED RELEASE ORAL at 09:20

## 2024-01-01 RX ADMIN — ATENOLOL 25 MG: 25 TABLET ORAL at 21:39

## 2024-01-01 RX ADMIN — LORAZEPAM 2 MG: 2 INJECTION INTRAMUSCULAR; INTRAVENOUS at 00:52

## 2024-01-01 RX ADMIN — IPRATROPIUM BROMIDE AND ALBUTEROL 1 PUFF: 20; 100 SPRAY, METERED RESPIRATORY (INHALATION) at 08:00

## 2024-01-01 RX ADMIN — HYDROMORPHONE HYDROCHLORIDE 0.2 MG: 0.2 INJECTION, SOLUTION INTRAMUSCULAR; INTRAVENOUS; SUBCUTANEOUS at 14:08

## 2024-01-01 RX ADMIN — DEXMEDETOMIDINE HYDROCHLORIDE 1 MCG/KG/HR: 400 INJECTION INTRAVENOUS at 23:53

## 2024-01-01 RX ADMIN — ALLOPURINOL 300 MG: 300 TABLET ORAL at 08:50

## 2024-01-01 RX ADMIN — FAMOTIDINE 20 MG: 20 TABLET, FILM COATED ORAL at 08:17

## 2024-01-01 RX ADMIN — IPRATROPIUM BROMIDE AND ALBUTEROL SULFATE 3 ML: 2.5; .5 SOLUTION RESPIRATORY (INHALATION) at 18:45

## 2024-01-01 RX ADMIN — HYDROMORPHONE HYDROCHLORIDE 0.2 MG: 0.2 INJECTION, SOLUTION INTRAMUSCULAR; INTRAVENOUS; SUBCUTANEOUS at 10:03

## 2024-01-01 RX ADMIN — MIDAZOLAM 1 MG: 1 INJECTION INTRAMUSCULAR; INTRAVENOUS at 12:55

## 2024-01-01 RX ADMIN — PIPERACILLIN AND TAZOBACTAM 3.38 G: 3; .375 INJECTION, POWDER, FOR SOLUTION INTRAVENOUS at 21:02

## 2024-01-01 RX ADMIN — GABAPENTIN 600 MG: 600 TABLET, FILM COATED ORAL at 08:01

## 2024-01-01 RX ADMIN — PANTOPRAZOLE SODIUM 40 MG: 40 TABLET, DELAYED RELEASE ORAL at 10:30

## 2024-01-01 RX ADMIN — LORAZEPAM 2 MG: 2 INJECTION INTRAMUSCULAR; INTRAVENOUS at 07:10

## 2024-01-01 RX ADMIN — NICARDIPINE HYDROCHLORIDE 7.5 MG/HR: 0.2 INJECTION INTRAVENOUS at 08:14

## 2024-01-01 RX ADMIN — HEPARIN SODIUM 7500 UNITS: 10000 INJECTION, SOLUTION INTRAVENOUS; SUBCUTANEOUS at 04:10

## 2024-01-01 RX ADMIN — ACETAMINOPHEN 975 MG: 325 SUSPENSION ORAL at 11:43

## 2024-01-01 RX ADMIN — NICARDIPINE HYDROCHLORIDE 15 MG/HR: 0.2 INJECTION INTRAVENOUS at 02:49

## 2024-01-01 RX ADMIN — LORAZEPAM 2 MG: 1 TABLET ORAL at 10:21

## 2024-01-01 RX ADMIN — NICARDIPINE HYDROCHLORIDE 15 MG/HR: 0.2 INJECTION INTRAVENOUS at 19:29

## 2024-01-01 RX ADMIN — THIAMINE HCL TAB 100 MG 100 MG: 100 TAB at 15:40

## 2024-01-01 RX ADMIN — SODIUM CHLORIDE, POTASSIUM CHLORIDE, SODIUM LACTATE AND CALCIUM CHLORIDE: 600; 310; 30; 20 INJECTION, SOLUTION INTRAVENOUS at 12:45

## 2024-01-01 RX ADMIN — HYDROMORPHONE HYDROCHLORIDE 0.2 MG: 0.2 INJECTION, SOLUTION INTRAMUSCULAR; INTRAVENOUS; SUBCUTANEOUS at 16:13

## 2024-01-01 RX ADMIN — THIAMINE HCL TAB 100 MG 100 MG: 100 TAB at 15:02

## 2024-01-01 RX ADMIN — CLONIDINE HYDROCHLORIDE 0.2 MG: 0.2 TABLET ORAL at 14:08

## 2024-01-01 RX ADMIN — METHOCARBAMOL 500 MG: 500 TABLET ORAL at 08:15

## 2024-01-01 RX ADMIN — GABAPENTIN 1200 MG: 600 TABLET, FILM COATED ORAL at 21:02

## 2024-01-01 RX ADMIN — HALOPERIDOL LACTATE 5 MG: 5 INJECTION, SOLUTION INTRAMUSCULAR at 19:52

## 2024-01-01 RX ADMIN — NICARDIPINE HYDROCHLORIDE 10 MG/HR: 0.2 INJECTION INTRAVENOUS at 18:26

## 2024-01-01 RX ADMIN — GABAPENTIN 600 MG: 300 CAPSULE ORAL at 12:09

## 2024-01-01 RX ADMIN — TAMSULOSIN HYDROCHLORIDE 0.4 MG: 0.4 CAPSULE ORAL at 08:15

## 2024-01-01 RX ADMIN — OXYCODONE HYDROCHLORIDE 5 MG: 5 TABLET ORAL at 19:20

## 2024-01-01 RX ADMIN — HYDRALAZINE HYDROCHLORIDE 25 MG: 25 TABLET ORAL at 08:06

## 2024-01-01 RX ADMIN — VANCOMYCIN HYDROCHLORIDE 1000 MG: 1 INJECTION, SOLUTION INTRAVENOUS at 00:00

## 2024-01-01 RX ADMIN — IPRATROPIUM BROMIDE AND ALBUTEROL 1 PUFF: 20; 100 SPRAY, METERED RESPIRATORY (INHALATION) at 13:08

## 2024-01-01 RX ADMIN — FERROUS SULFATE TAB 325 MG (65 MG ELEMENTAL FE) 325 MG: 325 (65 FE) TAB at 08:10

## 2024-01-01 RX ADMIN — THIAMINE HYDROCHLORIDE 200 MG: 100 INJECTION, SOLUTION INTRAMUSCULAR; INTRAVENOUS at 15:41

## 2024-01-01 RX ADMIN — FOLIC ACID 1 MG: 5 INJECTION, SOLUTION INTRAMUSCULAR; INTRAVENOUS; SUBCUTANEOUS at 10:53

## 2024-01-01 RX ADMIN — DEXMEDETOMIDINE HYDROCHLORIDE 1 MCG/KG/HR: 400 INJECTION INTRAVENOUS at 15:26

## 2024-01-01 RX ADMIN — HYDROMORPHONE HYDROCHLORIDE 0.2 MG: 0.2 INJECTION, SOLUTION INTRAMUSCULAR; INTRAVENOUS; SUBCUTANEOUS at 13:36

## 2024-01-01 RX ADMIN — IPRATROPIUM BROMIDE AND ALBUTEROL SULFATE 3 ML: .5; 3 SOLUTION RESPIRATORY (INHALATION) at 19:04

## 2024-01-01 RX ADMIN — HEPARIN SODIUM 7500 UNITS: 10000 INJECTION, SOLUTION INTRAVENOUS; SUBCUTANEOUS at 14:07

## 2024-01-01 RX ADMIN — ATORVASTATIN CALCIUM 10 MG: 10 TABLET, FILM COATED ORAL at 22:10

## 2024-01-01 RX ADMIN — SODIUM CHLORIDE 74 ML: 9 INJECTION, SOLUTION INTRAVENOUS at 05:07

## 2024-01-01 RX ADMIN — HYDROMORPHONE HYDROCHLORIDE 0.2 MG: 0.2 INJECTION, SOLUTION INTRAMUSCULAR; INTRAVENOUS; SUBCUTANEOUS at 05:49

## 2024-01-01 RX ADMIN — ATENOLOL 100 MG: 25 TABLET ORAL at 09:52

## 2024-01-01 RX ADMIN — ASPIRIN 81 MG: 81 TABLET, COATED ORAL at 09:22

## 2024-01-01 RX ADMIN — LORAZEPAM 2 MG: 2 INJECTION INTRAMUSCULAR; INTRAVENOUS at 06:51

## 2024-01-01 RX ADMIN — ASPIRIN 81 MG: 81 TABLET, COATED ORAL at 20:11

## 2024-01-01 RX ADMIN — AMLODIPINE BESYLATE 10 MG: 10 TABLET ORAL at 08:10

## 2024-01-01 RX ADMIN — FINASTERIDE 5 MG: 5 TABLET, FILM COATED ORAL at 12:09

## 2024-01-01 RX ADMIN — HYDROMORPHONE HYDROCHLORIDE 0.2 MG: 0.2 INJECTION, SOLUTION INTRAMUSCULAR; INTRAVENOUS; SUBCUTANEOUS at 19:00

## 2024-01-01 RX ADMIN — QUETIAPINE FUMARATE 50 MG: 50 TABLET ORAL at 18:29

## 2024-01-01 RX ADMIN — AMLODIPINE BESYLATE 10 MG: 10 TABLET ORAL at 08:05

## 2024-01-01 RX ADMIN — MAGNESIUM HYDROXIDE 30 ML: 400 SUSPENSION ORAL at 01:24

## 2024-01-01 RX ADMIN — FUROSEMIDE 40 MG: 10 INJECTION, SOLUTION INTRAMUSCULAR; INTRAVENOUS at 15:40

## 2024-01-01 RX ADMIN — NICARDIPINE HYDROCHLORIDE 15 MG/HR: 0.2 INJECTION INTRAVENOUS at 13:32

## 2024-01-01 RX ADMIN — ATENOLOL 50 MG: 50 TABLET ORAL at 20:59

## 2024-01-01 RX ADMIN — Medication 1 TABLET: at 08:10

## 2024-01-01 RX ADMIN — DEXMEDETOMIDINE HYDROCHLORIDE 0.9 MCG/KG/HR: 400 INJECTION INTRAVENOUS at 08:58

## 2024-01-01 RX ADMIN — THIAMINE HCL TAB 100 MG 100 MG: 100 TAB at 09:22

## 2024-01-01 RX ADMIN — LORAZEPAM 2 MG: 2 INJECTION INTRAMUSCULAR; INTRAVENOUS at 22:00

## 2024-01-01 RX ADMIN — DEXMEDETOMIDINE HYDROCHLORIDE 1.2 MCG/KG/HR: 400 INJECTION INTRAVENOUS at 05:34

## 2024-01-01 RX ADMIN — THIAMINE HCL TAB 100 MG 100 MG: 100 TAB at 22:12

## 2024-01-01 RX ADMIN — PIPERACILLIN AND TAZOBACTAM 3.38 G: 3; .375 INJECTION, POWDER, FOR SOLUTION INTRAVENOUS at 09:16

## 2024-01-01 RX ADMIN — CLONIDINE HYDROCHLORIDE 0.2 MG: 0.1 TABLET ORAL at 13:25

## 2024-01-01 RX ADMIN — METHOCARBAMOL 500 MG: 500 TABLET ORAL at 19:00

## 2024-01-01 RX ADMIN — DULOXETINE HYDROCHLORIDE 120 MG: 60 CAPSULE, DELAYED RELEASE ORAL at 11:56

## 2024-01-01 RX ADMIN — ACETAMINOPHEN 975 MG: 325 TABLET, FILM COATED ORAL at 12:09

## 2024-01-01 RX ADMIN — NICARDIPINE HYDROCHLORIDE 10 MG/HR: 0.2 INJECTION INTRAVENOUS at 11:57

## 2024-01-01 RX ADMIN — HYDROMORPHONE HYDROCHLORIDE 0.2 MG: 0.2 INJECTION, SOLUTION INTRAMUSCULAR; INTRAVENOUS; SUBCUTANEOUS at 22:59

## 2024-01-01 RX ADMIN — CLONIDINE HYDROCHLORIDE 0.2 MG: 0.2 TABLET ORAL at 22:59

## 2024-01-01 RX ADMIN — ACETAMINOPHEN 975 MG: 325 TABLET ORAL at 06:35

## 2024-01-01 RX ADMIN — TAMSULOSIN HYDROCHLORIDE 0.4 MG: 0.4 CAPSULE ORAL at 08:22

## 2024-01-01 RX ADMIN — ESMOLOL HYDROCHLORIDE IN SODIUM CHLORIDE 100 MCG/KG/MIN: 20 INJECTION INTRAVENOUS at 06:57

## 2024-01-01 RX ADMIN — HYDRALAZINE HYDROCHLORIDE 50 MG: 50 TABLET ORAL at 08:22

## 2024-01-01 RX ADMIN — MUPIROCIN: 20 OINTMENT TOPICAL at 21:35

## 2024-01-01 RX ADMIN — FUROSEMIDE 40 MG: 40 TABLET ORAL at 09:11

## 2024-01-01 RX ADMIN — SENNOSIDES AND DOCUSATE SODIUM 2 TABLET: 50; 8.6 TABLET ORAL at 08:10

## 2024-01-01 RX ADMIN — LORAZEPAM 2 MG: 2 INJECTION INTRAMUSCULAR; INTRAVENOUS at 08:20

## 2024-01-01 RX ADMIN — ASPIRIN 81 MG: 81 TABLET, COATED ORAL at 09:19

## 2024-01-01 RX ADMIN — DEXMEDETOMIDINE HYDROCHLORIDE 1.2 MCG/KG/HR: 400 INJECTION INTRAVENOUS at 02:55

## 2024-01-01 RX ADMIN — HYDRALAZINE HYDROCHLORIDE 50 MG: 50 TABLET ORAL at 22:11

## 2024-01-01 RX ADMIN — IOPAMIDOL 72 ML: 755 INJECTION, SOLUTION INTRAVENOUS at 09:58

## 2024-01-01 RX ADMIN — LORAZEPAM 2 MG: 2 INJECTION INTRAMUSCULAR; INTRAVENOUS at 03:14

## 2024-01-01 RX ADMIN — DEXMEDETOMIDINE HYDROCHLORIDE 1.2 MCG/KG/HR: 400 INJECTION INTRAVENOUS at 11:46

## 2024-01-01 RX ADMIN — POLYETHYLENE GLYCOL 3350 17 G: 17 POWDER, FOR SOLUTION ORAL at 19:00

## 2024-01-01 RX ADMIN — GABAPENTIN 600 MG: 600 TABLET, FILM COATED ORAL at 13:08

## 2024-01-01 RX ADMIN — ATENOLOL 50 MG: 50 TABLET ORAL at 08:01

## 2024-01-01 RX ADMIN — FINASTERIDE 5 MG: 5 TABLET, FILM COATED ORAL at 09:19

## 2024-01-01 RX ADMIN — LORAZEPAM 2 MG: 1 TABLET ORAL at 15:33

## 2024-01-01 RX ADMIN — DEXMEDETOMIDINE HYDROCHLORIDE 1 MCG/KG/HR: 400 INJECTION INTRAVENOUS at 21:26

## 2024-01-01 RX ADMIN — AMLODIPINE BESYLATE 10 MG: 10 TABLET ORAL at 18:29

## 2024-01-01 RX ADMIN — FINASTERIDE 5 MG: 5 TABLET, FILM COATED ORAL at 08:47

## 2024-01-01 RX ADMIN — HEPARIN SODIUM 7500 UNITS: 10000 INJECTION, SOLUTION INTRAVENOUS; SUBCUTANEOUS at 12:07

## 2024-01-01 RX ADMIN — LORAZEPAM 2 MG: 2 INJECTION INTRAMUSCULAR; INTRAVENOUS at 13:36

## 2024-01-01 RX ADMIN — ESMOLOL HYDROCHLORIDE IN SODIUM CHLORIDE 50 MCG/KG/MIN: 20 INJECTION INTRAVENOUS at 17:58

## 2024-01-01 RX ADMIN — HEPARIN SODIUM 5000 UNITS: 5000 INJECTION, SOLUTION INTRAVENOUS; SUBCUTANEOUS at 10:53

## 2024-01-01 RX ADMIN — QUETIAPINE FUMARATE 25 MG: 25 TABLET ORAL at 22:10

## 2024-01-01 RX ADMIN — THIAMINE HCL TAB 100 MG 100 MG: 100 TAB at 17:18

## 2024-01-01 RX ADMIN — NICARDIPINE HYDROCHLORIDE 15 MG/HR: 0.2 INJECTION INTRAVENOUS at 22:09

## 2024-01-01 RX ADMIN — CLONIDINE HYDROCHLORIDE 0.1 MG: 0.1 TABLET ORAL at 21:39

## 2024-01-01 RX ADMIN — AMLODIPINE BESYLATE 10 MG: 10 TABLET ORAL at 08:22

## 2024-01-01 RX ADMIN — CEFAZOLIN SODIUM 2 G: 1 INJECTION, POWDER, FOR SOLUTION INTRAMUSCULAR; INTRAVENOUS at 08:12

## 2024-01-01 RX ADMIN — HYDROMORPHONE HYDROCHLORIDE 0.2 MG: 0.2 INJECTION, SOLUTION INTRAMUSCULAR; INTRAVENOUS; SUBCUTANEOUS at 03:22

## 2024-01-01 RX ADMIN — NICARDIPINE HYDROCHLORIDE 12.5 MG/HR: 0.2 INJECTION INTRAVENOUS at 03:53

## 2024-01-01 RX ADMIN — FERROUS SULFATE TAB 325 MG (65 MG ELEMENTAL FE) 325 MG: 325 (65 FE) TAB at 08:15

## 2024-01-01 RX ADMIN — DEXMEDETOMIDINE HYDROCHLORIDE 0.8 MCG/KG/HR: 400 INJECTION INTRAVENOUS at 08:08

## 2024-01-01 RX ADMIN — HYDROMORPHONE HYDROCHLORIDE 0.2 MG: 0.2 INJECTION, SOLUTION INTRAMUSCULAR; INTRAVENOUS; SUBCUTANEOUS at 01:53

## 2024-01-01 RX ADMIN — SENNOSIDES AND DOCUSATE SODIUM 1 TABLET: 50; 8.6 TABLET ORAL at 21:02

## 2024-01-01 RX ADMIN — IPRATROPIUM BROMIDE AND ALBUTEROL SULFATE 3 ML: .5; 3 SOLUTION RESPIRATORY (INHALATION) at 07:56

## 2024-01-01 RX ADMIN — LORAZEPAM 2 MG: 2 INJECTION INTRAMUSCULAR; INTRAVENOUS at 00:58

## 2024-01-01 RX ADMIN — CIPROFLOXACIN 500 MG: 500 TABLET, FILM COATED ORAL at 08:01

## 2024-01-01 RX ADMIN — GABAPENTIN 1200 MG: 300 CAPSULE ORAL at 21:38

## 2024-01-01 RX ADMIN — FOLIC ACID 1 MG: 5 INJECTION, SOLUTION INTRAMUSCULAR; INTRAVENOUS; SUBCUTANEOUS at 12:03

## 2024-01-01 RX ADMIN — TRAMADOL HYDROCHLORIDE 50 MG: 50 TABLET, FILM COATED ORAL at 20:58

## 2024-01-01 RX ADMIN — MIDAZOLAM 1 MG: 1 INJECTION INTRAMUSCULAR; INTRAVENOUS at 11:00

## 2024-01-01 RX ADMIN — QUETIAPINE FUMARATE 25 MG: 25 TABLET ORAL at 21:01

## 2024-01-01 RX ADMIN — IPRATROPIUM BROMIDE AND ALBUTEROL SULFATE 3 ML: .5; 3 SOLUTION RESPIRATORY (INHALATION) at 07:01

## 2024-01-01 RX ADMIN — OXYCODONE HYDROCHLORIDE 5 MG: 5 TABLET ORAL at 10:12

## 2024-01-01 RX ADMIN — DOXYCYCLINE HYCLATE 100 MG: 100 CAPSULE ORAL at 08:01

## 2024-01-01 RX ADMIN — NICARDIPINE HYDROCHLORIDE 2.5 MG/HR: 0.2 INJECTION INTRAVENOUS at 10:42

## 2024-01-01 RX ADMIN — DOXAZOSIN 2 MG: 2 TABLET ORAL at 21:02

## 2024-01-01 RX ADMIN — DEXMEDETOMIDINE HYDROCHLORIDE 1 MCG/KG/HR: 400 INJECTION INTRAVENOUS at 18:25

## 2024-01-01 RX ADMIN — SENNOSIDES AND DOCUSATE SODIUM 2 TABLET: 50; 8.6 TABLET ORAL at 08:23

## 2024-01-01 RX ADMIN — NICARDIPINE HYDROCHLORIDE 15 MG/HR: 0.2 INJECTION INTRAVENOUS at 15:27

## 2024-01-01 RX ADMIN — SENNOSIDES AND DOCUSATE SODIUM 1 TABLET: 50; 8.6 TABLET ORAL at 21:01

## 2024-01-01 RX ADMIN — QUETIAPINE FUMARATE 25 MG: 25 TABLET ORAL at 08:04

## 2024-01-01 RX ADMIN — DEXMEDETOMIDINE HYDROCHLORIDE 0.2 MCG/KG/HR: 400 INJECTION INTRAVENOUS at 15:03

## 2024-01-01 RX ADMIN — FERROUS SULFATE TAB 325 MG (65 MG ELEMENTAL FE) 325 MG: 325 (65 FE) TAB at 09:29

## 2024-01-01 RX ADMIN — Medication 40 MG: at 06:57

## 2024-01-01 RX ADMIN — ESMOLOL HYDROCHLORIDE IN SODIUM CHLORIDE 100 MCG/KG/MIN: 20 INJECTION INTRAVENOUS at 04:57

## 2024-01-01 RX ADMIN — IPRATROPIUM BROMIDE AND ALBUTEROL SULFATE 3 ML: .5; 3 SOLUTION RESPIRATORY (INHALATION) at 11:41

## 2024-01-01 RX ADMIN — NICARDIPINE HYDROCHLORIDE 15 MG/HR: 0.2 INJECTION INTRAVENOUS at 14:15

## 2024-01-01 RX ADMIN — QUETIAPINE FUMARATE 25 MG: 25 TABLET ORAL at 08:22

## 2024-01-01 RX ADMIN — POLYETHYLENE GLYCOL 3350 17 G: 17 POWDER, FOR SOLUTION ORAL at 18:29

## 2024-01-01 RX ADMIN — PIPERACILLIN AND TAZOBACTAM 3.38 G: 3; .375 INJECTION, POWDER, FOR SOLUTION INTRAVENOUS at 10:17

## 2024-01-01 RX ADMIN — HYDRALAZINE HYDROCHLORIDE 25 MG: 25 TABLET ORAL at 17:18

## 2024-01-01 RX ADMIN — NICARDIPINE HYDROCHLORIDE 12.5 MG/HR: 0.2 INJECTION INTRAVENOUS at 17:37

## 2024-01-01 RX ADMIN — FUROSEMIDE 40 MG: 40 TABLET ORAL at 18:29

## 2024-01-01 RX ADMIN — HEPARIN SODIUM 5000 UNITS: 5000 INJECTION, SOLUTION INTRAVENOUS; SUBCUTANEOUS at 23:43

## 2024-01-01 RX ADMIN — ACETAMINOPHEN 975 MG: 325 TABLET, FILM COATED ORAL at 10:26

## 2024-01-01 RX ADMIN — ESMOLOL HYDROCHLORIDE IN SODIUM CHLORIDE 100 MCG/KG/MIN: 20 INJECTION INTRAVENOUS at 14:31

## 2024-01-01 RX ADMIN — DULOXETINE HYDROCHLORIDE 120 MG: 60 CAPSULE, DELAYED RELEASE ORAL at 18:29

## 2024-01-01 RX ADMIN — IPRATROPIUM BROMIDE AND ALBUTEROL SULFATE 3 ML: .5; 3 SOLUTION RESPIRATORY (INHALATION) at 11:22

## 2024-01-01 RX ADMIN — PIPERACILLIN AND TAZOBACTAM 3.38 G: 3; .375 INJECTION, POWDER, FOR SOLUTION INTRAVENOUS at 04:32

## 2024-01-01 RX ADMIN — DEXMEDETOMIDINE HYDROCHLORIDE 0.9 MCG/KG/HR: 400 INJECTION INTRAVENOUS at 06:40

## 2024-01-01 RX ADMIN — FINASTERIDE 5 MG: 5 TABLET, FILM COATED ORAL at 10:35

## 2024-01-01 RX ADMIN — Medication 1 TABLET: at 08:14

## 2024-01-01 RX ADMIN — DEXMEDETOMIDINE HYDROCHLORIDE 0.2 MCG/KG/HR: 400 INJECTION INTRAVENOUS at 12:32

## 2024-01-01 RX ADMIN — AMLODIPINE BESYLATE 10 MG: 10 TABLET ORAL at 12:07

## 2024-01-01 RX ADMIN — ROCURONIUM BROMIDE 100 MG: 50 INJECTION, SOLUTION INTRAVENOUS at 12:00

## 2024-01-01 RX ADMIN — PROPOFOL 20 MG: 10 INJECTION, EMULSION INTRAVENOUS at 08:11

## 2024-01-01 RX ADMIN — SENNOSIDES AND DOCUSATE SODIUM 2 TABLET: 50; 8.6 TABLET ORAL at 20:11

## 2024-01-01 RX ADMIN — ACETAMINOPHEN 650 MG: 325 TABLET, FILM COATED ORAL at 09:29

## 2024-01-01 RX ADMIN — ACETAMINOPHEN 975 MG: 325 TABLET, FILM COATED ORAL at 11:10

## 2024-01-01 RX ADMIN — ACETAMINOPHEN 975 MG: 325 TABLET, FILM COATED ORAL at 03:40

## 2024-01-01 RX ADMIN — ATENOLOL 25 MG: 25 TABLET ORAL at 20:37

## 2024-01-01 RX ADMIN — PIPERACILLIN AND TAZOBACTAM 3.38 G: 3; .375 INJECTION, POWDER, FOR SOLUTION INTRAVENOUS at 15:40

## 2024-01-01 RX ADMIN — LIDOCAINE HYDROCHLORIDE 80 MG: 20 INJECTION, SOLUTION INFILTRATION; PERINEURAL at 08:02

## 2024-01-01 RX ADMIN — ATENOLOL 50 MG: 50 TABLET ORAL at 12:07

## 2024-01-01 RX ADMIN — Medication 50 MCG: at 08:11

## 2024-01-01 RX ADMIN — PANTOPRAZOLE SODIUM 40 MG: 40 TABLET, DELAYED RELEASE ORAL at 12:07

## 2024-01-01 RX ADMIN — GABAPENTIN 1200 MG: 300 CAPSULE ORAL at 22:11

## 2024-01-01 RX ADMIN — FINASTERIDE 5 MG: 5 TABLET, FILM COATED ORAL at 18:29

## 2024-01-01 RX ADMIN — SODIUM CHLORIDE, POTASSIUM CHLORIDE, SODIUM LACTATE AND CALCIUM CHLORIDE: 600; 310; 30; 20 INJECTION, SOLUTION INTRAVENOUS at 15:23

## 2024-01-01 RX ADMIN — MIDAZOLAM 1 MG: 1 INJECTION INTRAMUSCULAR; INTRAVENOUS at 09:44

## 2024-01-01 RX ADMIN — ACETAMINOPHEN 975 MG: 325 SUSPENSION ORAL at 04:14

## 2024-01-01 RX ADMIN — Medication 50 MCG: at 09:21

## 2024-01-01 RX ADMIN — FENTANYL CITRATE 50 MCG: 50 INJECTION INTRAMUSCULAR; INTRAVENOUS at 11:52

## 2024-01-01 RX ADMIN — NICARDIPINE HYDROCHLORIDE 15 MG/HR: 0.2 INJECTION INTRAVENOUS at 09:37

## 2024-01-01 RX ADMIN — DEXMEDETOMIDINE HYDROCHLORIDE 1.2 MCG/KG/HR: 400 INJECTION INTRAVENOUS at 05:32

## 2024-01-01 RX ADMIN — ALLOPURINOL 300 MG: 300 TABLET ORAL at 12:08

## 2024-01-01 RX ADMIN — IPRATROPIUM BROMIDE AND ALBUTEROL SULFATE 3 ML: .5; 3 SOLUTION RESPIRATORY (INHALATION) at 10:14

## 2024-01-01 RX ADMIN — LORAZEPAM 2 MG: 2 INJECTION INTRAMUSCULAR; INTRAVENOUS at 06:49

## 2024-01-01 RX ADMIN — ACETAMINOPHEN 975 MG: 325 TABLET, FILM COATED ORAL at 03:15

## 2024-01-01 RX ADMIN — ALLOPURINOL 300 MG: 300 TABLET ORAL at 08:22

## 2024-01-01 RX ADMIN — SODIUM CHLORIDE 1000 ML: 9 INJECTION, SOLUTION INTRAVENOUS at 21:45

## 2024-01-01 RX ADMIN — PIPERACILLIN AND TAZOBACTAM 3.38 G: 3; .375 INJECTION, POWDER, FOR SOLUTION INTRAVENOUS at 17:12

## 2024-01-01 RX ADMIN — HYDROMORPHONE HYDROCHLORIDE 0.2 MG: 0.2 INJECTION, SOLUTION INTRAMUSCULAR; INTRAVENOUS; SUBCUTANEOUS at 16:52

## 2024-01-01 RX ADMIN — GABAPENTIN 600 MG: 300 CAPSULE ORAL at 12:08

## 2024-01-01 RX ADMIN — PIPERACILLIN AND TAZOBACTAM 3.38 G: 3; .375 INJECTION, POWDER, FOR SOLUTION INTRAVENOUS at 09:15

## 2024-01-01 RX ADMIN — HYDRALAZINE HYDROCHLORIDE 25 MG: 25 TABLET ORAL at 15:40

## 2024-01-01 RX ADMIN — ACETAMINOPHEN 975 MG: 325 TABLET, FILM COATED ORAL at 21:39

## 2024-01-01 RX ADMIN — DEXMEDETOMIDINE HYDROCHLORIDE 1.2 MCG/KG/HR: 400 INJECTION INTRAVENOUS at 15:46

## 2024-01-01 RX ADMIN — HYDROMORPHONE HYDROCHLORIDE 0.2 MG: 0.2 INJECTION, SOLUTION INTRAMUSCULAR; INTRAVENOUS; SUBCUTANEOUS at 14:06

## 2024-01-01 RX ADMIN — SENNOSIDES AND DOCUSATE SODIUM 1 TABLET: 50; 8.6 TABLET ORAL at 09:21

## 2024-01-01 RX ADMIN — ATENOLOL 50 MG: 50 TABLET ORAL at 08:17

## 2024-01-01 RX ADMIN — HYDROMORPHONE HYDROCHLORIDE 0.2 MG: 0.2 INJECTION, SOLUTION INTRAMUSCULAR; INTRAVENOUS; SUBCUTANEOUS at 13:50

## 2024-01-01 RX ADMIN — LORAZEPAM 2 MG: 2 INJECTION INTRAMUSCULAR; INTRAVENOUS at 17:04

## 2024-01-01 RX ADMIN — ENOXAPARIN SODIUM 40 MG: 100 INJECTION SUBCUTANEOUS at 02:20

## 2024-01-01 RX ADMIN — HEPARIN SODIUM 7500 UNITS: 10000 INJECTION, SOLUTION INTRAVENOUS; SUBCUTANEOUS at 03:40

## 2024-01-01 RX ADMIN — LORAZEPAM 2 MG: 2 INJECTION INTRAMUSCULAR; INTRAVENOUS at 02:09

## 2024-01-01 RX ADMIN — ASPIRIN 81 MG: 81 TABLET, COATED ORAL at 08:15

## 2024-01-01 RX ADMIN — Medication 40 MG: at 09:01

## 2024-01-01 RX ADMIN — Medication 40 MG: at 08:21

## 2024-01-01 RX ADMIN — CLONIDINE HYDROCHLORIDE 0.2 MG: 0.1 TABLET ORAL at 22:10

## 2024-01-01 RX ADMIN — ESMOLOL HYDROCHLORIDE IN SODIUM CHLORIDE 75 MCG/KG/MIN: 20 INJECTION INTRAVENOUS at 14:35

## 2024-01-01 RX ADMIN — ACETAMINOPHEN 975 MG: 325 TABLET, FILM COATED ORAL at 20:37

## 2024-01-01 RX ADMIN — PANTOPRAZOLE SODIUM 40 MG: 40 TABLET, DELAYED RELEASE ORAL at 08:11

## 2024-01-01 RX ADMIN — CLONIDINE HYDROCHLORIDE 0.1 MG: 0.1 TABLET ORAL at 06:37

## 2024-01-01 RX ADMIN — DULOXETINE HYDROCHLORIDE 120 MG: 60 CAPSULE, DELAYED RELEASE ORAL at 09:21

## 2024-01-01 RX ADMIN — NICARDIPINE HYDROCHLORIDE 7.5 MG/HR: 0.2 INJECTION INTRAVENOUS at 13:50

## 2024-01-01 RX ADMIN — FUROSEMIDE 40 MG: 40 TABLET ORAL at 12:07

## 2024-01-01 RX ADMIN — ESMOLOL HYDROCHLORIDE IN SODIUM CHLORIDE 50 MCG/KG/MIN: 20 INJECTION INTRAVENOUS at 10:37

## 2024-01-01 RX ADMIN — DEXAMETHASONE SODIUM PHOSPHATE 10 MG: 4 INJECTION, SOLUTION INTRA-ARTICULAR; INTRALESIONAL; INTRAMUSCULAR; INTRAVENOUS; SOFT TISSUE at 12:00

## 2024-01-01 RX ADMIN — ACETAMINOPHEN 975 MG: 325 SUSPENSION ORAL at 20:28

## 2024-01-01 RX ADMIN — HYDRALAZINE HYDROCHLORIDE 50 MG: 25 TABLET ORAL at 22:11

## 2024-01-01 RX ADMIN — MUPIROCIN: 20 OINTMENT TOPICAL at 20:56

## 2024-01-01 RX ADMIN — HALOPERIDOL LACTATE 5 MG: 5 INJECTION, SOLUTION INTRAMUSCULAR at 11:43

## 2024-01-01 RX ADMIN — LORAZEPAM 2 MG: 2 INJECTION INTRAMUSCULAR; INTRAVENOUS at 21:25

## 2024-01-01 RX ADMIN — ALLOPURINOL 300 MG: 300 TABLET ORAL at 09:15

## 2024-01-01 RX ADMIN — NICARDIPINE HYDROCHLORIDE 5 MG/HR: 0.2 INJECTION INTRAVENOUS at 08:03

## 2024-01-01 RX ADMIN — ATENOLOL 50 MG: 50 TABLET ORAL at 08:50

## 2024-01-01 RX ADMIN — Medication 1 CAPSULE: at 08:02

## 2024-01-01 RX ADMIN — FOLIC ACID 1 MG: 1 TABLET ORAL at 08:10

## 2024-01-01 RX ADMIN — ALLOPURINOL 300 MG: 300 TABLET ORAL at 08:17

## 2024-01-01 RX ADMIN — IPRATROPIUM BROMIDE AND ALBUTEROL SULFATE 3 ML: .5; 3 SOLUTION RESPIRATORY (INHALATION) at 07:33

## 2024-01-01 RX ADMIN — LORAZEPAM 2 MG: 2 INJECTION INTRAMUSCULAR; INTRAVENOUS at 10:09

## 2024-01-01 RX ADMIN — SENNOSIDES AND DOCUSATE SODIUM 1 TABLET: 50; 8.6 TABLET ORAL at 08:15

## 2024-01-01 RX ADMIN — GABAPENTIN 600 MG: 300 CAPSULE ORAL at 08:14

## 2024-01-01 RX ADMIN — SODIUM CHLORIDE, POTASSIUM CHLORIDE, SODIUM LACTATE AND CALCIUM CHLORIDE: 600; 310; 30; 20 INJECTION, SOLUTION INTRAVENOUS at 10:26

## 2024-01-01 RX ADMIN — FOLIC ACID 1 MG: 5 INJECTION, SOLUTION INTRAMUSCULAR; INTRAVENOUS; SUBCUTANEOUS at 18:16

## 2024-01-01 RX ADMIN — DIPHENHYDRAMINE HYDROCHLORIDE 12.5 MG: 25 SOLUTION ORAL at 00:49

## 2024-01-01 RX ADMIN — SENNOSIDES AND DOCUSATE SODIUM 1 TABLET: 50; 8.6 TABLET ORAL at 08:17

## 2024-01-01 RX ADMIN — POTASSIUM CHLORIDE 20 MEQ: 20 SOLUTION ORAL at 08:22

## 2024-01-01 RX ADMIN — ACETAMINOPHEN 975 MG: 325 TABLET, FILM COATED ORAL at 10:09

## 2024-01-01 RX ADMIN — QUETIAPINE FUMARATE 25 MG: 25 TABLET ORAL at 21:25

## 2024-01-01 RX ADMIN — ACETAMINOPHEN 975 MG: 325 TABLET, FILM COATED ORAL at 11:02

## 2024-01-01 RX ADMIN — PIPERACILLIN AND TAZOBACTAM 3.38 G: 3; .375 INJECTION, POWDER, FOR SOLUTION INTRAVENOUS at 22:18

## 2024-01-01 RX ADMIN — FENTANYL CITRATE 25 MCG: 50 INJECTION, SOLUTION INTRAMUSCULAR; INTRAVENOUS at 08:03

## 2024-01-01 RX ADMIN — FINASTERIDE 5 MG: 5 TABLET, FILM COATED ORAL at 08:42

## 2024-01-01 RX ADMIN — FENTANYL CITRATE 25 MCG: 50 INJECTION INTRAMUSCULAR; INTRAVENOUS at 13:58

## 2024-01-01 RX ADMIN — PANTOPRAZOLE SODIUM 40 MG: 40 TABLET, DELAYED RELEASE ORAL at 04:49

## 2024-01-01 RX ADMIN — LORAZEPAM 2 MG: 2 INJECTION INTRAMUSCULAR; INTRAVENOUS at 08:48

## 2024-01-01 RX ADMIN — KETAMINE HYDROCHLORIDE 50 MG: 10 INJECTION INTRAMUSCULAR; INTRAVENOUS at 12:00

## 2024-01-01 RX ADMIN — IPRATROPIUM BROMIDE AND ALBUTEROL SULFATE 3 ML: .5; 3 SOLUTION RESPIRATORY (INHALATION) at 15:25

## 2024-01-01 RX ADMIN — POTASSIUM CHLORIDE 40 MEQ: 20 SOLUTION ORAL at 20:57

## 2024-01-01 RX ADMIN — TAMSULOSIN HYDROCHLORIDE 0.4 MG: 0.4 CAPSULE ORAL at 08:10

## 2024-01-01 RX ADMIN — POTASSIUM CHLORIDE 20 MEQ: 1.5 FOR SOLUTION ORAL at 07:59

## 2024-01-01 RX ADMIN — BUMETANIDE 2 MG: 0.25 INJECTION INTRAMUSCULAR; INTRAVENOUS at 00:57

## 2024-01-01 RX ADMIN — ESMOLOL HYDROCHLORIDE IN SODIUM CHLORIDE 100 MCG/KG/MIN: 20 INJECTION INTRAVENOUS at 19:32

## 2024-01-01 RX ADMIN — CLONIDINE HYDROCHLORIDE 0.2 MG: 0.1 TABLET ORAL at 14:07

## 2024-01-01 RX ADMIN — Medication 50 MCG: at 08:06

## 2024-01-01 RX ADMIN — MUPIROCIN: 20 OINTMENT TOPICAL at 22:11

## 2024-01-01 RX ADMIN — THIAMINE HCL TAB 100 MG 100 MG: 100 TAB at 21:01

## 2024-01-01 RX ADMIN — SENNOSIDES AND DOCUSATE SODIUM 1 TABLET: 50; 8.6 TABLET ORAL at 20:37

## 2024-01-01 RX ADMIN — ASPIRIN 81 MG: 81 TABLET, COATED ORAL at 08:10

## 2024-01-01 RX ADMIN — ATENOLOL 100 MG: 25 TABLET ORAL at 20:27

## 2024-01-01 RX ADMIN — PIPERACILLIN AND TAZOBACTAM 3.38 G: 3; .375 INJECTION, POWDER, FOR SOLUTION INTRAVENOUS at 04:10

## 2024-01-01 RX ADMIN — GABAPENTIN 300 MG: 250 SOLUTION ORAL at 08:47

## 2024-01-01 RX ADMIN — HEPARIN SODIUM 7500 UNITS: 10000 INJECTION, SOLUTION INTRAVENOUS; SUBCUTANEOUS at 20:56

## 2024-01-01 RX ADMIN — HEPARIN SODIUM 5000 UNITS: 5000 INJECTION, SOLUTION INTRAVENOUS; SUBCUTANEOUS at 17:20

## 2024-01-01 RX ADMIN — DEXMEDETOMIDINE HYDROCHLORIDE 0.8 MCG/KG/HR: 400 INJECTION INTRAVENOUS at 11:58

## 2024-01-01 RX ADMIN — POTASSIUM CHLORIDE 20 MEQ: 20 SOLUTION ORAL at 06:57

## 2024-01-01 RX ADMIN — GABAPENTIN 600 MG: 300 CAPSULE ORAL at 09:16

## 2024-01-01 RX ADMIN — ASPIRIN 81 MG: 81 TABLET, COATED ORAL at 21:39

## 2024-01-01 RX ADMIN — HYDRALAZINE HYDROCHLORIDE 10 MG: 20 INJECTION INTRAMUSCULAR; INTRAVENOUS at 13:47

## 2024-01-01 RX ADMIN — DULOXETINE HYDROCHLORIDE 120 MG: 60 CAPSULE, DELAYED RELEASE ORAL at 08:22

## 2024-01-01 RX ADMIN — IPRATROPIUM BROMIDE AND ALBUTEROL SULFATE 3 ML: .5; 3 SOLUTION RESPIRATORY (INHALATION) at 19:02

## 2024-01-01 RX ADMIN — DULOXETINE HYDROCHLORIDE 120 MG: 60 CAPSULE, DELAYED RELEASE ORAL at 08:10

## 2024-01-01 RX ADMIN — CLONIDINE HYDROCHLORIDE 0.1 MG: 0.1 TABLET ORAL at 14:19

## 2024-01-01 RX ADMIN — IPRATROPIUM BROMIDE AND ALBUTEROL SULFATE 3 ML: .5; 3 SOLUTION RESPIRATORY (INHALATION) at 07:58

## 2024-01-01 RX ADMIN — HEPARIN SODIUM 7500 UNITS: 10000 INJECTION, SOLUTION INTRAVENOUS; SUBCUTANEOUS at 12:42

## 2024-01-01 RX ADMIN — Medication 60 ML: at 08:23

## 2024-01-01 RX ADMIN — PROPOFOL 50 MCG/KG/MIN: 10 INJECTION, EMULSION INTRAVENOUS at 12:18

## 2024-01-01 RX ADMIN — HYDROMORPHONE HYDROCHLORIDE 0.2 MG: 0.2 INJECTION, SOLUTION INTRAMUSCULAR; INTRAVENOUS; SUBCUTANEOUS at 21:09

## 2024-01-01 RX ADMIN — PIPERACILLIN AND TAZOBACTAM 3.38 G: 3; .375 INJECTION, POWDER, FOR SOLUTION INTRAVENOUS at 22:10

## 2024-01-01 RX ADMIN — ESMOLOL HYDROCHLORIDE IN SODIUM CHLORIDE 100 MCG/KG/MIN: 20 INJECTION INTRAVENOUS at 03:17

## 2024-01-01 RX ADMIN — HEPARIN SODIUM 7500 UNITS: 10000 INJECTION, SOLUTION INTRAVENOUS; SUBCUTANEOUS at 21:01

## 2024-01-01 RX ADMIN — IPRATROPIUM BROMIDE AND ALBUTEROL SULFATE 3 ML: .5; 3 SOLUTION RESPIRATORY (INHALATION) at 12:04

## 2024-01-01 RX ADMIN — HYDROMORPHONE HYDROCHLORIDE 0.2 MG: 0.2 INJECTION, SOLUTION INTRAMUSCULAR; INTRAVENOUS; SUBCUTANEOUS at 15:43

## 2024-01-01 RX ADMIN — LORAZEPAM 2 MG: 2 INJECTION INTRAMUSCULAR; INTRAVENOUS at 03:45

## 2024-01-01 RX ADMIN — GABAPENTIN 300 MG: 300 CAPSULE ORAL at 10:09

## 2024-01-01 RX ADMIN — TAMSULOSIN HYDROCHLORIDE 0.4 MG: 0.4 CAPSULE ORAL at 12:08

## 2024-01-01 RX ADMIN — HYDRALAZINE HYDROCHLORIDE 25 MG: 25 TABLET ORAL at 22:59

## 2024-01-01 RX ADMIN — DULOXETINE HYDROCHLORIDE 120 MG: 60 CAPSULE, DELAYED RELEASE ORAL at 08:06

## 2024-01-01 RX ADMIN — LORAZEPAM 2 MG: 2 INJECTION INTRAMUSCULAR; INTRAVENOUS at 19:24

## 2024-01-01 RX ADMIN — ACETAMINOPHEN 975 MG: 325 TABLET, FILM COATED ORAL at 03:08

## 2024-01-01 RX ADMIN — ESMOLOL HYDROCHLORIDE IN SODIUM CHLORIDE 50 MCG/KG/MIN: 20 INJECTION INTRAVENOUS at 06:03

## 2024-01-01 RX ADMIN — HYDRALAZINE HYDROCHLORIDE 10 MG: 20 INJECTION INTRAMUSCULAR; INTRAVENOUS at 11:12

## 2024-01-01 RX ADMIN — ALLOPURINOL 300 MG: 300 TABLET ORAL at 10:35

## 2024-01-01 RX ADMIN — ESMOLOL HYDROCHLORIDE IN SODIUM CHLORIDE 100 MCG/KG/MIN: 20 INJECTION INTRAVENOUS at 09:56

## 2024-01-01 RX ADMIN — DEXMEDETOMIDINE HYDROCHLORIDE 1.2 MCG/KG/HR: 400 INJECTION INTRAVENOUS at 08:09

## 2024-01-01 RX ADMIN — THIAMINE HCL TAB 100 MG 100 MG: 100 TAB at 08:05

## 2024-01-01 RX ADMIN — DEXMEDETOMIDINE HYDROCHLORIDE 1 MCG/KG/HR: 400 INJECTION INTRAVENOUS at 00:08

## 2024-01-01 RX ADMIN — HYDRALAZINE HYDROCHLORIDE 50 MG: 25 TABLET ORAL at 09:19

## 2024-01-01 RX ADMIN — NICARDIPINE HYDROCHLORIDE 15 MG/HR: 0.2 INJECTION INTRAVENOUS at 21:26

## 2024-01-01 RX ADMIN — NICARDIPINE HYDROCHLORIDE 7.5 MG/HR: 0.2 INJECTION INTRAVENOUS at 07:51

## 2024-01-01 RX ADMIN — ESMOLOL HYDROCHLORIDE IN SODIUM CHLORIDE 100 MCG/KG/MIN: 20 INJECTION INTRAVENOUS at 12:42

## 2024-01-01 RX ADMIN — HEPARIN SODIUM 7500 UNITS: 10000 INJECTION, SOLUTION INTRAVENOUS; SUBCUTANEOUS at 14:10

## 2024-01-01 RX ADMIN — HYDRALAZINE HYDROCHLORIDE 10 MG: 20 INJECTION INTRAMUSCULAR; INTRAVENOUS at 01:36

## 2024-01-01 RX ADMIN — HYDROMORPHONE HYDROCHLORIDE 0.2 MG: 0.2 INJECTION, SOLUTION INTRAMUSCULAR; INTRAVENOUS; SUBCUTANEOUS at 11:54

## 2024-01-01 RX ADMIN — GABAPENTIN 300 MG: 250 SOLUTION ORAL at 11:55

## 2024-01-01 RX ADMIN — ONDANSETRON 4 MG: 2 INJECTION INTRAMUSCULAR; INTRAVENOUS at 12:00

## 2024-01-01 RX ADMIN — THIAMINE HCL TAB 100 MG 100 MG: 100 TAB at 22:10

## 2024-01-01 RX ADMIN — HYDROMORPHONE HYDROCHLORIDE 0.2 MG: 0.2 INJECTION, SOLUTION INTRAMUSCULAR; INTRAVENOUS; SUBCUTANEOUS at 08:29

## 2024-01-01 RX ADMIN — TRANEXAMIC ACID 1950 MG: 650 TABLET ORAL at 10:09

## 2024-01-01 RX ADMIN — Medication 0.2 MG/HR: at 10:57

## 2024-01-01 RX ADMIN — ASPIRIN 81 MG: 81 TABLET, COATED ORAL at 21:02

## 2024-01-01 RX ADMIN — HALOPERIDOL LACTATE 5 MG: 5 INJECTION, SOLUTION INTRAMUSCULAR at 01:54

## 2024-01-01 RX ADMIN — HYDROMORPHONE HYDROCHLORIDE 0.2 MG: 0.2 INJECTION, SOLUTION INTRAMUSCULAR; INTRAVENOUS; SUBCUTANEOUS at 12:55

## 2024-01-01 RX ADMIN — AMLODIPINE BESYLATE 10 MG: 10 TABLET ORAL at 09:19

## 2024-01-01 RX ADMIN — LORAZEPAM 2 MG: 2 INJECTION INTRAMUSCULAR; INTRAVENOUS at 15:34

## 2024-01-01 RX ADMIN — HYDROMORPHONE HYDROCHLORIDE 0.2 MG: 0.2 INJECTION, SOLUTION INTRAMUSCULAR; INTRAVENOUS; SUBCUTANEOUS at 18:17

## 2024-01-01 RX ADMIN — PANTOPRAZOLE SODIUM 40 MG: 40 TABLET, DELAYED RELEASE ORAL at 18:29

## 2024-01-01 RX ADMIN — HEPARIN SODIUM 7500 UNITS: 10000 INJECTION, SOLUTION INTRAVENOUS; SUBCUTANEOUS at 04:33

## 2024-01-01 RX ADMIN — OXYCODONE HYDROCHLORIDE 10 MG: 5 TABLET ORAL at 23:10

## 2024-01-01 RX ADMIN — NICARDIPINE HYDROCHLORIDE 15 MG/HR: 0.2 INJECTION INTRAVENOUS at 06:55

## 2024-01-01 RX ADMIN — IPRATROPIUM BROMIDE AND ALBUTEROL SULFATE 3 ML: .5; 3 SOLUTION RESPIRATORY (INHALATION) at 19:26

## 2024-01-01 RX ADMIN — DEXMEDETOMIDINE HYDROCHLORIDE 1 MCG/KG/HR: 400 INJECTION INTRAVENOUS at 20:56

## 2024-01-01 RX ADMIN — DOXAZOSIN 2 MG: 1 TABLET ORAL at 21:40

## 2024-01-01 RX ADMIN — THIAMINE HCL TAB 100 MG 100 MG: 100 TAB at 08:11

## 2024-01-01 RX ADMIN — NICARDIPINE HYDROCHLORIDE 10 MG/HR: 0.2 INJECTION INTRAVENOUS at 19:35

## 2024-01-01 RX ADMIN — BUPIVACAINE 10 ML: 13.3 INJECTION, SUSPENSION, LIPOSOMAL INFILTRATION at 11:15

## 2024-01-01 RX ADMIN — MUPIROCIN: 20 OINTMENT TOPICAL at 20:38

## 2024-01-01 RX ADMIN — LIDOCAINE HYDROCHLORIDE 5 ML: 10 INJECTION, SOLUTION EPIDURAL; INFILTRATION; INTRACAUDAL; PERINEURAL at 10:07

## 2024-01-01 RX ADMIN — ASPIRIN 81 MG: 81 TABLET, COATED ORAL at 08:17

## 2024-01-01 RX ADMIN — ACETAMINOPHEN 975 MG: 325 TABLET, FILM COATED ORAL at 20:11

## 2024-01-01 RX ADMIN — IPRATROPIUM BROMIDE AND ALBUTEROL SULFATE 3 ML: .5; 3 SOLUTION RESPIRATORY (INHALATION) at 15:59

## 2024-01-01 RX ADMIN — FUROSEMIDE 40 MG: 40 TABLET ORAL at 08:01

## 2024-01-01 RX ADMIN — HYDRALAZINE HYDROCHLORIDE 25 MG: 25 TABLET ORAL at 10:35

## 2024-01-01 RX ADMIN — SODIUM CHLORIDE, POTASSIUM CHLORIDE, SODIUM LACTATE AND CALCIUM CHLORIDE 1000 ML: 600; 310; 30; 20 INJECTION, SOLUTION INTRAVENOUS at 04:05

## 2024-01-01 RX ADMIN — LIDOCAINE HYDROCHLORIDE 0.1 ML: 10 INJECTION, SOLUTION EPIDURAL; INFILTRATION; INTRACAUDAL; PERINEURAL at 10:27

## 2024-01-01 RX ADMIN — SODIUM CHLORIDE, SODIUM LACTATE, POTASSIUM CHLORIDE, CALCIUM CHLORIDE: 600; 310; 30; 20 INJECTION, SOLUTION INTRAVENOUS at 06:35

## 2024-01-01 RX ADMIN — QUETIAPINE FUMARATE 25 MG: 25 TABLET ORAL at 15:33

## 2024-01-01 RX ADMIN — IPRATROPIUM BROMIDE AND ALBUTEROL SULFATE 3 ML: .5; 3 SOLUTION RESPIRATORY (INHALATION) at 18:49

## 2024-01-01 RX ADMIN — ESMOLOL HYDROCHLORIDE IN SODIUM CHLORIDE 100 MCG/KG/MIN: 20 INJECTION INTRAVENOUS at 17:03

## 2024-01-01 RX ADMIN — AMLODIPINE BESYLATE 10 MG: 10 TABLET ORAL at 09:18

## 2024-01-01 RX ADMIN — HYDROMORPHONE HYDROCHLORIDE 0.2 MG: 0.2 INJECTION, SOLUTION INTRAMUSCULAR; INTRAVENOUS; SUBCUTANEOUS at 07:51

## 2024-01-01 RX ADMIN — GABAPENTIN 800 MG: 250 SOLUTION ORAL at 22:57

## 2024-01-01 RX ADMIN — QUETIAPINE FUMARATE 25 MG: 25 TABLET ORAL at 02:51

## 2024-01-01 RX ADMIN — TAMSULOSIN HYDROCHLORIDE 0.4 MG: 0.4 CAPSULE ORAL at 09:19

## 2024-01-01 RX ADMIN — ATORVASTATIN CALCIUM 10 MG: 10 TABLET, FILM COATED ORAL at 21:01

## 2024-01-01 RX ADMIN — CLONIDINE HYDROCHLORIDE 0.1 MG: 0.1 TABLET ORAL at 22:09

## 2024-01-01 RX ADMIN — HYDROMORPHONE HYDROCHLORIDE 0.2 MG: 0.2 INJECTION, SOLUTION INTRAMUSCULAR; INTRAVENOUS; SUBCUTANEOUS at 08:25

## 2024-01-01 RX ADMIN — THIAMINE HYDROCHLORIDE 200 MG: 100 INJECTION, SOLUTION INTRAMUSCULAR; INTRAVENOUS at 23:01

## 2024-01-01 RX ADMIN — DEXMEDETOMIDINE HYDROCHLORIDE 0.5 MCG/KG/HR: 400 INJECTION INTRAVENOUS at 23:37

## 2024-01-01 RX ADMIN — Medication 60 ML: at 15:04

## 2024-01-01 RX ADMIN — LORAZEPAM 2 MG: 2 INJECTION INTRAMUSCULAR; INTRAVENOUS at 06:09

## 2024-01-01 RX ADMIN — THIAMINE HCL TAB 100 MG 100 MG: 100 TAB at 22:09

## 2024-01-01 RX ADMIN — HYDROMORPHONE HYDROCHLORIDE 0.2 MG: 0.2 INJECTION, SOLUTION INTRAMUSCULAR; INTRAVENOUS; SUBCUTANEOUS at 10:42

## 2024-01-01 RX ADMIN — HEPARIN SODIUM 5000 UNITS: 5000 INJECTION, SOLUTION INTRAVENOUS; SUBCUTANEOUS at 18:09

## 2024-01-01 RX ADMIN — CEFAZOLIN 2 G: 2 INJECTION, POWDER, LYOPHILIZED, FOR SOLUTION INTRAVENOUS at 06:30

## 2024-01-01 RX ADMIN — ACETAMINOPHEN 975 MG: 325 TABLET, FILM COATED ORAL at 17:49

## 2024-01-01 RX ADMIN — SODIUM CHLORIDE 80 ML: 9 INJECTION, SOLUTION INTRAVENOUS at 10:00

## 2024-01-01 RX ADMIN — HYDROMORPHONE HYDROCHLORIDE 0.2 MG: 0.2 INJECTION, SOLUTION INTRAMUSCULAR; INTRAVENOUS; SUBCUTANEOUS at 17:04

## 2024-01-01 RX ADMIN — DEXMEDETOMIDINE HYDROCHLORIDE 1.2 MCG/KG/HR: 400 INJECTION INTRAVENOUS at 10:39

## 2024-01-01 RX ADMIN — DEXMEDETOMIDINE HYDROCHLORIDE 1.2 MCG/KG/HR: 400 INJECTION INTRAVENOUS at 01:41

## 2024-01-01 RX ADMIN — POTASSIUM CHLORIDE 20 MEQ: 20 SOLUTION ORAL at 03:40

## 2024-01-01 RX ADMIN — NICARDIPINE HYDROCHLORIDE 10 MG/HR: 0.2 INJECTION INTRAVENOUS at 00:09

## 2024-01-01 RX ADMIN — GABAPENTIN 1200 MG: 300 CAPSULE ORAL at 22:09

## 2024-01-01 RX ADMIN — DEXMEDETOMIDINE HYDROCHLORIDE 1.2 MCG/KG/HR: 400 INJECTION INTRAVENOUS at 23:01

## 2024-01-01 RX ADMIN — ATENOLOL 25 MG: 25 TABLET ORAL at 09:19

## 2024-01-01 RX ADMIN — NICARDIPINE HYDROCHLORIDE 15 MG/HR: 0.2 INJECTION INTRAVENOUS at 16:47

## 2024-01-01 RX ADMIN — ATENOLOL 100 MG: 25 TABLET ORAL at 20:11

## 2024-01-01 RX ADMIN — Medication 25 MG: at 12:55

## 2024-01-01 RX ADMIN — THIAMINE HYDROCHLORIDE 200 MG: 100 INJECTION, SOLUTION INTRAMUSCULAR; INTRAVENOUS at 21:31

## 2024-01-01 RX ADMIN — PHENYLEPHRINE HYDROCHLORIDE 200 MCG: 10 INJECTION INTRAVENOUS at 13:00

## 2024-01-01 RX ADMIN — POTASSIUM CHLORIDE 20 MEQ: 1500 TABLET, EXTENDED RELEASE ORAL at 08:18

## 2024-01-01 RX ADMIN — CEFAZOLIN 2 G: 2 INJECTION, POWDER, LYOPHILIZED, FOR SOLUTION INTRAVENOUS at 20:57

## 2024-01-01 RX ADMIN — DEXMEDETOMIDINE HYDROCHLORIDE 1.2 MCG/KG/HR: 400 INJECTION INTRAVENOUS at 18:17

## 2024-01-01 RX ADMIN — QUETIAPINE FUMARATE 25 MG: 25 TABLET ORAL at 15:40

## 2024-01-01 RX ADMIN — LORAZEPAM 2 MG: 2 INJECTION INTRAMUSCULAR; INTRAVENOUS at 12:19

## 2024-01-01 RX ADMIN — VANCOMYCIN HYDROCHLORIDE 1000 MG: 1 INJECTION, SOLUTION INTRAVENOUS at 00:17

## 2024-01-01 RX ADMIN — QUETIAPINE FUMARATE 25 MG: 25 TABLET ORAL at 22:09

## 2024-01-01 RX ADMIN — LORAZEPAM 2 MG: 2 INJECTION INTRAMUSCULAR; INTRAVENOUS at 17:45

## 2024-01-01 RX ADMIN — CLONIDINE HYDROCHLORIDE 0.2 MG: 0.1 TABLET ORAL at 06:40

## 2024-01-01 RX ADMIN — HYDROMORPHONE HYDROCHLORIDE 0.5 MG: 1 INJECTION, SOLUTION INTRAMUSCULAR; INTRAVENOUS; SUBCUTANEOUS at 06:11

## 2024-01-01 RX ADMIN — METHOCARBAMOL 500 MG: 500 TABLET ORAL at 00:57

## 2024-01-01 RX ADMIN — SENNOSIDES AND DOCUSATE SODIUM 1 TABLET: 8.6; 5 TABLET ORAL at 02:19

## 2024-01-01 RX ADMIN — LORAZEPAM 2 MG: 2 INJECTION INTRAMUSCULAR; INTRAVENOUS at 01:27

## 2024-01-01 RX ADMIN — FENTANYL CITRATE 25 MCG: 50 INJECTION INTRAMUSCULAR; INTRAVENOUS at 14:06

## 2024-01-01 RX ADMIN — ACETAMINOPHEN 975 MG: 325 TABLET, FILM COATED ORAL at 19:28

## 2024-01-01 RX ADMIN — GABAPENTIN 600 MG: 600 TABLET, FILM COATED ORAL at 13:29

## 2024-01-01 RX ADMIN — QUETIAPINE FUMARATE 25 MG: 25 TABLET ORAL at 08:10

## 2024-01-01 RX ADMIN — FOLIC ACID 1 MG: 1 TABLET ORAL at 08:06

## 2024-01-01 RX ADMIN — ATENOLOL 100 MG: 25 TABLET ORAL at 08:22

## 2024-01-01 RX ADMIN — PIPERACILLIN AND TAZOBACTAM 3.38 G: 3; .375 INJECTION, POWDER, FOR SOLUTION INTRAVENOUS at 03:49

## 2024-01-01 RX ADMIN — GABAPENTIN 600 MG: 300 CAPSULE ORAL at 08:11

## 2024-01-01 RX ADMIN — TRAMADOL HYDROCHLORIDE 50 MG: 50 TABLET, FILM COATED ORAL at 03:40

## 2024-01-01 RX ADMIN — QUETIAPINE FUMARATE 25 MG: 25 TABLET ORAL at 15:02

## 2024-01-01 RX ADMIN — ASPIRIN 81 MG: 81 TABLET, COATED ORAL at 12:08

## 2024-01-01 RX ADMIN — FINASTERIDE 5 MG: 5 TABLET, FILM COATED ORAL at 09:16

## 2024-01-01 RX ADMIN — DEXMEDETOMIDINE HYDROCHLORIDE 0.6 MCG/KG/HR: 400 INJECTION INTRAVENOUS at 15:39

## 2024-01-01 RX ADMIN — PIPERACILLIN AND TAZOBACTAM 3.38 G: 3; .375 INJECTION, POWDER, FOR SOLUTION INTRAVENOUS at 15:02

## 2024-01-01 RX ADMIN — GABAPENTIN 600 MG: 600 TABLET, FILM COATED ORAL at 08:17

## 2024-01-01 RX ADMIN — CLONIDINE HYDROCHLORIDE 0.2 MG: 0.2 TABLET ORAL at 16:52

## 2024-01-01 RX ADMIN — TAMSULOSIN HYDROCHLORIDE 0.4 MG: 0.4 CAPSULE ORAL at 09:22

## 2024-01-01 RX ADMIN — HEPARIN SODIUM 5000 UNITS: 5000 INJECTION, SOLUTION INTRAVENOUS; SUBCUTANEOUS at 02:25

## 2024-01-01 RX ADMIN — ESMOLOL HYDROCHLORIDE IN SODIUM CHLORIDE 100 MCG/KG/MIN: 20 INJECTION INTRAVENOUS at 12:10

## 2024-01-01 RX ADMIN — ATORVASTATIN CALCIUM 10 MG: 10 TABLET, FILM COATED ORAL at 22:12

## 2024-01-01 RX ADMIN — OXYCODONE HYDROCHLORIDE 5 MG: 5 TABLET ORAL at 10:26

## 2024-01-01 RX ADMIN — THIAMINE HYDROCHLORIDE 200 MG: 100 INJECTION, SOLUTION INTRAMUSCULAR; INTRAVENOUS at 13:24

## 2024-01-01 RX ADMIN — FENTANYL CITRATE 50 MCG: 50 INJECTION INTRAMUSCULAR; INTRAVENOUS at 11:05

## 2024-01-01 RX ADMIN — BUMETANIDE 2 MG: 0.25 INJECTION INTRAMUSCULAR; INTRAVENOUS at 20:11

## 2024-01-01 RX ADMIN — FUROSEMIDE 20 MG: 10 INJECTION, SOLUTION INTRAMUSCULAR; INTRAVENOUS at 17:13

## 2024-01-01 RX ADMIN — FAMOTIDINE 20 MG: 20 TABLET, FILM COATED ORAL at 21:02

## 2024-01-01 RX ADMIN — SUGAMMADEX 200 MG: 100 INJECTION, SOLUTION INTRAVENOUS at 13:20

## 2024-01-01 RX ADMIN — FUROSEMIDE 40 MG: 40 TABLET ORAL at 08:17

## 2024-01-01 RX ADMIN — QUETIAPINE FUMARATE 25 MG: 25 TABLET ORAL at 17:18

## 2024-01-01 RX ADMIN — MUPIROCIN: 20 OINTMENT TOPICAL at 09:46

## 2024-01-01 RX ADMIN — FUROSEMIDE 80 MG: 10 INJECTION, SOLUTION INTRAMUSCULAR; INTRAVENOUS at 19:28

## 2024-01-01 RX ADMIN — DEXMEDETOMIDINE HYDROCHLORIDE 1.2 MCG/KG/HR: 400 INJECTION INTRAVENOUS at 13:18

## 2024-01-01 RX ADMIN — IOPAMIDOL 137 ML: 755 INJECTION, SOLUTION INTRAVENOUS at 05:06

## 2024-01-01 RX ADMIN — DEXMEDETOMIDINE HYDROCHLORIDE 0.9 MCG/KG/HR: 400 INJECTION INTRAVENOUS at 03:00

## 2024-01-01 RX ADMIN — ACETAMINOPHEN 975 MG: 325 TABLET, FILM COATED ORAL at 19:35

## 2024-01-01 RX ADMIN — MIDAZOLAM 1 MG: 1 INJECTION INTRAMUSCULAR; INTRAVENOUS at 10:55

## 2024-01-01 RX ADMIN — Medication 50 MCG: at 08:49

## 2024-01-01 RX ADMIN — OXYCODONE HYDROCHLORIDE 5 MG: 5 TABLET ORAL at 14:51

## 2024-01-01 RX ADMIN — HEPARIN SODIUM 5000 UNITS: 5000 INJECTION, SOLUTION INTRAVENOUS; SUBCUTANEOUS at 12:21

## 2024-01-01 RX ADMIN — HEPARIN SODIUM 7500 UNITS: 10000 INJECTION, SOLUTION INTRAVENOUS; SUBCUTANEOUS at 20:36

## 2024-01-01 RX ADMIN — MUPIROCIN: 20 OINTMENT TOPICAL at 08:54

## 2024-01-01 RX ADMIN — ATORVASTATIN CALCIUM 10 MG: 10 TABLET, FILM COATED ORAL at 21:40

## 2024-01-01 RX ADMIN — POTASSIUM CHLORIDE 40 MEQ: 1.5 POWDER, FOR SOLUTION ORAL at 06:13

## 2024-01-01 RX ADMIN — GABAPENTIN 600 MG: 300 CAPSULE ORAL at 11:11

## 2024-01-01 RX ADMIN — NICARDIPINE HYDROCHLORIDE 15 MG/HR: 0.2 INJECTION INTRAVENOUS at 10:52

## 2024-01-01 RX ADMIN — QUETIAPINE FUMARATE 25 MG: 25 TABLET ORAL at 09:22

## 2024-01-01 RX ADMIN — LORAZEPAM 2 MG: 2 INJECTION INTRAMUSCULAR; INTRAVENOUS at 09:39

## 2024-01-01 RX ADMIN — PHENYLEPHRINE HYDROCHLORIDE 200 MCG: 10 INJECTION INTRAVENOUS at 13:02

## 2024-01-01 RX ADMIN — POLYETHYLENE GLYCOL 3350 17 G: 17 POWDER, FOR SOLUTION ORAL at 08:16

## 2024-01-01 RX ADMIN — BUPIVACAINE HYDROCHLORIDE 10 ML: 5 INJECTION, SOLUTION EPIDURAL; INTRACAUDAL; PERINEURAL at 11:15

## 2024-01-01 RX ADMIN — HEPARIN SODIUM 7500 UNITS: 10000 INJECTION, SOLUTION INTRAVENOUS; SUBCUTANEOUS at 05:20

## 2024-01-01 RX ADMIN — IPRATROPIUM BROMIDE AND ALBUTEROL SULFATE 3 ML: .5; 3 SOLUTION RESPIRATORY (INHALATION) at 15:06

## 2024-01-01 RX ADMIN — ACETAMINOPHEN 975 MG: 325 TABLET, FILM COATED ORAL at 04:26

## 2024-01-01 RX ADMIN — HYDROMORPHONE HYDROCHLORIDE 0.5 MG: 1 INJECTION, SOLUTION INTRAMUSCULAR; INTRAVENOUS; SUBCUTANEOUS at 09:44

## 2024-01-01 RX ADMIN — HYDROMORPHONE HYDROCHLORIDE 0.2 MG: 0.2 INJECTION, SOLUTION INTRAMUSCULAR; INTRAVENOUS; SUBCUTANEOUS at 10:35

## 2024-01-01 RX ADMIN — ASPIRIN 81 MG: 81 TABLET, COATED ORAL at 20:59

## 2024-01-01 RX ADMIN — CLONIDINE HYDROCHLORIDE 0.2 MG: 0.2 TABLET ORAL at 05:49

## 2024-01-01 RX ADMIN — FLUTICASONE FUROATE AND VILANTEROL TRIFENATATE 1 PUFF: 100; 25 POWDER RESPIRATORY (INHALATION) at 08:00

## 2024-01-01 RX ADMIN — OXYCODONE HYDROCHLORIDE 10 MG: 5 TABLET ORAL at 04:48

## 2024-01-01 RX ADMIN — ASPIRIN 81 MG CHEWABLE TABLET 81 MG: 81 TABLET CHEWABLE at 20:28

## 2024-01-01 RX ADMIN — VANCOMYCIN HYDROCHLORIDE 2000 MG: 10 INJECTION, POWDER, LYOPHILIZED, FOR SOLUTION INTRAVENOUS at 01:35

## 2024-01-01 RX ADMIN — GABAPENTIN 300 MG: 250 SOLUTION ORAL at 13:00

## 2024-01-01 RX ADMIN — TRAMADOL HYDROCHLORIDE 50 MG: 50 TABLET, FILM COATED ORAL at 14:08

## 2024-01-01 RX ADMIN — THIAMINE HCL TAB 100 MG 100 MG: 100 TAB at 08:22

## 2024-01-01 RX ADMIN — ASPIRIN 81 MG CHEWABLE TABLET 81 MG: 81 TABLET CHEWABLE at 08:22

## 2024-01-01 RX ADMIN — HYDRALAZINE HYDROCHLORIDE 50 MG: 50 TABLET ORAL at 15:07

## 2024-01-01 RX ADMIN — HYDROMORPHONE HYDROCHLORIDE 0.2 MG: 0.2 INJECTION, SOLUTION INTRAMUSCULAR; INTRAVENOUS; SUBCUTANEOUS at 09:04

## 2024-01-01 RX ADMIN — ATORVASTATIN CALCIUM 10 MG: 10 TABLET, FILM COATED ORAL at 21:02

## 2024-01-01 RX ADMIN — IPRATROPIUM BROMIDE AND ALBUTEROL SULFATE 3 ML: .5; 3 SOLUTION RESPIRATORY (INHALATION) at 15:09

## 2024-01-01 RX ADMIN — PROPOFOL 150 MG: 10 INJECTION, EMULSION INTRAVENOUS at 12:00

## 2024-01-01 ASSESSMENT — ACTIVITIES OF DAILY LIVING (ADL)
ADLS_ACUITY_SCORE: 40
ADLS_ACUITY_SCORE: 34
ADLS_ACUITY_SCORE: 40
ADLS_ACUITY_SCORE: 36
ADLS_ACUITY_SCORE: 42
ADLS_ACUITY_SCORE: 36
ADLS_ACUITY_SCORE: 38
ADLS_ACUITY_SCORE: 49
ADLS_ACUITY_SCORE: 40
ADLS_ACUITY_SCORE: 42
ADLS_ACUITY_SCORE: 34
ADLS_ACUITY_SCORE: 38
ADLS_ACUITY_SCORE: 36
ADLS_ACUITY_SCORE: 36
ADLS_ACUITY_SCORE: 38
ADLS_ACUITY_SCORE: 28
ADLS_ACUITY_SCORE: 34
ADLS_ACUITY_SCORE: 40
ADLS_ACUITY_SCORE: 34
ADLS_ACUITY_SCORE: 42
ADLS_ACUITY_SCORE: 40
ADLS_ACUITY_SCORE: 42
ADLS_ACUITY_SCORE: 38
ADLS_ACUITY_SCORE: 38
ADLS_ACUITY_SCORE: 40
ADLS_ACUITY_SCORE: 34
ADLS_ACUITY_SCORE: 28
ADLS_ACUITY_SCORE: 34
ADLS_ACUITY_SCORE: 42
ADLS_ACUITY_SCORE: 40
ADLS_ACUITY_SCORE: 40
ADLS_ACUITY_SCORE: 36
ADLS_ACUITY_SCORE: 25
ADLS_ACUITY_SCORE: 31
ADLS_ACUITY_SCORE: 42
ADLS_ACUITY_SCORE: 40
ADLS_ACUITY_SCORE: 40
ADLS_ACUITY_SCORE: 28
ADLS_ACUITY_SCORE: 40
ADLS_ACUITY_SCORE: 42
ADLS_ACUITY_SCORE: 40
ADLS_ACUITY_SCORE: 36
ADLS_ACUITY_SCORE: 42
ADLS_ACUITY_SCORE: 40
ADLS_ACUITY_SCORE: 42
ADLS_ACUITY_SCORE: 40
ADLS_ACUITY_SCORE: 36
ADLS_ACUITY_SCORE: 40
ADLS_ACUITY_SCORE: 28
ADLS_ACUITY_SCORE: 36
ADLS_ACUITY_SCORE: 30
ADLS_ACUITY_SCORE: 40
ADLS_ACUITY_SCORE: 36
ADLS_ACUITY_SCORE: 40
ADLS_ACUITY_SCORE: 42
ADLS_ACUITY_SCORE: 40
ADLS_ACUITY_SCORE: 42
ADLS_ACUITY_SCORE: 40
ADLS_ACUITY_SCORE: 42
ADLS_ACUITY_SCORE: 36
ADLS_ACUITY_SCORE: 40
ADLS_ACUITY_SCORE: 42
ADLS_ACUITY_SCORE: 40
ADLS_ACUITY_SCORE: 36
ADLS_ACUITY_SCORE: 36
ADLS_ACUITY_SCORE: 24
ADLS_ACUITY_SCORE: 40
ADLS_ACUITY_SCORE: 24
ADLS_ACUITY_SCORE: 40
ADLS_ACUITY_SCORE: 24
ADLS_ACUITY_SCORE: 40
ADLS_ACUITY_SCORE: 36
ADLS_ACUITY_SCORE: 42
ADLS_ACUITY_SCORE: 36
ADLS_ACUITY_SCORE: 42
ADLS_ACUITY_SCORE: 42
ADLS_ACUITY_SCORE: 40
ADLS_ACUITY_SCORE: 40
ADLS_ACUITY_SCORE: 42
ADLS_ACUITY_SCORE: 40
ADLS_ACUITY_SCORE: 40
ADLS_ACUITY_SCORE: 28
ADLS_ACUITY_SCORE: 28
ADLS_ACUITY_SCORE: 40
ADLS_ACUITY_SCORE: 42
ADLS_ACUITY_SCORE: 40
ADLS_ACUITY_SCORE: 34
ADLS_ACUITY_SCORE: 40
ADLS_ACUITY_SCORE: 32
ADLS_ACUITY_SCORE: 42
ADLS_ACUITY_SCORE: 38
ADLS_ACUITY_SCORE: 38
ADLS_ACUITY_SCORE: 28
ADLS_ACUITY_SCORE: 40
ADLS_ACUITY_SCORE: 40
ADLS_ACUITY_SCORE: 39
ADLS_ACUITY_SCORE: 40
ADLS_ACUITY_SCORE: 42
ADLS_ACUITY_SCORE: 42
ADLS_ACUITY_SCORE: 40
ADLS_ACUITY_SCORE: 24
ADLS_ACUITY_SCORE: 28
ADLS_ACUITY_SCORE: 40
ADLS_ACUITY_SCORE: 42
ADLS_ACUITY_SCORE: 24
ADLS_ACUITY_SCORE: 42
PREVIOUS_RESPONSIBILITIES: DRIVING;MEDICATION MANAGEMENT
ADLS_ACUITY_SCORE: 42
ADLS_ACUITY_SCORE: 31
ADLS_ACUITY_SCORE: 40
ADLS_ACUITY_SCORE: 36
ADLS_ACUITY_SCORE: 40
ADLS_ACUITY_SCORE: 40
ADLS_ACUITY_SCORE: 36
ADLS_ACUITY_SCORE: 40
ADLS_ACUITY_SCORE: 42
ADLS_ACUITY_SCORE: 36
ADLS_ACUITY_SCORE: 28
ADLS_ACUITY_SCORE: 36
ADLS_ACUITY_SCORE: 34
ADLS_ACUITY_SCORE: 32
ADLS_ACUITY_SCORE: 38
ADLS_ACUITY_SCORE: 42
ADLS_ACUITY_SCORE: 29
ADLS_ACUITY_SCORE: 24
ADLS_ACUITY_SCORE: 42
ADLS_ACUITY_SCORE: 30
ADLS_ACUITY_SCORE: 28
ADLS_ACUITY_SCORE: 34
ADLS_ACUITY_SCORE: 38
ADLS_ACUITY_SCORE: 40
ADLS_ACUITY_SCORE: 42
ADLS_ACUITY_SCORE: 32
ADLS_ACUITY_SCORE: 40
ADLS_ACUITY_SCORE: 42
ADLS_ACUITY_SCORE: 40
ADLS_ACUITY_SCORE: 28
ADLS_ACUITY_SCORE: 40
ADLS_ACUITY_SCORE: 42
ADLS_ACUITY_SCORE: 42
ADLS_ACUITY_SCORE: 28
ADLS_ACUITY_SCORE: 38
ADLS_ACUITY_SCORE: 40
ADLS_ACUITY_SCORE: 42
ADLS_ACUITY_SCORE: 34
ADLS_ACUITY_SCORE: 40
ADLS_ACUITY_SCORE: 29
ADLS_ACUITY_SCORE: 28
ADLS_ACUITY_SCORE: 28
ADLS_ACUITY_SCORE: 34
ADLS_ACUITY_SCORE: 34
ADLS_ACUITY_SCORE: 40
ADLS_ACUITY_SCORE: 36
ADLS_ACUITY_SCORE: 38
ADLS_ACUITY_SCORE: 42
ADLS_ACUITY_SCORE: 24
ADLS_ACUITY_SCORE: 42
ADLS_ACUITY_SCORE: 38
ADLS_ACUITY_SCORE: 40
ADLS_ACUITY_SCORE: 42
ADLS_ACUITY_SCORE: 40
ADLS_ACUITY_SCORE: 49
ADLS_ACUITY_SCORE: 31
ADLS_ACUITY_SCORE: 42
ADLS_ACUITY_SCORE: 40
ADLS_ACUITY_SCORE: 42
ADLS_ACUITY_SCORE: 40
ADLS_ACUITY_SCORE: 42
ADLS_ACUITY_SCORE: 40
ADLS_ACUITY_SCORE: 28

## 2024-01-01 ASSESSMENT — ENCOUNTER SYMPTOMS
CONSTIPATION: 1
NAUSEA: 1
FEVER: 1
ORTHOPNEA: 0
COUGH: 0
LIGHT-HEADEDNESS: 0
ORTHOPNEA: 0
SHORTNESS OF BREATH: 0
CONFUSION: 1
ABDOMINAL DISTENTION: 1
ABDOMINAL PAIN: 1
CHEST TIGHTNESS: 0
FATIGUE: 1
HEADACHES: 0
APPETITE CHANGE: 1
COLOR CHANGE: 1
VOMITING: 0

## 2024-01-01 ASSESSMENT — COPD QUESTIONNAIRES
COPD: 1
CAT_SEVERITY: MODERATE

## 2024-01-01 ASSESSMENT — COLUMBIA-SUICIDE SEVERITY RATING SCALE - C-SSRS

## 2024-01-01 ASSESSMENT — PAIN SCALES - GENERAL
PAINLEVEL: MODERATE PAIN (5)
PAINLEVEL: NO PAIN (0)
PAINLEVEL: EXTREME PAIN (8)
PAINLEVEL: NO PAIN (0)
PAINLEVEL: MILD PAIN (3)
PAINLEVEL: MODERATE PAIN (5)
PAINLEVEL: MODERATE PAIN (5)

## 2024-01-02 NOTE — PATIENT INSTRUCTIONS
Name:  Noe Goetz [facility]   MRN:  [id number]   :  1940   Today's Date:  2024         You were seen today for a pre-operative assessment in the:    Pre-operative Anesthesia Assessment Center(PAC)  [facility]  [address] [place] [id number]  phone [phone]      You will be receiving a call with location, date, arrival time and diet instructions from Preadmission Nursing at your surgical site:    -[facility]: [phone]           Anesthesia recommendations for medications:    Hold Aspirin for 7 days before procedure.  Hold Multivitamins for 7 days before procedure.   Hold Herbal medications and Supplements for 7 days before procedure.  Hold Ibuprofen for 1 day before procedure.   Hold Naproxen for 4 days before procedure.     No alcohol or cannabis products for 24 hours before your procedure         Please DO NOT take the following medications the day of procedure:    Iron and lasix (furosemide).    Please take these medications the day of procedure:    Tylenol or norco if needed; take atenolol, doxycycline.      How do I prepare myself?  - Please take 2 showers (one the night prior to surgery and one the morning of surgery) using Scrubcare or Hibiclens soap.    Use this soap only from the neck to your toes.     Leave the soap on your skin for one minute--then rinse thoroughly.      You may use your own shampoo and conditioner. No other hair products.   - Please remove all jewelry and body piercings.  - No lotions, deodorants or fragrance.  - No makeup or fingernail polish.   - Bring your ID and insurance card.    -If you have a Deep Brain Stimulator, a Spinal Cord Stimulator, or any implanted Neuro Device, you must bring the remote to your appointment                 For further questions regarding your surgery please call your surgeon's office.

## 2024-01-02 NOTE — H&P
Pre-Operative H & P     CC:  Preoperative exam to assess for increased cardiopulmonary risk while undergoing surgery and anesthesia.    Date of Encounter: 1/2/2024  Primary Care Physician:  Semmler, Steven Duane     Reason for visit:   Encounter Diagnoses   Name Primary?    Preop examination Yes    Open wound of left palm        HPI  Noe Morales is a 83 year old male who presents for pre-operative H & P in preparation for  Procedure Information       Case: [id number] Date/Time: 01/10/24 0700    Procedures:       LEFT PALM WOUND IRRIGATION AND DEBRIDEMENT, (Left: Hand)      LEFT MIDDLE FINGER AMPUTATION WITH FILLET FLAP RECONSTRUCTION OF THE LEFT PALM WOUND (Left: Finger)      POSSIBLE COMPLEX CLOSURE, (Left: Finger)    Anesthesia type: MAC with Local    Diagnosis: Open wound of left palm [S61.402A]    Pre-op diagnosis: Open wound of left palm [S61.402A]    Location:  OR 03 /  OR    Providers: Sean Carlos MD            Noe Morales is a 83 year old male with presumed CAD, hypertension, hyperlipidemia, COPD, allergic rhinitis, history of DVT, gout, fatty liver, GERD, BPH with urinary retention, alcohol abuse/dependence, anxiety, depression and 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  Earlier this past summer he developed a left palm wound.  Biopsies indicated it was not a reoccurance.  He underwent a tenosynovectomy on 10/9/23 at [facility].  Since then, the incisions broke down and the tendons became exposed. He was subsequently referred to Dr. Carlos and the above listed procedure has now been recommended for treatment.     History is obtained from the patient and chart review    Hx of abnormal bleeding or anti-platelet use: none      Past Medical History  Past Medical History:   Diagnosis Date    Alcohol dependence (H)     Allergic rhinitis     Anxiety      BPH (benign prostatic hyperplasia)     COPD (chronic obstructive pulmonary disease) (H)     Depression     Fatty liver     GERD (gastroesophageal reflux disease)     Gout     History of sarcoma     Hypertension     Mixed hyperlipidemia     Morbid obesity (H)     Personal history of DVT (deep vein thrombosis)        Past Surgical History  Past Surgical History:   Procedure Laterality Date    ARTHROPLASTY KNEE Right 05/20/2019    Procedure: ARTHROPLASTY, KNEE;  Surgeon: Reynaldo Barnes MD;  Location: [facility] OR    ARTHROTOMY SHOULDER, ROTATOR CUFF REPAIR, COMBINED  07/09/2012    Procedure: COMBINED ARTHROTOMY SHOULDER, ROTATOR CUFF REPAIR;  Right shoulder biceps tendodesis;  Surgeon: Reynaldo Barnes MD;  Location: [facility] OR    HAND SURGERY Right        Prior to Admission Medications  Current Outpatient Medications   Medication Sig Dispense Refill    albuterol 90 MCG/ACT inhaler Inhale 2 puffs into the lungs every 6 hours as needed.        ATENOLOL 50 MG OR TABS Take 50 mg by mouth every morning      DULOXETINE HCL PO Take 120 mg by mouth every evening      ferrous sulfate (IRON) 325 (65 FE) MG tablet Take 325 mg by mouth daily (with breakfast)      GABAPENTIN PO Take 600-1,200 mg by mouth 3 times daily      HYDROcodone-acetaminophen (NORCO) 5-325 MG tablet Take 1-2 tablets by mouth every 4 hours as needed for severe pain 60 tablet 0    hydrOXYzine (ATARAX) 25 MG tablet Take 1-2 tablets (25-50 mg) by mouth every 6 hours as needed for itching or other (For breakthrough pain.  Pain rating 1-5 give 25mg; pain rating 6-10 give 50mg) 60 tablet 1    LOVASTATIN PO Take 40 mg by mouth At Bedtime.        Magnesium 70 MG CAPS Take 1 capsule by mouth every morning      multivitamin  with lutein (OCUVITE WITH LTEIN) CAPS Take 1 capsule by mouth every morning      Potassium Chloride (KLOR-CON PO) Take 20 mEq by mouth every morning      PRILOSEC OTC 20 MG OR TBEC Take 20 mg by mouth 2 times daily      psyllium (METAMUCIL)  58.6 % POWD Take 1 Tablespoonful by mouth every morning      senna-docusate (SENOKOT-S/PERICOLACE) 8.6-50 MG tablet Take 1-2 tablets by mouth 2 times daily (Patient taking differently: Take 1-2 tablets by mouth daily as needed)      VITAMIN D, CHOLECALCIFEROL, PO Take 2,000 Units by mouth every morning      doxazosin (CARDURA) 4 MG tablet Take 2 mg by mouth At Bedtime       fluticasone (FLONASE) 50 MCG/ACT nasal spray Spray 2 sprays into both nostrils daily      fluticasone-salmeterol (ADVAIR) 250-50 MCG/DOSE inhaler Inhale 1 puff into the lungs every 12 hours      FUROSEMIDE PO Take 40 mg by mouth 2 times daily Am and 5 pm      order for DME Equipment being ordered: Walker ()  Treatment Diagnosis: R knee osteoarthritis s/p R TKA 1 Units 0       Allergies  Allergies   Allergen Reactions    Nkda [No Known Drug Allergy]        Social History  Social History     Socioeconomic History    Marital status:      Spouse name: Not on file    Number of children: Not on file    Years of education: Not on file    Highest education level: Not on file   Occupational History    Not on file   Tobacco Use    Smoking status: Never     Passive exposure: Never    Smokeless tobacco: Never   Substance and Sexual Activity    Alcohol use: Not Currently     Comment: Sober since Dec 29, 2023    Drug use: Never    Sexual activity: Not on file   Other Topics Concern    Parent/sibling w/ CABG, MI or angioplasty before 65F 55M? Not Asked   Social History Narrative    Not on file     Social Determinants of Health     Financial Resource Strain: Not on file   Food Insecurity: Not on file   Transportation Needs: Not on file   Physical Activity: Not on file   Stress: Not on file   Social Connections: Not on file   Interpersonal Safety: Not on file   Housing Stability: Not on file       Family History  Family History   Problem Relation Age of Onset    Anesthesia Reaction No family hx of     Thrombosis No family hx of        Review of  Systems  The complete review of systems is negative other than noted in the HPI or here.   Anesthesia Evaluation   Pt has had prior anesthetic.     No history of anesthetic complications       ROS/MED HX  ENT/Pulmonary:     (+) sleep apnea, uses CPAP,         allergic rhinitis,               COPD,           (-) recent URI   Neurologic:  - neg neurologic ROS     Cardiovascular:     (+) Dyslipidemia hypertension- -  CAD (presumed CAD - see note) -  - -                                 Previous cardiac testing   Echo: Date: Results:    Stress Test:  Date: 2021 Results:  2021   FINAL CONCLUSIONS    1.  Abnormal pharmacologic stress perfusion imaging study.    2.  Small sized, partially reversible, defect of moderate severity in the mid inferior (RCA),   basal inferior (RCA) segments suggestive of    ischemia.    3.  Normal left ventricular size and systolic function with a calculated LVEF of 59%.    4.  Stress ECG is negative for myocardial ischemia.   ECG Reviewed:  Date: 10/2023 Results:  EKG  10/2023  NSR, nonspecific intraventricular conduction delay    Cath:  Date: Results:   (-) TATUM and orthopnea/PND   METS/Exercise Tolerance: 4 - Raking leaves, gardening Comment: Doesn't exercise purposefully.  Denies any exertional dyspnea or angina with grocery shopping.  Reports he can walk through the whole store.   Hematologic: Comments: History of RLE DVT    (+) History of blood clots,    pt is not anticoagulated,           Musculoskeletal: Comment: Chronic back pain     No recent gout flare ups      GI/Hepatic:     (+) GERD, Asymptomatic on medication,           liver disease (fatty liver),       Renal/Genitourinary: Comment: Urinary retention    (+)        BPH,      Endo: Comment: Recently had a steroid injection (last Thursday) for his back.     (+)               Obesity,       Psychiatric/Substance Use:     (+) psychiatric history anxiety and depression alcohol abuse      Infectious Disease:  - neg infectious disease ROS      Malignancy: Comment: History of left hand sarcoma 1989  (+) Malignancy, History of Other.Other CA sarcoma Remission status post Radiation and Surgery.    Other: Comment: Open wound of left palm     (+)  , H/O Chronic Pain,         Virtual visit -  No vitals were obtained    Physical Exam  Constitutional: Awake, alert, cooperative, no apparent distress, and appears stated age.  Eyes: Pupils equal  HENT: Normocephalic  Respiratory: non labored breathing   Neurologic: Awake, alert, oriented to name, place and time.   Neuropsychiatric: Calm, cooperative. Normal affect.      Prior Labs/Diagnostic Studies   All labs and imaging personally reviewed     EKG/ stress test - if available please see in ROS above     BASIC METABOLIC PANEL  Order: [id number]   suggestion  Information displayed in this report may not trend or trigger automated decision support.     Component  Ref Range & Units 3 mo ago   SODIUM  136 - 145 mmol/L 138   POTASSIUM  3.5 - 5.1 mmol/L 4.5   CHLORIDE  98 - 107 mmol/L 103   CO2,TOTAL  22 - 29 mmol/L 25   ANION GAP  5 - 18 10   GLUCOSE  70 - 99 mg/dL 111 High    CALCIUM  8.8 - 10.2 mg/dL 9.7   BUN  8 - 23 mg/dL 15   CREATININE  0.70 - 1.20 mg/dL 1.02   BUN/CREAT RATIO  10 - 20 15   eGFR  >90 mL/min/1.73m2 74 Low    Comment: As of 03/15/2022, eGFR is calculated by the CKD-EPI creatinine equation without race adjustment.  eGFR can be influenced by muscle mass, exercise, and diet.  The reported eGFR is an estimation only and is only applicable if the renal function is stable.     Specimen Collected: 10/04/23  4:06 PM    Performed by: [facility] Last Resulted: 10/04/23  9:57 PM   Received From: [facility]  Result Received: 11/13/23  8:56 AM    View Encounter        Received Information   Contains abnormal data CBC WITH AUTO DIFFERENTIAL  Order: [id number]   suggestion  Information displayed in this report may not trend or trigger automated  decision support.     Component  Ref Range & Units 3 mo ago   WHITE BLOOD COUNT          4.5 - 11.0 thou/cu mm 10.1   RED BLOOD COUNT            4.30 - 5.90 mil/cu mm 4.53   HEMOGLOBIN                13.5 - 17.5 g/dL 14.9   HEMATOCRIT                37.0 - 53.0 % 43.8   MCV                        80 - 100 fL 97   MCH                        26.0 - 34.0 pg 32.9   MCHC                      32.0 - 36.0 g/dL 34.0   RDW                        11.5 - 15.5 % 13.2   PLATELET COUNT            140 - 440 thou/cu mm 189   MPV                        6.5 - 11.0 fL 8.3   % NEUT  % 66.7   % LYMPH  % 18.0   % MONO  % 7.3   % EOS  % 7.6   % BASO  % 0.4   ABSOLUTE NEUTROPHILS      1.7 - 7.0 thou/cu mm 6.7   ABSOLUTE LYMPHOCYTES      0.9 - 2.9 thou/cu mm 1.8   ABSOLUTE MONOCYTES        <0.9 thou/cu mm 0.7   ABSOLUTE EOSINOPHILS      <0.5 thou/cu mm 0.8 High    ABSOLUTE BASOPHILS        <0.3 thou/cu mm 0.0     Specimen Collected: 10/04/23  4:06 PM    Performed by: [facility]            The patient's records and results personally reviewed by this provider.     Outside records reviewed from: Care Everywhere      Assessment    Noe Morales is a 83 year old male seen as a PAC referral for risk assessment and optimization for anesthesia.    Plan/Recommendations  Pt will be optimized for the proposed procedure.  See below for details on the assessment, risk, and preoperative recommendations    NEUROLOGY  - No history of TIA, CVA or seizure    -Post Op delirium risk factors:  Age    ENT  - No current airway concerns.  Will need to be reassessed day of surgery.  Mallampati: Unable to assess  TM: Unable to assess    - hard of hearing.  Wears hearing aids.    CARDIAC  - No history of Afib  - hold lasix DOS  - had an abnormal stress test in 2021.  Was evaluated in 2022 by cardiology and no further testing was noted to be indicated.  The following is the excerpt from their A&P:    cardiology consult with Dr. Coles  "at [facility] in 2021  ASSESSMENT:  1. Abnormal myocardial perfusion imaging study. As described above, small inferior defect. When compared with his MPI from 2010, they are almost identical, both showing small inferior defects. No obvious wall motion abnormalities have been detected in the past. It is not clear whether this is a true positive or not. Nevertheless, I would consider this a low risk stress test.       - METS (Metabolic Equivalents)  Patient performs 4 or more METS exercise without symptoms            Total Score: 0      RCRI-Very low risk: Class 1 0.4% complication rate            Total Score: 0        PULMONARY  - Obstructive Sleep Apnea  HODA with home CPAP.     - COPD  Well controlled  - Tobacco History    History   Smoking Status    Never   Smokeless Tobacco    Never       GI  - GERD is well controlled with prilosec.   PONV Medium Risk  Total Score: 2           1 AN PONV: Patient is not a current smoker    1 AN PONV: Intended Post Op Opioids        /RENAL  - BPH with urinary retention.  On doxazosin, but reports still having retention symptoms.  Monitor closely post-op.     ENDOCRINE    - BMI: Estimated body mass index is 41.81 kg/m  as calculated from the following:    Height as of 12/6/22: 1.727 m (5' 8\").    Weight as of 12/6/22: 124.7 kg (275 lb).  Class 3 Obesity (BMI > 40)  - No history of Diabetes Mellitus    HEME  VTE Medium Risk 1.8%            Total Score: 7    VTE: Greater than 59 yrs old    VTE: BMI greater than 39    VTE: Male    VTE: Pt history of VTE      - No history of abnormal bleeding or antiplatelet use.      MSK  - chronic back pain.  Consider cautious positioning.     PSYCH  - continue mental health medications without interruption.  - noted alcohol dependence/abuse and patient and wife are limited in their responses regarding alcohol intake, but patient and wife report that he stopped drinking alcohol on 12/29/23 and deny any alcohol withdrawal symptoms.  Monitor closely. "     Different anesthesia methods/types have been discussed with the patient, but they are aware that the final plan will be decided by the assigned anesthesia provider on the date of service.      The patient is optimized for their procedure. AVS with information on surgery time/arrival time, meds and NPO status given by nursing staff. No further diagnostic testing indicated.    Please refer to the physical examination documented by the anesthesiologist in the anesthesia record on the day of surgery.    Video-Visit Details    Type of service:  Video Visit    Provider received verbal consent for a Video Visit from the patient? Yes   Video Start Time:  1359  Video End Time: 1416    Originating Location (pt. Location): Home    Distant Location (provider location):  Off-site  Mode of Communication:  Video Conference via [employer]  On the day of service:     Prep time: 15 minutes  Visit time: 17 minutes  Documentation time: 16 minutes  ------------------------------------------  Total time: 48 minutes      MARCY Vicente CNP  Preoperative Assessment Center  [facility]  Clinic and Surgery Center  Phone: [phone]  Fax: [phone]

## 2024-01-02 NOTE — PROGRESS NOTES
Noe is a 83 year old who is being evaluated via a billable video visit.      How would you like to obtain your AVS? Dorcashart      Subjective   Noe is a 83 year old, presenting for the following health issues:  Pre-Op Exam (/)        NATALI Mccain LPN

## 2024-01-08 NOTE — ANESTHESIA PREPROCEDURE EVALUATION
Anesthesia Pre-Procedure Evaluation    Patient: Noe Goetz [facility]   MRN: [id number] : 1940        Procedure : Procedure(s):  LEFT PALM WOUND IRRIGATION AND DEBRIDEMENT,  LEFT MIDDLE FINGER AMPUTATION WITH FILLET FLAP RECONSTRUCTION OF THE LEFT PALM WOUND  POSSIBLE COMPLEX CLOSURE,          Past Medical History:   Diagnosis Date    Alcohol dependence (H)     Allergic rhinitis     Anxiety     BPH (benign prostatic hyperplasia)     COPD (chronic obstructive pulmonary disease) (H)     Depression     Fatty liver     GERD (gastroesophageal reflux disease)     Gout     History of sarcoma     Hypertension     Mixed hyperlipidemia     Morbid obesity (H)     Personal history of DVT (deep vein thrombosis)       Past Surgical History:   Procedure Laterality Date    ARTHROPLASTY KNEE Right 2019    Procedure: ARTHROPLASTY, KNEE;  Surgeon: Reynaldo Barnes MD;  Location: [facility] OR    ARTHROTOMY SHOULDER, ROTATOR CUFF REPAIR, COMBINED  2012    Procedure: COMBINED ARTHROTOMY SHOULDER, ROTATOR CUFF REPAIR;  Right shoulder biceps tendodesis;  Surgeon: Reynaldo Barnes MD;  Location: [facility] OR    HAND SURGERY Right       Allergies   Allergen Reactions    Nkda [No Known Drug Allergy]       Social History     Tobacco Use    Smoking status: Never     Passive exposure: Never    Smokeless tobacco: Never   Substance Use Topics    Alcohol use: Not Currently     Comment: Sober since Dec 29, 2023      Wt Readings from Last 1 Encounters:   22 124.7 kg (275 lb)        Anesthesia Evaluation   Pt has had prior anesthetic.     No history of anesthetic complications       ROS/MED HX  ENT/Pulmonary:     (+) sleep apnea, uses CPAP,         allergic rhinitis,               COPD,           (-) recent URI   Neurologic:  - neg neurologic ROS     Cardiovascular:     (+) Dyslipidemia hypertension- -  CAD (presumed CAD - see note) -  - -                                 Previous cardiac testing   Echo: Date: Results:    Stress  Test:  Date: 2021 Results:  2021   FINAL CONCLUSIONS    1.  Abnormal pharmacologic stress perfusion imaging study.    2.  Small sized, partially reversible, defect of moderate severity in the mid inferior (RCA),   basal inferior (RCA) segments suggestive of    ischemia.    3.  Normal left ventricular size and systolic function with a calculated LVEF of 59%.    4.  Stress ECG is negative for myocardial ischemia.   ECG Reviewed:  Date: 10/2023 Results:  EKG  10/2023  NSR, nonspecific intraventricular conduction delay    Cath:  Date: Results:   (-) TATUM, orthopnea/PND and murmur   METS/Exercise Tolerance: 4 - Raking leaves, gardening Comment: Doesn't exercise purposefully.  Denies any exertional dyspnea or angina with grocery shopping.  Reports he can walk through the whole store.   Hematologic: Comments: History of RLE DVT    (+) History of blood clots,    pt is not anticoagulated,           Musculoskeletal: Comment: Chronic back pain     No recent gout flare ups      GI/Hepatic:     (+) GERD, Asymptomatic on medication,           liver disease (fatty liver),       Renal/Genitourinary: Comment: Urinary retention    (+)        BPH,      Endo: Comment: Recently had a steroid injection (last Thursday) for his back.     (+)               Obesity,       Psychiatric/Substance Use:     (+) psychiatric history anxiety and depression alcohol abuse      Infectious Disease:  - neg infectious disease ROS     Malignancy: Comment: History of left hand sarcoma 1989  (+) Malignancy, History of Other.Other CA sarcoma Remission status post Radiation and Surgery.    Other: Comment: Open wound of left palm     (+)  , H/O Chronic Pain,         Physical Exam    Airway        Mallampati: III   TM distance: > 3 FB   Neck ROM: full   Mouth opening: > 3 cm    Respiratory Devices and Support         Dental     Comment: Teeth examined without any significant abnormality, patient denies loose, missing or chipped teeth or anything removable.    "      Cardiovascular          Rhythm and rate: regular and normal (-) no murmur    Pulmonary   pulmonary exam normal        breath sounds clear to auscultation           OUTSIDE LABS:  CBC:   Lab Results   Component Value Date    WBC 6.4 09/25/2021    WBC 6.7 09/01/2016    HGB 15.1 09/25/2021    HGB 12.7 (L) 05/22/2019    HCT 44.2 09/25/2021    HCT 40.1 09/01/2016     09/25/2021     05/21/2019     BMP:   Lab Results   Component Value Date     (L) 09/25/2021     09/01/2016    POTASSIUM 4.3 09/25/2021    POTASSIUM 3.8 09/01/2016    CHLORIDE 100 09/25/2021    CHLORIDE 105 09/01/2016    CO2 27 09/25/2021    CO2 29 09/01/2016    BUN 9 09/25/2021    BUN 18 09/01/2016    CR 0.79 09/25/2021    CR 1.20 05/21/2019     (H) 09/25/2021     (H) 05/21/2019     COAGS:   Lab Results   Component Value Date    PTT 29 09/10/2011    INR 1.03 09/10/2011     POC: No results found for: \"BGM\", \"HCG\", \"HCGS\"  HEPATIC:   Lab Results   Component Value Date    ALBUMIN 3.1 (L) 09/25/2021    PROTTOTAL 6.8 09/25/2021    ALT 49 09/25/2021    AST 48 (H) 09/25/2021    ALKPHOS 64 09/25/2021    BILITOTAL 0.6 09/25/2021     OTHER:   Lab Results   Component Value Date    LACT 0.9 09/10/2011    CHERY 8.7 09/25/2021    LIPASE 38 09/05/2013    CRP 30.7 (H) 09/25/2021       Anesthesia Plan    ASA Status:  3    NPO Status:  NPO Appropriate    Anesthesia Type: MAC.     - Reason for MAC: immobility needed   Induction: Intravenous, Propofol.   Maintenance: TIVA.        Consents    Anesthesia Plan(s) and associated risks, benefits, and realistic alternatives discussed. Questions answered and patient/representative(s) expressed understanding.     - Discussed:     - Discussed with:  Patient      - Extended Intubation/Ventilatory Support Discussed: No.      - Patient is DNR/DNI Status: No     Use of blood products discussed: No .     Postoperative Care    Pain management: IV analgesics.   PONV prophylaxis: Ondansetron (or other " 5HT-3), Background Propofol Infusion     Comments:    Other Comments: Discussed plan for IV anesthetic with native airway. Discussed potential need for conversion to general anesthetic with airway management and potential for recall.             Jordan Chahal MD    I have reviewed the pertinent notes and labs in the chart from the past 30 days and (re)examined the patient.  Any updates or changes from those notes are reflected in this note.

## 2024-01-09 NOTE — TELEPHONE ENCOUNTER
[facility] Call Center    Phone Message    May a detailed message be left on voicemail: yes     Reason for Call: PT WIFE CALLING WITH QUESTION ABOUT AFTER SURGERY CARE AND WANTS SOMEONE TO CALL HER AND GO OVER IT WITH HER. HIS SURGERY IS 1/10/24 TOMORROW. SHE WOULD LIKE A CALL TODAY     Action Taken: [facility] [place] [[id number]]    Travel Screening: Not Applicable

## 2024-01-10 NOTE — PROGRESS NOTES
PRS    No changes in history or exam, though the wound is smaller. Medically optimized. Discussed plan for surgery. Patient states that he would still want the LEFT middle finger amputated as it is stiff, painful and non-functioning.     OR today for LEFT palm wound irrigation and debridement, LEFT middle finger amputation with fillet flap palm wound reconstruction, possible complex closure    Discussed the risks of surgery, including but not limited to: Infection, bleeding, pain, poor scarring, injury to nerves or tendons, neuroma formation, complex regional pain syndrome, prolonged wound healing, loss of fillet flap, need for additional revision surgery, need for more proximal level amputation, anesthesia related complication, DVT, PE, death.  Explained that the most significant risk is persistent non-healing and patient understands this. Despite these risks, patient consents to and would like to proceed with left palm wound irrigation and debridement, possible complex closure, left middle finger amputation with fillet flap reconstruction of the left palm wound.     Sean Carlos MD, PhD

## 2024-01-10 NOTE — OP NOTE
PLASTIC SURGERY OPERATIVE REPORT     Date of Surgery: 1/10/2024  Surgical Service: Plastic Surgery     Preoperative Diagnosis:   1. 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 Status post elective left middle finger tendon surgery complicated by incision dehiscence and nonhealing wound     Postoperative Diagnosis: Same as preoperative diagnoses     Procedures Performed:  1.  Left palm wound irrigation and sharp debridement of skin, subcutaneous tissue and tendon with use of blade (3 x 2 cm)  2.  Left middle finger amputation  3.  Left palm wound reconstruction with local regional fillet flap using skin from the left middle finger based on radial and ulnar digital vessels     Attending:  DINA Carlos MD, PhD  Assistant: None     Complications: None apparent  Specimens:   1. Left palm wound skin margin for permanent pathology  2. Left middle finger amputation for gross pathology  Implants: None  Estimated blood loss: 10 mL  Tourniquet time: 39 minutes  Wound classification: Clean contaminated  Anesthesia: MAC with local anesthetic     Indications for Procedure: 83-year-old male with history of left palm soft tissue malignancy with remote excision and radiation treatment with more recent left middle finger elective tendon surgery complicated by incision dehiscence and nonhealing wound.  After discussing options for management, including ongoing wound dressing changes versus reconstruction, patient elects undergo surgery.  The simplest reconstructive option, in my opinion, may involve left middle finger amputation with use of the skin as a fillet flap for reconstruction.  Since the left middle finger is painful, stiff, and generally nonfunctioning, patient prefers to have a left middle finger amputation and is agreeable to a left palm wound reconstruction using a fillet flap.  Explained that the biggest risk is additional or persistent nonhealing.  Discussed that this  will be minimized hopefully by reducing tension on the closure, taking down the tourniquet to evaluate for the fillet flap perfusion prior to inset, as well as being conservative about skin excision such that if it does not heal we can potentially readvanced the flap as needed.  After discussing the risks, benefits and alternatives, patient elects undergo left palm wound irrigation and debridement, left middle finger amputation with left palm wound reconstruction using a fillet flap.     Intraoperative findings: No evidence of ongoing infection.  Left palm wound with exposed flexor tendon that is desiccated.  Surrounding skin of the wound margin is macerated, was excised and sent as permanent specimen for pathology.  Following left middle finger amputation, the fillet flap was well-perfused on tourniquet takedown.  Under no tension, the left palm wound was reconstructed with gentle advancement of fillet flap with inset.  The fillet flap was well-perfused on inset at the end of the case.  Hemostasis was ensured prior to closure.     Description of Procedure: Patient was seen in the preoperative holding area.  Consent was verified.  The left palm was marked.  All additional questions were answered.  Discussed the risks of surgery, including but not limited to: Infection, bleeding, pain, poor scarring, injury to nerves or tendons, neuroma formation, complex regional pain syndrome, prolonged wound healing, loss of fillet flap, need for additional revision surgery, need for more proximal level amputation, anesthesia related complication, DVT, PE, death.  Explained that the most significant risk is persistent non-healing and patient understands this. Despite these risks, patient consents to and would like to proceed with left palm wound irrigation and debridement, possible complex closure, left middle finger amputation with fillet flap reconstruction of the left palm wound.   Patient was then transferred to the operating  room and placed supine on the operating table.  All pressure points were padded.  Sequential compression devices were placed on bilateral lower extremities and verified to be operational.  IV antibiotic prophylaxis was given.  Preinduction timeout was performed.  Monitored anesthesia care was commenced.  The left middle finger was blocked with a mixture of 1% lidocaine and 0.25% bupivacaine plain, as well as the skin around the wound in the left palm.  The left hand was prepped and draped using Betadine in usual sterile fashion.  Timeout was done.  The start the case the left hand was elevated, and the forearm tourniquet was inflated to 250 mmHg.  #15 blade was used to incise the margin of the left palm wound.  The distalmost portion of the elliptical excision was marked with a suture.  This skin and underlying soft tissue was sent for pathology as a permanent specimen to rule out recurrent malignancy.  At this point, incision was made down the volar longitudinal length of the left middle finger and then circumferentially just distal to the distal interphalangeal crease.  The tip of the left middle finger was excised after disarticulation at the distal interphalangeal joint.  Next, the middle phalanx as well as the proximal phalanx were  from the surrounding soft tissues and excised.  Once done, the digital nerves were identified, gently dissected with tenotomy scissors, and then allowed to retract into the palm with neurectomy.  At this point, the surgical wound was irrigated with sterile saline.  The tourniquet was taken down.  Hemostasis was done with bipolar electrocautery.  Next, the fillet flap was gently advanced onto the left palm wound with no tension.  The skin was additionally marked conservatively.  The additional skin from the fillet flap was incised using a #15 blade.  At this point, the perfusion to the fillet flap was confirmed to be robust up to the tip of the flap distally, which would be  inset along the proximal margin of the left palm wound.  Finally, the fillet flap was inset into the left palm wound using interrupted 4-0 and 3-0 nylon suture.  Along the distal radial aspect of the closure, a small Penrose was inserted to allow for egress of fluid.  Once done, the incision was dressed with Xeroform and bacitracin, 4 x 4 gauze, cast padding and a soft ACE bandage dressing was completed.  Patient tolerated the procedure with no issues and was transferred to the postanesthesia care unit with no events.     Postoperative plan: Clinic in 1 week for Penrose removal and evaluation.     Sean Carlos MD, PhD

## 2024-01-10 NOTE — ANESTHESIA CARE TRANSFER NOTE
Patient: Noe Goetz [facility]    Procedure: Procedure(s):  LEFT PALM WOUND IRRIGATION AND DEBRIDEMENT,  LEFT MIDDLE FINGER AMPUTATION WITH FILLET FLAP RECONSTRUCTION OF THE LEFT PALM WOUND       Diagnosis: Open wound of left palm [S61.402A]  Diagnosis Additional Information: No value filed.    Anesthesia Type:   MAC     Note:    Oropharynx: oropharynx clear of all foreign objects and spontaneously breathing  Level of Consciousness: awake  Oxygen Supplementation: room air    Independent Airway: airway patency satisfactory and stable  Dentition: dentition unchanged  Vital Signs Stable: post-procedure vital signs reviewed and stable  Report to RN Given: handoff report given  Patient transferred to: Phase II    Handoff Report: Identifed the Patient, Identified the Reponsible Provider, Reviewed the pertinent medical history, Discussed the surgical course, Reviewed Intra-OP anesthesia mangement and issues during anesthesia, Set expectations for post-procedure period and Allowed opportunity for questions and acknowledgement of understanding    Vitals:  Vitals Value Taken Time   BP     Temp     Pulse     Resp     SpO2         Electronically Signed By: MARCY Chau CRNA  January 10, 2024  9:40 AM

## 2024-01-10 NOTE — DISCHARGE INSTRUCTIONS
Tylenol 975 mg was given at 6:35 am.    You should not take more then 4,000 mg of tylenol/acetaminophen in a 24 hour period.    Hand Surgery Discharge Instructions    Patient has been treated for left palm wound with Dr. Carlos on 1/10/2023.     Care: Please keep the splint/cast/dressing clean and dry at all times. Should it get wet, please call the clinic to schedule an appointment - as it may need to be replaced. Do not hesitate to use the sling to help protect the affected extremity and in particular in the setting of a nerve block.     Medications: You can take Ibuprofen 400-800 mg and Tylenol 650 mg for pain relief. Please take each every 6 hours, and for optimal pain relief - please stagger the medications so that you are taking one or the other every 3 hours. If you had a nerve block, the effects may last 8-12 hours. If you have been prescribed additional pain medications, please take as instructed and as needed. If you are taking additional pain medications, please do not exceed 4000 mg of Tylenol daily from all sources. Also, if you are taking narcotic medications, please do not operate heavy machinery or drive. If you have been prescribed antibiotics, please also take as instructed.     Diet: Start with clear liquids and slow down if nauseated. Advance the diet as tolerated.    Weight-bearing/Activities: Move your fingers to prevent stiffness. Elevate the affected extremity to improve throbbing sensation or pain. No strenuous activities and do not raise your heart rate above 100 bpm for the first few weeks. Limit your weight-bearing with the affected extremity to 2-3 pounds unless otherwise specified.    ER Instructions: Please call the office and/or consider return to the ER if you experience worsening pain not relieved by medications, increased swelling, redness or high fevers >101F or if there are unexpected problems like shortness of breath.    Post-op follow-up: Clinic next week with Dr. Carlos  at the [place] [facility]. Please call our  Ortho triage line if you have any questions or concerns before your clinic visit: [phone].    Sean Carlos MD, PhD  For questions or concerns regarding your procedure, please contact Dr. Carlos at [phone].

## 2024-01-10 NOTE — ANESTHESIA POSTPROCEDURE EVALUATION
Patient: Noe Goetz [facility]    Procedure: Procedure(s):  LEFT PALM WOUND IRRIGATION AND DEBRIDEMENT,  LEFT MIDDLE FINGER AMPUTATION WITH FILLET FLAP RECONSTRUCTION OF THE LEFT PALM WOUND       Anesthesia Type:  MAC    Note:  Disposition: Outpatient   Postop Pain Control: Uneventful            Sign Out: Well controlled pain   PONV: No   Neuro/Psych: Uneventful            Sign Out: Acceptable/Baseline neuro status   Airway/Respiratory: Uneventful            Sign Out: Acceptable/Baseline resp. status   CV/Hemodynamics: Uneventful            Sign Out: Acceptable CV status; No obvious hypovolemia; No obvious fluid overload   Other NRE:    DID A NON-ROUTINE EVENT OCCUR? No           Last vitals:  Vitals Value Taken Time   /63 01/10/24 1000   Temp 97.1  F (36.2  C) 01/10/24 0944   Pulse     Resp 16 01/10/24 1000   SpO2 92 % 01/10/24 1000       Electronically Signed By: Jordan Chahal MD  January 10, 2024  11:50 AM

## 2024-01-11 NOTE — TELEPHONE ENCOUNTER
"Nurse Triage SBAR    Is this a 2nd Level Triage? YES, LICENSED PRACTITIONER REVIEW IS REQUIRED    Situation:   Patient with wet blood thru his initial dressings to his hand.      Background:   LEFT PALM WOUND IRRIGATION AND DEBRIDEMENT, Left MAC with Local   LEFT MIDDLE FINGER AMPUTATION WITH FILLET FLAP RECONSTRUCTION OF THE LEFT PALM WOUND       Sean Carlos MD   1-      Assessment:   Patient denies SOB or chest discomfort  Patient denies dizziness  Patient denies fever or chills    Protocol Recommended Disposition:   Routing to Plastics for a call back  RN also called and had to leave a voice message with this information as well.  RN tried to teams staff seen in chart with no return contact, per to try teams first, then call.    Recommendation:   Reinforce dressing with another dressing or a cloth until they hear back from the clinic.  They are also encouraged to my chart as well, as they reported to me that they do not get responses when calling    .  Routing to Provider      Reason for Disposition    Patient wants to be seen    Additional Information    Negative: Bright red, wide-spread, sunburn-like rash    Negative: SEVERE headache and after spinal (epidural) anesthesia    Negative: Vomiting and persists > 4 hours    Negative: Vomiting and abdomen looks much more swollen than usual    Negative: Drinking very little and dehydration suspected (e.g., no urine > 12 hours, very dry mouth, very lightheaded)    Negative: Patient sounds very sick or weak to the triager    Negative: Sounds like a serious complication to the triager    Negative: Fever > 100.4 F (38.0 C)    Negative: SEVERE post-op pain (e.g., excruciating, pain scale 8-10) that is not controlled with pain medications    Negative: Headache and after spinal (epidural) anesthesia and not severe    Negative: Fever present > 3 days (72 hours)    Answer Assessment - Initial Assessment Questions  1. SYMPTOM: \"What's the main symptom you're " "concerned about?\" (e.g., pain, fever, vomiting)      Ace wrap if wet with blood, after surgery yesterday  2. ONSET: \"When did   start?\"      today  3. SURGERY: \"What surgery did you have?\"        4. DATE of SURGERY: \"When was the surgery?\"       01-  5. ANESTHESIA: \" What type of anesthesia did you have?\" (e.g., general, spinal, epidural, local)        6. PAIN: \"Is there any pain?\" If Yes, ask: \"How bad is it?\"  (Scale 1-10; or mild, moderate, severe)        7. FEVER: \"Do you have a fever?\" If Yes, ask: \"What is your temperature, how was it measured, and when did it start?\"      no  8. VOMITING: \"Is there any vomiting?\" If Yes, ask: \"How many times?\"      no  9. BLEEDING: \"Is there any bleeding?\" If Yes, ask: \"How much?\" and \"Where?\"      Yes hand thru dressing  10. OTHER SYMPTOMS: \"Do you have any other symptoms?\" (e.g., drainage from wound, painful urination, constipation)        Wet blood on dressing to hand,  patient denies SOB or chest discomfort.  Denies dizziness.  Wife and daughter with him currently   Denies fever or chills..    Protocols used: Post-Op Symptoms and [id number]    "

## 2024-01-17 NOTE — TELEPHONE ENCOUNTER
S/p Left palm wound I&D, left middle finger amputation, left palm wound reconstruction with local regional fillet flap using skin from the left middle finger, DOS: 1/10/24    HYDROcodone-acetaminophen (NORCO) 5-325 MG tablet       Last Written Prescription Date:  1/10/24  Last Fill Quantity: 12,   # refills: 0  Future Office visit: 1/19/24    Berry Cazares RN

## 2024-01-19 NOTE — LETTER
1/19/2024         RE: Noe Morales  [address] [address]        Dear Colleague,    Thank you for referring your patient, Noe Morales, to the [facility]. Please see a copy of my visit note below.    Ortho Hand    Doing well.  No issues.  No fevers or chills.  Has not washed the hand yet.  Complains of some swelling.  Penrose drain was removed at home.    On exam, left palm incision line intact with no wounds but there is blanching erythema.  Fillet flap remains viable.  Can make a full left composite fist, though somewhat limited due to swelling.    Path: Skin and subcutaneous soft tissue with extensive acute inflammation, ulceration, granulation tissue formation and fibrosis.  Negative for malignancy.  Left middle finger amputation with benign tissue.    A/P: 83-year-old 1 week status post left palm wound irrigation debridement with middle finger amputation and fillet flap reconstruction    -Since the patient has blanching erythema in the setting of prior irradiated skin, my concern is that he he may have developed cellulitis.  In either case, my recommendation is to start the patient on oral antibiotics.  Patient is agreeable.  Additionally, patient should wash the hand with warm water and soap and gently pat or air dry.  -Sutures to remain in place  -Reassured the patient that the swelling is typical postoperatively and that it should improve over time.  There may take some time to improve in the setting of prior radiation tissue injury.  -Continue to observe for any worsening signs of infection  -Return to clinic in 1 week for reevaluation    Sean Carlos MD, PhD        Again, thank you for allowing me to participate in the care of your patient.        Sincerely,        Sean Carlos MD

## 2024-01-19 NOTE — TELEPHONE ENCOUNTER
Called and spoke with Pavan who was questioning the need for two narcotics orders. Relayed to hold the script sent in today until the script from 1/18/24 runs out.

## 2024-01-19 NOTE — TELEPHONE ENCOUNTER
[facility] Call Center    Phone Message    May a detailed message be left on voicemail: yes     Reason for Call: Other: Pavan calling from [employer] with a question on an Rx.      Action Taken: Other: TE     Travel Screening: Not Applicable

## 2024-01-22 NOTE — PROGRESS NOTES
Ortho Hand    Doing well.  No issues.  No fevers or chills.  Has not washed the hand yet.  Complains of some swelling.  Penrose drain was removed at home.    On exam, left palm incision line intact with no wounds but there is blanching erythema.  Fillet flap remains viable.  Can make a full left composite fist, though somewhat limited due to swelling.    Path: Skin and subcutaneous soft tissue with extensive acute inflammation, ulceration, granulation tissue formation and fibrosis.  Negative for malignancy.  Left middle finger amputation with benign tissue.    A/P: 83-year-old 1 week status post left palm wound irrigation debridement with middle finger amputation and fillet flap reconstruction    -Since the patient has blanching erythema in the setting of prior irradiated skin, my concern is that he he may have developed cellulitis.  In either case, my recommendation is to start the patient on oral antibiotics.  Patient is agreeable.  Additionally, patient should wash the hand with warm water and soap and gently pat or air dry.  -Sutures to remain in place  -Reassured the patient that the swelling is typical postoperatively and that it should improve over time.  There may take some time to improve in the setting of prior radiation tissue injury.  -Continue to observe for any worsening signs of infection  -Return to clinic in 1 week for reevaluation    Sean Carlos MD, PhD

## 2024-01-26 NOTE — LETTER
1/26/2024         RE: Noe Morales  [address] [address]        Dear Colleague,    Thank you for referring your patient, Noe Morales, to the [facility]. Please see a copy of my visit note below.    Ortho Hand    Doing better. Less swelling. Can flex the left ring finger easier than before. No fevers or chills.     On exam, L palm incisions intact with no wounds or signs of infection, less swelling, still with some blanching erythema.    A/P: 83M 2 weeks s/p L palm wound I/D, L MF amputation, fillet flap reconstruction    -Since patient has radiated soft tissues, and the incisions are expected to take longer to heal, we will plan to delay suture removal for an additional 1-2 weeks.   -Instructed patient to wash the left hand with warm water and soap to loosen up the crusting along the incisions as well as to decrease the bacterial burden by keeping everything clean.   -Encouraged patient to keep the hand elevated to reduce swelling and to move the fingers to prevent stiffness  -Continue antibiotics in lieu of blanching erythema, which may most represent radiated soft tissue in the setting of inflammation expected during healing, but could also be mild cellulitis  -Return to clinic in 1-2 weeks for suture removal    Sean Carlos MD, PhD      Again, thank you for allowing me to participate in the care of your patient.        Sincerely,        Sean Carlos MD

## 2024-01-28 NOTE — PROGRESS NOTES
Ortho Hand    Doing better. Less swelling. Can flex the left ring finger easier than before. No fevers or chills.     On exam, L palm incisions intact with no wounds or signs of infection, less swelling, still with some blanching erythema.    A/P: 83M 2 weeks s/p L palm wound I/D, L MF amputation, fillet flap reconstruction    -Since patient has radiated soft tissues, and the incisions are expected to take longer to heal, we will plan to delay suture removal for an additional 1-2 weeks.   -Instructed patient to wash the left hand with warm water and soap to loosen up the crusting along the incisions as well as to decrease the bacterial burden by keeping everything clean.   -Encouraged patient to keep the hand elevated to reduce swelling and to move the fingers to prevent stiffness  -Continue antibiotics in lieu of blanching erythema, which may most represent radiated soft tissue in the setting of inflammation expected during healing, but could also be mild cellulitis  -Return to clinic in 1-2 weeks for suture removal    Sean Carlos MD, PhD

## 2024-01-29 NOTE — TELEPHONE ENCOUNTER
Waiting to hear if both oxycodone and hydrocodone rx's were filled. May change this to hydrocodone depending on Pt preference, but this would be last prescription of narcotics and then Pt would be expected to transition to OTC pain medication only.    oxyCODONE (ROXICODONE) 5 MG tablet       Last Written Prescription Date:  1/19/24  Last Fill Quantity: 12,   # refills: 0  Last Office Visit: 1/26/24  Future Office visit:    Next 5 appointments (look out 90 days)      Feb 08, 2024  2:00 PM  Nurse Only with [facility] NURSE ONLY ORTHO  [facility] ([facility] [address] [address] [place] [id number] [phone]           Berry Cazares RN

## 2024-01-30 NOTE — CONFIDENTIAL NOTE
Patient would like to have script for Norco instead of Oxycodone. Called [facility] and verified that they will cancel the Oxycodone script and patient has not picked it up.   New prescription routed.   Chantell Moore RN

## 2024-02-08 NOTE — PROGRESS NOTES
Noe Goetz [facility] comes into clinic today at the request of Dr. Carlos for suture removal and wound check.    S: The patient is status post Left palm wound I&D, left middle finger amputation, left palm wound reconstruction with local regional fillet flap using skin from the left middle finger, DOS: 1/10/24.   There has been no history of infection or drainage. Pt has no complaints. Pt reports elevating and icing with relief.     O/A: Four sutures seen along the left palm. Incision is well approximated, clean, dry, intact, and no signs of infection. Sutures removed without difficulty. Moderate swelling. Small amount of soreness. Pt admits to removing a large number of stitches on his own by picking at them with his fingers.    P: Routine wound care discussed. We specifically discussed keeping the hand clean and dry. After the suture sites are healed, Pt will be sure to keep skin folds dry to prevent skin infections. The patient will follow up in as needed. If there are any concerns or signs and symptoms of infection, patient will reach out to the clinic. Patient is agreeable with plan.    This service provided today was under the direct supervision of Dr. Dunn, who was available if needed.    Berry Cazares RN

## 2024-02-09 PROBLEM — U07.1 COVID-19 VIRUS INFECTION: Status: ACTIVE | Noted: 2024-01-01

## 2024-02-09 PROBLEM — R09.02 HYPOXIA: Status: ACTIVE | Noted: 2024-01-01

## 2024-02-10 NOTE — PROGRESS NOTES
Pt moving indep/supervision in the room on room air, 94% at rest and 91% after walking, says he has been a little dizzy today but did not affect his mobility, no further skilled PT needs, rec discharge to home.     02/10/24 8380   Appointment Info   Signing Clinician's Name / Credentials (PT) Carlenedima Quickbo, PT   Living Environment   People in Home spouse   Current Living Arrangements house   Home Accessibility no concerns   Self-Care   Usual Activity Tolerance moderate   Current Activity Tolerance moderate   Equipment Currently Used at Home none   Activity/Exercise/Self-Care Comment wife and pt share responsibilities at home, no AD used, pt ambulates in the community   General Information   Onset of Illness/Injury or Date of Surgery 02/09/24   Referring Physician Kae Casper MD   Patient/Family Therapy Goals Statement (PT) to return home   Pertinent History of Current Problem (include personal factors and/or comorbidities that impact the POC) weakness, fever, COVID   Existing Precautions/Restrictions fall   Cognition   Affect/Mental Status (Cognition) WNL   Orientation Status (Cognition) oriented x 4   Transfers   Comment, (Transfers) indep/supervision sit<>stand (reported dizziness)   Gait/Stairs (Locomotion)   [place] Level (Gait) independent;supervision   Distance in Feet (Gait) 40 ft in room, no AD, on RA   Pattern (Gait) step-through   Deviations/Abnormal Patterns (Gait) base of support, wide   Clinical Impression   Criteria for Skilled Therapeutic Intervention Evaluation only   Clinical Presentation (PT Evaluation Complexity) stable   Clinical Presentation Rationale clinical judgemnet   Clinical Decision Making (Complexity) low complexity   Risk & Benefits of therapy have been explained evaluation/treatment results reviewed;care plan/treatment goals reviewed;participants voiced agreement with care plan;patient   PT Total Evaluation Time   PT Rikki, Low Complexity Minutes (59965) 15   PT Discharge  Planning   PT Discharge Recommendation (DC Rec) home   PT Rationale for DC Rec pt at baseline for mobility, rec home with wife   PT Brief overview of current status up indep in room, SpO2 at rest 94% on RA, after walking was 91%, having a little dizziness but did not affect his mobility   Total Session Time   Total Session Time (sum of timed and untimed services) 15

## 2024-02-10 NOTE — PLAN OF CARE
[facility] [place]    Patient admitted to room 2313 at approximately 0100 via cart from emergency room. Patient was accompanied by nurse.     Verbal SBAR report received from Pattie GUTIERREZ prior to patient arrival.     Patient trasferred to bed via self. Patient alert and oriented X 3. The patient is not having any pain.  . Admission vital signs: Blood pressure 121/62, pulse 83, temperature 97.6  F (36.4  C), temperature source Oral, resp. rate 17, weight 130.6 kg (288 lb), SpO2 96%. Patient was oriented to plan of care, call light, bed controls, tv, telephone, bathroom, and visiting hours.     Risk Assessment    The following safety risks were identified during admission: fall. Yellow risk band applied: YES.     Skin Initial Assessment    This writer admitted this patient and completed a full skin assessment and Reddy score in the Adult PCS flowsheet. Appropriate interventions initiated as needed. Blanchable redness bilateral hands. Open area right middle finger nail. Left surgical hand middle finger amputated with slight scabbing. Applied new clean dry dressing. Blanchable redness under pannus, bilateral groin, toes, feet and heels. Dry scaly feet.     Secondary skin check, not done due to covid.         Education    Patient has a [place] to Observation order: No  Observation education completed and documented: N/A      Lynsey Madrigal RN

## 2024-02-10 NOTE — H&P
[facility]    History and Physical - Hospitalist Service       Date of Admission:  2/9/2024    Assessment & Plan      Noe Goetz [facility] is a 83 year old male admitted on 2/9/2024. He confusion, fever and hypoxia    COVID  Has received the vaccines.  Tested positive today.  Mild hypoxia could be for many reasons so will hold off on steroids at this time due to new finger amputation.    LEFT MIDDLE FINGER AMPUTATION  Amputation around middle of January and some complications with healing.  Had some radiation to that area in the past.  Concern for cellulitis so he was started on antibiotics which will be continued.    HYPOXIA  Could be due to COVID or poorly treated HODA or his COPD.  He was only needing 1L.  Continue and monitor.  Has mask as he is a mouth breather.    CONFUSION  Some confusion per wife who sent him in but no obvious confusion at this time.  Monitor and consider SLUMs if needed.    ALCOHOL ABUSE  Drinks 4 drinks nightly and states withdrawal is mild shakiness.  Hold on CIWA at this time.  Encouraged to stop drinking completely    HODA  Usually uses CPAP    COPD  Will start combivent inhaler instead of albuterol and monitor.  No oxygen at home usually.    OBESITY  Will affect all aspects of his health.  Carb controlled diet for now.    DEPRESSION  Continue large dose of antidepressant.        Diet:  carb controlled  DVT Prophylaxis: Enoxaparin (Lovenox) SQ  Kennedy Catheter: Not present  Lines: None     Cardiac Monitoring: None  Code Status:  full and his wife is his POA.    Clinically Significant Risk Factors Present on Admission                  # Hypertension: Noted on problem list                 Disposition Plan      Expected Discharge Date: 02/11/2024                  Kae Casper MD  Hospitalist Service  [facility]  Securely message with [employer] (more info)  Text page via [employer] Paging/Directory  "    ______________________________________________________________________    Chief Complaint   confusion    History is obtained from the patient    History of Present Illness   Noe Goetz [facility] is a 83 year old male who has a past medical history of Alcohol dependence , Anxiety, BPH, COPD, Depression, Fatty liver, GERD, Gout, History of sarcoma, Hypertension, Mixed hyperlipidemia, Morbid obesity, and Personal history of DVt.  EMS brinds him in due to fever and confusion.  Wife reports that patient spiked a fever today up to 103 degrees and seemed confused.  Patient does report a cough and states yesterday he felt well though he felt \"a little dry\".  He denies pain with urination, rashes, abdominal pain or chest pain.  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  He reports he continues to take these antibiotics.       Past Medical History    Past Medical History:   Diagnosis Date    Alcohol dependence (H)     Allergic rhinitis     Anxiety     BPH (benign prostatic hyperplasia)     COPD (chronic obstructive pulmonary disease) (H)     Depression     Fatty liver     GERD (gastroesophageal reflux disease)     Gout     History of sarcoma     Hypertension     Mixed hyperlipidemia     Morbid obesity (H)     Personal history of DVT (deep vein thrombosis)        Past Surgical History   Past Surgical History:   Procedure Laterality Date    AMPUTATE FINGER(S) Left 1/10/2024    Procedure: LEFT MIDDLE FINGER AMPUTATION WITH FILLET FLAP RECONSTRUCTION OF THE LEFT PALM WOUND;  Surgeon: Sean Carlos MD;  Location: [facility] OR    ARTHROPLASTY KNEE Right 05/20/2019    Procedure: ARTHROPLASTY, KNEE;  Surgeon: Reynaldo Barnes MD;  Location: [facility]    ARTHROTOMY SHOULDER, ROTATOR CUFF REPAIR, COMBINED  07/09/2012    Procedure: COMBINED " ARTHROTOMY SHOULDER, ROTATOR CUFF REPAIR;  Right shoulder biceps tendodesis;  Surgeon: Reynaldo Barnes MD;  Location: [facility] OR    HAND SURGERY Right     IRRIGATION AND DEBRIDEMENT HAND, COMBINED Left 1/10/2024    Procedure: LEFT PALM WOUND IRRIGATION AND DEBRIDEMENT,;  Surgeon: Sean Carlos MD;  Location: [facility] OR       Prior to Admission Medications   Prior to Admission Medications   Prescriptions Last Dose Informant Patient Reported? Taking?   ATENOLOL 50 MG OR TABS  Self Yes No   Sig: Take 50 mg by mouth every morning   DULOXETINE HCL PO  Self Yes No   Sig: Take 120 mg by mouth every evening   FUROSEMIDE PO  Self Yes No   Sig: Take 40 mg by mouth 2 times daily Am and 5 pm   GABAPENTIN PO  Self Yes No   Sig: Take 600-1,200 mg by mouth 3 times daily   HYDROcodone-acetaminophen (NORCO) 5-325 MG tablet   No No   Sig: Take 1 tablet by mouth every 12 hours as needed for pain   LOVASTATIN PO  Self Yes No   Sig: Take 40 mg by mouth At Bedtime.     Magnesium 70 MG CAPS  Self Yes No   Sig: Take 1 capsule by mouth every morning   PRILOSEC OTC 20 MG OR TBEC  Self Yes No   Sig: Take 20 mg by mouth 2 times daily   Potassium Chloride (KLOR-CON PO)  Self Yes No   Sig: Take 20 mEq by mouth every morning   VITAMIN D, CHOLECALCIFEROL, PO  Self Yes No   Sig: Take 2,000 Units by mouth every morning   albuterol 90 MCG/ACT inhaler  Self Yes No   Sig: Inhale 2 puffs into the lungs every 6 hours as needed.     cephALEXin (KEFLEX) 500 MG capsule   No No   Sig: Take 1 capsule (500 mg) by mouth 4 times daily   ciprofloxacin (CIPRO) 500 MG tablet   No No   Sig: Take 1 tablet (500 mg) by mouth 2 times daily   ciprofloxacin (CIPRO) 500 MG tablet   No No   Sig: Take 1 tablet (500 mg) by mouth 2 times daily   doxazosin (CARDURA) 4 MG tablet  Self Yes No   Sig: Take 2 mg by mouth At Bedtime    doxycycline hyclate (VIBRAMYCIN) 100 MG capsule   No No   Sig: Take 1 capsule (100 mg) by mouth 2 times daily   doxycycline hyclate (VIBRAMYCIN)  100 MG capsule   No No   Sig: Take 1 capsule (100 mg) by mouth 2 times daily   ferrous sulfate (IRON) 325 (65 FE) MG tablet  Self Yes No   Sig: Take 325 mg by mouth daily (with breakfast)   fluticasone (FLONASE) 50 MCG/ACT nasal spray  Self Yes No   Sig: Spray 2 sprays into both nostrils daily   fluticasone-salmeterol (ADVAIR) 250-50 MCG/DOSE inhaler  Self Yes No   Sig: Inhale 1 puff into the lungs every 12 hours   hydrOXYzine (ATARAX) 25 MG tablet  Self No No   Sig: Take 1-2 tablets (25-50 mg) by mouth every 6 hours as needed for itching or other (For breakthrough pain.  Pain rating 1-5 give 25mg; pain rating 6-10 give 50mg)   multivitamin  with lutein (OCUVITE WITH LTEIN) CAPS  Self Yes No   Sig: Take 1 capsule by mouth every morning   ondansetron (ZOFRAN) 4 MG tablet   No No   Sig: Take 1 tablet (4 mg) by mouth every 6 hours as needed for nausea   order for DME  Self No No   Sig: Equipment being ordered: Walker ()  Treatment Diagnosis: R knee osteoarthritis s/p R TKA   psyllium (METAMUCIL) 58.6 % POWD  Self Yes No   Sig: Take 1 Tablespoonful by mouth every morning   senna-docusate (SENOKOT-S/PERICOLACE) 8.6-50 MG tablet  Self No No   Sig: Take 1-2 tablets by mouth 2 times daily   Patient taking differently: Take 1-2 tablets by mouth daily as needed      Facility-Administered Medications: None        Review of Systems    The 10 point Review of Systems is negative other than noted in the HPI or here.     Social History   I have reviewed this patient's social history and updated it with pertinent information if needed.  Social History     Tobacco Use    Smoking status: Never     Passive exposure: Never    Smokeless tobacco: Never   Substance Use Topics    Alcohol use: Yes    Drug use: Never         Family History           Allergies   Allergies   Allergen Reactions    Nkda [No Known Drug Allergy]         Physical Exam   Vital Signs: Temp: (!) 100.5  F (38.1  C) Temp src: Oral BP: (!) 149/84 Pulse: 84   Resp: 21  SpO2: 92 % O2 Device: None (Room air)    Weight: 288 lbs 0 oz    General Appearance: Wd obese man in nad. Alert and oriented?  Respiratory: lung sounds diminished, no dyspnea  Cardiovascular: rrr  GI: no abd distension or pain  Skin: left hand with healing incision at middle finger.  Right hand middle finger with some irriations.       Medical Decision Making             Data   ------------------------- PAST 24 HR DATA REVIEWED -----------------------------------------------    I have personally reviewed the following data over the past 24 hrs:    6.4  \   13.5   / 139 (L)     134 (L) 97 (L) 15.1 /  118 (H)   3.9 26 0.93 \     ALT: 19 AST: 21 AP: 58 TBILI: 0.6   ALB: 4.1 TOT PROTEIN: 6.9 LIPASE: N/A     Procal: 0.13 CRP: 90.60 (H) Lactic Acid: 1.1         Imaging results reviewed over the past 24 hrs:   Recent Results (from the past 24 hour(s))   XR Chest Port 1 View    Narrative    EXAM: XR CHEST PORT 1 VIEW  LOCATION: [facility]  DATE: 2/9/2024    INDICATION: fever, hypoxia, covid  COMPARISON: 10/04/2023      Impression    IMPRESSION: Mild enlargement of the cardiac silhouette, though accentuated by AP portable technique. Calcified aortic arch. A few scattered bands of left midlung and basilar atelectasis. No airspace consolidation. No pneumothorax. Left shoulder   arthroplasty. Severe arthritis of the right shoulder.     As the provider for the telehealth service, I attest that I introduced myself to the patient and provided my credentials. Based on review of the patient s chart and/or a discussion with members of the patient s treatment team, I determined that telemedicine via a real-time, two-way, interactive audio and video platform is an appropriate means of providing this service. The patient and I mutually agreed that this visit is appropriate for telemedicine as well.

## 2024-02-10 NOTE — ED TRIAGE NOTES
Arrives here via EMS from home after noticing a fever of 103. Pt has a cough. . Normal saline given in route     Triage Assessment (Adult)       Row Name 02/09/24 2121          Triage Assessment    Airway WDL WDL        Respiratory WDL    Respiratory WDL cough;X     Cough Frequency frequent        Skin Circulation/Temperature WDL    Skin Circulation/Temperature WDL WDL        Cardiac WDL    Cardiac WDL WDL        Peripheral/Neurovascular WDL    Peripheral Neurovascular WDL WDL        Cognitive/Neuro/Behavioral WDL    Cognitive/Neuro/Behavioral WDL WDL

## 2024-02-10 NOTE — ED PROVIDER NOTES
"  History     Chief Complaint   Patient presents with    Fever     Weakness       HPI  Noe Morales is a 83 year old male who  has a past medical history of Alcohol dependence (H), Allergic rhinitis, Anxiety, BPH (benign prostatic hyperplasia), COPD (chronic obstructive pulmonary disease) (H), Depression, Fatty liver, GERD (gastroesophageal reflux disease), Gout, History of sarcoma, Hypertension, Mixed hyperlipidemia, Morbid obesity (H), and Personal history of DVT (deep vein thrombosis).  He presents to the emergency department via EMS.  They were called to the house where he lives with his wife due to fever and confusion.  Wife reports that patient spiked a fever today up to 103 degrees and seemed confused.  Patient does report that he has been having a cough and states yesterday he felt well though he felt \"a little dry\".  He denies pain with urination, rashes, abdominal pain or chest pain.  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  He reports he continues to take these antibiotics.          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     Priority: Medium    CAD (coronary artery disease) 07/15/2010     Priority: Medium    Sleep apnea 03/15/2007     " Priority: Medium    Esophageal reflux 01/17/2007     Priority: Medium    Essential hypertension 01/17/2007     Priority: Medium    Hay fever 01/17/2007     Priority: Medium    Asbestosis (H) 01/17/2007     Priority: Medium     Pos but stable ct lung with plaques 2007 no change in 2010,      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, 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     Priority: Medium     Overview:   1998-excised      Other and unspecified disc disorder 01/17/2007     Priority: Medium     Overview:   lumbar          Past Medical History:    Past Medical History:   Diagnosis Date    Alcohol dependence (H)     Allergic rhinitis     Anxiety     BPH (benign prostatic hyperplasia)     COPD (chronic obstructive pulmonary disease) (H)     Depression     Fatty liver     GERD (gastroesophageal reflux disease)     Gout     History of sarcoma     Hypertension     Mixed hyperlipidemia     Morbid obesity (H)     Personal history of DVT (deep vein thrombosis)        Past Surgical History:    Past Surgical History:   Procedure Laterality Date    AMPUTATE FINGER(S) Left 1/10/2024    Procedure: LEFT MIDDLE FINGER AMPUTATION WITH FILLET FLAP RECONSTRUCTION OF THE LEFT PALM WOUND;  Surgeon: Sean Carlos MD;  Location:  OR    ARTHROPLASTY KNEE Right 05/20/2019    Procedure: ARTHROPLASTY, KNEE;  Surgeon: Reynaldo Barnes MD;  Location: [facility] OR    ARTHROTOMY SHOULDER, ROTATOR CUFF REPAIR, COMBINED  07/09/2012    Procedure: COMBINED ARTHROTOMY SHOULDER, ROTATOR CUFF REPAIR;  Right shoulder biceps tendodesis;  Surgeon: Reynaldo Barnes MD;  Location: [facility] OR    HAND SURGERY Right     IRRIGATION AND DEBRIDEMENT HAND, COMBINED Left 1/10/2024    Procedure: LEFT PALM WOUND IRRIGATION AND DEBRIDEMENT,;  Surgeon: Sean Carlos MD;  Location:  OR       Family History:    Family History   Problem Relation Age of Onset    Anesthesia Reaction No family hx of     Thrombosis No family hx of         Social History:  Marital Status:   [2]  Social History     Tobacco Use    Smoking status: Never     Passive exposure: Never    Smokeless tobacco: Never   Substance Use Topics    Alcohol use: Yes    Drug use: Never        Medications:    albuterol 90 MCG/ACT inhaler  ATENOLOL 50 MG OR TABS  cephALEXin (KEFLEX) 500 MG capsule  ciprofloxacin (CIPRO) 500 MG tablet  ciprofloxacin (CIPRO) 500 MG tablet  doxazosin (CARDURA) 4 MG tablet  doxycycline hyclate (VIBRAMYCIN) 100 MG capsule  doxycycline hyclate (VIBRAMYCIN) 100 MG capsule  DULOXETINE HCL PO  ferrous sulfate (IRON) 325 (65 FE) MG tablet  fluticasone (FLONASE) 50 MCG/ACT nasal spray  fluticasone-salmeterol (ADVAIR) 250-50 MCG/DOSE inhaler  FUROSEMIDE PO  GABAPENTIN PO  HYDROcodone-acetaminophen (NORCO) 5-325 MG tablet  hydrOXYzine (ATARAX) 25 MG tablet  LOVASTATIN PO  Magnesium 70 MG CAPS  multivitamin  with lutein (OCUVITE WITH LTEIN) CAPS  ondansetron (ZOFRAN) 4 MG tablet  order for DME  Potassium Chloride (KLOR-CON PO)  PRILOSEC OTC 20 MG OR TBEC  psyllium (METAMUCIL) 58.6 % POWD  senna-docusate (SENOKOT-S/PERICOLACE) 8.6-50 MG tablet  VITAMIN D, CHOLECALCIFEROL, PO          Review of Systems    Physical Exam   BP: (!) 149/84  Pulse: 84  Temp: (!) 100.5  F (38.1  C)  Resp: 21  Weight: 130.6 kg (288 lb)  SpO2: 92 %      Physical Exam  Vitals and nursing note reviewed.   Constitutional:       General: He is not in acute distress.     Appearance: He is well-developed. He is obese. He is not ill-appearing, toxic-appearing or diaphoretic.      Comments: Patient is awake and alert, he appears washed out and fatigued.  He answers questions slowly but appropriately and is oriented x 3.  He is maintaining an airway without difficulty.   HENT:      Head: Normocephalic and atraumatic.      Mouth/Throat:      Lips: Pink.      Mouth: Mucous membranes are moist.      Pharynx: Oropharynx is clear. No oropharyngeal exudate.   Eyes:      General: Lids are normal. No  scleral icterus.     Extraocular Movements: Extraocular movements intact.      Right eye: No nystagmus.      Left eye: No nystagmus.      Conjunctiva/sclera: Conjunctivae normal.      Pupils: Pupils are equal, round, and reactive to light.   Neck:      Thyroid: No thyromegaly.      Vascular: No JVD.      Trachea: No tracheal deviation.   Cardiovascular:      Rate and Rhythm: Normal rate and regular rhythm.      Pulses: Normal pulses.      Heart sounds: Normal heart sounds. No murmur heard.     No friction rub. No gallop.   Pulmonary:      Effort: Pulmonary effort is normal. No respiratory distress.      Breath sounds: Normal breath sounds.   Abdominal:      General: Bowel sounds are normal. There is no distension.      Palpations: Abdomen is soft. There is no mass.      Tenderness: There is no abdominal tenderness. There is no guarding or rebound.      Hernia: A hernia is present. Hernia is present in the umbilical area.      Comments: Soft, reducible umbilical hernia noted.   Musculoskeletal:         General: No tenderness. Normal range of motion.      Cervical back: Normal range of motion and neck supple. No erythema or rigidity.      Right lower leg: Edema present.      Left lower leg: Edema present.   Lymphadenopathy:      Cervical: No cervical adenopathy.   Skin:     General: Skin is warm and dry.      Capillary Refill: Capillary refill takes less than 2 seconds.      Coloration: Skin is not pale.      Findings: Erythema present. No rash.      Comments: Patient has venous stasis changes of bilateral lower extremities which appears essentially symmetrical.  He has a dressing on his left hand, third finger amputation noted, no significant swelling or erythema noted.   Neurological:      Mental Status: He is alert and oriented to person, place, and time.      Cranial Nerves: No cranial nerve deficit.      Sensory: No sensory deficit.      Motor: Motor function is intact.   Psychiatric:         Mood and Affect:  Mood and affect normal.         Speech: Speech normal.         Behavior: Behavior normal.         ED Course                 Procedures                  Results for orders placed or performed during the hospital encounter of 02/09/24 (from the past 24 hour(s))   Symptomatic Influenza A/B, RSV, & SARS-CoV2 PCR (COVID-19) Nasopharyngeal    Specimen: Nasopharyngeal; Swab   Result Value Ref Range    Influenza A PCR Negative Negative    Influenza B PCR Negative Negative    RSV PCR Negative Negative    SARS CoV2 PCR Positive (A) Negative    Narrative    Testing was performed using the Xpert Xpress CoV2/Flu/RSV Assay on the [employer] Instrument. This test should be ordered for the detection of SARS-CoV-2, influenza, and RSV viruses in individuals who meet clinical and/or epidemiological criteria. Test performance is unknown in asymptomatic patients. This test is for in vitro diagnostic use under the FDA EUA for laboratories certified under CLIA to perform high or moderate complexity testing. This test has not been FDA cleared or approved. A negative result does not rule out the presence of PCR inhibitors in the specimen or target RNA in concentration below the limit of detection for the assay. If only one viral target is positive but coinfection with multiple targets is suspected, the sample should be re-tested with another FDA cleared, approved, or authorized test, if coinfection would change clinical management. This test was validated by the [facility]. These laboratories are certified under the Clinical Laboratory Improvement Amendments of 1988 (CLIA-88) as qualified to perform high complexity laboratory testing.   CBC with platelets differential    Narrative    The following orders were created for panel order CBC with platelets differential.  Procedure                               Abnormality         Status                     ---------                               -----------          ------                     CBC with platelets and d...[[id number]]  Abnormal            Final result                 Please view results for these tests on the individual orders.   Comprehensive metabolic panel   Result Value Ref Range    Sodium 134 (L) 135 - 145 mmol/L    Potassium 3.9 3.4 - 5.3 mmol/L    Carbon Dioxide (CO2) 26 22 - 29 mmol/L    Anion Gap 11 7 - 15 mmol/L    Urea Nitrogen 15.1 8.0 - 23.0 mg/dL    Creatinine 0.93 0.67 - 1.17 mg/dL    GFR Estimate 82 >60 mL/min/1.73m2    Calcium 9.1 8.8 - 10.2 mg/dL    Chloride 97 (L) 98 - 107 mmol/L    Glucose 118 (H) 70 - 99 mg/dL    Alkaline Phosphatase 58 40 - 150 U/L    AST 21 0 - 45 U/L    ALT 19 0 - 70 U/L    Protein Total 6.9 6.4 - 8.3 g/dL    Albumin 4.1 3.5 - 5.2 g/dL    Bilirubin Total 0.6 <=1.2 mg/dL   Lactic acid whole blood   Result Value Ref Range    Lactic Acid 1.1 0.7 - 2.0 mmol/L   CBC with platelets and differential   Result Value Ref Range    WBC Count 7.9 4.0 - 11.0 10e3/uL    RBC Count 4.44 4.40 - 5.90 10e6/uL    Hemoglobin 14.1 13.3 - 17.7 g/dL    Hematocrit 41.7 40.0 - 53.0 %    MCV 94 78 - 100 fL    MCH 31.8 26.5 - 33.0 pg    MCHC 33.8 31.5 - 36.5 g/dL    RDW 13.0 10.0 - 15.0 %    Platelet Count 142 (L) 150 - 450 10e3/uL    % Neutrophils 74 %    % Lymphocytes 8 %    % Monocytes 15 %    % Eosinophils 1 %    % Basophils 1 %    % Immature Granulocytes 1 %    NRBCs per 100 WBC 0 <1 /100    Absolute Neutrophils 6.0 1.6 - 8.3 10e3/uL    Absolute Lymphocytes 0.6 (L) 0.8 - 5.3 10e3/uL    Absolute Monocytes 1.2 0.0 - 1.3 10e3/uL    Absolute Eosinophils 0.0 0.0 - 0.7 10e3/uL    Absolute Basophils 0.0 0.0 - 0.2 10e3/uL    Absolute Immature Granulocytes 0.0 <=0.4 10e3/uL    Absolute NRBCs 0.0 10e3/uL   Procalcitonin   Result Value Ref Range    Procalcitonin 0.13 <0.50 ng/mL   CRP inflammation   Result Value Ref Range    CRP Inflammation 90.60 (H) <5.00 mg/L   Erythrocyte sedimentation rate auto   Result Value Ref Range    Erythrocyte Sedimentation Rate  18 0 - 20 mm/hr   UA with Microscopic reflex to Culture    Specimen: Urine, Clean Catch   Result Value Ref Range    Color Urine Yellow Colorless, Straw, Light Yellow, Yellow    Appearance Urine Clear Clear    Glucose Urine Negative Negative mg/dL    Bilirubin Urine Negative Negative    Ketones Urine Negative Negative mg/dL    Specific Gravity Urine 1.010 1.003 - 1.035    Blood Urine Negative Negative    pH Urine 6.0 5.0 - 7.0    Protein Albumin Urine Negative Negative mg/dL    Urobilinogen Urine Normal Normal, 2.0 mg/dL    Nitrite Urine Negative Negative    Leukocyte Esterase Urine Negative Negative    RBC Urine 1 <=2 /HPF    WBC Urine <1 <=5 /HPF    Hyaline Casts Urine 1 <=2 /LPF    Narrative    Urine Culture not indicated   XR Chest Port 1 View    Narrative    EXAM: XR CHEST PORT 1 VIEW  LOCATION: [facility]  DATE: 2/9/2024    INDICATION: fever, hypoxia, covid  COMPARISON: 10/04/2023      Impression    IMPRESSION: Mild enlargement of the cardiac silhouette, though accentuated by AP portable technique. Calcified aortic arch. A few scattered bands of left midlung and basilar atelectasis. No airspace consolidation. No pneumothorax. Left shoulder   arthroplasty. Severe arthritis of the right shoulder.       Medications   sodium chloride 0.9% BOLUS 1,000 mL (1,000 mLs Intravenous $New Bag 2/9/24 [id number])       Assessments & Plan (with Medical Decision Making)     I have reviewed the nursing notes.    I have reviewed the findings, diagnosis, plan and need for follow up with the patient.  This patient presented to the emergency department with fever and confusion.  He is noted to be febrile here but he is not hypotensive and does not have an elevated lactate decreasing any suspicion for severe sepsis or septic shock.  He is hypoxic and is COVID-positive.  No evidence to suggest bacterial pneumonia on chest x-ray.  Urinalysis is without any signs of infection.  He is already on 2 different  antibiotics to treat a wound infection on his hand.  I would doubt that this is the source of his acute symptoms.  Suspect that symptoms are all related to acute COVID infection.  He was started on supplemental oxygen at 1 L/min by oxy plus facemask as patient seem to be predominantly breathing through his mouth.  I feel given his hypoxia that inpatient admission is warranted and have discussed the case with the on-call hospitalist.  Patient will be admitted for further treatment and evaluation.        New Prescriptions    No medications on file       Final diagnoses:   COVID-19 virus infection   Hypoxia       2/9/2024   [facility] EMERGENCY DEPT       Juan Manuel Arroyo MD  02/09/24 [id number]

## 2024-02-10 NOTE — DISCHARGE SUMMARY
[facility]  Hospitalist Discharge Summary      Date of Admission:  2/9/2024  Date of Discharge:  2/10/2024  Discharging Provider: Juan Manuel Madrigal MD  Discharge Service: Hospitalist Service    Discharge Diagnoses   # Suspected COVID pneumonia   # Hypoxia, resolved   # Metabolic encephalopathy, resolved   # Left middle finger amputation   # COPD  # Alcohol use disorder   # HODA  # Depression  # Anxiety   # Dyslipidemia   # Suspected CAD   # Obesity BMI 43     Clinically Significant Risk Factors          Follow-ups Needed After Discharge   Follow-up Appointments     Follow-up and recommended labs and tests       Follow up with primary care provider, Steven Duane Semmler, within 7 days   to evaluate medication change, to evaluate treatment change, and for   hospital follow- up.            Unresulted Labs Ordered in the Past 30 Days of this Admission       Date and Time Order Name Status Description    2/9/2024  9:26 PM Blood Culture Peripheral Blood In process     2/9/2024  9:26 PM Blood Culture Peripheral Blood In process         These results will be followed up by Juan Manuel Madrigal MD      Discharge Disposition   Discharged to home  Condition at discharge: Stable    Hospital Course      Noe Goetz [facility] [address] is a 83 year old male with past medical history of presumed CAD, HTN, HLD, COPD, hx DVT, obesity, anxiety, depression, alcohol use disorder, BPH, GERD and 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 Improved with supportive care. Discharging back to home.     # Suspected COVID pneumonia   # Hypoxia, resolved   # Metabolic encephalopathy, resolved   Patient presented confusion and fever and was found to hypoxic requiring up to 1.5L if oxygen for a few hours overnight. COVID positive in the ED, CXR without  consolidation and patient on antibiotics PTA for hand (Discussed below). Patient has recived COVID vaccination. Fever promptly resolved. Patient able to tolerate physical therapy and walking without oxygen. Patient remained oriented and not confused through out the day agreeable to returning home.   - Provided handout information on supportive care for COVID  - Reviewed criteria for him to return to the hospital.     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   - Continued PTA doxycyline and ciprofloxacin     # COPD  Given rapid improvement not treating for a COPD exacerbation.   - Continued PTA inhalers     # Alcohol use disorder   Drinks 4 drinks nightly and states withdrawal is mild shakiness.  - Encouraged cessation     # HODA  Usually uses CPAP, encouraged continued use.     # Depression  # Anxiety   Continued PTA antidepressant and anxiolytics     # Dyslipidemia   # Suspected CAD   - Continued PTA statin     # Obesity BMI 43   Will affect all aspects of his health. Encouraged follow up and continued discussion.       Consultations This Hospital Stay   PHYSICAL THERAPY ADULT IP CONSULT  OCCUPATIONAL THERAPY ADULT IP CONSULT  CARE MANAGEMENT / SOCIAL WORK IP CONSULT  SPIRITUAL HEALTH SERVICES IP CONSULT    Code Status   Full Code    Time Spent on this Encounter   I, Juan Manuel Madrigal MD, personally saw the patient today and spent greater than 30 minutes discharging this patient.       Juan Manuel Madrigal MD  [facility] SURGICAL  [address] [address]  Phone: [phone]  Fax: [phone]  ______________________________________________________________________    Physical Exam   Vital Signs: Temp: 98.1  F (36.7  C) Temp src: Oral BP: 124/73 Pulse: 83   Resp: 17 SpO2: 95 % O2 Device: None (Room air) Oxygen Delivery: 1.5  LPM  Weight: 288 lbs 0 oz  General Appearance: Awake and alert, in no acute distress, sitting up in bed   Respiratory: Breathing easily on room air, lung sounds distant but clear   Cardiovascular: Regular rate and rhythm, extremities warm and well perfused   GI: Abdomen obese, non-tender, non-distended   Skin: No rashes or lesions   Other: Appropriate mood and affect, no focal deficits appreciated, able to easily ambulate around the room         Primary Care Physician   Steven Duane Semmler    Discharge Orders      Reason for your hospital stay    You were hospitalized with confusion and found to have COVID. You improved with supportive cares in the hospital and are being discharged back to home.     Follow-up and recommended labs and tests     Follow up with primary care provider, Steven Duane Semmler, within 7 days to evaluate medication change, to evaluate treatment change, and for hospital follow- up.     Activity    Your activity upon discharge: activity as tolerated     Diet    Follow this diet upon discharge: Orders Placed This Encounter      Combination Diet Regular Diet Adult; Moderate Consistent Carb (60 g CHO per Meal) Diet       Significant Results and Procedures   Results for orders placed or performed during the hospital encounter of 02/09/24   XR Chest Port 1 View    Narrative    EXAM: XR CHEST PORT 1 VIEW  LOCATION: [facility]  DATE: 2/9/2024    INDICATION: fever, hypoxia, covid  COMPARISON: 10/04/2023      Impression    IMPRESSION: Mild enlargement of the cardiac silhouette, though accentuated by AP portable technique. Calcified aortic arch. A few scattered bands of left midlung and basilar atelectasis. No airspace consolidation. No pneumothorax. Left shoulder   arthroplasty. Severe arthritis of the right shoulder.       Discharge Medications   Current Discharge Medication List        CONTINUE these medications which have NOT CHANGED    Details   albuterol 90 MCG/ACT  inhaler Inhale 2 puffs into the lungs every 6 hours as needed.        ATENOLOL 50 MG OR TABS Take 50 mg by mouth every morning      ciprofloxacin (CIPRO) 500 MG tablet Take 1 tablet (500 mg) by mouth 2 times daily  Qty: 14 tablet, Refills: 0    Associated Diagnoses: Open wound of left palm      doxazosin (CARDURA) 4 MG tablet Take 2 mg by mouth At Bedtime       !! doxycycline hyclate (VIBRAMYCIN) 100 MG capsule Take 1 capsule (100 mg) by mouth 2 times daily  Qty: 14 capsule, Refills: 0    Associated Diagnoses: Open wound of left palm      DULOXETINE HCL PO Take 120 mg by mouth every evening      ferrous sulfate (IRON) 325 (65 FE) MG tablet Take 325 mg by mouth daily (with breakfast)      fluticasone (FLONASE) 50 MCG/ACT nasal spray Spray 2 sprays into both nostrils daily      fluticasone-salmeterol (ADVAIR) 250-50 MCG/DOSE inhaler Inhale 1 puff into the lungs every 12 hours      FUROSEMIDE PO Take 40 mg by mouth 2 times daily Am and 5 pm      HYDROcodone-acetaminophen (NORCO) 5-325 MG tablet Take 1 tablet by mouth every 12 hours as needed for pain  Qty: 10 tablet, Refills: 0    Associated Diagnoses: Open wound of left palm      LOVASTATIN PO Take 40 mg by mouth At Bedtime.        multivitamin  with lutein (OCUVITE WITH LTEIN) CAPS Take 1 capsule by mouth every morning      Potassium Chloride (KLOR-CON PO) Take 20 mEq by mouth every morning      PRILOSEC OTC 20 MG OR TBEC Take 20 mg by mouth 2 times daily      psyllium (METAMUCIL) 58.6 % POWD Take 1 Tablespoonful by mouth every morning      VITAMIN D, CHOLECALCIFEROL, PO Take 2,000 Units by mouth every morning      !! doxycycline hyclate (VIBRAMYCIN) 100 MG capsule Take 1 capsule (100 mg) by mouth 2 times daily  Qty: 28 capsule, Refills: 0    Associated Diagnoses: Open wound of left palm      GABAPENTIN PO Take 600-1,200 mg by mouth 3 times daily      hydrOXYzine (ATARAX) 25 MG tablet Take 1-2 tablets (25-50 mg) by mouth every 6 hours as needed for itching or other  (For breakthrough pain.  Pain rating 1-5 give 25mg; pain rating 6-10 give 50mg)  Qty: 60 tablet, Refills: 1    Associated Diagnoses: Primary osteoarthritis of right knee      Magnesium 70 MG CAPS Take 1 capsule by mouth every morning      ondansetron (ZOFRAN) 4 MG tablet Take 1 tablet (4 mg) by mouth every 6 hours as needed for nausea  Qty: 12 tablet, Refills: 0    Associated Diagnoses: Open wound of left palm      order for DME Equipment being ordered: Walker ()  Treatment Diagnosis: R knee osteoarthritis s/p R TKA  Qty: 1 Units, Refills: 0    Associated Diagnoses: Primary osteoarthritis of right knee      senna-docusate (SENOKOT-S/PERICOLACE) 8.6-50 MG tablet Take 1-2 tablets by mouth 2 times daily    Associated Diagnoses: Primary osteoarthritis of right knee       !! - Potential duplicate medications found. Please discuss with provider.        Allergies   Allergies   Allergen Reactions    Nkda [No Known Drug Allergy]

## 2024-02-10 NOTE — ED NOTES
[facility]   Admission Handoff    The patient is Noe Goetz [facility], 83 year old who arrived in the ED by AMBULANCE from home with a complaint of Fever (Weakness/)  . The patient's current symptoms are new and during this time the symptoms have decreased. In the ED, patient was diagnosed with   Final diagnoses:   COVID-19 virus infection   Hypoxia         Needed?: No    Allergies:    Allergies   Allergen Reactions    Nkda [No Known Drug Allergy]        Past Medical Hx:   Past Medical History:   Diagnosis Date    Alcohol dependence (H)     Allergic rhinitis     Anxiety     BPH (benign prostatic hyperplasia)     COPD (chronic obstructive pulmonary disease) (H)     Depression     Fatty liver     GERD (gastroesophageal reflux disease)     Gout     History of sarcoma     Hypertension     Mixed hyperlipidemia     Morbid obesity (H)     Personal history of DVT (deep vein thrombosis)        Initial vitals were: BP: (!) 149/84  Pulse: 84  Temp: (!) 100.5  F (38.1  C)  Resp: 21  Weight: 130.6 kg (288 lb)  SpO2: 92 %   Recent vital Signs: BP (!) 149/84   Pulse 84   Temp (!) 100.5  F (38.1  C) (Oral)   Resp 21   Wt 130.6 kg (288 lb)   SpO2 92%   BMI 43.79 kg/m      Elimination Status: Continent: Yes     Activity Level: 2 assist    Fall Status: Reason for falls risk:  Mobility  nonskid shoes/slippers when out of bed, arm band in place, and activity supervised    Baseline Mental status: WDL  Current Mental Status changes: at basesline    Infection present or suspected this encounter: yes COVID r/o and special precautions  Sepsis suspected: No    Isolation type: COVID    Bariatric equipment needed?: No    In the ED these meds were given:   Medications   sodium chloride 0.9% BOLUS 1,000 mL (1,000 mLs Intravenous $New Bag 2/9/24 6978)       Drips running?  No    Home pump  No    Current LDAs: Peripheral IV: Site right hand; Gauge 18  IV push only, no IV fluids     Results:   Labs/Imaging   Ordered and Resulted from Time of ED Arrival Up to the Time of Departure from the ED  Results for orders placed or performed during the hospital encounter of 02/09/24 (from the past 24 hour(s))   Symptomatic Influenza A/B, RSV, & SARS-CoV2 PCR (COVID-19) Nasopharyngeal    Specimen: Nasopharyngeal; Swab   Result Value Ref Range    Influenza A PCR Negative Negative    Influenza B PCR Negative Negative    RSV PCR Negative Negative    SARS CoV2 PCR Positive (A) Negative    Narrative    Testing was performed using the Xpert Xpress CoV2/Flu/RSV Assay on the Cepheid GeneXpert Instrument. This test should be ordered for the detection of SARS-CoV-2, influenza, and RSV viruses in individuals who meet clinical and/or epidemiological criteria. Test performance is unknown in asymptomatic patients. This test is for in vitro diagnostic use under the FDA EUA for laboratories certified under CLIA to perform high or moderate complexity testing. This test has not been FDA cleared or approved. A negative result does not rule out the presence of PCR inhibitors in the specimen or target RNA in concentration below the limit of detection for the assay. If only one viral target is positive but coinfection with multiple targets is suspected, the sample should be re-tested with another FDA cleared, approved, or authorized test, if coinfection would change clinical management. This test was validated by the [facility]. These laboratories are certified under the Clinical Laboratory Improvement Amendments of 1988 (CLIA-88) as qualified to perform high complexity laboratory testing.   CBC with platelets differential    Narrative    The following orders were created for panel order CBC with platelets differential.  Procedure                               Abnormality         Status                     ---------                               -----------         ------                     CBC with platelets and d...[[id number]]   Abnormal            Final result                 Please view results for these tests on the individual orders.   Comprehensive metabolic panel   Result Value Ref Range    Sodium 134 (L) 135 - 145 mmol/L    Potassium 3.9 3.4 - 5.3 mmol/L    Carbon Dioxide (CO2) 26 22 - 29 mmol/L    Anion Gap 11 7 - 15 mmol/L    Urea Nitrogen 15.1 8.0 - 23.0 mg/dL    Creatinine 0.93 0.67 - 1.17 mg/dL    GFR Estimate 82 >60 mL/min/1.73m2    Calcium 9.1 8.8 - 10.2 mg/dL    Chloride 97 (L) 98 - 107 mmol/L    Glucose 118 (H) 70 - 99 mg/dL    Alkaline Phosphatase 58 40 - 150 U/L    AST 21 0 - 45 U/L    ALT 19 0 - 70 U/L    Protein Total 6.9 6.4 - 8.3 g/dL    Albumin 4.1 3.5 - 5.2 g/dL    Bilirubin Total 0.6 <=1.2 mg/dL   Lactic acid whole blood   Result Value Ref Range    Lactic Acid 1.1 0.7 - 2.0 mmol/L   CBC with platelets and differential   Result Value Ref Range    WBC Count 7.9 4.0 - 11.0 10e3/uL    RBC Count 4.44 4.40 - 5.90 10e6/uL    Hemoglobin 14.1 13.3 - 17.7 g/dL    Hematocrit 41.7 40.0 - 53.0 %    MCV 94 78 - 100 fL    MCH 31.8 26.5 - 33.0 pg    MCHC 33.8 31.5 - 36.5 g/dL    RDW 13.0 10.0 - 15.0 %    Platelet Count 142 (L) 150 - 450 10e3/uL    % Neutrophils 74 %    % Lymphocytes 8 %    % Monocytes 15 %    % Eosinophils 1 %    % Basophils 1 %    % Immature Granulocytes 1 %    NRBCs per 100 WBC 0 <1 /100    Absolute Neutrophils 6.0 1.6 - 8.3 10e3/uL    Absolute Lymphocytes 0.6 (L) 0.8 - 5.3 10e3/uL    Absolute Monocytes 1.2 0.0 - 1.3 10e3/uL    Absolute Eosinophils 0.0 0.0 - 0.7 10e3/uL    Absolute Basophils 0.0 0.0 - 0.2 10e3/uL    Absolute Immature Granulocytes 0.0 <=0.4 10e3/uL    Absolute NRBCs 0.0 10e3/uL   Procalcitonin   Result Value Ref Range    Procalcitonin 0.13 <0.50 ng/mL   CRP inflammation   Result Value Ref Range    CRP Inflammation 90.60 (H) <5.00 mg/L   Erythrocyte sedimentation rate auto   Result Value Ref Range    Erythrocyte Sedimentation Rate 18 0 - 20 mm/hr   UA with Microscopic reflex to Culture    Specimen:  Urine, Clean Catch   Result Value Ref Range    Color Urine Yellow Colorless, Straw, Light Yellow, Yellow    Appearance Urine Clear Clear    Glucose Urine Negative Negative mg/dL    Bilirubin Urine Negative Negative    Ketones Urine Negative Negative mg/dL    Specific Gravity Urine 1.010 1.003 - 1.035    Blood Urine Negative Negative    pH Urine 6.0 5.0 - 7.0    Protein Albumin Urine Negative Negative mg/dL    Urobilinogen Urine Normal Normal, 2.0 mg/dL    Nitrite Urine Negative Negative    Leukocyte Esterase Urine Negative Negative    RBC Urine 1 <=2 /HPF    WBC Urine <1 <=5 /HPF    Hyaline Casts Urine 1 <=2 /LPF    Narrative    Urine Culture not indicated   XR Chest Port 1 View    Narrative    EXAM: XR CHEST PORT 1 VIEW  LOCATION: [facility]  DATE: 2/9/2024    INDICATION: fever, hypoxia, covid  COMPARISON: 10/04/2023      Impression    IMPRESSION: Mild enlargement of the cardiac silhouette, though accentuated by AP portable technique. Calcified aortic arch. A few scattered bands of left midlung and basilar atelectasis. No airspace consolidation. No pneumothorax. Left shoulder   arthroplasty. Severe arthritis of the right shoulder.       For the majority of the shift this patient's behavior was Green     Cardiac Rhythm: Normal Sinus  Pt needs tele? Yes  Skin/wound Issues: None    Code Status: Full Code    Pain control: good    Nausea control: good    Abnormal labs/tests/findings requiring intervention: Covid Positive    Patient tested for COVID 19 prior to admission: YES     OBS brochure/video discussed/provided to patient/family: No     Family present during ED course? No     Family Comments/Social Situation comments: None    Tasks needing completion: None    Ronen Ayala, RN

## 2024-02-10 NOTE — PROGRESS NOTES
[facility] DISCHARGE NOTE    Patient discharged to home at 2:15 PM via wheel chair. Accompanied by son and staff. Discharge instructions reviewed with patient, opportunity offered to ask questions. Prescriptions - None ordered for discharge. All belongings sent with patient.    Marie Thompson RN

## 2024-02-23 NOTE — LETTER
2/23/2024         RE: Noe Morales  [address] [address]        Dear Colleague,    Thank you for referring your patient, Noe Morales, to the [facility]. Please see a copy of my visit note below.    Ortho Hand    Doing well.  No issues.  Healed well.  Asks about the swelling in the hand and what to expect.    On exam, well-healed left palm flap incisions with no wounds or signs of infection.  The swelling has continued to improve.  He is able to almost make a full composite fist with the unaffected digits.    A/P: 83-year-old male 6+ weeks status post left palm wound irrigation and debridement with middle finger amputation and fillet flap reconstruction, now healed    -Reiterated that the swelling at the level of the flap should continue to improve over time.  Patient can encourage the swelling to improve by maintaining hand elevation, gentle Coban compression, and minimizing activities in which the hand is below the level of the heart.    -Cleared for all activities including swimming or soaking  -Return to clinic as needed    Sean Carlos MD, PhD      Again, thank you for allowing me to participate in the care of your patient.        Sincerely,        Sean Carlos MD

## 2024-02-23 NOTE — NURSING NOTE
Chief Complaint   Patient presents with    Left Hand - Surgical Followup       There were no vitals filed for this visit.    There is no height or weight on file to calculate BMI.      JOHN Rolle NREMT

## 2024-02-23 NOTE — PROGRESS NOTES
Ortho Hand    Doing well.  No issues.  Healed well.  Asks about the swelling in the hand and what to expect.    On exam, well-healed left palm flap incisions with no wounds or signs of infection.  The swelling has continued to improve.  He is able to almost make a full composite fist with the unaffected digits.    A/P: 83-year-old male 6+ weeks status post left palm wound irrigation and debridement with middle finger amputation and fillet flap reconstruction, now healed    -Reiterated that the swelling at the level of the flap should continue to improve over time.  Patient can encourage the swelling to improve by maintaining hand elevation, gentle Coban compression, and minimizing activities in which the hand is below the level of the heart.    -Cleared for all activities including swimming or soaking  -Return to clinic as needed    Sean Carlos MD, PhD

## 2024-04-04 NOTE — TELEPHONE ENCOUNTER
REFERRAL INFORMATION:  Referring Provider: Dr. Josef Gonzalez  Referring Clinic: [facility] - Primary Care  Reason for Visit/Diagnosis: Umbilical Hernia       FUTURE VISIT INFORMATION:  Appointment Date: 4/8/2024  Appointment Time: 2 PM     NOTES RECORD STATUS  DETAILS   OFFICE NOTE from Referring Provider Care Everywhere [facility]:  4/2/24 - PCC OV with Dr. Gonzalez   OFFICE NOTE from Other Specialists N/A    HOSPITAL DISCHARGE SUMMARY/ ED VISITS  Internal [facility]:  2/9/24 - ED OV with Dr. Madrigal   OPERATIVE REPORT N/A    PERTINENT LABS Care Everywhere / Internal    IMAGING (CT, MRI, US, XR)  Received / Internal [facility]:  2/9/24 - XR Chest    [facility]:  3/26/22 - CT Abd/Pelvis     Records Requested    Facility  [facility]  Fax: [phone]   Outcome * 4/4/24 7:33 AM Faxed urgent request to [facility] for images to be pushed to [facility]. - Bren    * 4/4/24 11:12 AM Images received from [facility] and attached to the patient in PACs. - Bren

## 2024-04-08 PROBLEM — K42.9 UMBILICAL HERNIA WITHOUT OBSTRUCTION AND WITHOUT GANGRENE: Status: ACTIVE | Noted: 2024-01-01

## 2024-04-08 NOTE — PROGRESS NOTES
Surgical Consultation/History and Physical  [facility]    Noe is seen in consultation for umbilical hernia, at the request of Steven Duane Semmler, MD.    Chief Complaint:  umbilical hernia    History of Present Illness: Noe Morales is a 83 year old male who presents to surgery clinic for evaluation of an umbilical hernia. First noticed symptoms about 1 year ago. Describes symptoms of umbilical swelling. He states this is not painful, but he is concerned about the appearance of this. Denies any other bulges or symptoms at groins. No associated nausea or emesis, has been tolerating a regular diet. Denies any episodes of bloating, obstipation, or inability to pass gas. No overlying skin changes.     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, including shoulder    Other and unspecified disc disorder    Sleep apnea    History of alcohol abuse    Hypoxia    COVID-19 virus infection       Past Medical History:   Diagnosis Date    Alcohol dependence (H)     Allergic rhinitis     Anxiety     BPH (benign prostatic hyperplasia)     COPD (chronic obstructive pulmonary disease) (H)     Depression     Fatty liver     GERD (gastroesophageal reflux disease)     Gout     History of sarcoma     Hypertension     Mixed hyperlipidemia     Morbid obesity (H)     Personal history of DVT (deep vein thrombosis)        Past Surgical History:   Procedure Laterality Date    AMPUTATE FINGER(S) Left 1/10/2024    Procedure: LEFT MIDDLE FINGER AMPUTATION WITH FILLET FLAP RECONSTRUCTION OF THE LEFT PALM WOUND;  Surgeon: Sean Carlos MD;  Location: [facility] OR    ARTHROPLASTY KNEE Right 05/20/2019     Procedure: ARTHROPLASTY, KNEE;  Surgeon: Reynaldo Barnes MD;  Location: [facility]    ARTHROTOMY SHOULDER, ROTATOR CUFF REPAIR, COMBINED  2012    Procedure: COMBINED ARTHROTOMY SHOULDER, ROTATOR CUFF REPAIR;  Right shoulder biceps tendodesis;  Surgeon: Reynaldo Barnes MD;  Location: [facility] OR    HAND SURGERY Right     IRRIGATION AND DEBRIDEMENT HAND, COMBINED Left 1/10/2024    Procedure: LEFT PALM WOUND IRRIGATION AND DEBRIDEMENT,;  Surgeon: Sean Carlos MD;  Location: [facility] OR       Family History   Problem Relation Age of Onset    Hypertension Daughter     Pneumonia Daughter     Chronic Obstructive Pulmonary Disease Daughter     Anesthesia Reaction No family hx of     Thrombosis No family hx of        Social History     Tobacco Use    Smoking status: Former     Types: Cigarettes     Quit date:      Years since quittin.2     Passive exposure: Never    Smokeless tobacco: Never   Substance Use Topics    Alcohol use: Yes     Comment: occa        History   Drug Use Unknown       Current Outpatient Medications   Medication Sig Dispense Refill    albuterol 90 MCG/ACT inhaler Inhale 2 puffs into the lungs every 6 hours as needed.        ATENOLOL 50 MG OR TABS Take 50 mg by mouth every morning      ciprofloxacin (CIPRO) 500 MG tablet Take 1 tablet (500 mg) by mouth 2 times daily 14 tablet 0    doxazosin (CARDURA) 4 MG tablet Take 2 mg by mouth At Bedtime       doxycycline hyclate (VIBRAMYCIN) 100 MG capsule Take 1 capsule (100 mg) by mouth 2 times daily 28 capsule 0    DULOXETINE HCL PO Take 120 mg by mouth every evening      ferrous sulfate (IRON) 325 (65 FE) MG tablet Take 325 mg by mouth daily (with breakfast)      finasteride (PROSCAR) 5 MG tablet Take 5 mg by mouth daily      fluticasone (FLONASE) 50 MCG/ACT nasal spray Spray 2 sprays into both nostrils daily      fluticasone-salmeterol (ADVAIR) 250-50 MCG/DOSE inhaler Inhale 1 puff into the lungs every 12 hours      FUROSEMIDE PO Take 40 mg  by mouth 2 times daily Am and 5 pm      GABAPENTIN PO Take 600-1,200 mg by mouth 3 times daily      HYDROcodone-acetaminophen (NORCO) 5-325 MG tablet Take 1 tablet by mouth every 12 hours as needed for pain 10 tablet 0    LOVASTATIN PO Take 40 mg by mouth At Bedtime.        multivitamin  with lutein (OCUVITE WITH LTEIN) CAPS Take 1 capsule by mouth every morning      Potassium Chloride (KLOR-CON PO) Take 20 mEq by mouth every morning      PRILOSEC OTC 20 MG OR TBEC Take 20 mg by mouth 2 times daily      psyllium (METAMUCIL) 58.6 % POWD Take 1 Tablespoonful by mouth every morning      senna-docusate (SENOKOT-S/PERICOLACE) 8.6-50 MG tablet Take 1-2 tablets by mouth 2 times daily (Patient taking differently: Take 1-2 tablets by mouth daily as needed)      VITAMIN D, CHOLECALCIFEROL, PO Take 2,000 Units by mouth every morning         Allergies   Allergen Reactions    Nkda [No Known Drug Allergy]        Review of Systems:   10 point ROS negative other than what was listed in HPI    Physical Exam:  /82 (BP Location: Right arm, Patient Position: Chair, Cuff Size: Adult Large)   Pulse 66   Temp 97.7  F (36.5  C) (Tympanic)   Wt 128.8 kg (284 lb)   BMI 43.18 kg/m      Constitutional- No acute distress, well nourished, non-toxic  Eyes: Anicteric, no injection.  PERRL  ENT:  Normocephalic, atraumatic, Nose midline, moist mucus membranes  Neck - supple, no LAD  Respiratory- Nonlabored breathing on room air  Cardiovascular - Vital signs stable, extremities warm and well perfused  Abdomen - Soft, obese, non-tender. Reducible umbilical hernia, nontender.   Neuro - No focal neuro deficits, Alert and oriented x 3  Psych: Appropriate mood and affect  Musculoskeletal: Normal gait, symmetric strength.  FROM upper and lower extremities.  Skin: Warm, Dry    Assessment:  1. Noe Goetz [facility] presents with an asymptomatic umbilical hernia. Symptoms are relatively minimal, he has had no GI symptoms and the hernia is nontender.   He and his wife are wondering if this hernia could be life-threatening and what the surgical options are.     I explained to the patient and his wife that hernias are present a defect within the fascial layer of the abdomen through which fat and sometimes intra-abdominal contents can push through.  I explained that although hernias are not expected to resolve without surgery, surgery is not always necessary.  In this patient's case, symptoms are minimal and he is at elevated risk of recurrence based off his BMI.  As such, I recommend holding off on any surgical approach at least until patient has lost weight to a BMI of 35 or if patient is having increased symptoms associate with hernia.    We did discuss weight loss strategies.  He states he has been trying to exercise more at the [facility] but has not tried any dietary changes as of yet.  I will place a referral to weight loss management clinic to further discuss for allergies.  In addition, patient was advised to wear an abdominal binder for comfort if this helps.    Patient and his wife expressed understanding and are amenable to the proposed plan.  Patient to follow-up in surgery clinic if there are any further questions or concerns or new symptoms.    Plan:   1. Non-operative management of asymptomatic umbilical hernia  2. Referral placed to comprehensive weight loss management clinic  3. Follow up with surgery clinic as needed        Audie Larose MD  4/8/2024 at 2:43 PM

## 2024-04-08 NOTE — PATIENT INSTRUCTIONS
Plan:   1. Non-operative management of asymptomatic umbilical hernia  2. Referral placed to comprehensive weight loss management clinic  3. Follow up with surgery clinic as needed

## 2024-04-08 NOTE — NURSING NOTE
"Initial /82 (BP Location: Right arm, Patient Position: Chair, Cuff Size: Adult Large)   Pulse 66   Temp 97.7  F (36.5  C) (Tympanic)   Wt 128.8 kg (284 lb)   BMI 43.18 kg/m   Estimated body mass index is 43.18 kg/m  as calculated from the following:    Height as of 12/6/22: 1.727 m (5' 8\").    Weight as of this encounter: 128.8 kg (284 lb). .  Shruthi Tucker LPN    "

## 2024-04-08 NOTE — LETTER
4/8/2024         RE: Noe Morales  [address] [address]        Dear Colleague,    Thank you for referring your patient, Noe Morales, to the [facility]. Please see a copy of my visit note below.    Surgical Consultation/History and Physical  [facility] General Surgery    Noe is seen in consultation for umbilical hernia, at the request of Steven Duane Semmler, MD.    Chief Complaint:  umbilical hernia    History of Present Illness: Noe Morales is a 83 year old male who presents to surgery clinic for evaluation of an umbilical hernia. First noticed symptoms about 1 year ago. Describes symptoms of umbilical swelling. He states this is not painful, but he is concerned about the appearance of this. Denies any other bulges or symptoms at groins. No associated nausea or emesis, has been tolerating a regular diet. Denies any episodes of bloating, obstipation, or inability to pass gas. No overlying skin changes.     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, including shoulder     Other and unspecified disc disorder     Sleep apnea     History of alcohol abuse     Hypoxia     COVID-19 virus infection       Past Medical History:   Diagnosis Date     Alcohol dependence (H)      Allergic rhinitis      Anxiety      BPH (benign prostatic hyperplasia)      COPD (chronic obstructive pulmonary disease) (H)      Depression      Fatty liver      GERD (gastroesophageal reflux disease)      Gout      History of sarcoma      Hypertension      Mixed hyperlipidemia      Morbid obesity (H)      Personal history of DVT (deep vein thrombosis)         Past Surgical History:   Procedure Laterality Date     AMPUTATE FINGER(S) Left 1/10/2024    Procedure: LEFT MIDDLE FINGER AMPUTATION WITH FILLET FLAP RECONSTRUCTION OF THE LEFT PALM WOUND;  Surgeon: Sean Carlos MD;  Location: [facility] OR     ARTHROPLASTY KNEE Right 2019    Procedure: ARTHROPLASTY, KNEE;  Surgeon: Reynaldo Barnes MD;  Location: [facility]     ARTHROTOMY SHOULDER, ROTATOR CUFF REPAIR, COMBINED  2012    Procedure: COMBINED ARTHROTOMY SHOULDER, ROTATOR CUFF REPAIR;  Right shoulder biceps tendodesis;  Surgeon: Reynaldo Barnes MD;  Location: [facility] OR     HAND SURGERY Right      IRRIGATION AND DEBRIDEMENT HAND, COMBINED Left 1/10/2024    Procedure: LEFT PALM WOUND IRRIGATION AND DEBRIDEMENT,;  Surgeon: Sean Carlos MD;  Location: [facility] OR       Family History   Problem Relation Age of Onset     Hypertension Daughter      Pneumonia Daughter      Chronic Obstructive Pulmonary Disease Daughter      Anesthesia Reaction No family hx of      Thrombosis No family hx of        Social History     Tobacco Use     Smoking status: Former     Types: Cigarettes     Quit date:      Years since quittin.2     Passive exposure: Never     Smokeless tobacco: Never   Substance Use Topics     Alcohol use: Yes     Comment: occa        History   Drug Use Unknown       Current Outpatient Medications   Medication Sig Dispense Refill     albuterol 90 MCG/ACT inhaler Inhale 2 puffs into the lungs every 6 hours as needed.         ATENOLOL 50 MG OR TABS Take 50 mg by mouth every morning       ciprofloxacin (CIPRO) 500 MG tablet Take 1 tablet (500 mg) by mouth 2 times daily 14 tablet 0     doxazosin (CARDURA) 4 MG tablet Take 2 mg by mouth At Bedtime        doxycycline hyclate (VIBRAMYCIN) 100 MG capsule Take 1 capsule (100 mg) by mouth 2 times daily 28 capsule 0     DULOXETINE HCL PO Take 120 mg by mouth every evening       ferrous sulfate (IRON) 325 (65 FE) MG tablet Take 325 mg by mouth daily  (with breakfast)       finasteride (PROSCAR) 5 MG tablet Take 5 mg by mouth daily       fluticasone (FLONASE) 50 MCG/ACT nasal spray Spray 2 sprays into both nostrils daily       fluticasone-salmeterol (ADVAIR) 250-50 MCG/DOSE inhaler Inhale 1 puff into the lungs every 12 hours       FUROSEMIDE PO Take 40 mg by mouth 2 times daily Am and 5 pm       GABAPENTIN PO Take 600-1,200 mg by mouth 3 times daily       HYDROcodone-acetaminophen (NORCO) 5-325 MG tablet Take 1 tablet by mouth every 12 hours as needed for pain 10 tablet 0     LOVASTATIN PO Take 40 mg by mouth At Bedtime.         multivitamin  with lutein (OCUVITE WITH LTEIN) CAPS Take 1 capsule by mouth every morning       Potassium Chloride (KLOR-CON PO) Take 20 mEq by mouth every morning       PRILOSEC OTC 20 MG OR TBEC Take 20 mg by mouth 2 times daily       psyllium (METAMUCIL) 58.6 % POWD Take 1 Tablespoonful by mouth every morning       senna-docusate (SENOKOT-S/PERICOLACE) 8.6-50 MG tablet Take 1-2 tablets by mouth 2 times daily (Patient taking differently: Take 1-2 tablets by mouth daily as needed)       VITAMIN D, CHOLECALCIFEROL, PO Take 2,000 Units by mouth every morning         Allergies   Allergen Reactions     Nkda [No Known Drug Allergy]        Review of Systems:   10 point ROS negative other than what was listed in HPI    Physical Exam:  /82 (BP Location: Right arm, Patient Position: Chair, Cuff Size: Adult Large)   Pulse 66   Temp 97.7  F (36.5  C) (Tympanic)   Wt 128.8 kg (284 lb)   BMI 43.18 kg/m      Constitutional- No acute distress, well nourished, non-toxic  Eyes: Anicteric, no injection.  PERRL  ENT:  Normocephalic, atraumatic, Nose midline, moist mucus membranes  Neck - supple, no LAD  Respiratory- Nonlabored breathing on room air  Cardiovascular - Vital signs stable, extremities warm and well perfused  Abdomen - Soft, obese, non-tender. Reducible umbilical hernia, nontender.   Neuro - No focal neuro deficits, Alert and oriented  x 3  Psych: Appropriate mood and affect  Musculoskeletal: Normal gait, symmetric strength.  FROM upper and lower extremities.  Skin: Warm, Dry    Assessment:  1. Noe Goetz [facility] presents with an asymptomatic umbilical hernia. Symptoms are relatively minimal, he has had no GI symptoms and the hernia is nontender.  He and his wife are wondering if this hernia could be life-threatening and what the surgical options are.     I explained to the patient and his wife that hernias are present a defect within the fascial layer of the abdomen through which fat and sometimes intra-abdominal contents can push through.  I explained that although hernias are not expected to resolve without surgery, surgery is not always necessary.  In this patient's case, symptoms are minimal and he is at elevated risk of recurrence based off his BMI.  As such, I recommend holding off on any surgical approach at least until patient has lost weight to a BMI of 35 or if patient is having increased symptoms associate with hernia.    We did discuss weight loss strategies.  He states he has been trying to exercise more at the [facility] but has not tried any dietary changes as of yet.  I will place a referral to weight loss management clinic to further discuss for allergies.  In addition, patient was advised to wear an abdominal binder for comfort if this helps.    Patient and his wife expressed understanding and are amenable to the proposed plan.  Patient to follow-up in surgery clinic if there are any further questions or concerns or new symptoms.    Plan:   1. Non-operative management of asymptomatic umbilical hernia  2. Referral placed to comprehensive weight loss management clinic  3. Follow up with surgery clinic as needed        Audie Larose MD  4/8/2024 at 2:43 PM      Again, thank you for allowing me to participate in the care of your patient.        Sincerely,        Audie Larose MD

## 2024-04-17 NOTE — ED PROVIDER NOTES
History     Chief Complaint   Patient presents with    wound concern     Patient states he has had a sore on his right ankle x's 3 months , no known injury   He has not been previously seen for the wound   Patient states he has been using carbolic salve and bacitracin with no improvement        HPI  Noe Morales is a 83 year old male who presents to urgent care, significant concern over wound on his right foot which is been present for approximate last 3 months.  Patient denies any instigating injury.  He states that his pain level  has been stable.  No new erythema, discharge, or new change in sensation.  He has been attempted to treat with carbolic salve and bacitracin without improvement.  He was seen by PCP 4/2/2024 given a referral to wound clinic however family states that they were hoping to avoid drive to [place] and therefore came to  today.      Allergies:  Allergies   Allergen Reactions    Nkda [No Known Drug Allergy]        Problem List:    Patient Active Problem List    Diagnosis Date Noted    Umbilical hernia without obstruction and without gangrene 04/08/2024     Priority: Medium    Hypoxia 02/09/2024     Priority: Medium    COVID-19 virus infection 02/09/2024     Priority: Medium    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     Priority: Medium    History of alcohol abuse 05/20/2019     Priority: Medium    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     Priority: Medium    Hyperlipidemia 07/28/2015     Priority: Medium    Intractable back pain 09/05/2013     Priority: Medium    Back pain 09/05/2013     Priority: Medium    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     Priority: Medium    CAD (coronary artery disease) 07/15/2010     Priority: Medium    Sleep apnea 03/15/2007     Priority: Medium    Esophageal reflux 01/17/2007     Priority: Medium    Essential hypertension 01/17/2007     Priority: Medium    Hay fever 01/17/2007     Priority: Medium    Asbestosis (H) 01/17/2007     Priority: Medium     Pos but  stable ct lung with plaques 2007 no change in 2010,      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, 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     Priority: Medium     Overview:   1998-excised      Other and unspecified disc disorder 01/17/2007     Priority: Medium     Overview:   lumbar          Past Medical History:    Past Medical History:   Diagnosis Date    Alcohol dependence (H)     Allergic rhinitis     Anxiety     BPH (benign prostatic hyperplasia)     COPD (chronic obstructive pulmonary disease) (H)     Depression     Fatty liver     GERD (gastroesophageal reflux disease)     Gout     History of sarcoma     Hypertension     Mixed hyperlipidemia     Morbid obesity (H)     Personal history of DVT (deep vein thrombosis)        Past Surgical History:    Past Surgical History:   Procedure Laterality Date    AMPUTATE FINGER(S) Left 1/10/2024    Procedure: LEFT MIDDLE FINGER AMPUTATION WITH FILLET FLAP RECONSTRUCTION OF THE LEFT PALM WOUND;  Surgeon: Sean Carlos MD;  Location:  OR    ARTHROPLASTY KNEE Right 05/20/2019    Procedure: ARTHROPLASTY, KNEE;  Surgeon: Reynaldo Barnes MD;  Location: [facility]    ARTHROTOMY SHOULDER, ROTATOR CUFF REPAIR, COMBINED  07/09/2012    Procedure: COMBINED ARTHROTOMY SHOULDER, ROTATOR CUFF REPAIR;  Right shoulder biceps tendodesis;  Surgeon: Reynaldo Barnes MD;  Location: [facility] OR    HAND SURGERY Right     IRRIGATION AND DEBRIDEMENT HAND, COMBINED Left 1/10/2024    Procedure: LEFT PALM WOUND IRRIGATION AND DEBRIDEMENT,;  Surgeon: Sean Carlos MD;  Location:  OR       Family History:    Family History   Problem Relation Age of Onset    Hypertension Daughter     Pneumonia Daughter     Chronic Obstructive Pulmonary Disease Daughter     Anesthesia Reaction No family hx of     Thrombosis No family hx of        Social History:  Marital Status:   [2]  Social History     Tobacco Use    Smoking status: Former     Current packs/day: 0.00      Types: Cigarettes     Quit date:      Years since quittin.3     Passive exposure: Never    Smokeless tobacco: Never   Substance Use Topics    Alcohol use: Yes     Comment: occa    Drug use: Never        Medications:    albuterol 90 MCG/ACT inhaler  ATENOLOL 50 MG OR TABS  ciprofloxacin (CIPRO) 500 MG tablet  doxazosin (CARDURA) 4 MG tablet  doxycycline hyclate (VIBRAMYCIN) 100 MG capsule  DULOXETINE HCL PO  ferrous sulfate (IRON) 325 (65 FE) MG tablet  finasteride (PROSCAR) 5 MG tablet  fluticasone (FLONASE) 50 MCG/ACT nasal spray  fluticasone-salmeterol (ADVAIR) 250-50 MCG/DOSE inhaler  FUROSEMIDE PO  GABAPENTIN PO  HYDROcodone-acetaminophen (NORCO) 5-325 MG tablet  LOVASTATIN PO  multivitamin  with lutein (OCUVITE WITH LTEIN) CAPS  Potassium Chloride (KLOR-CON PO)  PRILOSEC OTC 20 MG OR TBEC  psyllium (METAMUCIL) 58.6 % POWD  senna-docusate (SENOKOT-S/PERICOLACE) 8.6-50 MG tablet  VITAMIN D, CHOLECALCIFEROL, PO      Review of Systems  Per HPI   Physical Exam   BP: 125/63  Pulse: 83  Temp: 97.9  F (36.6  C)  Resp: 24  SpO2: 100 %  Physical Exam  GENERAL APPEARANCE:alert, cooperative and no acute distress  RESP: lungs clear to auscultation - no rales, rhonchi or wheezes  CV: regular rates and rhythm, normal S1 S2, no murmur noted, posterior tibialis and dorsalis pedis pulse present   ABDOMEN:  soft, nontender, no HSM or masses and bowel sounds normal  NEURO: sensation to light touch of right foot grossly intact,  SKIN: 2 cm wound to the right lateral midfoot, minimally tender to palpation, no surrounding erythema.    ED Course        Procedures              Critical Care time:  none               No results found for this or any previous visit (from the past 24 hour(s)).    Medications - No data to display    Assessments & Plan (with Medical Decision Making)     I have reviewed the nursing notes.    I have reviewed the findings, diagnosis, plan and need for follow up with the patient.       New  Prescriptions    No medications on file       Final diagnoses:   Right foot ulcer (H)     83-year-old male with longstanding history of right foot wound presents to urgent care with concern over persistent wound.  He had stable vital signs upon arrival.  Physical exam findings significant for 2 cm ulceration to the lateral aspect of the right midfoot without signs of infection at this time.  I discussed importance of following up with the wound clinic for further evaluation and definitive management.  Patient's significant other was somewhat upset with this stating that they felt like was a waste of time to come here.  I explained that I cannot perform surgical interventions which may be required given duration of his symptoms.  No evidence for antibiotic/bacterial infection at this time.  He was discharged home with instructions for follow-up as previously scheduled.  Worrisome reasons to return to the ER/UC sooner discussed.    Disclaimer: This note consists of symbols derived from keyboarding, dictation, and/or voice recognition software. As a result, there may be errors in the script that have gone undetected.  Please consider this when interpreting information found in the chart.      4/17/2024   [facility] EMERGENCY DEPT       Yaneth Munson PA-C  04/21/24 1829

## 2024-04-19 PROBLEM — J44.1 COPD EXACERBATION (H): Status: ACTIVE | Noted: 2023-02-27

## 2024-04-19 PROBLEM — F32.A DEPRESSION: Status: ACTIVE | Noted: 2024-01-01

## 2024-04-19 PROBLEM — G47.00 INSOMNIA: Status: ACTIVE | Noted: 2024-01-01

## 2024-04-19 PROBLEM — F32.2 DEPRESSION, MAJOR, SINGLE EPISODE, SEVERE (H): Status: ACTIVE | Noted: 2022-01-12

## 2024-04-19 PROBLEM — H91.90 HARD OF HEARING: Status: ACTIVE | Noted: 2024-01-01

## 2024-04-19 PROBLEM — G89.29 CHRONIC PAIN: Status: ACTIVE | Noted: 2024-01-01

## 2024-04-19 NOTE — LETTER
2024             [facility]             Wound Dressing Rx and Order Form             Order Status:New Order             Verbal: Ceci LANGFORD  [facility]   Account # [id number]  Fax: [phone]  Customer Service: [phone]          Patient Info:  Name: Noe Phillips  : 1940  Address:   [address] [place] [address]  Phone: [phone]      Insurance Info:  PRIMARY INSURANCE: Payor: [employer] / Plan: [employer]VANTAGE / Product Type: HMO /   Primary Policy ID#: [id number]  SECONDARY INSURANCE:  N/A   Secondary Policy ID#: N/A    Physician Info:   Name:  Bubba Machuca DPM   Dept Address/Phones:   [address] [id number] [phone]  Dept: [phone]  Fax: [phone]        Wound info:  Encounter Diagnoses   Name Primary?    Venous stasis ulcer of right midfoot limited to breakdown of skin with varicose veins (H)     PAD (peripheral artery disease) (H24) Yes     VASC Wound Right lateral ankle (Active)   Pre Size Length 1 24 0800   Pre Size Width 1.2 24 0800   Pre Size Depth 0.1 24 0800   Pre Total Sq cm 1.2 24 0800       Incision/Surgical Site 01/10/24 Left Finger (Comment which one) (Active)     Drainage: moderate  Thickness:  Full  Duration of Need: 30  Days Supply: 30  Start Date: 2024  Starter Kit: Ancillary Kit (saline, gloves, gauze)  Qualifying wound/Debridement: Yes     Dressing Type Brand Size Days Supply  15/30 Quantity  changes/week   Primary Manuka honey HD Supra lite   4''x5'' 30 Every other day   Secondary Square gauze   4''x4'' 30 Every other day    Sof form roll gauze   4''x75'' 30 Every other day   Tape Papertape  1in 30 Every other day         No substitutions preferred. Call [phone].    OK to forward to covered supplier.    Electronically Signed Physician: Bubba Machuca  DPM Date: 4/19/2024

## 2024-04-19 NOTE — PATIENT INSTRUCTIONS
"  Wound care supplies were ordered today through [facility] and if you are not receiving your supplies or have a question on your bill please contact Nilda Pires at [phone]. Please allow 2-5 business days for delivery of supplies. You may get a call from a [phone] if there are additional information Jerson needs. It is important to  or return their call. PLEASE NOTE: If you need to return your supplies, you MUST call customer service within 15 days of delivery date.       Dressing Change lnstructions    EVERY OTHER DAY and as needed, Cleanse your right foot wound(s) with Normal saline.    Pat Dry with non-sterile gauze    Primary Dressing: Apply Medihoney or Manuka honey into/onto the wounds    Secondary dressing: Cover with dry gauze    Secure with non-sterile roll gauze (4\" x 75\" roll) and tape (1\" roll tape) as needed; avoid adhesive directly on the skin         SEEK MEDICAL CARE IF:  You have an increase in swelling, pain, or redness around the wound.  You have an increase in the amount of pus coming from the wound.  There is a bad smell coming from the wound.  The wound appears to be worsening/enlarging  You have a fever greater than 101.5 F      It is ok to continue current wound care treatment/products for the next 2-3 days until new wound care supplies are ordered and arrive. If longer than this please contact our office at [phone].      We want to hear from you!   In the next few weeks, you should receive a call or email to complete a survey about your visit at [facility]. Please help us improve your appointment experience by letting us know how we did today. We strive to make your experience good and value any ways in which we could do better.      We value your input and suggestions.    Thank you for choosing the [facility]!    "

## 2024-04-19 NOTE — PROGRESS NOTES
FOOT AND ANKLE SURGERY/PODIATRY CONSULT NOTE        ASSESSMENT:   Venous stasis ulceration lateral right foot into epidermis/dermis  COPD        TREATMENT:  -I discussed with the patient and his wife today that the ulceration on the lateral right foot is stable without signs of infection.  We reviewed that the etiology is likely related to venous stasis.    -Discussed compression therapy today.  Patient is open to this recommendation.  I will ask him to follow-up with Dr. Cisneros for future cares.    -Recommend elevation of the right foot at all times    -Patient referred for ABIs with toe pressures    -After discussion of risk factors, nursing staff removed dressing, cleansed wound and consent obtained 2% Lidocaine HCL jelly was applied, under clean conditions, the lateral right foot ulceration(s) were debrided using currette.  Devitalized and nonviable tissue, along with any fibrin and slough, was removed to improve granulation tissue formation, stimulate wound healing, decrease overall bacteria load, disrupt biofilm formation and decrease edge senescence. Wound drainage was scant No. Total excisional debridement was 1.2 sq cm from the epidermis/dermis area with a depth of 0.1 cm.   Ulcers were improved afterwards and .  Measures were as noted on the flow sheet.  Medihoney with gauze dressing applied today which he will reapply every other day.  Tubigrip dispensed today which he will wear for mild compression.    -Patient to follow-up with Dr. Cisneros at her next available appointment for future cares for lateral right foot ulceration.    Bubba Machuca DPM  [facility]      HPI: Noe Goetz [facility] was seen today for a right foot ulceration.  Patient reports that he first noticed the ulceration approximately 3 months ago.  He does have compression stockings in his possession but has not been using them due to difficulty of application.  History of smoking, quit 30 years ago.  He is  scheduled for a shoulder replacement surgery in the next 1 to 2 weeks.  Past medical history significant for COPD.    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    COPD (chronic obstructive pulmonary disease) (H)     COPD exacerbation (H) 02/27/2023    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    COVID-19 virus infection 02/09/2024    Depression     Depression, major, single episode, severe (H) 01/12/2022    Esophageal reflux 01/17/2007    Essential hypertension 01/17/2007    Fatty liver     GERD (gastroesophageal reflux disease)     Gout     Hard of hearing 04/19/2024    Hay fever 01/17/2007    Hepatic steatosis 05/22/2013    History of alcohol abuse 05/20/2019    History of sarcoma     Hyperlipidemia 07/28/2015    Hypertension     Hypoxia 02/09/2024    Insomnia 04/19/2024    Intractable back pain 09/05/2013    Left rotator cuff tear arthropathy 07/28/2016    Mixed hyperlipidemia     Morbid obesity (H)     Morbid obesity with BMI of 40.0-44.9, adult (H) 01/17/2007    Personal history of DVT (deep vein thrombosis)     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    S/p reverse total shoulder arthroplasty 07/28/2016    Sleep apnea 03/15/2007    Umbilical hernia without obstruction and without gangrene 04/08/2024       Past Surgical History:   Procedure Laterality Date    AMPUTATE FINGER(S) Left 1/10/2024    Procedure: LEFT MIDDLE FINGER AMPUTATION WITH FILLET FLAP RECONSTRUCTION OF THE LEFT PALM WOUND;  Surgeon: Sean Carlos MD;  Location:  OR     ARTHROPLASTY KNEE Right 05/20/2019    Procedure: ARTHROPLASTY, KNEE;  Surgeon: Reynaldo Barnes MD;  Location: [facility] OR    ARTHROTOMY SHOULDER, ROTATOR CUFF REPAIR, COMBINED  07/09/2012    Procedure: COMBINED ARTHROTOMY SHOULDER, ROTATOR CUFF REPAIR;  Right shoulder biceps tendodesis;  Surgeon: Reynaldo Barnes MD;  Location: [facility] OR    HAND SURGERY Right     IRRIGATION AND DEBRIDEMENT HAND, COMBINED Left 1/10/2024    Procedure: LEFT PALM WOUND IRRIGATION AND DEBRIDEMENT,;  Surgeon: Sean Carlos MD;  Location: [facility] OR        Allergies   Allergen Reactions    Nkda [No Known Drug Allergy]          Current Outpatient Medications:     albuterol 90 MCG/ACT inhaler, Inhale 2 puffs into the lungs every 6 hours as needed.  , Disp: , Rfl:     allopurinol (ZYLOPRIM) 300 MG tablet, Take 300 mg by mouth, Disp: , Rfl:     ATENOLOL 50 MG OR TABS, Take 50 mg by mouth every morning, Disp: , Rfl:     doxazosin (CARDURA) 4 MG tablet, Take 2 mg by mouth At Bedtime , Disp: , Rfl:     doxycycline hyclate (VIBRAMYCIN) 100 MG capsule, Take 1 capsule (100 mg) by mouth 2 times daily, Disp: 28 capsule, Rfl: 0    DULOXETINE HCL PO, Take 120 mg by mouth every evening, Disp: , Rfl:     ferrous sulfate (IRON) 325 (65 FE) MG tablet, Take 325 mg by mouth daily (with breakfast), Disp: , Rfl:     finasteride (PROSCAR) 5 MG tablet, Take 5 mg by mouth daily, Disp: , Rfl:     fluticasone (FLONASE) 50 MCG/ACT nasal spray, Spray 2 sprays into both nostrils daily, Disp: , Rfl:     fluticasone-salmeterol (ADVAIR) 250-50 MCG/DOSE inhaler, Inhale 1 puff into the lungs every 12 hours, Disp: , Rfl:     FUROSEMIDE PO, Take 40 mg by mouth 2 times daily Am and 5 pm, Disp: , Rfl:     GABAPENTIN PO, Take 600-1,200 mg by mouth 3 times daily, Disp: , Rfl:     HYDROcodone-acetaminophen (NORCO) 5-325 MG tablet, Take 1 tablet by mouth every 12 hours as needed for pain, Disp: 10 tablet, Rfl: 0    LOVASTATIN PO, Take 40 mg by mouth At Bedtime.  , Disp: , Rfl:      multivitamin  with lutein (OCUVITE WITH LTEIN) CAPS, Take 1 capsule by mouth every morning, Disp: , Rfl:     mupirocin (BACTROBAN) 2 % external ointment, APPLY OINTMENT TOPICALLY TO AFFECTED AREA TWICE DAILY, Disp: , Rfl:     omeprazole (PRILOSEC) 20 MG DR capsule, TAKE 1 CAPSULE BY MOUTH TWICE DAILY BEFORE MEAL(S), Disp: , Rfl:     Potassium Chloride (KLOR-CON PO), Take 20 mEq by mouth every morning, Disp: , Rfl:     potassium chloride quiana ER (KLOR-CON M20) 20 MEQ CR tablet, TAKE 1 TABLET BY MOUTH ONCE DAILY WITH A MEAL., Disp: , Rfl:     PRILOSEC OTC 20 MG OR TBEC, Take 20 mg by mouth 2 times daily, Disp: , Rfl:     psyllium (METAMUCIL) 58.6 % POWD, Take 1 Tablespoonful by mouth every morning, Disp: , Rfl:     senna-docusate (SENOKOT-S/PERICOLACE) 8.6-50 MG tablet, Take 1-2 tablets by mouth 2 times daily (Patient taking differently: Take 1-2 tablets by mouth daily as needed), Disp: , Rfl:     sulfamethoxazole-trimethoprim (BACTRIM DS) 800-160 MG tablet, TAKE 1 TABLET BY MOUTH TWICE DAILY FOR 2 WEEKS, Disp: , Rfl:     tamsulosin (FLOMAX) 0.4 MG capsule, Take 0.4 mg by mouth, Disp: , Rfl:     TRELEGY ELLIPTA 100-62.5-25 MCG/ACT oral inhaler, Inhale 1 puff into the lungs daily, Disp: , Rfl:     VENTOLIN  (90 Base) MCG/ACT inhaler, 2 puffs every 4 hours as needed for shortness of breath or wheezing, Disp: , Rfl:     VITAMIN D, CHOLECALCIFEROL, PO, Take 2,000 Units by mouth every morning, Disp: , Rfl:     ciprofloxacin (CIPRO) 500 MG tablet, Take 1 tablet (500 mg) by mouth 2 times daily (Patient not taking: Reported on 4/19/2024), Disp: 14 tablet, Rfl: 0    Social History     Social History Narrative    Not on file       Family History   Problem Relation Age of Onset    Hypertension Daughter     Pneumonia Daughter     Chronic Obstructive Pulmonary Disease Daughter     Anesthesia Reaction No family hx of     Thrombosis No family hx of        Review of Systems - 10 point Review of Systems is negative except  for right foot ulcer which is noted in HPI.      OBJECTIVE:  Appearance: alert, well appearing, and in no distress.    /85   Pulse 86   Temp 98.1  F (36.7  C)   Resp 18   SpO2 95%     BMI= There is no height or weight on file to calculate BMI.    General appearance: Patient is alert and fully cooperative with history & exam.  No sign of distress is noted during the visit.     Psychiatric: Affect is pleasant & appropriate.  Patient appears motivated to improve health.     Respiratory: Breathing is regular & unlabored while sitting.     HEENT: Hearing is intact to spoken word.  Speech is clear.  No gross evidence of visual impairment that would impact ambulation.    Vascular: Dorsalis pedis palpableRight.  Diffuse edema right lower extremity.  Dermatologic:   VASC Wound Right lateral ankle (Active)   Pre Size Length 1 04/19/24 0800   Pre Size Width 1.2 04/19/24 0800   Pre Size Depth 0.1 04/19/24 0800   Pre Total Sq cm 1.2 04/19/24 0800       Incision/Surgical Site 01/10/24 Left Finger (Comment which one) (Active)   Mixed granular/fibrotic tissue ulceration lateral right foot, no erythema.  Neurologic: Intact to light touch Right.  Musculoskeletal: Contracted digits noted Right.

## 2024-04-22 NOTE — PROGRESS NOTES
PHYSICAL THERAPY EVALUATION  Type of Visit: Evaluation    See electronic medical record for Abuse and Falls Screening details.    Subjective       Presenting condition or subjective complaint:   Patient reports he went [facility] to get injections in both side of back. Has been going to chiropractor in which was not beneficial. Overall pain is better with movement but wants to improve mobility. Is able to walk about a half mile. Denies numbness/tingling in his legs. Right side feels more stiff than left.   Date of onset: 04/22/19    Relevant medical history:   HTN, Arthritis  Dates & types of surgery:      Prior diagnostic imaging/testing results:       Prior therapy history for the same diagnosis, illness or injury:        Employment:     retired  Hobbies/Interests:   mechanical     Patient goals for therapy:   would like to bend over, increase endurance       Objective   LUMBAR SPINE EVALUATION  PAIN: Pain Level at Rest: 0/10  Pain Level with Use: 3/10  Pain Location: lumbar spine  Pain Quality: Aching and Dull  Pain Frequency: intermittent or daily  Pain is Worst: daytime  Pain is Exacerbated By: bending, walking  Pain is Relieved By: NSAIDs, otc medications, and rest  INTEGUMENTARY (edema, incisions): WFL  POSTURE: Standing Posture: Rounded shoulders, Forward head, Lordosis decreased, Thoracic kyphosis increased  GAIT:   Weightbearing Status: WBAT  Assistive Device(s): None  Gait Deviations: Stride length decreased  L lateral lean  BALANCE/PROPRIOCEPTION:  poor dynamic standing  ROM:   (Degrees) Left AROM Left PROM  Right AROM Right PROM   Hip Flexion 106*  108*    Hip Extension       Hip Abduction       Hip Adduction       Hip Internal Rotation 19*  13*    Hip External Rotation 31*  35*    Lumbar Side glide 50% limited 20% limited   Lumbar Flexion 30% limited   Lumbar Extension 50% limited     PELVIC/SI SCREEN: WFL  STRENGTH:  R hip abduction 4/5; L hip abduction 4+/5; poor (+) core; R hip flexion 4-/5; L hip flexion  4/5  MYOTOMES: WNL  DTR S: WNL  CORD SIGNS: WNL  DERMATOMES: WNL  NEURAL TENSION: Lumbar WNL  FLEXIBILITY: Decreased hip IR L, Decreased hip ER L, Decreased piriformis L, Decreased hip flexors L, Decreased hip IR R, Decreased hip ER R, Decreased piriformis R, Decreased hip flexors R  LUMBAR/HIP Special Tests:  negative SLR and Slump tests bilaterally    PELVIS/SI SPECIAL TESTS: WFL  FUNCTIONAL TESTS: Double Leg Squat: Anterior knee translation, Knee valgus, Contralateral hip drop, and Improper use of glutes/hips  PALPATION:  no tenderness noted    Assessment & Plan   CLINICAL IMPRESSIONS  Medical Diagnosis: Low back pain potentially associated with spinal stenosis (M54.50)  - Primary    Treatment Diagnosis: low back pain and bilateral leg weakness   Impression/Assessment: Patient is a 84 year old male with low back pain complaints.  The following significant findings have been identified: Pain, Decreased ROM/flexibility, Decreased joint mobility, Decreased strength, Impaired gait, and Impaired muscle performance. These impairments interfere with their ability to perform self care tasks, recreational activities, household chores, and community mobility as compared to previous level of function.     Clinical Decision Making (Complexity):  Clinical Presentation: Stable/Uncomplicated  Clinical Presentation Rationale: based on medical and personal factors listed in PT evaluation  Clinical Decision Making (Complexity): Low complexity    PLAN OF CARE  Treatment Interventions:  Interventions: Gait Training, Manual Therapy, Neuromuscular Re-education, Therapeutic Activity, Therapeutic Exercise    Long Term Goals     PT Goal 1  Goal Identifier: STG  Goal Description: Patient to report walking 4x/week at least 1 mile consistently to improve community mobility.  Rationale: to maximize safety and independence within the community  Target Date: 05/20/24  PT Goal 2  Goal Identifier: LTG  Goal Description: Patient to report  reaching to floor and lifting light object up <3/10 LBP.  Rationale: to maximize safety and independence with performance of ADLs and functional tasks  Target Date: 07/01/24  PT Goal 3  Goal Identifier: LTG  Goal Description: Patient to report compliance and IND with HEP to aid functional recovery and reduce future low back pain episode occurrence.  Rationale: to maximize safety and independence with performance of ADLs and functional tasks  Target Date: 07/01/24      Frequency of Treatment: 1x/week  Duration of Treatment: 10 weeks    Education Assessment:   Learner/Method: Patient;Listening;Reading;Demonstration;Pictures/Video    Risks and benefits of evaluation/treatment have been explained.   Patient/Family/caregiver agrees with Plan of Care.     Evaluation Time:     PT Eval, Low Complexity Minutes (00376): 15     Signing Clinician: Inocente Pedersen, PT      [facility]                                                                                   OUTPATIENT PHYSICAL THERAPY      PLAN OF TREATMENT FOR OUTPATIENT REHABILITATION   Patient's Last Name, First Name, M.IRaymond MoralesNoe Goetz YOB: 1940   Provider's Name   [facility]   Medical Record No.  [id number]     Onset Date: 04/22/19  Start of Care Date: 04/22/24     Medical Diagnosis:  Low back pain potentially associated with spinal stenosis (M54.50)  - Primary      PT Treatment Diagnosis:  low back pain and bilateral leg weakness Plan of Treatment  Frequency/Duration: 1x/week/ 10 weeks    Certification date from 04/22/24 to 07/01/24         See note for plan of treatment details and functional goals     Inocente Pedersen, PT                         I CERTIFY THE NEED FOR THESE SERVICES FURNISHED UNDER        THIS PLAN OF TREATMENT AND WHILE UNDER MY CARE .             Physician Signature               Date    X_____________________________________________________                   Referring Provider:  Thor Pimentel    Initial Assessment  See Epic Evaluation- Start of Care Date: 04/22/24

## 2024-04-26 NOTE — TELEPHONE ENCOUNTER
Caller: Braydon     Provider: DIOR Cisneros    Detailed reason for call: Joana and Ron called to see if John could get in sooner to see Joana Cisneros, CNP due to right ankle wound getting worse per spouse- she states there is purulent drainage and the wound Is a lot more painful. Advised patient to go to UC to be seen to rule out infection. Joana stated they were already in UC today and that a provider just looked at it and told them to keep the appointment and discharged home. No record of appointment with [facility] today. Only seen one from 4/17 prior to pts appointment with Dr. Machuca on 4/19. Pt requested pain medications for the pain. Writer informed Joana and John that pain medications are not prescribed unless surgery was done. Tried to walk through having them send a photo through [employer] or email but unable to get photo. Spoke with MONSE on above information and she also advised patient to be seen in urgent care. Updated Joana and Pt on [facility]K recommendation as well. Joana stated that they were just in UC and writer stated that the most recent note from ED/UC was 4/17 and Joana stated that was the day she was referring too, and not today. Writer encouraged pt to be seen in UC today. Joana asked if any pain medications like Hydrocodone will be prescribed, writer again stated no. Was able to reschedule patient to be seen sooner but with Dr. Back on 5/2, they are ok with seeing either or as long as its a sooner appointment. Will also have them placed on waitlist if someone opens up sooner. They will contact with any further questions or concerns.     Best phone number to contact: [phone]    Best time to contact: any      (Noted to patient if reason is related to wound or incision, to please send a photo via email or [email or web address].)

## 2024-05-01 NOTE — PATIENT INSTRUCTIONS
Wound care supplies were ordered today through [employer] and if you are not receiving your supplies or have a question on your bill please contact Nilda Pires at [phone]. Please allow 2-5 business days for delivery of supplies. You may get a call from a [phone] if there are additional information [employer] needs. It is important to  or return their call. PLEASE NOTE: If you need to return your supplies, you MUST call customer service within 15 days of delivery date.     SOAK THE HYDROFERA WITH A CUP OF WATER TO REMOVE    Wound Care Instructions    3 TIMES PER WEEK and as needed, Cleanse your right ankle wound(s) with Normal saline.    Pat Dry with non-sterile gauze    Primary Dressing: Apply hydrofera blue ready transfer into/onto the wounds    Secondary dressing: Cover with mepilex    Compression: tubigrip- hand wash in the sink, let air dry    It is ok to get your wound wet in the bath or shower    It is recommended that you elevate your legs throughout the day: approx 2-3 times each day  Elevate them above the level of your heart for 30 min.   Ways to do this:   Lay on the couch or your bed and prop your legs up on pillows   Recline back as far as you can go in your recliner and prop your legs on pillows.     Doing these things will help reduce the edema in your legs.    High Protein Foods  When you have an open ulcer, your bodies protein needs are much higher, so it is recommended eat good sources of protein or take a protein supplement!    Protein Supplements  -Premier Protein  -Ensure  -Boost  -Glucerna, if diabetic    Chicken  -Chicken breast, 3.5oz.-30 grams protein  -Chicken meat, cooked, 4 oz.    Beef  -Hamburger abad, 4 oz-28 grams protein  -Steak, 6 oz-42 grams  -Most cuts of beef- 7 grams of protein per ounce    Fish  -Most fish fillets or steaks are about 22 grams of protein for 3 1/2 oz(100 grams) of cooked fish, or 6 grams per ounce  -Tuna, 6 oz can-40 grams of protein    Pork  -Pork chop,  average-22 grams protein  -Pork loin or tenderloin, 4 oz.-29 grams  -Ham, 3oz serving- 19 grams  -Tarango, 1 slice-3 grams    Eggs and Dairy  -Egg, large-7 grams  -Milk, 1 cup-8 grams  -Cottage cheese, 1/2 cup-15 grams  -Greek yogurt, 1 cup-usually 8-12 grams, check label    Beans  -Soy milk, 1 cup-6-10 grams  -Most beans(black, huertas, lentils, etc.) about 7-10 grams protein per half cup of cooked beans    Nuts and Seeds  -Peanut butter, 2 Tablespoons- 8 grams protein  -Almonds, 1/4 cup- 8 grams  -Peanuts, 1/4 cup-9 grams  -Sunflower seeds, 1/4 cup- 6 grams        If for some reason you are not able to get your dressing(s) changed as outlined above (due to illness, lack of supplies, lack of help) please do the following: remove old, soiled dressings; wash the wounds with saline; pat dry; apply ABD pad or other absorbant pad and secure with rolled gauze; avoid tape directly on your skin; Call the clinic as soon as possible to let us know what the current issues are in receiving wound care [phone].      SEEK MEDICAL CARE IF:  You have an increase in swelling, pain, or redness around the wound.  You have an increase in the amount of pus coming from the wound.  There is a bad smell coming from the wound.  The wound appears to be worsening/enlarging  You have a fever greater than 101.5 F      It is ok to continue current wound care treatment/products for the next 2-3 days until new wound care supplies are ordered and arrive. If longer than this please contact our office at [phone].    If you have a 2 layer or 4 layer compression wrap on these are safe to have on for ONLY 7 days. If for some reason you are not able to get the wrap(s) changed (due to illness; lack of supplies, lack of help, lack of transportation) please do the following: unwrap the old 2 or 4 layer compression wrap; avoid using scissors as you could cut your skin and cause wounds; use tubular compression when available. Call to reschedule your  home care or clinic visit appointment as soon as possible.    Please NOTE: if you are 15 minutes late to your clinic appointment you will have to be rescheduled. Please call our clinic as soon as possible if you know you will not be able to get to your appointment at [phone].    If you fail to show up to 3 scheduled clinic appointments you will be dismissed from our clinic.              We want to hear from you!  In the next few weeks, you should receive a call or email to complete a survey about your visit at [facility]. Please help us improve your appointment experience by letting us know how we did today. We strive to make your experience good and value any ways in which we could do better.      We value your input and suggestions.    Thank you for choosing the [facility]!

## 2024-05-09 PROBLEM — R60.0 LOWER EXTREMITY EDEMA: Status: ACTIVE | Noted: 2024-01-01

## 2024-05-09 PROBLEM — L97.912 SKIN ULCER OF RIGHT LOWER LEG WITH FAT LAYER EXPOSED (H): Status: ACTIVE | Noted: 2024-01-01

## 2024-05-09 NOTE — PROGRESS NOTES
Wound Clinic Note         Visit date: 05/09/2024       Cheif Complaint:     Noe Morales is a 84 year old  male had concerns including Wound Check.  The patient has lower extremity edema and a right leg ulcer         HISTORY OF PRESENT ILLNESS:    Noe Morales reports the wound has been present since January 2024.  The wound began without a clear cause.   Prior to this he has never had other difficult to heal leg or foot wounds.  He is planning to have a right shoulder replacement in July.  The patient denies a history of congestive heart failure, previous vein procedures or previous DVT.       The patient's wife has been bandaging the wound with medical honey and dry gauze changed every other day.  There has been Light serous  drainage from the wound.       The pateint denies fevers or chills.  They report the pain from the wound has been 8/10 and has remained about the same recently.      The patient reports they currently do not have any routine for elevating their legs.  The patient confirms they do sleep in a bed.     Today the patient reports maintaining a regular diet without special attention to protein.        The patient denies a history of diabetes, smoking or chronic steroid use.         The patient has not had any symptoms of infection relating to the wound recently and is not currently on antibiotics.       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    COPD (chronic obstructive pulmonary disease) (H)      COPD exacerbation (H) 02/27/2023    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    COVID-19 virus infection 02/09/2024    Depression     Depression, major, single episode, severe (H) 01/12/2022    Esophageal reflux 01/17/2007    Essential hypertension 01/17/2007    Fatty liver     GERD (gastroesophageal reflux disease)     Gout     Hard of hearing 04/19/2024    Hay fever 01/17/2007    Hepatic steatosis 05/22/2013    History of alcohol abuse 05/20/2019    History of sarcoma     Hyperlipidemia 07/28/2015    Hypertension     Hypoxia 02/09/2024    Insomnia 04/19/2024    Intractable back pain 09/05/2013    Left rotator cuff tear arthropathy 07/28/2016    Mixed hyperlipidemia     Morbid obesity (H)     Morbid obesity with BMI of 40.0-44.9, adult (H) 01/17/2007    Personal history of DVT (deep vein thrombosis)     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       Surgical Hx:   Past Surgical History:   Procedure Laterality Date    AMPUTATE FINGER(S) Left 1/10/2024    Procedure: LEFT MIDDLE FINGER AMPUTATION WITH FILLET FLAP RECONSTRUCTION OF THE LEFT PALM WOUND;  Surgeon: Sean Carlos MD;  Location:  OR    ARTHROPLASTY KNEE Right 05/20/2019    Procedure: ARTHROPLASTY, KNEE;  Surgeon: Reynaldo Barnes MD;  Location: [facility]    ARTHROTOMY SHOULDER, ROTATOR CUFF REPAIR, COMBINED  07/09/2012    Procedure: COMBINED ARTHROTOMY SHOULDER, ROTATOR CUFF REPAIR;  Right shoulder biceps tendodesis;  Surgeon: Reynaldo Barnes MD;  Location: [facility] OR    HAND SURGERY Right     IRRIGATION AND DEBRIDEMENT HAND, COMBINED Left 1/10/2024    Procedure: LEFT PALM WOUND IRRIGATION AND DEBRIDEMENT,;  Surgeon: Sean Carlos MD;   Location: [facility] OR          Allergies:    Allergies   Allergen Reactions    Nkda [No Known Drug Allergy]               Medication History:    Current Outpatient Medications   Medication Sig Dispense Refill    albuterol 90 MCG/ACT inhaler Inhale 2 puffs into the lungs every 6 hours as needed.        allopurinol (ZYLOPRIM) 300 MG tablet Take 300 mg by mouth      ATENOLOL 50 MG OR TABS Take 50 mg by mouth every morning      ciprofloxacin (CIPRO) 500 MG tablet Take 1 tablet (500 mg) by mouth 2 times daily 14 tablet 0    doxazosin (CARDURA) 4 MG tablet Take 2 mg by mouth At Bedtime       DULOXETINE HCL PO Take 120 mg by mouth every evening      ferrous sulfate (IRON) 325 (65 FE) MG tablet Take 325 mg by mouth daily (with breakfast)      finasteride (PROSCAR) 5 MG tablet Take 5 mg by mouth daily      fluticasone (FLONASE) 50 MCG/ACT nasal spray Spray 2 sprays into both nostrils daily      fluticasone-salmeterol (ADVAIR) 250-50 MCG/DOSE inhaler Inhale 1 puff into the lungs every 12 hours      FUROSEMIDE PO Take 40 mg by mouth 2 times daily Am and 5 pm      GABAPENTIN PO Take 600-1,200 mg by mouth 3 times daily      HYDROcodone-acetaminophen (NORCO) 5-325 MG tablet Take 1 tablet by mouth every 12 hours as needed for pain 10 tablet 0    LOVASTATIN PO Take 40 mg by mouth At Bedtime.        multivitamin  with lutein (OCUVITE WITH LTEIN) CAPS Take 1 capsule by mouth every morning      mupirocin (BACTROBAN) 2 % external ointment APPLY OINTMENT TOPICALLY TO AFFECTED AREA TWICE DAILY      omeprazole (PRILOSEC) 20 MG DR capsule TAKE 1 CAPSULE BY MOUTH TWICE DAILY BEFORE MEAL(S)      Potassium Chloride (KLOR-CON PO) Take 20 mEq by mouth every morning      potassium chloride quiana ER (KLOR-CON M20) 20 MEQ CR tablet TAKE 1 TABLET BY MOUTH ONCE DAILY WITH A MEAL.      PRILOSEC OTC 20 MG OR TBEC Take 20 mg by mouth 2 times daily      psyllium (METAMUCIL) 58.6 % POWD Take 1 Tablespoonful by mouth every morning      senna-docusate  (SENOKOT-S/PERICOLACE) 8.6-50 MG tablet Take 1-2 tablets by mouth 2 times daily (Patient taking differently: Take 1-2 tablets by mouth daily as needed)      sulfamethoxazole-trimethoprim (BACTRIM DS) 800-160 MG tablet TAKE 1 TABLET BY MOUTH TWICE DAILY FOR 2 WEEKS      tamsulosin (FLOMAX) 0.4 MG capsule Take 0.4 mg by mouth      TRELEGY ELLIPTA 100-62.5-25 MCG/ACT oral inhaler Inhale 1 puff into the lungs daily      VENTOLIN  (90 Base) MCG/ACT inhaler 2 puffs every 4 hours as needed for shortness of breath or wheezing      VITAMIN D, CHOLECALCIFEROL, PO Take 2,000 Units by mouth every morning      doxycycline hyclate (VIBRAMYCIN) 100 MG capsule Take 1 capsule (100 mg) by mouth 2 times daily (Patient not taking: Reported on 5/9/2024) 28 capsule 0     No current facility-administered medications for this visit.         Tobacco History:  reports that he quit smoking about 22 years ago. His smoking use included cigarettes. He has never been exposed to tobacco smoke. He has never used smokeless tobacco.       REVIEW OF SYMPTOMS:   The review of systems was negative except as noted in the HPI.           PHYSICAL EXAMINATION:     /78   Pulse 70   SpO2 94%            GENERAL: The patient overall appears well and is no acute distress.   HEAD: normocephalic   EYES: Sclera and conjunctiva clear   NECK: no obvious masses   LUNGS: breathing is unlabored.   EXTREMITIES: No clubbing, cyanosis or edema   SKIN: No rashes or other abnormalities except as noted under the Wound section below.   NEUROLOGICAL: normal motor and sensory function   EDEMA: Moderate       WOUND: The wound appears healthy with no sign of infection.   Wound bed: granulation tissue  Periwound: healthy intact skin  Fairly superficial wound on the right lateral ankle.      Also see below for wound details:       Circumferential volume measures:             No data to display                Ulceration(s)/Wound(s):   Please see the media tab under the  "chart review for pictures of the wounds.  Nursing staff removed dressings and cleansed wound.    VASC Wound Right lateral ankle (Active)   Pre Size Length 2 05/09/24 0800   Pre Size Width 1.5 05/09/24 0800   Pre Size Depth 0.1 05/09/24 0800   Pre Total Sq cm 3 05/09/24 0800             No results for input(s): \"HGBA1C\", \"A1C\", \"EAG\" in the last 08809 hours.    Invalid input(s): \"OKIUIXCIT9C\"       Recent Labs   Lab Test 02/09/24  2143 09/25/21  0535 09/01/16  0423   ALBUMIN 4.1 3.1* 3.4         WBC   Date Value Ref Range Status   09/01/2016 6.7 4.0 - 11.0 10e9/L Final     WBC Count   Date Value Ref Range Status   02/10/2024 6.4 4.0 - 11.0 10e3/uL Final     Albumin   Date Value Ref Range Status   02/09/2024 4.1 3.5 - 5.2 g/dL Final   09/25/2021 3.1 (L) 3.4 - 5.0 g/dL Final   09/01/2016 3.4 3.4 - 5.0 g/dL Final           No sharp debridement performed today.                  ASSESSMENT:   This is a 84 year old  male with a right leg ulcer, the patient also has lower extremity edema which was also managed during today's clinic visit.          PLAN:   We'll bandage the area with Hydrofera Blue, Mepilex bandage and a Tubigrip stocking changed every other day.  He is scheduled to have an arterial ultrasound performed tomorrow.    Separate from the wound care instructions we then discussed management strategies for lower extremity edema.  I explained the keys for managing lower extremity edema are compression and elevation.  I explained to the patient today that controlling the edema is probably the most important thing we can do to help heal the wound.  I have specifically recommended that they lay down with their legs above the level of the heart for 30 minutes at least twice a day.  I have also encouraged the patient to continue to sleep in a bed.     I have explained to the patient the importance of protein intake to wound healing.  I have explained that increasing protein intake will speed wound healing.  We discussed " several types of food that are high in protein and the wound care nurse gave the patient a handout that summarizes this information.  In addition to further speed wound healing I have encouraged the patient to take a protein supplement.   The patient will return to the wound clinic in 3 to 4 weeks to see me again.        45 minutes spent on the date of the encounter doing chart review, history and exam, documentation and further activities per the note, this time excludes any procedure time      Sean Back MD  05/09/2024   9:26 AM   [facility]  [phone]    This note was electronically signed by Sean Back MD

## 2024-05-09 NOTE — LETTER
"[facility]  [address] [place] [address] [address]  [place], [place] [address]  [phone]      Fax [phone]    [facility]           Fax: [phone]            Customer Service: [phone]        Account #: [id number]    Wound Dressing Rx and Order Form  Order Status: refill  Verbal: Taylor  Date: May 9, 2024     Noe Goetz [facility]  Gender: male  : 1940  [address] [place] [id number] [phone] (home)     Medical Record: [id number]  Primary Care Provider: Semmler, Steven Duane      ICD-10-CM    1. Skin ulcer of right lower leg with fat layer exposed (H)  L97.912 Wound care      2. Lower extremity edema  R60.0 Wound care            Insurance Info:  INSURER: Payor: [employer] / Plan: [employer] MEDICARE ADVANTAGE / Product Type: HMO /   Policy ID#:  [id number]  SECONDARY INSURANCE:    Secondary Policy ID#:  N/A        Physician Info:   Name:  SANIA HEART     Dept Address/Phones:   [address] [place] [address], [address]  [facility] [id number] [phone]  Fax: [phone]    Lymphedema circumferential measurements (in cm):       No data to display                  Wound info:  VASC Wound Right lateral ankle (Active)   Pre Size Length 2 24 0800   Pre Size Width 1.5 24 0800   Pre Size Depth 0.1 24 0800   Pre Total Sq cm 3 24 0800   Number of days: 20       Incision/Surgical Site 01/10/24 Left Finger (Comment which one) (Active)   Number of days: 120        Drainage: moderate  Thickness:  full  Duration of Need: 30 DAYS  Days Supply: 30 DAYS  Start Date: 2024  Starter Kit, Ancillary Kit (saline, gloves, gauze): Yes   Qualifying wound/Debridement: Yes     NO SUBSTITUTIONS. Call [phone].      Dressing Type Brand Size Frequency of change  Quantity   Primary Hydrofera blue ready transfer  4\"x5\" 3 TIMES PER WEEK and as needed     Mepilex border adhesive bandage  3\"x3' 3 TIMES PER WEEK and as needed      NO SUBSTITUTIONS. " Call [phone] with questions.       OK to forward to covered supplier.    Electronically Signed Physician:  SANIA HEART             Date: May 9, 2024

## 2024-05-23 NOTE — PATIENT INSTRUCTIONS
Wound care supplies were not ordered or needed today.      Wound Care Instructions- continue for another week or so, then ok to stop dressings    3 TIMES PER WEEK and as needed, Cleanse your right ankle wound(s) with Normal saline.    Pat Dry with non-sterile gauze    Primary Dressing: mepilex or your preferred bandaid    Compression: tubigrip- hand wash in the sink, let air dry    It is ok to get your wound wet in the bath or shower    It is recommended that you elevate your legs throughout the day: approx 2-3 times each day  Elevate them above the level of your heart for 30 min.   Ways to do this:   Lay on the couch or your bed and prop your legs up on pillows   Recline back as far as you can go in your recliner and prop your legs on pillows.     Doing these things will help reduce the edema in your legs.        If for some reason you are not able to get your dressing(s) changed as outlined above (due to illness, lack of supplies, lack of help) please do the following: remove old, soiled dressings; wash the wounds with saline; pat dry; apply ABD pad or other absorbant pad and secure with rolled gauze; avoid tape directly on your skin; Call the clinic as soon as possible to let us know what the current issues are in receiving wound care [phone].      SEEK MEDICAL CARE IF:  You have an increase in swelling, pain, or redness around the wound.  You have an increase in the amount of pus coming from the wound.  There is a bad smell coming from the wound.  The wound appears to be worsening/enlarging  You have a fever greater than 101.5 F      It is ok to continue current wound care treatment/products for the next 2-3 days until new wound care supplies are ordered and arrive. If longer than this please contact our office at [phone].    If you have a 2 layer or 4 layer compression wrap on these are safe to have on for ONLY 7 days. If for some reason you are not able to get the wrap(s) changed (due to illness;  lack of supplies, lack of help, lack of transportation) please do the following: unwrap the old 2 or 4 layer compression wrap; avoid using scissors as you could cut your skin and cause wounds; use tubular compression when available. Call to reschedule your home care or clinic visit appointment as soon as possible.    Please NOTE: if you are 15 minutes late to your clinic appointment you will have to be rescheduled. Please call our clinic as soon as possible if you know you will not be able to get to your appointment at [phone].    If you fail to show up to 3 scheduled clinic appointments you will be dismissed from our clinic.              We want to hear from you!  In the next few weeks, you should receive a call or email to complete a survey about your visit at [facility]. Please help us improve your appointment experience by letting us know how we did today. We strive to make your experience good and value any ways in which we could do better.      We value your input and suggestions.    Thank you for choosing the [facility]!

## 2024-05-30 NOTE — PROGRESS NOTES
Wound Clinic Note         Visit date: 05/30/2024       Cheif Complaint:     Noe Morales is a 84 year old  male had concerns including Wound Check.  The patient has lower extremity edema and a right leg ulcer         HISTORY OF PRESENT ILLNESS:    Noe Morales reports the wound has been present since January 2024.  The wound began without a clear cause.   Prior to this he has never had other difficult to heal leg or foot wounds.  He is planning to have a right shoulder replacement in July.  The patient denies a history of congestive heart failure, previous vein procedures or previous DVT.       Since his last clinic visit with me his wife has been bandaging the wound with Hydrofera Blue, Mepilex bandage and a Tubigrip stocking changed every other day.  There has been light serous drainage from the wound.  There is been no difficulties with the dressing changes.      The pateint denies fevers or chills.  They report the pain from the wound has been 7/10 and has remained about the same recently.      The patient reports propping up their legs occasionally during the day, but they do not elevate their legs above the level of their heart.  The patient confirms they do sleep in a bed.     Today the patient reports maintaining a high protein diet, but has not been taking protein supplements lately.        The patient denies a history of diabetes, smoking or chronic steroid use.         The patient has not had any symptoms of infection relating to the wound recently and is not currently on antibiotics.       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    COPD (chronic obstructive pulmonary disease) (H)     COPD exacerbation (H) 02/27/2023    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    COVID-19 virus infection 02/09/2024    Depression     Depression, major, single episode, severe (H) 01/12/2022    Esophageal reflux 01/17/2007    Essential hypertension 01/17/2007    Fatty liver     GERD (gastroesophageal reflux disease)     Gout     Hard of hearing 04/19/2024    Hay fever 01/17/2007    Hepatic steatosis 05/22/2013    History of alcohol abuse 05/20/2019    History of sarcoma     Hyperlipidemia 07/28/2015    Hypertension     Hypoxia 02/09/2024    Insomnia 04/19/2024    Intractable back pain 09/05/2013    Left rotator cuff tear arthropathy 07/28/2016    Mixed hyperlipidemia     Morbid obesity (H)     Morbid obesity with BMI of 40.0-44.9, adult (H) 01/17/2007    Personal history of DVT (deep vein thrombosis)     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       Surgical Hx:   Past Surgical History:   Procedure Laterality Date    AMPUTATE FINGER(S) Left 1/10/2024    Procedure: LEFT MIDDLE FINGER AMPUTATION WITH FILLET FLAP RECONSTRUCTION OF THE LEFT PALM WOUND;  Surgeon: Sean Carlos MD;  Location:  OR    ARTHROPLASTY KNEE Right 05/20/2019    Procedure: ARTHROPLASTY, KNEE;  Surgeon: Reynaldo Barnes MD;  Location: [facility]    ARTHROTOMY SHOULDER, ROTATOR CUFF REPAIR, COMBINED  07/09/2012    Procedure: COMBINED ARTHROTOMY SHOULDER, ROTATOR CUFF REPAIR;  Right shoulder biceps tendodesis;  Surgeon: Reynaldo Barnes MD;  Location: [facility]  OR    HAND SURGERY Right     IRRIGATION AND DEBRIDEMENT HAND, COMBINED Left 1/10/2024    Procedure: LEFT PALM WOUND IRRIGATION AND DEBRIDEMENT,;  Surgeon: Sean Carlos MD;  Location: [facility] OR          Allergies:    Allergies   Allergen Reactions    Nkda [No Known Drug Allergy]               Medication History:    Current Outpatient Medications   Medication Sig Dispense Refill    albuterol 90 MCG/ACT inhaler Inhale 2 puffs into the lungs every 6 hours as needed.        allopurinol (ZYLOPRIM) 300 MG tablet Take 300 mg by mouth      ATENOLOL 50 MG OR TABS Take 50 mg by mouth every morning      ciprofloxacin (CIPRO) 500 MG tablet Take 1 tablet (500 mg) by mouth 2 times daily 14 tablet 0    doxazosin (CARDURA) 4 MG tablet Take 2 mg by mouth At Bedtime       doxycycline hyclate (VIBRAMYCIN) 100 MG capsule Take 1 capsule (100 mg) by mouth 2 times daily 28 capsule 0    DULOXETINE HCL PO Take 120 mg by mouth every evening      ferrous sulfate (IRON) 325 (65 FE) MG tablet Take 325 mg by mouth daily (with breakfast)      finasteride (PROSCAR) 5 MG tablet Take 5 mg by mouth daily      fluticasone (FLONASE) 50 MCG/ACT nasal spray Spray 2 sprays into both nostrils daily      fluticasone-salmeterol (ADVAIR) 250-50 MCG/DOSE inhaler Inhale 1 puff into the lungs every 12 hours      FUROSEMIDE PO Take 40 mg by mouth 2 times daily Am and 5 pm      GABAPENTIN PO Take 600-1,200 mg by mouth 3 times daily      HYDROcodone-acetaminophen (NORCO) 5-325 MG tablet Take 1 tablet by mouth every 12 hours as needed for pain 10 tablet 0    LOVASTATIN PO Take 40 mg by mouth At Bedtime.        multivitamin  with lutein (OCUVITE WITH LTEIN) CAPS Take 1 capsule by mouth every morning      mupirocin (BACTROBAN) 2 % external ointment APPLY OINTMENT TOPICALLY TO AFFECTED AREA TWICE DAILY      omeprazole (PRILOSEC) 20 MG DR capsule TAKE 1 CAPSULE BY MOUTH TWICE DAILY BEFORE MEAL(S)      Potassium Chloride (KLOR-CON PO) Take 20 mEq by mouth every  morning      potassium chloride quiana ER (KLOR-CON M20) 20 MEQ CR tablet TAKE 1 TABLET BY MOUTH ONCE DAILY WITH A MEAL.      PRILOSEC OTC 20 MG OR TBEC Take 20 mg by mouth 2 times daily      psyllium (METAMUCIL) 58.6 % POWD Take 1 Tablespoonful by mouth every morning      senna-docusate (SENOKOT-S/PERICOLACE) 8.6-50 MG tablet Take 1-2 tablets by mouth 2 times daily (Patient taking differently: Take 1-2 tablets by mouth daily as needed)      sulfamethoxazole-trimethoprim (BACTRIM DS) 800-160 MG tablet TAKE 1 TABLET BY MOUTH TWICE DAILY FOR 2 WEEKS      tamsulosin (FLOMAX) 0.4 MG capsule Take 0.4 mg by mouth      TRELEGY ELLIPTA 100-62.5-25 MCG/ACT oral inhaler Inhale 1 puff into the lungs daily      VENTOLIN  (90 Base) MCG/ACT inhaler 2 puffs every 4 hours as needed for shortness of breath or wheezing      VITAMIN D, CHOLECALCIFEROL, PO Take 2,000 Units by mouth every morning       No current facility-administered medications for this visit.         Tobacco History:  reports that he quit smoking about 22 years ago. His smoking use included cigarettes. He has never been exposed to tobacco smoke. He has never used smokeless tobacco.       REVIEW OF SYMPTOMS:   The review of systems was negative except as noted in the HPI.           PHYSICAL EXAMINATION:     /80   Pulse 70   SpO2 93%            GENERAL: The patient overall appears well and is no acute distress.   HEAD: normocephalic   EYES: Sclera and conjunctiva clear   NECK: no obvious masses   LUNGS: breathing is unlabored.   EXTREMITIES: No clubbing, cyanosis or edema   SKIN: No rashes or other abnormalities except as noted under the Wound section below.   NEUROLOGICAL: normal motor and sensory function   EDEMA: Moderate       WOUND: The wound appears healthy with no sign of infection.   Wound bed: granulation tissue  Periwound: healthy intact skin  The wound area is quite a bit smaller today compared with his last clinic visit and is mostly scabbed  "over.  There is just 1 small open area remaining.      Also see below for wound details:       Circumferential volume measures:             No data to display                Ulceration(s)/Wound(s):   Please see the media tab under the chart review for pictures of the wounds.  Nursing staff removed dressings and cleansed wound.    VASC Wound Right lateral ankle (Active)   Description scab 05/30/24 1300               No results for input(s): \"HGBA1C\", \"A1C\", \"EAG\" in the last 29020 hours.    Invalid input(s): \"[id number]\"       Recent Labs   Lab Test 02/09/24  2143 09/25/21  0535 09/01/16  0423   ALBUMIN 4.1 3.1* 3.4         WBC   Date Value Ref Range Status   09/01/2016 6.7 4.0 - 11.0 10e9/L Final     WBC Count   Date Value Ref Range Status   02/10/2024 6.4 4.0 - 11.0 10e3/uL Final     Albumin   Date Value Ref Range Status   02/09/2024 4.1 3.5 - 5.2 g/dL Final   09/25/2021 3.1 (L) 3.4 - 5.0 g/dL Final   09/01/2016 3.4 3.4 - 5.0 g/dL Final           No sharp debridement performed today.                  ASSESSMENT:   This is a 84 year old  male with a right leg ulcer, the patient also has lower extremity edema which was also managed during today's clinic visit.          PLAN:   We'll bandage the area with Hydrofera Blue, Mepilex bandage and a Tubigrip stocking changed every other day for another week or 2 until the final open area heals then no further bandages to be required.  He had a bilateral lower extremity arterial ultrasound performed on May 10, 2024 which did not show any evidence of arterial insufficiency.    Separate from the wound care instructions we then discussed management strategies for lower extremity edema.  I explained the keys for managing lower extremity edema are compression and elevation.  I have again explained to the patient today that controlling the edema is probably the most important thing we can do to help heal the wound.  I have specifically recommended that they lay down with their " legs above the level of the heart for 30 minutes at least twice a day.  I emphasized that if we can not control the edema we will likely not be able to get this wound to heal.  I have also encouraged the patient to continue to sleep in a bed.     I have encouraged the patient to continue on their high protein diet to aid in wound healing.   The patient will return to the wound clinic on an as-needed basis if further wounds develop.        30 minutes spent on the date of the encounter doing chart review, history and exam, documentation and further activities per the note, this time excludes any procedure time      Sean Back MD  05/30/2024   1:48 PM   [facility]  [phone]    This note was electronically signed by Sean Back MD

## 2024-08-06 NOTE — ANESTHESIA PREPROCEDURE EVALUATION
Anesthesia Pre-Procedure Evaluation    Patient: Noe Goetz [facility]   MRN: [id number] : 1940        Procedure : Procedure(s):  Reverse Total Shoulder Arthroplasty          Past Medical History:   Diagnosis Date     Alcohol abuse 2012     Alcohol dependence (H)      Allergic rhinitis      Anxiety      Anxiety state 2007     Asbestosis (H) 2007    Pos but stable ct lung with plaques  no change in ,       Back pain 2013     BPH (benign prostatic hyperplasia)      CAD (coronary artery disease) 07/15/2010     Cervical spondylarthritis 2011     Chronic pain 2024     Combined arterial insufficiency and corporo-venous occlusive erectile dysfunction 2011     COPD (chronic obstructive pulmonary disease) (H)      COPD exacerbation (H) 2023     COPD with chronic bronchitis (H) 2017     COVID-19 virus infection 2024     Depression      Depression, major, single episode, severe (H) 2022     Esophageal reflux 2007     Essential hypertension 2007     Fatty liver      GERD (gastroesophageal reflux disease)      Gout      Hard of hearing 2024     Hay fever 2007     Hepatic steatosis 2013     History of alcohol abuse 2019     History of sarcoma      Hyperlipidemia 2015     Hypertension      Hypoxia 2024     Insomnia 2024     Intractable back pain 2013     Left rotator cuff tear arthropathy 2016     Mixed hyperlipidemia      Morbid obesity (H)      Morbid obesity with BMI of 40.0-44.9, adult (H) 2007     Personal history of DVT (deep vein thrombosis)      Right knee DJD 2019     S/p reverse total shoulder arthroplasty 2016     Sleep apnea 03/15/2007     Umbilical hernia without obstruction and without gangrene 2024      Past Surgical History:   Procedure Laterality Date     AMPUTATE FINGER(S) Left 1/10/2024    Procedure: LEFT MIDDLE FINGER AMPUTATION WITH FILLET FLAP  RECONSTRUCTION OF THE LEFT PALM WOUND;  Surgeon: Sean Carlos MD;  Location: [facility]     ARTHROPLASTY KNEE Right 2019    Procedure: ARTHROPLASTY, KNEE;  Surgeon: Reynaldo Barnes MD;  Location: [facility]     ARTHROTOMY SHOULDER, ROTATOR CUFF REPAIR, COMBINED  2012    Procedure: COMBINED ARTHROTOMY SHOULDER, ROTATOR CUFF REPAIR;  Right shoulder biceps tendodesis;  Surgeon: Reynaldo Barnes MD;  Location: [facility] OR     HAND SURGERY Right      IRRIGATION AND DEBRIDEMENT HAND, COMBINED Left 1/10/2024    Procedure: LEFT PALM WOUND IRRIGATION AND DEBRIDEMENT,;  Surgeon: Sean Carlos MD;  Location: [facility]      Allergies   Allergen Reactions     Nkda [No Known Drug Allergy]       Social History     Tobacco Use     Smoking status: Former     Current packs/day: 0.00     Types: Cigarettes     Quit date:      Years since quittin.6     Passive exposure: Never     Smokeless tobacco: Never   Substance Use Topics     Alcohol use: Yes     Comment: occa      Wt Readings from Last 1 Encounters:   24 128.8 kg (284 lb)        Anesthesia Evaluation   Pt has had prior anesthetic. Type: General, MAC and Regional.    History of anesthetic complications       ROS/MED HX  ENT/Pulmonary:     (+) sleep apnea,          allergic rhinitis,              moderate,  COPD,              Neurologic:       Cardiovascular:     (+) Dyslipidemia hypertension- -  CAD -  - -                                      METS/Exercise Tolerance:     Hematologic:     (+) History of blood clots,               Musculoskeletal:   (+)  arthritis,             GI/Hepatic:     (+) GERD,            liver disease,       Renal/Genitourinary:     (+)        BPH,      Endo:     (+)               Obesity,       Psychiatric/Substance Use:     (+) psychiatric history anxiety and depression alcohol abuse      Infectious Disease:       Malignancy:       Other:      (+)  , H/O Chronic Pain,         Physical Exam    Airway        Mallampati: II  "  TM distance: > 3 FB   Neck ROM: full   Mouth opening: > 3 cm    Respiratory Devices and Support         Dental       (+) Modest Abnormalities - crowns, retainers, 1 or 2 missing teeth      Cardiovascular   cardiovascular exam normal          Pulmonary   pulmonary exam normal            OUTSIDE LABS:  CBC:   Lab Results   Component Value Date    WBC 6.4 02/10/2024    WBC 7.9 02/09/2024    HGB 13.5 02/10/2024    HGB 14.1 02/09/2024    HCT 41.5 02/10/2024    HCT 41.7 02/09/2024     (L) 02/10/2024     (L) 02/09/2024     BMP:   Lab Results   Component Value Date     02/10/2024     (L) 02/09/2024    POTASSIUM 3.8 02/10/2024    POTASSIUM 3.9 02/09/2024    CHLORIDE 100 02/10/2024    CHLORIDE 97 (L) 02/09/2024    CO2 28 02/10/2024    CO2 26 02/09/2024    BUN 15.6 02/10/2024    BUN 15.1 02/09/2024    CR 1.07 02/10/2024    CR 0.93 02/09/2024     (H) 02/10/2024     (H) 02/09/2024     COAGS:   Lab Results   Component Value Date    PTT 29 09/10/2011    INR 1.03 09/10/2011     POC: No results found for: \"BGM\", \"HCG\", \"HCGS\"  HEPATIC:   Lab Results   Component Value Date    ALBUMIN 4.1 02/09/2024    PROTTOTAL 6.9 02/09/2024    ALT 19 02/09/2024    AST 21 02/09/2024    ALKPHOS 58 02/09/2024    BILITOTAL 0.6 02/09/2024     OTHER:   Lab Results   Component Value Date    LACT 1.1 02/09/2024    CHERY 9.0 02/10/2024    LIPASE 38 09/05/2013    CRP 30.7 (H) 09/25/2021    SED 18 02/09/2024       Anesthesia Plan    ASA Status:  3       Anesthesia Type: General.     - Airway: ETT      Maintenance: Balanced.        Consents    Anesthesia Plan(s) and associated risks, benefits, and realistic alternatives discussed. Questions answered and patient/representative(s) expressed understanding.     - Discussed: Risks, Benefits and Alternatives for BOTH SEDATION and the PROCEDURE were discussed     - Discussed with:  Patient            Postoperative Care    Pain management: IV analgesics, Oral pain medications, " Peripheral nerve block (Single Shot).   PONV prophylaxis: Ondansetron (or other 5HT-3), Dexamethasone or Solumedrol     Comments:             MARCY Vargas CRNA    I have reviewed the pertinent notes and labs in the chart from the past 30 days and (re)examined the patient.  Any updates or changes from those notes are reflected in this note.

## 2024-08-07 PROBLEM — M19.011 DJD OF RIGHT SHOULDER: Status: ACTIVE | Noted: 2024-01-01

## 2024-08-07 NOTE — MEDICATION SCRIBE - ADMISSION MEDICATION HISTORY
Medication Scribe Admission Medication History    Admission medication history is complete. The information provided in this note is only as accurate as the sources available at the time of the update.    Information Source(s): Patient and Family member, spouse via in-person    Pertinent Information: pt states he does not use advair    Changes made to PTA medication list:  Added: tylenol 650  Deleted: albuterol, cipro, vibramycin   Changed: None    Allergies reviewed with patient and updates made in EHR: yes    Medication History Completed By: Maura Mayorga 8/7/2024 10:26 AM    PTA Med List   Medication Sig Last Dose    acetaminophen (TYLENOL 8 HOUR) 650 MG CR tablet Take 2 tablets by mouth 2 times daily 8/6/2024 at hs    allopurinol (ZYLOPRIM) 300 MG tablet Take 1 tablet by mouth daily 8/6/2024 at am    ATENOLOL 50 MG OR TABS Take 1 tablet by mouth every morning 8/7/2024 at am    doxazosin (CARDURA) 4 MG tablet Take 0.5 tablets by mouth at bedtime 8/6/2024 at hs    DULoxetine (CYMBALTA) 60 MG capsule Take 2 capsules by mouth daily 8/6/2024 at pm    ferrous sulfate (IRON) 325 (65 FE) MG tablet Take 325 mg by mouth daily (with breakfast) 8/6/2024 at am    finasteride (PROSCAR) 5 MG tablet Take 1 tablet by mouth daily 8/6/2024 at pm    fluticasone (FLONASE) 50 MCG/ACT nasal spray Spray 2 sprays into both nostrils daily 8/6/2024 at am    furosemide (LASIX) 40 MG tablet Take 1 tablet by mouth 2 times daily AM and 1500 8/6/2024 at pm    gabapentin (NEURONTIN) 600 MG tablet TAKE 1 CAPSULE BY MOUTH EVERY MORNING, 1 CAP AT NOON AND 2 CAPS AT BEDTIME. 8/6/2024 at hs    HYDROcodone-acetaminophen (NORCO) 5-325 MG tablet Take 1 tablet by mouth every 12 hours as needed for pain Past Month at unknown    lovastatin (MEVACOR) 40 MG tablet Take 1 tablet by mouth at bedtime 8/6/2024 at hs    multivitamin  with lutein (OCUVITE WITH LTEIN) CAPS Take 1 capsule by mouth every morning 8/6/2024 at am    mupirocin (BACTROBAN) 2 %  external ointment APPLY OINTMENT TOPICALLY TO AFFECTED AREA TWICE DAILY Unknown at unknown    omeprazole (PRILOSEC) 20 MG DR capsule TAKE 1 CAPSULE BY MOUTH TWICE DAILY BEFORE MEAL(S) 8/6/2024 at pm    potassium chloride quiana ER (KLOR-CON M20) 20 MEQ CR tablet TAKE 1 TABLET BY MOUTH ONCE DAILY WITH A MEAL. 8/6/2024 at am    psyllium (METAMUCIL) 58.6 % POWD Take 1 Tablespoonful by mouth every morning 8/6/2024 at hs    senna-docusate (SENOKOT-S/PERICOLACE) 8.6-50 MG tablet Take 1-2 tablets by mouth 2 times daily (Patient taking differently: Take 1-2 tablets by mouth daily as needed) Past Week at unknown    tamsulosin (FLOMAX) 0.4 MG capsule Take 1 capsule by mouth daily 8/6/2024 at am    TRELEGY ELLIPTA 100-62.5-25 MCG/ACT oral inhaler Inhale 1 puff into the lungs daily 8/6/2024 at am    VENTOLIN  (90 Base) MCG/ACT inhaler 2 puffs every 4 hours as needed for shortness of breath or wheezing 8/5/2024 at unknown    vitamin D3 (CHOLECALCIFEROL) 50 mcg (2000 units) tablet Take 1 tablet by mouth every morning 8/6/2024 at am

## 2024-08-07 NOTE — ANESTHESIA CARE TRANSFER NOTE
Patient: Noe Goetz [facility]    Procedure: Procedure(s):  Reverse Total Shoulder Arthroplasty       Diagnosis: Osteoarthritis of right shoulder, unspecified osteoarthritis type [M19.011]  Diagnosis Additional Information: No value filed.    Anesthesia Type:   General     Note:    Oropharynx: oropharynx clear of all foreign objects and spontaneously breathing  Level of Consciousness: awake  Oxygen Supplementation: room air    Independent Airway: airway patency satisfactory and stable  Dentition: dentition unchanged  Vital Signs Stable: post-procedure vital signs reviewed and stable  Report to RN Given: handoff report given  Patient transferred to: PACU    Handoff Report: Identifed the Patient, Identified the Reponsible Provider, Reviewed the pertinent medical history, Discussed the surgical course, Reviewed Intra-OP anesthesia mangement and issues during anesthesia, Set expectations for post-procedure period and Allowed opportunity for questions and acknowledgement of understanding      Vitals:  Vitals Value Taken Time   BP 95/62 08/07/24 1340   Temp     Pulse 70 08/07/24 1340   Resp 19 08/07/24 1342   SpO2 94 % 08/07/24 1342   Vitals shown include unfiled device data.    Electronically Signed By: MARCY Montgomery CRNA  August 7, 2024  1:43 PM

## 2024-08-07 NOTE — ANESTHESIA PROCEDURE NOTES
Brachial plexus Procedure Note    Pre-Procedure   Staff -        Resident/Fellow: Mara Llanos       CRNA: Ray Cowan APRN CRNA       Performed By: CRNA and CAMPBELL       Location: pre-op       Procedure Start/Stop Times: 8/7/2024 11:00 AM and 8/7/2024 11:20 AM       Pre-Anesthestic Checklist: patient identified, IV checked, site marked, risks and benefits discussed, informed consent, monitors and equipment checked, pre-op evaluation, at physician/surgeon's request and post-op pain management  Timeout:       Correct Patient: Yes        Correct Procedure: Yes        Correct Site: Yes        Correct Position: Yes        Correct Laterality: Yes        Site Marked: Yes  Procedure Documentation  Procedure: Brachial plexus       Laterality: right       Patient Position: supine       Skin prep: Chloraprep (interscalene approach).       Needle Type: insulated       Needle Gauge: 21.        Needle Length (Inches): 4        Ultrasound guided       1. Ultrasound was used to identify targeted nerve, plexus, vascular marker, or fascial plane and place a needle adjacent to it in real-time.       2. Ultrasound was used to visualize the spread of anesthetic in close proximity to the above referenced structure.       3. A permanent image is entered into the patient's record.       4. The visualized anatomic structures appeared normal.       5. There were no apparent abnormal pathologic findings.    Assessment/Narrative         The placement was negative for: blood aspirated, painful injection and site bleeding       Paresthesias: No.       Bolus given via needle. no blood aspirated via catheter.        Secured via.        Insertion/Infusion Method: Single Shot       Complications: none    Medication(s) Administered   Bupivacaine 0.5% PF (Infiltration) - Infiltration   10 mL - 8/7/2024 11:15:00 AM  Bupivacaine liposome (Exparel) 1.3% LA inj susp (Infiltration) - Infiltration   10 mL - 8/7/2024 11:15:00 AM  Medication  "Administration Time: 8/7/2024 11:00 AM     Comments:  Technically challenging due to patient anatomy, student attempt for 10min, patient tolerated, provider took over, 5 more minutes, block in, arm heavy and numb      FOR [facility] ([facility] ONLY:   Pain Team Contact information: please page the Pain Team Via [facility]. Search \"Pain\". During daytime hours, please page the attending first. At night please page the resident first.      "

## 2024-08-07 NOTE — PROGRESS NOTES
"[facility] [facility] ADMISSION NOTE    Patient admitted to room 2305 at approximately 1510 via cart from surgery. Patient was accompanied by transport tech.     Verbal SBAR report received from RN prior to patient arrival.     Patient trasferred to bed via air kaity. Patient alert and oriented X 3. The patient is not having any pain.  . Admission vital signs: Blood pressure 133/72, pulse 70, temperature 97.4  F (36.3  C), temperature source Oral, resp. rate 18, height 1.71 m (5' 7.32\"), weight 128.8 kg (284 lb), SpO2 94%. Patient was oriented to plan of care, call light, bed controls, tv, telephone, bathroom, and visiting hours.     Risk Assessment    The following safety risks were identified during admission: fall and skin. Yellow risk band applied: YES.     Skin Initial Assessment    This writer admitted this patient and completed a full skin assessment and Reddy score in the Adult PCS flowsheet.   Photo documentation of skin problem and/or wound competed via Bluesocket application (located under Media):  N/A    Appropriate interventions initiated as needed.     Secondary skin check completed by RN.         Education    Patient has a Guys to Observation order: No  Observation education completed and documented: N/A      Merna Blanco RN      "

## 2024-08-07 NOTE — BRIEF OP NOTE
"[facility]    Brief Operative Note    Pre-operative diagnosis: Osteoarthritis of right shoulder, unspecified osteoarthritis type [M19.011]  Post-operative diagnosis Same as pre-operative diagnosis    Procedure: Reverse Total Shoulder Arthroplasty, Right - Shoulder    Surgeon: Surgeons and Role:     * Sean Rush MD - Primary     * Pierce Jenkins PA-C - Assisting  Anesthesia: Choice   Estimated Blood Loss: Less than 100 ml    Drains: None  Specimens: * No specimens in log *  Findings:   None.  Complications: None.  Implants:   Implant Name Type Inv. Item Serial No.  Lot No. LRB No. Used Action   PIN STEINMANN BIOMET REV SHLDER 3.2MM 9\" THRD SS [id number] - [id number] Wire PIN STEINMANN BIOMET REV SHLDER 3.2MM 9\" THRD SS [id number]  [employer]  Right 1 Used as a Supply   IMP BASEPLATE MINI GLENOSPHERE BIOM REV SHLDR 25MM [id number] - [id number] Total Joint Component/Insert IMP BASEPLATE MINI GLENOSPHERE BIOM REV SHLDR 25MM [id number]  [employer] [id number] Right 1 Implanted   IMP SCR CENTRAL BIOMET REVERSE SHLDR 6.5X25MM [id number] - [id number] Metallic Hardware/Alba IMP SCR CENTRAL BIOMET REVERSE SHLDR 6.5X25MM 056761  [employer] [id number] Right 1 Implanted   IMP SCR LOCKING BIOM REV SHLDR 3.5 HEX 4.30L02LG 633669 - [id number] Metallic Hardware/Alba IMP SCR LOCKING BIOM REV SHLDR 3.5 HEX 4.59P50LA 043866  [employer] [id number] Right 1 Implanted   IMP SCR LOCKING BIOM REV SHLDR 3.5 HEX 4.82G80LL 747311 - [id number] Metallic Hardware/Alba IMP SCR LOCKING BIOM REV SHLDR 3.5 HEX 4.49C08XL 110084  [employer] [id number] Right 1 Implanted   IMP GLENOSPHERE BIOM REV SHLDR 36MM STD 715901 - [id number] Total Joint Component/Insert IMP GLENOSPHERE BIOM REV SHLDR 36MM STD 505815  [employer] [id number] Right 1 Implanted   IMP SCR LOCKING BIOM REV SHLDR 3.5 HEX 4.72M56WR 478696 - " [id number] Total Joint Component/Insert IMP SCR LOCKING BIOM REV SHLDR 3.5 HEX 4.49A20PO 107691  [employer] [id number] Right 1 Implanted   IMP SCR LOCKING BIOM REV SHLDR 3.5 HEX 4.38B13JC 695640 - [id number] Total Joint Component/Insert IMP SCR LOCKING BIOM REV SHLDR 3.5 HEX 4.99R13MI 926964  [employer] [id number] Right 1 Implanted   IMP STEM MINI HUMERAL BIOM SHLDR 14MM 287946 - [id number] Total Joint Component/Insert IMP STEM MINI HUMERAL BIOM SHLDR 14MM 801978  [employer] [id number] Right 1 Implanted   IMP HUMERAL TRAY ZIM RVRS SHLDR STD [id number] - [id number] Total Joint Component/Insert IMP HUMERAL TRAY ZIM RVRS SHLDR STD [id number]  [employer] [id number] Right 1 Implanted   IMP BEARING HUMERAL ZIM 36MM STD PRLNG [id number] - [id number] Total Joint Component/Insert IMP BEARING HUMERAL ZIM 36MM STD PRLNG [id number]  [employer] [id number] Right 1 Implanted

## 2024-08-07 NOTE — ANESTHESIA POSTPROCEDURE EVALUATION
Patient: Noe Goetz [facility]    Procedure: Procedure(s):  Reverse Total Shoulder Arthroplasty       Anesthesia Type:  General    Note:  Disposition: Outpatient   Postop Pain Control: Uneventful            Sign Out: Well controlled pain   PONV: No   Neuro/Psych: Uneventful            Sign Out: Acceptable/Baseline neuro status   Airway/Respiratory: Uneventful            Sign Out: Acceptable/Baseline resp. status   CV/Hemodynamics: Uneventful            Sign Out: Acceptable CV status; No obvious hypovolemia; No obvious fluid overload   Other NRE: NONE   DID A NON-ROUTINE EVENT OCCUR? No           Last vitals:  Vitals Value Taken Time   /74 08/07/24 1430   Temp 35.7  C (96.26  F) 08/07/24 1441   Pulse 67 08/07/24 1441   Resp 26 08/07/24 1441   SpO2 92 % 08/07/24 1441   Vitals shown include unfiled device data.    Electronically Signed By: MARCY Montgomery CRNA  August 7, 2024  2:42 PM

## 2024-08-07 NOTE — ANESTHESIA PROCEDURE NOTES
Airway       Patient location during procedure: OR       Procedure Start/Stop Times: 8/7/2024 12:00 PM  Staff -        CRNA: Rakesh Bryant APRN CRNA       Performed By: CRNAIndications and Patient Condition       Indications for airway management: berta-procedural       Induction type:intravenous       Mask difficulty assessment: 1 - vent by mask    Final Airway Details       Final airway type: endotracheal airway       Successful airway: ETT - single  Endotracheal Airway Details        ETT size (mm): 7.5       Cuffed: yes       Cuff volume (mL): 7       Successful intubation technique: video laryngoscopy       VL Blade Size: Vill[place] 3       Grade View of Cords: 1       Adjucts: stylet       Position: Center       Measured from: lips       Secured at (cm): 22       Bite block used: None    Post intubation assessment        Placement verified by: capnometry, equal breath sounds and chest rise        Number of attempts at approach: 1       Number of other approaches attempted: 0       Secured with: commercial tube avery       Ease of procedure: easy       Dentition: Lips/oral mucosa injury and Intact    Medication(s) Administered   Medication Administration Time: 8/7/2024 12:00 PM

## 2024-08-07 NOTE — OP NOTE
Procedure Date: 08/07/24     PREOPERATIVE DIAGNOSIS:  Right shoulder rotator cuff tear with shoulder degenerative arthritis.     POSTOPERATIVE DIAGNOSIS:  same     PROCEDURE PERFORMED:  Right Reverse  total shoulder arthroplasty utilizing a Biomet size 14 mini humeral stem, standard glenoid baseplate with central fixation screww and 4 locking screws, Standard 36mm glenosphere in offset postion C, 36mm  Standard glenoid with Standard Humeral tray.     SURGEON:  Sean Rush MD     ASSISTANT:  Pierce Jenkins PA-C.  Pierce Jenkins PA-C, was present throughout the procedure, critical for patient positioning, prepping, draping, safe progression of procedure, wound closure, and sling placement.     DESCRIPTION OF PROCEDURE:  After informed risks, benefits, alternatives, and expected outcomes of the procedure, the patient desired to proceed.  She was brought to the operating suite where he was placed under general anesthesia, received 2 grams of Ancef, 1 gram of tranexamic acid.  A timeout verification step was completed.  He was positioned in low beach chair position.  Her right upper extremity was prepped and draped in a manner appropriate for procedure.     A deltopectoral approach was taken to the shoulder.  The cephalic vein was protected and retracted laterally.  Cobell retractor was placed.  Conjoined tendon was identified.  Clavipectoral fascia was divided, and the biceps tendon was not  identified within the groove.   Attention was then directed towards the subscapularis tendon, it was elevated off the lesser tuberosity, tagged with a #1 Ethibond stitches and retracted.  It was then divided inferiorly.  Leash of vessels was ligated.  Retractor was placed between the subscap and the joint capsule to protect the axillary nerve throughout these procedures.  External rotation was maintained to protect the axillary nerve.  The inferior capsule was then sharply divided with Metzenbaum scissors.  The subscap  was retracted, and attention was directed towards completing a proximal humeral resection.  A 30-degree retroverted x 45-degree proximal resection was completed using the Biomet guide.  This was followed by placement of Fukuda retractor, circumferential labral excision.      The anterior labrum was cleared of soft tissue, and a Bankart retractor was placed.  The modified rabbit ear retractor was placed posteriorly and excellent exposure of the glenoid was achieved.  The central pin was placed utilizing a  glenoid guide.  This was reamed to correct slight retroversion.  A standard baseplate was then positioned over the guidepin all seating was demonstrated in the central lobe it was utilized 25 mm central fixation screw was then secured there was taken to him and straight complete seating of the central screw.  Peripheral locking screws were then placed 25 mm inferior superior and 15 mm locking screws were placed utilizing the fixed we will guide for each.  Table fixation was felt to been achieved.  A standard 36 mm glenosphere in the C offset position was then assembled on the back table and secured to the glenoid baseplate and checked for stability.     The humeral head could then be fully exposed using a brown retractor and Kranthi retractors, canal finder, sequential reaming up to 14 mm, followed by broaching up to 14 mm was completed.  A trial standard humeral tray and standard 36 mm bearing surface were positioned excellent range of motion stability and tensioning of the conjoined tendon with 90 degrees of internal rotation and 90 degrees of abduction demonstrated.  Final 14 mm mini humeral stem was secured the standard humeral tray and standard 36mm bearing surface was assembled secured to the humeral stem.      Wounds were copiously irrigated and subcutaneous was closed with 2-0 Vicryl.  Skin was closed with a 3-0 Stratafix, Steri-Strips, and Aquacel dressing.  The patient tolerated the procedure well.   Returned to recovery in stable condition.     ESTIMATED BLOOD LOSS:  100 mL.     Sean Rush MD

## 2024-08-07 NOTE — PLAN OF CARE
Problem: Adult Inpatient Plan of Care  Goal: Absence of Hospital-Acquired Illness or Injury  Intervention: Identify and Manage Fall Risk  Recent Flowsheet Documentation  Taken 8/7/2024 1525 by Merna Blanco RN  Safety Promotion/Fall Prevention:   activity supervised   nonskid shoes/slippers when out of bed  Intervention: Prevent Skin Injury  Recent Flowsheet Documentation  Taken 8/7/2024 1800 by Merna Blanco RN  Body Position: position changed independently  Taken 8/7/2024 1525 by Merna Blanco RN  Body Position: supine, head elevated  Intervention: Prevent and Manage VTE (Venous Thromboembolism) Risk  Recent Flowsheet Documentation  Taken 8/7/2024 1525 by Merna Blanco RN  VTE Prevention/Management: SCDs on (sequential compression devices)  Goal: Optimal Comfort and Wellbeing  Intervention: Monitor Pain and Promote Comfort  Recent Flowsheet Documentation  Taken 8/7/2024 1543 by Merna Blanco RN  Pain Management Interventions: medication (see MAR)     Goal Outcome Evaluation:    Patient vital signs are at baseline: Yes  Patient able to ambulate as they were prior to admission or with assist devices provided by therapies during their stay:  Yes  Patient MUST void prior to discharge:  Yes  Patient able to tolerate oral intake:  Yes  Pain has adequate pain control using Oral analgesics:  Yes  Does patient have an identified :  Yes  Has goal D/C date and time been discussed with patient:  Yes

## 2024-08-08 NOTE — PROVIDER NOTIFICATION
"MD notified that patient is having chest pain.   Patient initially complained of sharp, spasm back pain.   Given Robaxin and Oxycodone.   Patient then complained of \"severe chest pain\"  MD notified.  Blood pressure 133/66, pulse 71, temperature 97.5  F (36.4  C), temperature source Oral, resp. rate 19, height 1.71 m (5' 7.32\"), weight 128.8 kg (284 lb), SpO2 95%.  Patient had EKG, troponins ordered.  Writer called wife and spoke with her per patient direction and request.   Wife stated that patient seemed like he was having a panic attack.   Patient stated same.   Patient is up in chair and is more comfortable at present.  Continue to assess and reassure patient.   "

## 2024-08-08 NOTE — DISCHARGE SUMMARY
[facility] Discharge Summary                                  [facility]     [place] [facility] [id number]   Age: 84 year old  PCP: Semmler, Steven Duane, [phone] 1940     Date of Admission:  8/7/2024  Date of Discharge::  8/8/2024  1:43 PM  Discharge Physician:  Thor Pimentel PA-C    Code status:  Prior    Admission Information:  Admission Diagnosis:  Osteoarthritis of right shoulder, unspecified osteoarthritis type [M19.011]    Post-Operative Day: 1 Day Post-Op     Reason for admission:  The patient was admitted for the following:Procedure(s) (LRB):  Reverse Total Shoulder Arthroplasty (Right)    Active Problems:    DJD of right shoulder      Allergies:  Nkda [no known drug allergy]    Following the procedure noted above the patient was transferred to the post-op floor and started on:    Therapy:  occupational therapy  Anticoagulation Plan: Aspirin 81 mg BID  for 42 days  Pain Management: oxycodone, tylenol, and Robaxin. Chronic gabapentin and Cymbalta.   Weight bearing status: Non-weight bearing     The patient was followed and co-managed by the hospitalist service during the inpatient treatment course  Complications:  Severe right chest and upper back pain; likely due to anxiety/panic attack and surgery. ACS workup with troponin and EKG unremarkable.   Consultations:  None     Pertinent Labs   Lab Results: personally reviewed.     Recent Labs   Lab Test 08/08/24  0523 08/07/24  1004 02/10/24  0542 02/09/24  2143 09/25/21  0535   WBC  --  6.1 6.4 7.9 6.4   HGB 13.1* 13.7 13.5 14.1 15.1   HCT  --  39.7* 41.5 41.7 44.2   MCV  --  92 97 94 92   PLT  --  176 139* 142* 172   NA  --   --  137 134* 132*   CRP  --   --   --   --  30.7*          Discharge Information:  Condition at discharge: Stable  Discharge destination:  Discharged to home     Medications at discharge:  Discharge Medication List as of 8/8/2024  1:17 PM        START taking these medications    Details    acetaminophen (TYLENOL) 325 MG tablet Take 2 tablets (650 mg) by mouth every 4 hours as needed for other (mild pain), No Print Out      methocarbamol (ROBAXIN) 500 MG tablet Take 1 tablet (500 mg) by mouth 4 times daily as needed for other (pain), Disp-60 tablet, R-1, E-Prescribe      oxyCODONE (ROXICODONE) 5 MG tablet Take 1-2 tablets (5-10 mg) by mouth every 4 hours as needed for moderate to severe pain, Disp-26 tablet, R-0, E-Prescribe           CONTINUE these medications which have CHANGED    Details   aspirin 81 MG EC tablet Take 1 tablet (81 mg) by mouth 2 times daily, No Print OutFor 6 weeks post surgery           CONTINUE these medications which have NOT CHANGED    Details   allopurinol (ZYLOPRIM) 300 MG tablet Take 1 tablet by mouth daily, Historical      ATENOLOL 50 MG OR TABS Take 1 tablet by mouth every morning, Historical      doxazosin (CARDURA) 4 MG tablet Take 0.5 tablets by mouth at bedtime, Historical      DULoxetine (CYMBALTA) 60 MG capsule Take 2 capsules by mouth daily, Historical      ferrous sulfate (IRON) 325 (65 FE) MG tablet Take 325 mg by mouth daily (with breakfast), Historical      finasteride (PROSCAR) 5 MG tablet Take 1 tablet by mouth daily, Historical      fluticasone (FLONASE) 50 MCG/ACT nasal spray Spray 2 sprays into both nostrils daily, Historical      furosemide (LASIX) 40 MG tablet Take 1 tablet by mouth 2 times daily AM and 1700, Historical      gabapentin (NEURONTIN) 600 MG tablet TAKE 1 CAPSULE BY MOUTH EVERY MORNING, 1 CAP AT NOON AND 2 CAPS AT BEDTIME., Historical      lovastatin (MEVACOR) 40 MG tablet Take 1 tablet by mouth at bedtime, Historical      multivitamin  with lutein (OCUVITE WITH LTEIN) CAPS Take 1 capsule by mouth every morning, Historical      mupirocin (BACTROBAN) 2 % external ointment APPLY OINTMENT TOPICALLY TO AFFECTED AREA TWICE DAILYHistorical      omeprazole (PRILOSEC) 20 MG DR capsule TAKE 1 CAPSULE BY MOUTH TWICE DAILY BEFORE MEAL(S), Historical       potassium chloride quiana ER (KLOR-CON M20) 20 MEQ CR tablet TAKE 1 TABLET BY MOUTH ONCE DAILY WITH A MEAL., Historical      psyllium (METAMUCIL) 58.6 % POWD Take 1 Tablespoonful by mouth every morning, Historical      senna-docusate (SENOKOT-S/PERICOLACE) 8.6-50 MG tablet Take 1-2 tablets by mouth 2 times daily, OTC      tamsulosin (FLOMAX) 0.4 MG capsule Take 1 capsule by mouth daily, Historical      TRELEGY ELLIPTA 100-62.5-25 MCG/ACT oral inhaler Inhale 1 puff into the lungs daily, MYRANDA, Historical      VENTOLIN  (90 Base) MCG/ACT inhaler 2 puffs every 4 hours as needed for shortness of breath or wheezing, MYRANDA, Historical      vitamin D3 (CHOLECALCIFEROL) 50 mcg (2000 units) tablet Take 1 tablet by mouth every morning, Historical           STOP taking these medications       acetaminophen (TYLENOL 8 HOUR) 650 MG CR tablet Comments:   Reason for Stopping:         fluticasone-salmeterol (ADVAIR) 250-50 MCG/DOSE inhaler Comments:   Reason for Stopping:         HYDROcodone-acetaminophen (NORCO) 5-325 MG tablet Comments:   Reason for Stopping:                          Follow-Up Care:  Patient should be seen in the office in 14 days by the Orthopedic Surgeon/Physician Assistant.  Call [phone] for appointment or questions.  MD Thor Lantigua PA-C

## 2024-08-08 NOTE — PROGRESS NOTES
"[facility] Progress Note      Post-operative Day: 1 Day Post-Op    Procedure(s):  Reverse Total Shoulder Arthroplasty right   Subjective:    Pt reports that his shoulder feels numb from the block, but he is having \"severe\" right anterior chest wall pain that spreads to the middle and left side with similar pain posteriorly. He was able to work with OT and it went well, he is comfortable with the exercises and restrictions.     Chest pain, SOB:  yes  Nausea, vomiting:  no  Lightheadedness, dizziness:  no  Neuro:  Patient denies new onset numbness or paresthesias      Objective:  Blood pressure 122/55, pulse 67, temperature 97.7  F (36.5  C), temperature source Oral, resp. rate 18, height 1.71 m (5' 7.32\"), weight 128.8 kg (284 lb), SpO2 91%.    Patient Vitals for the past 24 hrs:   BP Temp Temp src Pulse Resp SpO2   08/08/24 0840 122/55 -- -- -- -- 91 %   08/08/24 0726 138/65 97.7  F (36.5  C) Oral 67 18 --   08/08/24 0350 127/66 97.9  F (36.6  C) Oral 78 22 96 %   08/07/24 2322 133/66 -- -- 71 19 95 %   08/07/24 1958 101/60 97.5  F (36.4  C) Oral 74 18 91 %   08/07/24 1748 98/61 -- -- -- -- --   08/07/24 1640 115/60 -- -- -- -- --   08/07/24 1600 95/70 -- -- -- -- --   08/07/24 1558 -- -- -- -- 16 --   08/07/24 1530 138/75 -- -- -- -- 93 %   08/07/24 1516 133/72 97.4  F (36.3  C) Oral 70 18 94 %   08/07/24 1445 120/50 97.2  F (36.2  C) Axillary 66 16 94 %   08/07/24 1430 122/74 (!) 96.4  F (35.8  C) -- 64 12 94 %   08/07/24 1415 109/73 (!) 96.3  F (35.7  C) -- 67 20 96 %   08/07/24 1400 120/72 (!) 96.4  F (35.8  C) -- 66 18 98 %   08/07/24 1350 -- (!) 96.6  F (35.9  C) Temporal 64 17 96 %   08/07/24 1345 106/61 97.5  F (36.4  C) Temporal 68 21 94 %   08/07/24 1340 95/62 -- -- 70 18 91 %   08/07/24 1115 129/84 -- -- 60 16 93 %   08/07/24 1110 (!) 133/95 -- -- 59 10 94 %   08/07/24 1105 131/83 -- -- 58 14 93 %   08/07/24 1100 136/81 -- -- 61 13 93 %   08/07/24 1055 (!) 146/92 -- -- 60 15 95 %   08/07/24 " 1050 (!) 149/87 -- -- 63 14 95 %   08/07/24 1045 (!) 145/100 -- -- 58 18 95 %   08/07/24 1043 114/79 -- -- 59 11 --       Wt Readings from Last 4 Encounters:   08/07/24 128.8 kg (284 lb)   04/08/24 128.8 kg (284 lb)   02/09/24 130.6 kg (288 lb)   12/06/22 124.7 kg (275 lb)       Gen: A&O x 3. NAD. Appears tired. Up to the recliner, nodding off when not speaking. Wife present.   Wound status: Covered, Aquacel dressing is c/d/i. Extensive ecchymosis noted distally in the right upper arm and at the chest wall.   Circulation, motion and sensation: Hand and wrist ROM intact and mildly stiff on the right; distal upper extremity sensation is intact and equal bilaterally. Fingers are warm and well perfused.   Swelling: moderate      Pertinent Labs   Lab Results: personally reviewed.     Recent Labs   Lab Test 08/08/24  0523 08/07/24  1004 02/10/24  0542 02/09/24  2143 09/25/21  0535   WBC  --  6.1 6.4 7.9 6.4   HGB 13.1* 13.7 13.5 14.1 15.1   HCT  --  39.7* 41.5 41.7 44.2   MCV  --  92 97 94 92   PLT  --  176 139* 142* 172   NA  --   --  137 134* 132*   CRP  --   --   --   --  30.7*       Plan:   Continue current cares and rehabilitation.  Anticoagulation protocol: Aspirin 81 mg BID  x 42  days  Pain medications:  oxycodone, tylenol, and Robaxin. Chronic gabapentin and Cymbalta.   Weight bearing status:  NWB  Right anterior and posterior chest wall pain is most likely post-surgical from radiating shoulder pain and manipulation of the shoulder during surgery, discussed with Heath Yarbrough PA-C. Troponin recheck normal, awaiting repeat EKG.   Encouraged ice at the shoulder and chest, ambulation and exercises.   Disposition:  Plan for discharge to home when medically stable and pain is controlled, cleared by therapy. Later this afternoon vs tomorrow pending progress.             Report completed by:  Thor Pimentel PA-C  Date: 8/8/2024  Time: 10:21 AM

## 2024-08-08 NOTE — PLAN OF CARE
Goal Outcome Evaluation:      Patient vital signs are at baseline: Yes  Patient able to ambulate as they were prior to admission or with assist devices provided by therapies during their stay:  Yes  Patient MUST void prior to discharge:  Yes  Patient able to tolerate oral intake:  Yes  Pain has adequate pain control using Oral analgesics:  No,  Reason:  patient is having severe back pain that has been helped with pain medication but not completely resolved.  Does patient have an identified :  Yes  Has goal D/C date and time been discussed with patient:  Yes. Patient is eager to discharge to home with spouse later today          Patient has been anxious at times and does have panic attacks per spouse. Patient having back pain at present that is worse than R shoulder pain. Given Oxycodone. Patient is also distressed about not having BM. Given MOM and assisted to toilet. Continue to assess.

## 2024-08-08 NOTE — PROGRESS NOTES
[facility]  OUTPATIENT OCCUPATIONAL THERAPY  EVALUATION  PLAN OF TREATMENT FOR OUTPATIENT REHABILITATION  (COMPLETE FOR INITIAL CLAIMS ONLY)  Patient's Last Name, First Name, M.I.  YOB: 1940  [facility]                          Provider's Name  [facility] Medical Record No.  [id number]                             Onset Date:  08/07/24   Start of Care Date:  08/08/24   Type:     ___PT   _X_OT   ___SLP Medical Diagnosis:  R reverse total sh surg                    OT Diagnosis:  decreased ability to perform BADLS Visits from SOC:  1     See note for plan of treatment, functional goals and certification details    I CERTIFY THE NEED FOR THESE SERVICES FURNISHED UNDER        THIS PLAN OF TREATMENT AND WHILE UNDER MY CARE     (Physician co-signature of this document indicates review and certification of the therapy plan).                               08/08/24 0800   Appointment Info   Signing Clinician's Name / Credentials (OT) Guerda Aldana OTR/L   Quick Adds   Quick Adds Certification   Living Environment   People in Home spouse   Current Living Arrangements other (see comments)  (town home one level)   Home Accessibility no concerns   Transportation Anticipated family or friend will provide   Living Environment Comments wife is able to drive but does not like to. Pt aware he is not able to drive at this time   Self-Care   Activity/Exercise/Self-Care Comment Pt states Ind with BADLs and amb w/o AD   Instrumental Activities of Daily Living (IADL)   Previous Responsibilities driving;medication management   IADL Comments wife does cooking, has assist with cleaning   General Information   Onset of Illness/Injury or Date of Surgery 08/07/24   Referring Physician Dr Rush   Patient/Family Therapy Goal Statement (OT) Pt would like to d/c home with assist of spouse   Additional Occupational Profile Info/Pertinent History of Current  Problem Procedure:      Reverse Total Shoulder Arthroplasty, Right - Shoulder   Right Upper Extremity (Weight-bearing Status) weight-bearing as tolerated (WBAT)  (limited to 90' sh FF, 0-40 sh int/ext rotation, sling for comfort, WBAT, ok for ROM elbow and wrist)   General Observations and Info pt very fatigued today stating he had a panic attack last night most likely and unable to sleep. Pts wife stating pt called her several times   Cognitive Status Examination   Orientation Status orientation to person, place and time   Affect/Mental Status (Cognitive) anxious;confused   Follows Commands follows one-step commands;50-74% accuracy   Cognitive Status Comments Pt very fatigued today from not sleeping last night. TH waited for spouse to go thru info with pt due to pts fatigue and confusion level   Sensory   Sensory Comments pt able to feel light touch t/o R UE   Pain Assessment   Patient Currently in Pain Yes, see Vital Sign flowsheet  (more in back and chest)   Range of Motion Comprehensive   Comment, General Range of Motion L UE WFL, R hand able to grasp release with block wearing off   Bed Mobility   Comment (Bed Mobility) pt plans to sleep in recliner per wife when returning home   Transfers   Transfers No deficits identified   Upper Body Dressing Assessment/Training   Comment, (Upper Body Dressing) sling mod A to don   [place] Level (Upper Body Dressing) doff;don   Clinical Impression   Criteria for Skilled Therapeutic Interventions Met (OT) Yes, treatment indicated   OT Diagnosis decreased ability to perform BADLS   OT Problem List-Impairments impacting ADL problems related to;range of motion (ROM);strength;pain   Assessment of Occupational Performance 1-3 Performance Deficits   Identified Performance Deficits home ex with precautions of R UE   Planned Therapy Interventions (OT) ADL retraining;home program guidelines   Clinical Decision Making Complexity (OT) problem focused assessment/low complexity    Risk & Benefits of therapy have been explained evaluation/treatment results reviewed;care plan/treatment goals reviewed;risks/benefits reviewed;current/potential barriers reviewed;participants voiced agreement with care plan;participants included;patient;spouse/significant other   OT Total Evaluation Time   OT Eval, Low Complexity Minutes (92197) 10   Therapy Certification   Medical Diagnosis R reverse total sh surg   Start of Care Date 08/08/24   Certification date from 08/08/24   Certification date to 08/08/24   OT Goals   Therapy Frequency (OT) One time eval and treatment   OT Predicted Duration/Target Date for Goal Attainment 08/08/24   OT Goals OT Goal 1   OT: Goal 1 Pt will demo all exercised with R UE   Interventions   Interventions Quick Adds Therapeutic Procedures/Exercise   Therapeutic Procedures/Exercise   Therapeutic Procedure: strength, endurance, ROM, flexibillity minutes (65919) 20   Symptoms Noted During/After Treatment fatigue   Treatment Detail/Skilled Intervention Pt seen early am with pt very fatigued not sleeping well with c/o back pain and occas chest pain. Pt appeared very fatigued with difficulty to remain on one subject. TH decided to return later to go thru home guidelines and HEP with pt in agreement. Pt able to stand and amb w/o difficulty. Sling removed with wife present and pt able to demo pendulem ex in stand holding onto bed rail. Pt c/o lower back pain and occas chest pain though not during ex. nursing and dr faustin. Discussed dressing with pt and wife at length given handout and for ex. no c/o pain in R sh at this time. Pt has hand grasp with block wearing off. Discussed precaution of RUE with wife appearing aware. wife able to assist pt with BADLs PRN   OT Discharge Planning   OT Plan d/c home with assist   OT Discharge Recommendation (DC Rec) home with assist   OT Rationale for DC Rec Recommend d/c home with assist from wife . Pt and wife aware of HEP and able to perform. Plan to  see ortho in 2 weeks for further therapy needs   OT Brief overview of current status SBA R UE HEP   Total Session Time   Timed Code Treatment Minutes 20   Total Session Time (sum of timed and untimed services) 30

## 2024-08-08 NOTE — PROGRESS NOTES
[facility] DISCHARGE NOTE    Patient discharged to home at 1:45 PM via wheel chair. Accompanied by spouse and staff. Discharge instructions reviewed with patient and spouse, opportunity offered to ask questions. Prescriptions sent to patients preferred pharmacy. All belongings sent with patient.    Merna Blanco RN

## 2024-08-08 NOTE — PROGRESS NOTES
"[facility] Medicine Progress Note  Date of Service: 08/08/2024    Assessment & Plan   Noe Goetz [facility] is a 84 year old male who presented on 8/7/2024 for scheduled Procedure(s):  Reverse Total Shoulder Arthroplasty by Sean Rush MD and is being followed by the hospital medicine service for co-management of acute and/or chronic perioperative medical problems.      S/p Procedure(s):  Reverse Total Shoulder Arthroplasty   1 Day Post-Op    Following plan per orthopedic surgery service;  -Pain control  -Wound cares  -Antibiotic prophylaxis  -Physical/occupational therapy  -DVT prophylaxis    Chest pain with sequela  Procedural pain  Panic attack    Developed \"severe chest pain\" that was initial spasming in upper back which radiated into chest post procedural. Troponin WNL (10). EKG showing sinus bradycardia without ST-T wave changes. Recurrence of similar pain morning POD#1. Troponin WNL (16) and EKG unchanged compared to prior and without ischemic ST-T wave changes. Does endorse panic attacks PTA where he experience chest pain and SOB. ACS workup unremarkable, low suspicion for cardiac etiology. There is significant bruising involving the right shoulder. Pain is likely a combination of procedural pain and panic attack.  - No further workup needed.    Chronic obstructive pulmonary disease (COPD)    Chronic. Managed PTA with Trilegy Ellipta inhaler and Advair inhaler. Developed increased work of breathing evening during episode of chest pain. He required short period of supplemental oxygen. Likely secondary to panic attack.  - Substitute PTA Trellegy and Avair inhalers for Duonebs q4hrs PRN    Hypertension     Chronic. Reviewed, well controlled. Managed PTA with atenolol 50 mg.  - Continue atenolol.    Hyperlipidemia    Chronic. Managed PTA with lovastatin 40 mg.  - Continue lovastatin.    Benign prostatic hyperplasia (BPH)    Chronic. Managed PTA with finasteride 5 mg, " doxazosin 2 mg, tamsulosin 0.4 mg.  - Continue finasteride, doxazosin, and tamsulosin.    Gastroesophageal reflux disease    Chronic. Managed PTA with omeprazole 20 mg BID  - Continue omeprazole BID    Chronic back pain    Chronic. Managed PTA with hydrocodone-acetaminophen (Norco) 5-325 mg BID PRN.  - Held on admission, will manage with pain medication per orthopedics.    Generalized anxiety disorder (ANTHONY) with panic attack  Depression    Chronic. Mood appears appropriate. However, does have events of chest pain with shortness of breath that is consistent with panic attack. He experiences panic attacks PTA. Managed PTA with duloxetine 120 mg.  - Continue duloxetine     DVT Prophylaxis: as per orthopedic surgery service -  mg daily.  Code Status: Full Code    Lines: PIV   Kennedy catheter: None    Discussion: Medically, the patient appears optimized for discharge and does not have any barriers to discharge at this time.    Disposition: Anticipate discharge 8/8/24     Attestation:  I have discussed, or will be discussing, the patient with hospitalist physician, Dr. Mckeon.    GARY GANNON PA-C     Interval History   POD#1: Routine post-operative morning rounds. Developed chest pain last evening and this morning which were unremarkable for ACS on workup an likely secondary to procedural pain and anxiety attack. Appetite intact, tolerating PO without nausea or vomiting. Ambulating well, long distances with PT, feeling steady on feet without lightheadedness or dizziness. Passing flatus without a bowel movement at this time. Urinating without difficulty. Denies fever, chills, chest pain, palpitations, shortness of breath, lightheadedness, nausea, vomiting, abdominal pain, and paresthesias.     Physical Exam   Temp:  [96.3  F (35.7  C)-98  F (36.7  C)] 97.7  F (36.5  C)  Pulse:  [58-78] 67  Resp:  [10-22] 18  BP: ()/() 138/65  SpO2:  [91 %-98 %] 96 %    Weights:   Vitals:    08/07/24 0931   Weight:  128.8 kg (284 lb)    Body mass index is 44.06 kg/m .    General: Sitting upright in chair comfortably, appears stated age, alert, oriented x 4, no acute distress, non-toxic appearing.  CV: Regular rate and rhythm. Normal S1 and S2. No S3, S4, or murmurs. Radial pulse regular and strong. Mild lower extremity edema bilaterally.   Respiratory: Lungs clear to auscultation bilaterally. No wheezes, rhonchi, or crackles. Normal respiratory effort on room air. Speaking in full sentences.  GI: Soft, obese, nondistended, nontender to palpation throughout. Normoactive bowel sounds. No HSM or abdominal masses appreciated.  Skin: Warm and dry.  Musculoskeletal: Surgical site exam deferred to orthopedic/surgery provider. Right shoulder with ecchymosis most significant medial RUE and into armpit. Strong radial pulse present RUE. Aquacel covering right shoulder clean, dry, and intact.    Data   Recent Labs   Lab 08/08/24  0523 08/07/24  1004   WBC  --  6.1   HGB 13.1* 13.7   MCV  --  92   PLT  --  176   POTASSIUM  --  4.0   CR  --  0.99   *  --      Recent Labs   Lab 08/08/24  0523   *      Unresulted Labs Ordered in the Past 30 Days of this Admission       No orders found for last 31 day(s).           Imaging  Recent Results (from the past 24 hour(s))   XR Shoulder Right Port G/E 2 Views    Narrative    EXAM: XR SHOULDER RIGHT PORT G/E 2 VIEWS  DATE/TIME: 8/7/2024 2:47 PM    INDICATION: Status post surgery  COMPARISON: None available.       Impression    IMPRESSION: Reverse total shoulder arthroplasty. Negative for  postoperative purposes.    JONY SIU DO         SYSTEM ID:  [id number]      I reviewed all new labs and imaging results over the last 24 hours. I personally reviewed no images or EKG's today  Medications   Current Facility-Administered Medications   Medication Dose Route Frequency Provider Last Rate Last Admin    BUPivacaine liposome (EXPAREL) LA inj was given in the infiltration site to produce  "post-op analgesia. Duration of action is up to 72 hours. Other \"oly\" meds should not be given for 96 hours except for lidocaine 4% patch. This is for INFORMATION ONLY.   Does not apply Continuous PRN Kath Watson APRN CRNA        lactated ringers infusion   Intravenous Continuous Adri, Sean HANLEY MD   Stopped at 08/08/24 0443     Current Facility-Administered Medications   Medication Dose Route Frequency Provider Last Rate Last Admin    acetaminophen (TYLENOL) tablet 975 mg  975 mg Oral Q8H Adri, Sean HANLEY MD   975 mg at 08/08/24 0315    allopurinol (ZYLOPRIM) tablet 300 mg  300 mg Oral Daily Zafar Larson PA-C        aspirin EC tablet 81 mg  81 mg Oral BID Adri, Sean HANLEY MD   81 mg at 08/07/24 2102    atenolol (TENORMIN) tablet 50 mg  50 mg Oral QAM Zafar Larson PA-C        atorvastatin (LIPITOR) tablet 10 mg  10 mg Oral At Bedtime Zafar Larson PA-C   10 mg at 08/07/24 2102    doxazosin (CARDURA) tablet 2 mg  2 mg Oral At Bedtime Zafar Larson PA-C   2 mg at 08/07/24 2102    DULoxetine (CYMBALTA) DR capsule 120 mg  120 mg Oral QPM Zafar Larson PA-C        famotidine (PEPCID) tablet 20 mg  20 mg Oral BID Adri, Sean HANLEY MD   20 mg at 08/07/24 2102    Or    famotidine (PEPCID) injection 20 mg  20 mg Intravenous BID Adri, Sean HANLEY MD        finasteride (PROSCAR) tablet 5 mg  5 mg Oral QPM Zafar Larson PA-C        furosemide (LASIX) tablet 40 mg  40 mg Oral BID Zafar Larson PA-C        gabapentin (NEURONTIN) tablet 1,200 mg  1,200 mg Oral At Bedtime Sean Rush MD   1,200 mg at 08/07/24 2102    gabapentin (NEURONTIN) tablet 600 mg  600 mg Oral BID Zafar Larson PA-C        pantoprazole (PROTONIX) EC tablet 40 mg  40 mg Oral BID AC Comfort, Sean HANLEY MD   40 mg at 08/08/24 0449    polyethylene glycol (MIRALAX) Packet 17 g  17 g Oral Daily Comfort, Sean HANLEY MD        potassium chloride quiana ER (KLOR-CON M20) CR tablet 20 mEq  20 mEq Oral Daily " Zafar Larson PA-C        senna-docusate (SENOKOT-S/PERICOLACE) 8.6-50 MG per tablet 1 tablet  1 tablet Oral BID Sean Rush MD   1 tablet at 08/07/24 2102    sodium chloride (PF) 0.9% PF flush 3 mL  3 mL Intracatheter Q8H Sean Rush MD JESSE, ROBINSON, PA-C

## 2024-08-10 PROBLEM — I71.00 DISSECTION OF AORTA (H): Status: ACTIVE | Noted: 2024-01-01

## 2024-08-10 PROBLEM — I71.00 AORTIC DISSECTION (H): Status: ACTIVE | Noted: 2024-01-01

## 2024-08-10 NOTE — PROGRESS NOTES
RT called to bedside due to pt desaturation. Pt was placed on BiPAP 12/6 55%. SpO2 in the upper 90's, mepilex in place. Skin intact. Alarm volume set at 10. BS diminished.  Will cont to monitor.    /67   Pulse 71   Temp 98.5  F (36.9  C) (Axillary)   Resp 16   Wt 134.5 kg (296 lb 8.3 oz)   SpO2 96%   BMI 46.44 kg/m

## 2024-08-10 NOTE — ED NOTES
Son states pt last dose of hydrocodone was 2300 last night, requesting pt have a dose now, will update provider

## 2024-08-10 NOTE — ED PROVIDER NOTES
History     Chief Complaint   Patient presents with    Altered Mental Status     HPI  Noe Morales is a 84 year old male who is 3 days status post right shoulder replacement surgery presenting for evaluation of ongoing right shoulder pain with reported fevers and increased confusion.  Patient has been taking his pain medications and does report some constipation.  Has had some abdominal bloating and pain with that.  Patient reports fevers but does not know how high they have been.  Denies chest pain or difficulty son reports that he has sleep apnea and breathing.  Has had drops in his oxygen levels when sleeping.    Allergies:  Allergies   Allergen Reactions    Nkda [No Known Drug Allergy]        Problem List:    Patient Active Problem List    Diagnosis Date Noted    DJD of right shoulder 08/07/2024     Priority: Medium    Skin ulcer of right lower leg with fat layer exposed (H) 05/09/2024     Priority: Medium    Lower extremity edema 05/09/2024     Priority: Medium    Chronic pain 04/19/2024     Priority: Medium    Depression 04/19/2024     Priority: Medium    Hard of hearing 04/19/2024     Priority: Medium    Insomnia 04/19/2024     Priority: Medium    Umbilical hernia without obstruction and without gangrene 04/08/2024     Priority: Medium    Hypoxia 02/09/2024     Priority: Medium    COVID-19 virus infection 02/09/2024     Priority: Medium    COPD exacerbation (H) 02/27/2023     Priority: Medium    Depression, major, single episode, severe (H) 01/12/2022     Priority: Medium    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     Priority: Medium    History of alcohol abuse 05/20/2019     Priority: Medium    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     Priority: Medium    Left rotator cuff tear arthropathy 07/28/2016     Priority: Medium    S/p reverse total shoulder arthroplasty 07/28/2016     Priority: Medium    Hyperlipidemia 07/28/2015     Priority: Medium    Intractable back pain 09/05/2013     Priority: Medium    Low  back pain potentially associated with spinal stenosis 09/05/2013     Priority: Medium    Hepatic steatosis 05/22/2013     Priority: Medium    Alcohol abuse 11/02/2012     Priority: Medium    BPH (benign prostatic hyperplasia) 08/03/2012     Priority: Medium    Combined arterial insufficiency and corporo-venous occlusive erectile dysfunction 09/08/2011     Priority: Medium    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     Priority: Medium    CAD (coronary artery disease) 07/15/2010     Priority: Medium    Sleep apnea 03/15/2007     Priority: Medium    Esophageal reflux 01/17/2007     Priority: Medium    Essential hypertension 01/17/2007     Priority: Medium    Hay fever 01/17/2007     Priority: Medium    Asbestosis (H) 01/17/2007     Priority: Medium     Pos but stable ct lung with plaques 2007 no change in 2010,      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, 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     Priority: Medium     Overview:   1998-excised      Other and unspecified disc disorder 01/17/2007     Priority: Medium     Overview:   lumbar      Anxiety state 01/17/2007     Priority: Medium    Morbid obesity with BMI of 40.0-44.9, adult (H) 01/17/2007     Priority: Medium        Past Medical History:    Past Medical History:   Diagnosis Date    Alcohol abuse 11/02/2012    Alcohol dependence (H)     Allergic rhinitis     Anxiety     Anxiety state 01/17/2007    Asbestosis (H) 01/17/2007    Back pain 09/05/2013    BPH (benign prostatic hyperplasia)     CAD (coronary artery disease) 07/15/2010    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    Chronic pain 04/19/2024    Combined arterial insufficiency and corporo-venous occlusive erectile dysfunction 09/08/2011    COPD (chronic obstructive pulmonary disease) (H)     COPD exacerbation (H) 02/27/2023    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    COVID-19 virus infection 02/09/2024    Depression     Depression, major, single episode, severe (H) 01/12/2022     Esophageal reflux 01/17/2007    Essential hypertension 01/17/2007    Fatty liver     GERD (gastroesophageal reflux disease)     Gout     Hard of hearing 04/19/2024    Hay fever 01/17/2007    Hepatic steatosis 05/22/2013    History of alcohol abuse 05/20/2019    History of sarcoma     Hyperlipidemia 07/28/2015    Hypertension     Hypoxia 02/09/2024    Insomnia 04/19/2024    Intractable back pain 09/05/2013    Left rotator cuff tear arthropathy 07/28/2016    Mixed hyperlipidemia     Morbid obesity (H)     Morbid obesity with BMI of 40.0-44.9, adult (H) 01/17/2007    Personal history of DVT (deep vein thrombosis)     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    S/p reverse total shoulder arthroplasty 07/28/2016    Sleep apnea 03/15/2007    Umbilical hernia without obstruction and without gangrene 04/08/2024       Past Surgical History:    Past Surgical History:   Procedure Laterality Date    AMPUTATE FINGER(S) Left 1/10/2024    Procedure: LEFT MIDDLE FINGER AMPUTATION WITH FILLET FLAP RECONSTRUCTION OF THE LEFT PALM WOUND;  Surgeon: Sean Carlos MD;  Location:  OR    ARTHROPLASTY KNEE Right 05/20/2019    Procedure: ARTHROPLASTY, KNEE;  Surgeon: Reynaldo Barnes MD;  Location: [facility] OR    ARTHROTOMY SHOULDER, ROTATOR CUFF REPAIR, COMBINED  07/09/2012    Procedure: COMBINED ARTHROTOMY SHOULDER, ROTATOR CUFF REPAIR;  Right shoulder biceps tendodesis;  Surgeon: Reynaldo Barnes MD;  Location: [facility] OR    HAND SURGERY Right     IRRIGATION AND DEBRIDEMENT HAND, COMBINED Left 1/10/2024    Procedure: LEFT PALM WOUND IRRIGATION AND DEBRIDEMENT,;  Surgeon: Sean Carlos MD;  Location:  OR    REVERSE ARTHROPLASTY SHOULDER Right 8/7/2024    Procedure: Reverse Total Shoulder Arthroplasty;  Surgeon: Sean Rush MD;  Location: [facility] OR       Family History:    Family History   Problem Relation Age of Onset    Hypertension Daughter     Pneumonia Daughter     Chronic Obstructive Pulmonary Disease Daughter     Anesthesia  Reaction No family hx of     Thrombosis No family hx of        Social History:  Marital Status:   [2]  Social History     Tobacco Use    Smoking status: Former     Current packs/day: 0.00     Types: Cigarettes     Quit date:      Years since quittin.6     Passive exposure: Never    Smokeless tobacco: Never   Substance Use Topics    Alcohol use: Yes     Comment: occa    Drug use: Never        Medications:    acetaminophen (TYLENOL) 325 MG tablet  allopurinol (ZYLOPRIM) 300 MG tablet  aspirin 81 MG EC tablet  ATENOLOL 50 MG OR TABS  doxazosin (CARDURA) 4 MG tablet  DULoxetine (CYMBALTA) 60 MG capsule  ferrous sulfate (IRON) 325 (65 FE) MG tablet  finasteride (PROSCAR) 5 MG tablet  fluticasone (FLONASE) 50 MCG/ACT nasal spray  furosemide (LASIX) 40 MG tablet  gabapentin (NEURONTIN) 600 MG tablet  lovastatin (MEVACOR) 40 MG tablet  methocarbamol (ROBAXIN) 500 MG tablet  multivitamin  with lutein (OCUVITE WITH LTEIN) CAPS  mupirocin (BACTROBAN) 2 % external ointment  omeprazole (PRILOSEC) 20 MG DR capsule  oxyCODONE (ROXICODONE) 5 MG tablet  potassium chloride quiana ER (KLOR-CON M20) 20 MEQ CR tablet  psyllium (METAMUCIL) 58.6 % POWD  senna-docusate (SENOKOT-S/PERICOLACE) 8.6-50 MG tablet  tamsulosin (FLOMAX) 0.4 MG capsule  TRELEGY ELLIPTA 100-62.5-25 MCG/ACT oral inhaler  VENTOLIN  (90 Base) MCG/ACT inhaler  vitamin D3 (CHOLECALCIFEROL) 50 mcg (2000 units) tablet          Review of Systems   Constitutional:  Positive for appetite change (Decrease), fatigue and fever.   Respiratory:  Negative for cough, chest tightness and shortness of breath.    Cardiovascular:  Negative for chest pain.   Gastrointestinal:  Positive for abdominal distention, abdominal pain, constipation and nausea. Negative for vomiting.   Genitourinary:  Positive for decreased urine volume.   Musculoskeletal:         Pain in right shoulder   Skin:  Positive for color change. Negative for rash.   Neurological:  Negative for  "light-headedness and headaches.   Psychiatric/Behavioral:  Positive for confusion.    All other systems reviewed and are negative.      Physical Exam   BP: 107/72  Pulse: 91  Temp: 98.7  F (37.1  C)  Resp: 16  Height: 170.2 cm (5' 7\")  Weight: 127 kg (280 lb)  SpO2: 92 %      Physical Exam  Vitals and nursing note reviewed.   Constitutional:       Appearance: He is well-developed. He is obese. He is not ill-appearing or diaphoretic.      Comments: Awake and alert but mildly confused.  Answers questions but at times is slow to answer and cannot think of the answers.   HENT:      Head: Atraumatic.      Mouth/Throat:      Comments: Tongue and mouth is notably dry  Cardiovascular:      Rate and Rhythm: Normal rate and regular rhythm.   Pulmonary:      Effort: Pulmonary effort is normal.   Musculoskeletal:      Cervical back: Normal range of motion.      Comments: Bruising erythema, warmth, tenderness of the right shoulder   Skin:     General: Skin is warm and dry.      Capillary Refill: Capillary refill takes less than 2 seconds.   Neurological:      Mental Status: He is alert. He is disoriented.      Comments: Mildly confused   Psychiatric:         Mood and Affect: Mood normal.         ED Course        Procedures                Results for orders placed or performed during the hospital encounter of 08/10/24 (from the past 24 hour(s))   Comprehensive metabolic panel   Result Value Ref Range    Sodium 132 (L) 135 - 145 mmol/L    Potassium 4.0 3.4 - 5.3 mmol/L    Carbon Dioxide (CO2) 27 22 - 29 mmol/L    Anion Gap 9 7 - 15 mmol/L    Urea Nitrogen 17.1 8.0 - 23.0 mg/dL    Creatinine 0.98 0.67 - 1.17 mg/dL    GFR Estimate 77 >60 mL/min/1.73m2    Calcium 8.9 8.8 - 10.4 mg/dL    Chloride 96 (L) 98 - 107 mmol/L    Glucose 117 (H) 70 - 99 mg/dL    Alkaline Phosphatase 54 40 - 150 U/L    AST 24 0 - 45 U/L    ALT 10 0 - 70 U/L    Protein Total 6.2 (L) 6.4 - 8.3 g/dL    Albumin 3.6 3.5 - 5.2 g/dL    Bilirubin Total 0.8 <=1.2 mg/dL "   CBC with platelets, differential    Narrative    The following orders were created for panel order CBC with platelets, differential.  Procedure                               Abnormality         Status                     ---------                               -----------         ------                     CBC with platelets and d...[[id number]]  Abnormal            Final result               Manual Differential[[id number]]          Abnormal            Final result                 Please view results for these tests on the individual orders.   Lactic acid whole blood   Result Value Ref Range    Lactic Acid 0.9 0.7 - 2.0 mmol/L   [place] Draw    Narrative    The following orders were created for panel order [place] Draw.  Procedure                               Abnormality         Status                     ---------                               -----------         ------                     Extra Blood Culture Bottle[[id number]]                       Final result               Extra Blue Top Tube[[id number]]                              Final result                 Please view results for these tests on the individual orders.   CBC with platelets and differential   Result Value Ref Range    WBC Count 11.2 (H) 4.0 - 11.0 10e3/uL    RBC Count 3.68 (L) 4.40 - 5.90 10e6/uL    Hemoglobin 11.5 (L) 13.3 - 17.7 g/dL    Hematocrit 35.1 (L) 40.0 - 53.0 %    MCV 95 78 - 100 fL    MCH 31.3 26.5 - 33.0 pg    MCHC 32.8 31.5 - 36.5 g/dL    RDW 13.4 10.0 - 15.0 %    Platelet Count 129 (L) 150 - 450 10e3/uL    NRBCs per 100 WBC 0 <1 /100    Absolute NRBCs 0.0 10e3/uL   Extra Blood Culture Bottle   Result Value Ref Range    Hold Specimen JI    Extra Blue Top Tube   Result Value Ref Range    Hold Specimen JI    Manual Differential   Result Value Ref Range    % Neutrophils 90 %    % Lymphocytes 6 %    % Monocytes 4 %    % Eosinophils 0 %    % Basophils 0 %    Absolute Neutrophils 10.1 (H) 1.6 - 8.3 10e3/uL    Absolute Lymphocytes 0.7  (L) 0.8 - 5.3 10e3/uL    Absolute Monocytes 0.4 0.0 - 1.3 10e3/uL    Absolute Eosinophils 0.0 0.0 - 0.7 10e3/uL    Absolute Basophils 0.0 0.0 - 0.2 10e3/uL    RBC Morphology Confirmed RBC Indices     Platelet Assessment  Automated Count Confirmed. Platelet morphology is normal.     Automated Count Confirmed. Platelet morphology is normal.   Procalcitonin   Result Value Ref Range    Procalcitonin 0.25 <0.50 ng/mL   CRP inflammation   Result Value Ref Range    CRP Inflammation 275.85 (H) <5.00 mg/L   CT Chest/Abdomen/Pelvis w Contrast    Narrative    EXAM: CT CHEST/ABDOMEN/PELVIS WITH CONTRAST  LOCATION: [facility]  DATE: 08/10/2024    INDICATION: Right shoulder pain, postop, possible infection, abdominal pain, constipation.  COMPARISON: Abdomen and pelvis CT from 03/26/2022.  TECHNIQUE: CT scan of the chest, abdomen, and pelvis was performed following injection of IV contrast. Multiplanar reformats were obtained. Dose reduction techniques were used.   CONTRAST: 137 mL Isovue 370.    FINDINGS:   LUNGS AND PLEURA: Prominent subpleural opacities in both lung bases, right greater than left, favored to be due to atelectasis. Small bilateral pleural effusions. Calcified pleural plaques compatible with prior asbestos exposure.    MEDIASTINUM/AXILLAE: Evaluation of the aorta is somewhat limited by the technique. There is evidence for an acute aortic dissection. Motion artifact limits evaluation of the ascending thoracic aorta. There is some suspicion the dissection could involve   the right lateral wall of the ascending thoracic aorta although difficult to be definitive. Dissection definitely involves the aortic arch and descending thoracic aorta and may extend to involve the right innominate artery. The dissection flap is clearly   seen to the level of the upper abdominal aorta near the celiac artery but is poorly visualized in the mid abdominal aorta. However, a subtle dissection flap is again  seen in the distal abdominal aorta extending into the left proximal common iliac   artery. Emergent vascular surgery consultation is recommended. If clinical scenario permits, repeat CT scan using the dissection protocol would be helpful for greater definition of this dissection. Mild aneurysmal dilatation of the ascending thoracic   aorta measuring approximately 4.4 cm. Aortic arch measures about 4.4 cm. Proximal descending thoracic aorta measures 4.1 cm. Distal thoracic aorta measures about 3.8 cm. Abdominal aorta is normal in caliber.    Cardiac enlargement. No pericardial effusion. No adenopathy. Esophagus is grossly negative.    CORONARY ARTERY CALCIFICATION: Moderate.    HEPATOBILIARY: Subcentimeter low-attenuation lesion in the left hepatic lobe of doubtful significance. No calcified gallstones or biliary dilatation.    PANCREAS: Normal.    SPLEEN: Normal.    ADRENAL GLANDS: Normal.    KIDNEYS/BLADDER: Small benign right renal cyst with no follow-up needed. Kidneys are otherwise negative. No hydronephrosis. Bladder is unremarkable.    BOWEL: Normal.    LYMPH NODES: Normal.    VASCULATURE: See above for aortic details. Mesenteric and bilateral renal arteries are opacified.    PELVIC ORGANS: Normal.    MUSCULOSKELETAL: Recent postoperative changes right shoulder arthroplasty. Prominent ill-defined complex air and fluid collection seen in the soft tissues anterior to the right shoulder suspicious for a postoperative hematoma versus complex seroma.   Postoperative changes left shoulder arthroplasty. Marked hypertrophic and degenerative changes throughout the spine. Fat-containing umbilical hernia.      Impression    IMPRESSION:  1.  Acute aortic dissection. Detail is limited by the technique of the exam and some motion artifact at the level of the ascending thoracic aorta. There is suspicion the dissection may involve the ascending thoracic aorta making this a type A dissection.   Dissection clearly extends to  the upper abdominal aorta, however, is less well visualized in the mid abdominal aorta but a subtle dissection flap appears to extend to the distal abdominal aorta and left common iliac artery. There is also suspicion for   dissection involving the right brachiocephalic artery. Ascending thoracic aorta is mildly aneurysmal at 4.4 cm. Recommend emergent vascular consultation for further management. If time and patient conditions permit, CTA using the dissection protocol   would be helpful for better definition of these findings.    2.  Recent postoperative changes right shoulder arthroplasty. Prominent ill-defined complex air and fluid collection in the soft tissues anterior to the right shoulder is nonspecific but could be seen with hematoma or complex seroma in the postoperative   setting. Clinical correlation needed.    3.  Bilateral pleural effusions with prominent bibasilar atelectasis. Nothing definitive for pneumonitis.    4.  Calcified pleural plaques bilaterally suggestive of asbestos exposure.    5.  No significant findings in the abdomen or pelvis outside of the vascular findings described above.    6.  Findings called to Dr. Kwon on 08/10/2024, 5:34 AM CDT.           Medications   esmolol 20 mg/mL (BREVIBLOC) infusion 100 mL (50 mcg/kg/min × 127 kg Intravenous $New Bag 8/10/24 0603)   HYDROmorphone (PF) (DILAUDID) injection 0.5 mg (0.5 mg Intravenous $Given 8/10/24 0611)   ceFAZolin (ANCEF) 2 g vial to attach to  ml bag for ADULT or 50 ml bag for PEDS (2 g Intravenous $Given 8/10/24 0630)   lactated ringers BOLUS 1,000 mL (1,000 mLs Intravenous $New Bag 8/10/24 6035)   iopamidol (ISOVUE-370) solution 137 mL (137 mLs Intravenous $Given 8/10/24 0506)   sodium chloride 0.9 % bag 500mL for CT scan flush use (74 mLs Intravenous $Given 8/10/24 0507)     5:34 AM: Discussed with radiology: CT has evidence of an acute aortic dissection. Paging CT surgery to discuss. Patient re-assessed.  Reporting only  abdominal pain currently.    5:45 AM: Consulted with Dr Harrison, CT surgery. He recommends activating the Aortic emergency line.    5:50 AM: Dr Lloyd (vascular) and Dr Aceves (ICU) on the line.     5:57 AM: Agreement is for medical management with goal blood pressure <110. Esmolol.  Nicardipine for second line if HR too low.  Transfer to [facility] ICU. [facility] will accept.    5:59 AM: Dr Medeiros with radiology again.  Updating his read with some evidence of possible infection in the right shoulder with complex fluid present.    6:05 AM: Paging orthopedics to discuss antibiotics.     6:25 AM; Discussed with Dr Rush, orthopedics.  Agrees with starting Ancef empirically and prioritizing treatment for the aortic dissection    Assessments & Plan (with Medical Decision Making)  10-year-old male 3 days status post right shoulder replacement surgery presenting for evaluation of pain in the right shoulder with fever, confusion, and constipation.  Patient mildly confused in the ED but alert.  Right shoulder and anterior chest is erythematous, warm, tender to the touch concerning for cellulitis.  Patient also with abdominal bloating and tenderness and reports no bowel movements over the past 4 days with a sensation of significant constipation.  Patient's white count slightly elevated 11.2 with a 10.1 neutrophils.  CRP significantly elevated at 275.  Procalcitonin minimally elevated at 0.25.  Lactic acid normal.  Obtained a CT to further evaluate for evidence of deep space infection.  CT incidentally identified what appears to be an acute aortic dissection.  Consulted with aortic emergency team including vascular surgery, cardiothoracic surgery and ICU at the [facility].  Ultimately was decided to transfer patient to [facility] with medical management for this emergency.  Also consulted with his orthopedic surgeon who recommended starting IV antibiotics for possible postoperative infection.  Transferred urgently by  EMS with ongoing medical management for his pain and aortic dissection with plan for definitive management at [facility]      I have reviewed the nursing notes.    I have reviewed the findings, diagnosis, plan and need for follow up with the patient.  Critical Care Addendum    My initial assessment, based on my review of nursing observations, review of vital signs, focused history, and physical exam, established that Noe Goetz [facility] has aortic dissection, which requires immediate intervention, and therefore He is critically ill.     After the initial assessment, the care team initiated multiple lab tests, initiated IV fluid administration, and initiated medication therapy with esmolol  to provide stabilization care. Due to the critical nature of this patient, I reassessed nursing observations, vital signs, physical exam, mental status, neurologic status, and respiratory status multiple times prior to He disposition.     Time also spent performing documentation, reviewing test results, discussion with consultants, and coordination of care.     Critical care time (excluding teaching time and procedures): 45 minutes.           New Prescriptions    No medications on file       Final diagnoses:   Dissection of thoracoabdominal aorta (H)   Infection of deep incisional surgical site after procedure, initial encounter - right shoulder       8/10/2024   [facility] EMERGENCY DEPT       Kwon, Aric Gary MD  08/10/24 0608

## 2024-08-10 NOTE — ED TRIAGE NOTES
"Pt arrives via EMS from home after wife thought pt has became more confused tonight, which pt admits to, had recent right shoulder replacement 3 days ago, area appears warm, swollen and red, currently takes hydrocodone, last dose \"today sometime, not sure\"     Triage Assessment (Adult)       Row Name 08/10/24 0338          Triage Assessment    Airway WDL WDL        Respiratory WDL    Respiratory WDL WDL        Skin Circulation/Temperature WDL    Skin Circulation/Temperature WDL temperature     Skin Temperature warm  warm to the touch        Cardiac WDL    Cardiac WDL WDL        Peripheral/Neurovascular WDL    Peripheral Neurovascular WDL WDL        Cognitive/Neuro/Behavioral WDL    Cognitive/Neuro/Behavioral WDL orientation     Orientation disoriented to;other (see comments)  president and date                     "

## 2024-08-10 NOTE — CONSULTS
[facility] ERU History and Physical  Noe Morales   MRN: [id number]  : 1940  Date of Admission:8/10/2024  Primary care provider: Semmler, Steven Duane [phone]  ___________________________________      Chief Complaint   Indication for critical care admission: aortic dissection requiring invasive hemodynamic monitoring and antihypertensive gtts    Assessment & Plan    Noe Morales is a 84 year old White male with a past medical history significant for HTN, ETOH abuse, COPD, CAD, GERD, OA, right shoulder replacement 24 (3 days ago) who presents as a transfer from [facility] ED with a type B aortic dissection.  Admitted to the ICU for invasive hemodynamic monitoring and blood pressure control.    Neurological/Psychological    **Sedation: No sedation needs  **Pain/Agitation: tylenol scheduled, prn tramadol/robaxin/dilaudid; Precedex for agitation    History of ETOH abuse (6-8 drinks/day until 3 weeks ago)   - per family, no history of withdrawal sx, seizures   - CIWA with precedex   - gabapentin   - folate/thiamine    History of chronic low back pain (PTA on Norco):    - pain control as above   - heat packs prn   - PTA gabapentin    ANTHONY with panic attack:   - PTA duloxetine    Depression:    - PTA duloxetine    Pulmonary    History of COPD, not on O2 at home:    - PTA Breo/Incruse ellipta   - prn albuterol   - Supplemental O2 prn    History of HODA: uses CPAP at night   - CPAP at night   - RT consult      Cardiovascular    Type B aortic dissection:    - Vascular and CT surgery following   - SBP<120   - HR<70   - PTA atenolol   - Amlodipine 10mg every day   - Esmolol and nicardipine gtts   - ECHO   - Asa/statin   - CTA ordered for tomorrow   - Bedrest with bathroom priviliges    History of HTN:    - PTA atenolol in addition to above    HLD:    - PTA statin    Renal/Fluids/Electrolytes    CKD, baseline Cr appears to be 1-1.2: Cr 0.98 today   - Monitor Cr daily   - avoid  nephrotoxic meds    Chronic BLE edema:   - 1/2 dose PTA lasix (40 daily)    BPH:    - PTA tamsulosin, finasteride, doxazosin      **I/O last 24hrs: No intake or output data in the 24 hours ending 08/10/24 1020    Infectious Diseases    Recent shoulder replacement with erythema:    - Orthopedics consult   - Antibiotics if indicated per ortho      Gastrointestinal/Genitourinary    GERD:    - PPI    **Nutrition: CLD    Endocrine/Metabolic    No acute issues:     Hematology/Oncology    History of anemia:    - Iron supplementation   - Monitor daily CBCs    Thrombocytopenia, chronic:   - Continue to monitor    Rheumatology    History of Gout:    - PTA allopurinol    Skin/Musculoskeletal    Right shoulder replacement 8/7/24:    - Orthopedic surgery consult   - PT/OT consults   - bedrest with bathroom privileges for now  ------------------------------------------------------------------------------------------------------------------  Prophylaxis:     -GI: PPI    -DVT: SQH    FEN: CLD  Consults: CV surg, Vascular surg, Ortho  Family: Wife, Joana, and son, updated  Code status: Full  Disposition: ICU  =========================================================  Patient was discussed and evaluated with Dr. Garcia-Mili Llanos MD  ICU Fellow      History of Present Illness    Noe Phillips is a 84 year old White male with a past medical history significant for HTN, ETOH abuse, COPD, CAD, GERD, OA, right shoulder replacement 8/7/24 (3 days ago) who presents as a transfer from [facility] with a type B aortic dissection.  He presented to the ED at [facility] yesterday evening with right shoulder pain and right sided chest pain, dizziness, and confusion.  CT revealed aortic dissection.  Admitted to the ICU for invasive hemodynamic monitoring and blood pressure control.  Currently endorses right shoulder pain and low back pain.    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    Chronic pain 04/19/2024    Combined arterial insufficiency and corporo-venous occlusive erectile dysfunction 09/08/2011    COPD (chronic obstructive pulmonary disease) (H)     COPD exacerbation (H) 02/27/2023    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    COVID-19 virus infection 02/09/2024    Depression     Depression, major, single episode, severe (H) 01/12/2022    Esophageal reflux 01/17/2007    Essential hypertension 01/17/2007    Fatty liver     GERD (gastroesophageal reflux disease)     Gout     Hard of hearing 04/19/2024    Hay fever 01/17/2007    Hepatic steatosis 05/22/2013    History of alcohol abuse 05/20/2019    History of sarcoma     Hyperlipidemia 07/28/2015    Hypertension     Hypoxia 02/09/2024    Insomnia 04/19/2024    Intractable back pain 09/05/2013    Left rotator cuff tear arthropathy 07/28/2016    Mixed hyperlipidemia     Morbid obesity (H)     Morbid obesity with BMI of 40.0-44.9, adult (H) 01/17/2007    Personal history of DVT (deep vein thrombosis)     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    S/p reverse total shoulder arthroplasty 07/28/2016    Sleep apnea 03/15/2007    Umbilical hernia without obstruction and without gangrene 04/08/2024       Patient Active Problem List    Diagnosis Date Noted    Dissection of aorta (H) 08/10/2024     Priority: Medium    Aortic dissection (H) 08/10/2024     Priority: Medium    DJD of right shoulder 08/07/2024     Priority: Medium    Skin ulcer of right lower leg with fat layer exposed (H) 05/09/2024     Priority: Medium    Lower extremity edema 05/09/2024     Priority: Medium    Chronic pain 04/19/2024     Priority: Medium     Depression 04/19/2024     Priority: Medium    Hard of hearing 04/19/2024     Priority: Medium    Insomnia 04/19/2024     Priority: Medium    Umbilical hernia without obstruction and without gangrene 04/08/2024     Priority: Medium    Hypoxia 02/09/2024     Priority: Medium    COVID-19 virus infection 02/09/2024     Priority: Medium    COPD exacerbation (H) 02/27/2023     Priority: Medium    Depression, major, single episode, severe (H) 01/12/2022     Priority: Medium    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     Priority: Medium    History of alcohol abuse 05/20/2019     Priority: Medium    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     Priority: Medium    Left rotator cuff tear arthropathy 07/28/2016     Priority: Medium    S/p reverse total shoulder arthroplasty 07/28/2016     Priority: Medium    Hyperlipidemia 07/28/2015     Priority: Medium    Intractable back pain 09/05/2013     Priority: Medium    Low back pain potentially associated with spinal stenosis 09/05/2013     Priority: Medium    Hepatic steatosis 05/22/2013     Priority: Medium    Alcohol abuse 11/02/2012     Priority: Medium    BPH (benign prostatic hyperplasia) 08/03/2012     Priority: Medium    Combined arterial insufficiency and corporo-venous occlusive erectile dysfunction 09/08/2011     Priority: Medium    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     Priority: Medium    CAD (coronary artery disease) 07/15/2010     Priority: Medium    Sleep apnea 03/15/2007     Priority: Medium    Esophageal reflux 01/17/2007     Priority: Medium    Essential hypertension 01/17/2007     Priority: Medium    Hay fever 01/17/2007     Priority: Medium    Asbestosis (H) 01/17/2007     Priority: Medium     Pos but stable ct lung with plaques 2007 no change in 2010,      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, 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     Priority: Medium     Overview:   1998-excised      Other and unspecified disc disorder 01/17/2007     Priority:  Medium     Overview:   lumbar      Anxiety state 2007     Priority: Medium    Morbid obesity with BMI of 40.0-44.9, adult (H) 2007     Priority: Medium        Past Surgical History     Past Surgical History:   Procedure Laterality Date    AMPUTATE FINGER(S) Left 1/10/2024    Procedure: LEFT MIDDLE FINGER AMPUTATION WITH FILLET FLAP RECONSTRUCTION OF THE LEFT PALM WOUND;  Surgeon: Sean Carlos MD;  Location: [facility] OR    ARTHROPLASTY KNEE Right 2019    Procedure: ARTHROPLASTY, KNEE;  Surgeon: Reynaldo Barnes MD;  Location: [facility]    ARTHROTOMY SHOULDER, ROTATOR CUFF REPAIR, COMBINED  2012    Procedure: COMBINED ARTHROTOMY SHOULDER, ROTATOR CUFF REPAIR;  Right shoulder biceps tendodesis;  Surgeon: Reynaldo Barnes MD;  Location: [facility]    HAND SURGERY Right     IRRIGATION AND DEBRIDEMENT HAND, COMBINED Left 1/10/2024    Procedure: LEFT PALM WOUND IRRIGATION AND DEBRIDEMENT,;  Surgeon: Sean Carlos MD;  Location: [facility] OR    REVERSE ARTHROPLASTY SHOULDER Right 2024    Procedure: Reverse Total Shoulder Arthroplasty;  Surgeon: Sean Rush MD;  Location: [facility] OR        Social History   Social History     Socioeconomic History    Marital status:      Spouse name: Not on file    Number of children: Not on file    Years of education: Not on file    Highest education level: Not on file   Occupational History    Not on file   Tobacco Use    Smoking status: Former     Current packs/day: 0.00     Types: Cigarettes     Quit date:      Years since quittin.6     Passive exposure: Never    Smokeless tobacco: Never   Substance and Sexual Activity    Alcohol use: Yes     Comment: occa    Drug use: Never    Sexual activity: Not on file   Other Topics Concern    Parent/sibling w/ CABG, MI or angioplasty before 65F 55M? Not Asked   Social History Narrative    Not on file     Social Determinants of Health     Financial Resource Strain: Low Risk  (2022)    [facility]    Financial Resource Strain     Difficulty of Paying Living Expenses: 3     Difficulty of Paying Living Expenses: Not on file   Food Insecurity: No Food Insecurity (4/19/2022)    [facility]    Food Insecurity     Worried About Running Out of Food in the Last Year: 1   Transportation Needs: No Transportation Needs (4/19/2022)    [facility]    Transportation Needs     Lack of Transportation (Medical): 1   Physical Activity: Not on file   Stress: Not on file   Social Connections: Unknown (4/22/2023)    [facility]    Social Connections     Frequency of Communication with Friends and Family: Not on file   Interpersonal Safety: Not on file   Housing Stability: Low Risk  (4/19/2022)    [facility]    Housing Stability     Unable to Pay for Housing in the Last Year: 1        Tobacco: - Former smoker  EtOH: - Heavy use: 6-8 drinks/day until 3-4 weeks ago when he cut back; no history of withdrawal seizures      Family History    Family History   Problem Relation Age of Onset    Hypertension Daughter     Pneumonia Daughter     Chronic Obstructive Pulmonary Disease Daughter     Anesthesia Reaction No family hx of     Thrombosis No family hx of        Allergies   Allergies   Allergen Reactions    Nkda [No Known Drug Allergy]        Prior to Admission Medications    Prior to Admission Medications   Prescriptions Last Dose Informant Patient Reported? Taking?   ATENOLOL 50 MG OR TABS Unknown Other Yes Yes   Sig: Take 50 mg by mouth every morning   DULoxetine (CYMBALTA)  60 MG capsule Unknown Other Yes Yes   Sig: Take 120 mg by mouth daily   TRELEGY ELLIPTA 100-62.5-25 MCG/ACT oral inhaler Unknown Other Yes Yes   Sig: Inhale 1 puff into the lungs daily   VENTOLIN  (90 Base) MCG/ACT inhaler Unknown Other Yes Yes   Sig: Inhale 2 puffs into the lungs every 4 hours as needed for shortness of breath or wheezing   acetaminophen (TYLENOL) 325 MG tablet Unknown Other No Yes   Sig: Take 2 tablets (650 mg) by mouth every 4 hours as needed for other (mild pain)   allopurinol (ZYLOPRIM) 300 MG tablet Unknown Other Yes Yes   Sig: Take 300 mg by mouth daily   aspirin 81 MG EC tablet Unknown Other No Yes   Sig: Take 1 tablet (81 mg) by mouth 2 times daily   doxazosin (CARDURA) 4 MG tablet Unknown Other Yes Yes   Sig: Take 2 mg by mouth at bedtime   ferrous sulfate (IRON) 325 (65 FE) MG tablet Unknown Other Yes Yes   Sig: Take 325 mg by mouth daily (with breakfast)   finasteride (PROSCAR) 5 MG tablet Unknown Other Yes Yes   Sig: Take 5 mg by mouth daily   fluticasone (FLONASE) 50 MCG/ACT nasal spray Unknown Other Yes Yes   Sig: Spray 2 sprays into both nostrils daily   furosemide (LASIX) 40 MG tablet  Other Yes No   Sig: Take 40 mg by mouth 2 times daily AM and 1700   gabapentin (NEURONTIN) 600 MG tablet  Other Yes No   Sig: Take 1,200 mg by mouth at bedtime   gabapentin (NEURONTIN) 600 MG tablet  Other Yes Yes   Sig: Take 600 mg by mouth 2 times daily Morning and Noon   lovastatin (MEVACOR) 40 MG tablet Unknown Other Yes Yes   Sig: Take 40 mg by mouth at bedtime   methocarbamol (ROBAXIN) 500 MG tablet Unknown Other No Yes   Sig: Take 1 tablet (500 mg) by mouth 4 times daily as needed for other (pain)   multivitamin  with lutein (OCUVITE WITH LTEIN) CAPS Unknown Other Yes Yes   Sig: Take 1 capsule by mouth every morning   mupirocin (BACTROBAN) 2 % external ointment Unknown Other Yes Yes   Sig: APPLY OINTMENT TOPICALLY TO AFFECTED AREA TWICE DAILY   omeprazole (PRILOSEC) 20 MG DR capsule  Unknown Other Yes Yes   Sig: Take 20 mg by mouth 2 times daily   oxyCODONE (ROXICODONE) 5 MG tablet Unknown Other No Yes   Sig: Take 1-2 tablets (5-10 mg) by mouth every 4 hours as needed for moderate to severe pain   potassium chloride quiana ER (KLOR-CON M20) 20 MEQ CR tablet Unknown Other Yes Yes   Sig: Take 20 mEq by mouth daily   psyllium (METAMUCIL) 58.6 % POWD Unknown Other Yes Yes   Sig: Take 1 Tablespoonful by mouth every morning   senna-docusate (SENOKOT-S/PERICOLACE) 8.6-50 MG tablet Unknown Other No Yes   Sig: Take 1-2 tablets by mouth 2 times daily   tamsulosin (FLOMAX) 0.4 MG capsule Unknown Other Yes Yes   Sig: Take 0.4 mg by mouth daily   vitamin D3 (CHOLECALCIFEROL) 50 mcg (2000 units) tablet Unknown Other Yes Yes   Sig: Take 50 mcg by mouth every morning      Facility-Administered Medications: None       Current Facility-Administered Medications   Medication Dose Route Frequency Provider Last Rate Last Admin    acetaminophen (TYLENOL) tablet 650 mg  650 mg Oral Q4H PRN Mona Llanos MD        Or    acetaminophen (TYLENOL) oral liquid 650 mg  650 mg Per NG tube Q4H PRN Mona Llanos MD        albuterol (PROVENTIL HFA/VENTOLIN HFA) inhaler  2 puff Inhalation Q4H PRN Priscila Martinez PA-C        allopurinol (ZYLOPRIM) tablet 300 mg  300 mg Oral Daily Priscila Martinez PA-C        amLODIPine (NORVASC) tablet 10 mg  10 mg Oral Daily Prudence Oconnor MD        aspirin EC tablet 81 mg  81 mg Oral BID Priscila Martinez PA-C        atenolol (TENORMIN) tablet 50 mg  50 mg Oral QAM Priscila Martinez PA-C        atorvastatin (LIPITOR) tablet 10 mg  10 mg Oral At Bedtime Priscila Martinez PA-C        calcium carbonate (TUMS) chewable tablet 1,000 mg  1,000 mg Oral 4x Daily PRN Priscila Martinez PA-C        glucose gel 15-30 g  15-30 g Oral Q15 Min PRN Mona Llanos MD        Or    dextrose 50 % injection 25-50 mL  25-50 mL Intravenous Q15 Min PRN Mona Llanos MD        Or    glucagon injection 1 mg  1 mg  Subcutaneous Q15 Min PRN Mona Llanos MD        doxazosin (CARDURA) tablet 2 mg  2 mg Oral At Bedtime Priscila Martinez PA-C        DULoxetine (CYMBALTA) DR capsule 120 mg  120 mg Oral Daily Priscila Martinez PA-C        esmolol 20 mg/mL (BREVIBLOC) infusion 100 mL   mcg/kg/min Intravenous Continuous Mona Llanos MD        ferrous sulfate (FEROSUL) tablet 325 mg  325 mg Oral Daily with breakfast Priscila Martinez PA-C        finasteride (PROSCAR) tablet 5 mg  5 mg Oral Daily Priscila Martinez PA-C        fluticasone (FLONASE) 50 MCG/ACT spray 2 spray  2 spray Both Nostrils Daily Priscila Martinez PA-C        fluticasone-vilanterol (BREO ELLIPTA) 100-25 MCG/ACT inhaler 1 puff  1 puff Inhalation Daily Priscila Martinez PA-C        And    umeclidinium (INCRUSE ELLIPTA) 62.5 MCG/ACT inhaler 1 puff  1 puff Inhalation Daily Priscila Martinez PA-C        furosemide (LASIX) tablet 40 mg  40 mg Oral BID Priscila Martinez PA-C        gabapentin (NEURONTIN) capsule 1,200 mg  1,200 mg Oral At Bedtime Priscila Martinez PA-C        gabapentin (NEURONTIN) capsule 600 mg  600 mg Oral BID Priscila Martinez PA-C        heparin ANTICOAGULANT injection 5,000 Units  5,000 Units Subcutaneous Q8H Priscila Martinez PA-C        hydrALAZINE (APRESOLINE) tablet 10 mg  10 mg Oral Q4H PRN Priscila Martinez PA-C        Or    hydrALAZINE (APRESOLINE) injection 10 mg  10 mg Intravenous Q4H PRN Priscila Martinez PA-C        HYDROmorphone (DILAUDID) injection 0.2 mg  0.2 mg Intravenous Q2H PRN Mona Llanos MD        IF patient diabetic - HOLD: ALL ORAL HYPOGLYCEMICS: glipizide, glyburide, glimepiride, gliclazide, metformin (Glucophage), any metformin (Glucophage) containing medication, rosiglitazone (Avandia), pioglitazone (Actos), or sitagliptin (Januvia) on day of the procedure   Does not apply HOLD Mona Llanos MD        lidocaine (LMX4) cream   Topical Q1H PRN Priscila Martinez, THELMA        lidocaine 1 % 0.1-1 mL  0.1-1 mL  Other Q1H PRN Priscila Martinez PA-C        methocarbamol (ROBAXIN) tablet 500 mg  500 mg Oral 4x Daily PRN Priscila Martinez PA-C        multivitamin  with lutein (OCUVITE WITH LUTEIN) per capsule 1 capsule  1 capsule Oral QAM Priscila Martinez PA-C        mupirocin (BACTROBAN) 2 % ointment   Topical BID Priscila Martinez PA-C        naloxone (NARCAN) injection 0.2 mg  0.2 mg Intravenous Q2 Min PRN Mp Salter DO        Or    naloxone (NARCAN) injection 0.4 mg  0.4 mg Intravenous Q2 Min PRN Mp Salter DO        Or    naloxone (NARCAN) injection 0.2 mg  0.2 mg Intramuscular Q2 Min PRN Mp Salter DO        Or    naloxone (NARCAN) injection 0.4 mg  0.4 mg Intramuscular Q2 Min PRN Mp Salter DO        niCARdipine 40 mg in 200 mL NS (CARDENE) infusion  0.5-15 mg/hr Intravenous Continuous Mona Llanos MD        ondansetron (ZOFRAN ODT) ODT tab 4 mg  4 mg Oral Q6H PRN Mona Llanos MD        Or    ondansetron (ZOFRAN) injection 4 mg  4 mg Intravenous Q6H PRN Mona Llanos MD        pantoprazole (PROTONIX) EC tablet 40 mg  40 mg Oral QAM AC Priscila Martinez PA-C        polyethylene glycol (MIRALAX) Packet 17 g  17 g Oral Daily PRN Mona Llanos MD        potassium chloride quiana ER (KLOR-CON M20) CR tablet 20 mEq  20 mEq Oral Once Priscila Martinez PA-C        psyllium (METAMUCIL/KONSYL) Packet 1 packet  1 packet Oral Daily Priscila Martinez PA-C        senna-docusate (SENOKOT-S/PERICOLACE) 8.6-50 MG per tablet 1 tablet  1 tablet Oral BID PRN Mona Llanos MD        Or    senna-docusate (SENOKOT-S/PERICOLACE) 8.6-50 MG per tablet 2 tablet  2 tablet Oral BID PRN Mona Llanos MD        senna-docusate (SENOKOT-S/PERICOLACE) 8.6-50 MG per tablet 1-2 tablet  1-2 tablet Oral BID Priscila Martinez PA-C        sodium chloride (PF) 0.9% PF flush 10 mL  10 mL Intravenous Q10 Min PRMona Mehta MD        sodium chloride (PF) 0.9% PF flush 10 mL  10  mL Intravenous Once Mona Llanos MD        sodium chloride (PF) 0.9% PF flush 3 mL  3 mL Intracatheter Q8H Priscila Martinez PA-C        sodium chloride (PF) 0.9% PF flush 3 mL  3 mL Intracatheter q1 min prn Priscila Martinez PA-C        tamsulosin (FLOMAX) capsule 0.4 mg  0.4 mg Oral Daily Priscila Martinez PA-C        traMADol (ULTRAM) half-tab 50 mg  50 mg Oral Q6H PRN Priscila Martinez PA-C        vitamin D3 (CHOLECALCIFEROL) tablet 50 mcg  50 mcg Oral QAM Priscila Martinez PA-C            Review of Systems     Please see HPI. All other systems were reviewed and are found to be negative and non-contributory.       Physical Exam   /85   Pulse 85   Temp 99.5  F (37.5  C) (Axillary)   Resp (!) 36   SpO2 (!) 88%   Wt Readings from Last 1 Encounters:   08/10/24 127 kg (280 lb)    There is no height or weight on file to calculate BMI.     Resp: (!) 36      General: Alert, oriented to year and place, intermittently agitated  Eyes: Eyes are clear, pupils are reactive.  HEENT: Oropharynx is clear and moist. No evidence of cranial trauma.  Cardiovascular: Regular rate and rhythm. Mild BLE edema  Respiratory: Clear to auscultation bilaterally. Mildly tachypneic on NC  GI: Soft, non-tender, rotund, reducible umbilical hernia  Genitourinary: Deferred  Musculoskeletal: R shoulder dressing in place, some erythema onto biceps, ecchymosis to shoulder and chest wall  Skin: Warm and dry, no rashes.   Neurologic: No focal deficits    Drains and Tubes: None  Intravascular Access and Device: Date arterial line was placed: 08/10/24    No intake or output data in the 24 hours ending 08/10/24 1020    Data   Labs & Studies of Note: I personally reviewed the following studies:    ROUTINE ICU LABS (Last four results)  CMP  Recent Labs   Lab 08/10/24  0753 08/10/24  0343 08/08/24  0523 08/07/24  1004   NA  --  132*  --   --    POTASSIUM  --  4.0  --  4.0   CHLORIDE  --  96*  --   --    CO2  --  27  --   --    ANIONGAP  --  9   --   --    * 117* 142*  --    BUN  --  17.1  --   --    CR  --  0.98  --  0.99   GFRESTIMATED  --  77  --  76   CHERY  --  8.9  --   --    PROTTOTAL  --  6.2*  --   --    ALBUMIN  --  3.6  --   --    BILITOTAL  --  0.8  --   --    ALKPHOS  --  54  --   --    AST  --  24  --   --    ALT  --  10  --   --      CBC  Recent Labs   Lab 08/10/24  0343 08/08/24  0523 08/07/24  1004   WBC 11.2*  --  6.1   RBC 3.68*  --  4.30*   HGB 11.5* 13.1* 13.7   HCT 35.1*  --  39.7*   MCV 95  --  92   MCH 31.3  --  31.9   MCHC 32.8  --  34.5   RDW 13.4  --  13.5   *  --  176     INRNo lab results found in last 7 days.    Venous Blood Gas No lab results found in last 7 days.  Arterial Blood Gas No lab results found in last 7 days.  Lactic Acid   Recent Labs   Lab 08/10/24  0343   LACT 0.9       Inflammatory Markers    Recent Labs   Lab Test 02/09/24  2143 09/25/21  0535   SED 18  --    CRP  --  30.7*     Immune Globulin Studies  No lab results found.    Microbiology:  Significant culture results:  none  Culture Micro   Date Value Ref Range Status   08/02/2016   Final    10,000 to 50,000 colonies/mL mixed urogenital jeaneth   09/10/2011 No growth after 6 days  Final   09/10/2011 No growth after 6 days  Final     Recent Labs   Lab Test 08/02/16  1646   CULT 10,000 to 50,000 colonies/mL mixed urogenital jeaneth   SDES Midstream Urine       Urine Studies:    Recent Labs   Lab Test 02/09/24  2237 09/01/16  0353 08/02/16  1646   LEUKEST Negative Negative Large*   NITRITE Negative Negative Negative   WBCU <1  --  18*   RBCU 1  --  5*       Imaging:   Recent Results (from the past 24 hour(s))   CT Chest/Abdomen/Pelvis w Contrast    Narrative    EXAM: CT CHEST/ABDOMEN/PELVIS WITH CONTRAST  LOCATION: [facility]  DATE: 08/10/2024    INDICATION: Right shoulder pain, postop, possible infection, abdominal pain, constipation.  COMPARISON: Abdomen and pelvis CT from 03/26/2022.  TECHNIQUE: CT scan of the chest,  abdomen, and pelvis was performed following injection of IV contrast. Multiplanar reformats were obtained. Dose reduction techniques were used.   CONTRAST: 137 mL Isovue 370.    FINDINGS:   LUNGS AND PLEURA: Prominent subpleural opacities in both lung bases, right greater than left, favored to be due to atelectasis. Small bilateral pleural effusions. Calcified pleural plaques compatible with prior asbestos exposure.    MEDIASTINUM/AXILLAE: Evaluation of the aorta is somewhat limited by the technique. There is evidence for an acute aortic dissection. Motion artifact limits evaluation of the ascending thoracic aorta. There is some suspicion the dissection could involve   the right lateral wall of the ascending thoracic aorta although difficult to be definitive. Dissection definitely involves the aortic arch and descending thoracic aorta and may extend to involve the right innominate artery. The dissection flap is clearly   seen to the level of the upper abdominal aorta near the celiac artery but is poorly visualized in the mid abdominal aorta. However, a subtle dissection flap is again seen in the distal abdominal aorta extending into the left proximal common iliac   artery. Emergent vascular surgery consultation is recommended. If clinical scenario permits, repeat CT scan using the dissection protocol would be helpful for greater definition of this dissection. Mild aneurysmal dilatation of the ascending thoracic   aorta measuring approximately 4.4 cm. Aortic arch measures about 4.4 cm. Proximal descending thoracic aorta measures 4.1 cm. Distal thoracic aorta measures about 3.8 cm. Abdominal aorta is normal in caliber.    Cardiac enlargement. No pericardial effusion. No adenopathy. Esophagus is grossly negative.    CORONARY ARTERY CALCIFICATION: Moderate.    HEPATOBILIARY: Subcentimeter low-attenuation lesion in the left hepatic lobe of doubtful significance. No calcified gallstones or biliary dilatation.    PANCREAS:  Normal.    SPLEEN: Normal.    ADRENAL GLANDS: Normal.    KIDNEYS/BLADDER: Small benign right renal cyst with no follow-up needed. Kidneys are otherwise negative. No hydronephrosis. Bladder is unremarkable.    BOWEL: Normal.    LYMPH NODES: Normal.    VASCULATURE: See above for aortic details. Mesenteric and bilateral renal arteries are opacified.    PELVIC ORGANS: Normal.    MUSCULOSKELETAL: Recent postoperative changes right shoulder arthroplasty. Prominent ill-defined complex air and fluid collection seen in the soft tissues anterior to the right shoulder suspicious for a postoperative hematoma versus complex seroma.   Postoperative changes left shoulder arthroplasty. Marked hypertrophic and degenerative changes throughout the spine. Fat-containing umbilical hernia.      Impression    IMPRESSION:  1.  Acute aortic dissection. Detail is limited by the technique of the exam and some motion artifact at the level of the ascending thoracic aorta. There is suspicion the dissection may involve the ascending thoracic aorta making this a type A dissection.   Dissection clearly extends to the upper abdominal aorta, however, is less well visualized in the mid abdominal aorta but a subtle dissection flap appears to extend to the distal abdominal aorta and left common iliac artery. There is also suspicion for   dissection involving the right brachiocephalic artery. Ascending thoracic aorta is mildly aneurysmal at 4.4 cm. Recommend emergent vascular consultation for further management. If time and patient conditions permit, CTA using the dissection protocol   would be helpful for better definition of these findings.    2.  Recent postoperative changes right shoulder arthroplasty. Prominent ill-defined complex air and fluid collection in the soft tissues anterior to the right shoulder is nonspecific but could be seen with hematoma or complex seroma in the postoperative   setting. Clinical correlation needed.    3.  Bilateral  pleural effusions with prominent bibasilar atelectasis. Nothing definitive for pneumonitis.    4.  Calcified pleural plaques bilaterally suggestive of asbestos exposure.    5.  No significant findings in the abdomen or pelvis outside of the vascular findings described above.    6.  Findings called to Dr. Kwon on 08/10/2024, 5:34 AM CDT.           EKG/strip results: pending

## 2024-08-10 NOTE — ED NOTES
Pt oriented to current situation, time and place, unable to recall who the president is or what day it is, right shoulder looks rather inflamed, no sling upon arrival, states his wife has been giving him hydrocodone for pain, pt states Dr. Rush did his surgery. Pt also states he wasn't placed on an antibiotic post op.

## 2024-08-10 NOTE — ED NOTES
Bed: ED07  Expected date: 8/10/24  Expected time: 3:31 AM  Means of arrival: Ambulance ([id number])  Comments:

## 2024-08-10 NOTE — PROCEDURES
[facility]    Arterial line placement    Date/Time: 8/10/2024 10:22 AM    Performed by: Mona Llanos MD  Authorized by: Mp Salter DO      UNIVERSAL PROTOCOL   Site Marked: Yes  Prior Images Obtained and Reviewed:  Yes  Required items: Required blood products, implants, devices and special equipment available    Patient identity confirmed:  Arm band  NA - No sedation, light sedation, or local anesthesia  Confirmation Checklist:  Patient's identity using two indicators, procedure was appropriate and matched the consent or emergent situation, correct equipment/implants were available and relevant allergies  Time out: Immediately prior to the procedure a time out was called    Universal Protocol: the Joint Commission Universal Protocol was followed    Preparation: Patient was prepped and draped in usual sterile fashion    ESBL (mL):  5  Indication:  hemodynamic monitoring  Location:  Left radial       ANESTHESIA    Anesthesia:  Local infiltration  Local Anesthetic:  Lidocaine 1% without epinephrine  Anesthetic Total (mL):  3      SEDATION  Patient Sedated: Yes    Sedation Type:  Anxiolysis  Sedation:  Midazolam  Vital signs: Vital signs monitored during sedation      PROCEDURE DETAILS  Bishnu's Test Normal?: Bishnu's test not abnormal    Seldinger technique: Seldinger technique used      Post-procedure:  Line sutured  CMS: normal    PROCEDURE    Patient Tolerance:  Patient tolerated the procedure well with no immediate complications  Length of time physician/provider present for 1:1 monitoring during sedation: 30   Diagnosis: Aortic dissection          Dr. Salter was available throughout the procedure.    Mona Llanos MD  ICU Fellow

## 2024-08-10 NOTE — CONSULTS
Orthopedic Trauma Consultation:     Our orthopedic trauma team has received consultation notification for Mr. Noe Morales regarding right shoulder replacement (DOS: 8/7/2024) with Dr. Rush and ongoing erythema. Patient is currently admitted to ICU with dissection. I came by for evaluation and patient currently agitated in room, with ICU staff surrounding him, they are preparing for aldana catheterization.     Family member is in the hallway, and I am able to speak with them. They have two concerns 1) surgical site is erythematous 2) patient needs to be completing his daily pendulum exercise.     Low suspicion for infection POD#3. I will return in the morning once the current situation has settled to evaluate the surgical site.     I am also placing an order for PT in regard to helping with the pendulum exercises.

## 2024-08-10 NOTE — PLAN OF CARE
Problem: Adult Inpatient Plan of Care  Goal: Plan of Care Review  Description: The Plan of Care Review/Shift note should be completed every shift.  The Outcome Evaluation is a brief statement about your assessment that the patient is improving, declining, or no change.  This information will be displayed automatically on your shift  note.  Flowsheets (Taken 8/10/2024 1630)  Outcome Evaluation: when pt first admitted pt oriented to self, situation and place but not time and off on date by a day. as the morning progressed pt  no longer oriented to situation and place. pt became increasingly agitated and distressed. pt started on ciwa protocols as a precaution. during episodes of agitation pt trying to climb out of bed and leave. pt given versed and dilaudid for slim placement and aldana placement. pt able to void x1 and then unable to void-pt bladder scanned and showing appx 700. aldana placed with help of staff. pt no longer cooperating and wanting to wear oxygen-sitter and then nurse one to one in place. pt placed on cpap by RT due to desating while on oximask. pt started on esmolol and nicardepene upon arrival. pt to have bp systolic under 120 and hr 70 or under. pt heart rhythm nsr. pt strong and able to move all limbs except right shoulder is limited due to recent total shoulder surgery. pt able to swallow pills and water safely. pt able to sleep for a while after aldana being placed. family updated at bedside.pt placed on dex for agitation. prn pain medication being given for shoulder. pt able to have two small loose bowel movments this day. afebrile.  Plan of Care Reviewed With: patient  Overall Patient Progress: no change  Goal: Absence of Hospital-Acquired Illness or Injury  Intervention: Identify and Manage Fall Risk  Recent Flowsheet Documentation  Taken 8/10/2024 1600 by Mariann Collins RN  Safety Promotion/Fall Prevention:   activity supervised   clutter free environment maintained  Taken 8/10/2024 1200  by Hammer, Mariann, RN  Safety Promotion/Fall Prevention:   activity supervised   clutter free environment maintained  Taken 8/10/2024 0800 by Mariann Collins RN  Safety Promotion/Fall Prevention:   activity supervised   clutter free environment maintained  Intervention: Prevent Skin Injury  Recent Flowsheet Documentation  Taken 8/10/2024 1600 by Mariann Collins RN  Body Position:   turned   heels elevated  Device Skin Pressure Protection:   absorbent pad utilized/changed   pressure points protected  Taken 8/10/2024 1400 by Mariann Collins RN  Body Position:   turned   heels elevated  Taken 8/10/2024 1200 by Mariann Collins RN  Body Position:   turned   heels elevated  Device Skin Pressure Protection:   absorbent pad utilized/changed   pressure points protected  Taken 8/10/2024 1000 by Mariann Collins RN  Body Position:   turned   heels elevated  Taken 8/10/2024 0800 by Mariann Collins RN  Body Position:   turned   heels elevated  Device Skin Pressure Protection:   absorbent pad utilized/changed   pressure points protected  Intervention: Prevent and Manage VTE (Venous Thromboembolism) Risk  Recent Flowsheet Documentation  Taken 8/10/2024 1600 by Mariann Collins RN  VTE Prevention/Management: SCDs on (sequential compression devices)  Taken 8/10/2024 1200 by Mariann Collins RN  VTE Prevention/Management: SCDs on (sequential compression devices)  Taken 8/10/2024 0800 by Mariann Collins RN  VTE Prevention/Management: SCDs on (sequential compression devices)  Goal: Optimal Comfort and Wellbeing  Intervention: Provide Person-Centered Care  Recent Flowsheet Documentation  Taken 8/10/2024 1600 by Mariann Collins RN  Trust Relationship/Rapport:   care explained   choices provided  Taken 8/10/2024 1200 by Mariann Collins RN  Trust Relationship/Rapport:   care explained   choices provided  Taken 8/10/2024 1000 by Mariann Collins RN  Trust Relationship/Rapport:   care explained   choices provided  Taken 8/10/2024  0800 by Mariann Collins RN  Trust Relationship/Rapport:   care explained   choices provided  Goal: Readiness for Transition of Care  Intervention: Mutually Develop Transition Plan  Recent Flowsheet Documentation  Taken 8/10/2024 1600 by Mariann Collins RN  Equipment Currently Used at Home: none     Problem: Risk for Delirium  Goal: Optimal Coping  Intervention: Optimize Psychosocial Adjustment to Delirium  Recent Flowsheet Documentation  Taken 8/10/2024 1600 by Mariann Collins RN  Supportive Measures: active listening utilized  Taken 8/10/2024 1200 by Mariann Collins RN  Supportive Measures: active listening utilized  Taken 8/10/2024 0800 by Mariann Collins RN  Supportive Measures: active listening utilized  Goal: Improved Behavioral Control  Intervention: Minimize Safety Risk  Recent Flowsheet Documentation  Taken 8/10/2024 1600 by Mariann Collins RN  Communication Enhancement Strategies: (sitter in place)   family involved in communication plan   call light answered in person  Trust Relationship/Rapport:   care explained   choices provided  Taken 8/10/2024 1200 by Mariann Collins RN  Communication Enhancement Strategies: (sitter in place)   family involved in communication plan   call light answered in person  Trust Relationship/Rapport:   care explained   choices provided  Taken 8/10/2024 1000 by Mariann Collins RN  Trust Relationship/Rapport:   care explained   choices provided  Taken 8/10/2024 0800 by Mariann Collins RN  Trust Relationship/Rapport:   care explained   choices provided  Goal: Improved Attention and Thought Clarity  Intervention: Maximize Cognitive Function  Recent Flowsheet Documentation  Taken 8/10/2024 1600 by Mariann Collins RN  Sensory Stimulation Regulation:   music on   care clustered   lighting decreased   auditory stimulation minimized   tactile stimulation minimized   quiet environment promoted  Reorientation Measures:   calendar in view   clock in view   reorientation  provided  Taken 8/10/2024 1200 by Mariann Collins RN  Sensory Stimulation Regulation:   music on   care clustered   lighting decreased   auditory stimulation minimized   tactile stimulation minimized   quiet environment promoted  Reorientation Measures:   calendar in view   clock in view   reorientation provided     Problem: Alcohol Withdrawal  Goal: Alcohol Withdrawal Symptom Control  Intervention: Minimize or Manage Alcohol Withdrawal Symptoms  Recent Flowsheet Documentation  Taken 8/10/2024 1600 by Mariann Collins RN  Sensory Stimulation Regulation:   music on   care clustered   lighting decreased   auditory stimulation minimized   tactile stimulation minimized   quiet environment promoted  Taken 8/10/2024 1200 by Mariann Collins RN  Sensory Stimulation Regulation:   music on   care clustered   lighting decreased   auditory stimulation minimized   tactile stimulation minimized   quiet environment promoted  Goal: Optimal Neurologic Function  Intervention: Minimize or Manage Acute Neurologic Symptoms  Recent Flowsheet Documentation  Taken 8/10/2024 1600 by Mariann Collins RN  Sensory Stimulation Regulation:   music on   care clustered   lighting decreased   auditory stimulation minimized   tactile stimulation minimized   quiet environment promoted  Cerebral Perfusion Promotion:   blood pressure monitored   normothermia promoted  Taken 8/10/2024 1200 by Mariann Collins RN  Sensory Stimulation Regulation:   music on   care clustered   lighting decreased   auditory stimulation minimized   tactile stimulation minimized   quiet environment promoted  Cerebral Perfusion Promotion:   blood pressure monitored   normothermia promoted  Taken 8/10/2024 0800 by Mariann Collins RN  Cerebral Perfusion Promotion:   blood pressure monitored   normothermia promoted  Goal: Readiness for Change Identified  Intervention: Partner to Facilitate Behavior Change  Recent Flowsheet Documentation  Taken 8/10/2024 1600 by Dennis  BRENDA Waite  Supportive Measures: active listening utilized  Taken 8/10/2024 1200 by Mariann Collins RN  Supportive Measures: active listening utilized  Taken 8/10/2024 0800 by Mariann Collins RN  Supportive Measures: active listening utilized   Goal Outcome Evaluation:      Plan of Care Reviewed With: patient    Overall Patient Progress: no changeOverall Patient Progress: no change    Outcome Evaluation: when pt first admitted pt oriented to self, situation and place but not time and off on date by a day. as the morning progressed pt  no longer oriented to situation and place. pt became increasingly agitated and distressed. pt started on ciwa protocols as a precaution. during episodes of agitation pt trying to climb out of bed and leave. pt given versed and dilaudid for slim placement and aldana placement. pt able to void x1 and then unable to void-pt bladder scanned and showing appx 700. aldana placed with help of staff. pt no longer cooperating and wanting to wear oxygen-sitter and then nurse one to one in place. pt placed on cpap by RT due to desating while on oximask. pt started on esmolol and nicardepene upon arrival. pt to have bp systolic under 120 and hr 70 or under. pt heart rhythm nsr. pt strong and able to move all limbs except right shoulder is limited due to recent total shoulder surgery. pt able to swallow pills and water safely. pt able to sleep for a while after aldana being placed. family updated at bedside.pt placed on dex for agitation. prn pain medication being given for shoulder. pt able to have two small loose bowel movments this day. afebrile.

## 2024-08-10 NOTE — ED NOTES
Pt son arrived, states when pt's O2 sats get low @ home pt becomes confused, continues to 2L/NC tolerating well

## 2024-08-10 NOTE — ED NOTES
Pt returned from CT, son @ bedside, VSS, awaiting results, denies any new complaints, call light within reach, will continue to monitor and assist as needed.

## 2024-08-10 NOTE — PHARMACY-ADMISSION MEDICATION HISTORY
Pharmacist Admission Medication History    Admission medication history is complete. The information provided in this note is only as accurate as the sources available at the time of the update.    Information Source(s): Hospital records via in-person    Pertinent Information: Just discharged 8-8, so used AVS    Changes made to PTA medication list:  Added: None  Deleted: None  Changed: adjusted sig to reflect doses, not number of tablets    Allergies reviewed with patient and updates made in EHR: no    Medication History Completed By: Lev Crowell [facility] 8/10/2024 8:41 AM    PTA Med List   Medication Sig Last Dose    acetaminophen (TYLENOL) 325 MG tablet Take 2 tablets (650 mg) by mouth every 4 hours as needed for other (mild pain) Unknown    allopurinol (ZYLOPRIM) 300 MG tablet Take 300 mg by mouth daily Unknown    aspirin 81 MG EC tablet Take 1 tablet (81 mg) by mouth 2 times daily Unknown    ATENOLOL 50 MG OR TABS Take 50 mg by mouth every morning Unknown    doxazosin (CARDURA) 4 MG tablet Take 2 mg by mouth at bedtime Unknown    DULoxetine (CYMBALTA) 60 MG capsule Take 120 mg by mouth daily Unknown    ferrous sulfate (IRON) 325 (65 FE) MG tablet Take 325 mg by mouth daily (with breakfast) Unknown    finasteride (PROSCAR) 5 MG tablet Take 5 mg by mouth daily Unknown    fluticasone (FLONASE) 50 MCG/ACT nasal spray Spray 2 sprays into both nostrils daily Unknown    gabapentin (NEURONTIN) 600 MG tablet Take 600 mg by mouth 2 times daily Morning and Noon     lovastatin (MEVACOR) 40 MG tablet Take 40 mg by mouth at bedtime Unknown    methocarbamol (ROBAXIN) 500 MG tablet Take 1 tablet (500 mg) by mouth 4 times daily as needed for other (pain) Unknown    multivitamin  with lutein (OCUVITE WITH LTEIN) CAPS Take 1 capsule by mouth every morning Unknown    mupirocin (BACTROBAN) 2 % external ointment APPLY OINTMENT TOPICALLY TO AFFECTED AREA TWICE DAILY Unknown    omeprazole (PRILOSEC) 20 MG DR capsule Take 20 mg by mouth 2  times daily Unknown    oxyCODONE (ROXICODONE) 5 MG tablet Take 1-2 tablets (5-10 mg) by mouth every 4 hours as needed for moderate to severe pain Unknown    potassium chloride quiana ER (KLOR-CON M20) 20 MEQ CR tablet Take 20 mEq by mouth daily Unknown    psyllium (METAMUCIL) 58.6 % POWD Take 1 Tablespoonful by mouth every morning Unknown    senna-docusate (SENOKOT-S/PERICOLACE) 8.6-50 MG tablet Take 1-2 tablets by mouth 2 times daily Unknown    tamsulosin (FLOMAX) 0.4 MG capsule Take 0.4 mg by mouth daily Unknown    TRELEGY ELLIPTA 100-62.5-25 MCG/ACT oral inhaler Inhale 1 puff into the lungs daily Unknown    VENTOLIN  (90 Base) MCG/ACT inhaler Inhale 2 puffs into the lungs every 4 hours as needed for shortness of breath or wheezing Unknown    vitamin D3 (CHOLECALCIFEROL) 50 mcg (2000 units) tablet Take 50 mcg by mouth every morning Unknown

## 2024-08-10 NOTE — H&P
"Cardiac and Thoracic Surgery Consultation      Noe Morales MRN# [id number]   YOB: 1940 Age: 84 year old   Date of Admission: 8/10/2024     Reason for consult: I was asked by Dr. Kwon at [facility] to evaluate this patient for Gene Type B aortic dissection.           Assessment and Plan:   Mr. Noe Morales is a  84-year-old man with a [place] type B aortic dissection. I recommend medical management with a repeat CTA aortogram in 72 hours.  Repeat  CTA aortogram in 72 hours.  Place arterial line and control BP with goal SBP<110 using esmolol and nicardipine.  Orthopedic surgery consult status post shoulder surgery with concern for infection.  Vascular surgery consult.  The patient is not an operative candidate for sternotomy or left thoracotomy and cardiopulmonary bypass in the event of extension of the dissection into the aortic root or aortic rupture. I discussed this with the patient, his wife and son. They understand that he would not survive this.      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 Noe Morales is a  84-year-old man who complains of dizziness, fever, and right shoulder pain.     History is obtained from the patient and his family.         History of Present Illness:   This patient is a 84 year old male who presents with right shoulder pain, dizziness and fever. He just had right shoulder replacement 3 days earlier. He presented to the ER at [facility], and he had a CT with IV contrast that demonstrates a Gene type B aortic dissection. He has multiple comorbidities. He denies chest pain, and he has no pain in the midscapular region, neck or left shoulder. He denies abdominal pain.  In addition to feeling dizzy, he was unsteady on his feet yesterday and he \"could not go to the bathroom.\" There is some disagreement between the patient and his wife about how much alcohol he drinks.             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    COPD (chronic obstructive pulmonary disease) (H)     COPD exacerbation (H) 02/27/2023    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    COVID-19 virus infection 02/09/2024    Depression     Depression, major, single episode, severe (H) 01/12/2022    Esophageal reflux 01/17/2007    Essential hypertension 01/17/2007    Fatty liver     GERD (gastroesophageal reflux disease)     Gout     Hard of hearing 04/19/2024    Hay fever 01/17/2007    Hepatic steatosis 05/22/2013    History of alcohol abuse 05/20/2019    History of sarcoma     Hyperlipidemia 07/28/2015    Hypertension     Hypoxia 02/09/2024    Insomnia 04/19/2024    Intractable back pain 09/05/2013    Left rotator cuff tear arthropathy 07/28/2016    Mixed hyperlipidemia     Morbid obesity (H)     Morbid obesity with BMI of 40.0-44.9, adult (H) 01/17/2007    Personal history of DVT (deep vein thrombosis)     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    S/p reverse total shoulder arthroplasty 07/28/2016    Sleep apnea 03/15/2007    Umbilical hernia without obstruction and without gangrene 04/08/2024             Past Surgical History:     Past Surgical History:   Procedure Laterality Date    AMPUTATE FINGER(S) Left 1/10/2024    Procedure: LEFT MIDDLE FINGER AMPUTATION WITH FILLET FLAP RECONSTRUCTION OF THE LEFT PALM WOUND;  Surgeon: Sean Carlos MD;  Location: [facility] OR    ARTHROPLASTY KNEE Right 05/20/2019    Procedure: ARTHROPLASTY, KNEE;  Surgeon: Cameron  Reynaldo Mooney MD;  Location: [facility]    ARTHROTOMY SHOULDER, ROTATOR CUFF REPAIR, COMBINED  2012    Procedure: COMBINED ARTHROTOMY SHOULDER, ROTATOR CUFF REPAIR;  Right shoulder biceps tendodesis;  Surgeon: Reynaldo Barnes MD;  Location: [facility]    HAND SURGERY Right     IRRIGATION AND DEBRIDEMENT HAND, COMBINED Left 1/10/2024    Procedure: LEFT PALM WOUND IRRIGATION AND DEBRIDEMENT,;  Surgeon: Sean Carlos MD;  Location: [facility] OR    REVERSE ARTHROPLASTY SHOULDER Right 2024    Procedure: Reverse Total Shoulder Arthroplasty;  Surgeon: Sean Rush MD;  Location: [facility]               Social History:     Social History     Tobacco Use    Smoking status: Former     Current packs/day: 0.00     Types: Cigarettes     Quit date:      Years since quittin.6     Passive exposure: Never    Smokeless tobacco: Never   Substance Use Topics    Alcohol use: Yes     Comment: occa             Family History:     Family History   Problem Relation Age of Onset    Hypertension Daughter     Pneumonia Daughter     Chronic Obstructive Pulmonary Disease Daughter     Anesthesia Reaction No family hx of     Thrombosis No family hx of              Immunizations:     Immunization History   Administered Date(s) Administered    COVID-19 12+ (-) (MODERNA) 10/08/2023    COVID-19 Bivalent 18+ (Moderna) 2022    COVID-19 Monovalent 18+ (Moderna) 2021, 2021, 2021, 2022    Influenza (IIV3) PF 10/26/2006             Allergies:     Allergies   Allergen Reactions    Nkda [No Known Drug Allergy]              Medications:     allopurinol (ZYLOPRIM) 300 MG tablet  aspirin 81 MG EC tablet  ATENOLOL 50 MG OR TABS  doxazosin (CARDURA) 4 MG tablet  DULoxetine (CYMBALTA) 60 MG capsule  ferrous sulfate (IRON) 325 (65 FE) MG tablet  finasteride (PROSCAR) 5 MG tablet  fluticasone (FLONASE) 50 MCG/ACT nasal spray  furosemide (LASIX) 40 MG tablet  gabapentin (NEURONTIN) 600 MG tablet  lovastatin  (MEVACOR) 40 MG tablet  methocarbamol (ROBAXIN) 500 MG tablet  multivitamin  with lutein (OCUVITE WITH LTEIN) CAPS  mupirocin (BACTROBAN) 2 % external ointment  omeprazole (PRILOSEC) 20 MG DR capsule  oxyCODONE (ROXICODONE) 5 MG tablet  potassium chloride quiana ER (KLOR-CON M20) 20 MEQ CR tablet  psyllium (METAMUCIL) 58.6 % POWD  senna-docusate (SENOKOT-S/PERICOLACE) 8.6-50 MG tablet  tamsulosin (FLOMAX) 0.4 MG capsule  TRELEGY ELLIPTA 100-62.5-25 MCG/ACT oral inhaler  VENTOLIN  (90 Base) MCG/ACT inhaler  vitamin D3 (CHOLECALCIFEROL) 50 mcg (2000 units) tablet            Review of Systems:     The 10 point Review of Systems is negative other than noted in the HPI            Physical Exam:   Vitals were reviewed  Temp: 99.5  F (37.5  C) Temp src: Axillary BP: 121/65 Pulse: 85   Resp: 20 SpO2: (!) 88 %      Constitutional:   awake, alert, cooperative, no apparent distress, and appears stated age     Eyes:   Lids and lashes normal, sclera clear, conjunctiva normal     ENT:   normocephalic, without obvious abnormality     Neck:   supple, symmetrical, trachea midline     Lungs:   no increased work of breathing, good air exchange, and no retractions     Cardiovascular:   regular rate and rhythm     Abdomen:   non-distended, large habitus     Musculoskeletal:   Redness, swelling and tenderness of right shoulder surrounding dressing.  no lower extremity pitting edema present  full range of motion noted  motor strength is 5 out of 5 all extremities bilaterally     Neurologic:   Mental Status Exam:  Level of Alertness:   awake, alert  Cranial Nerves:  cranial nerves II-XII are grossly intact  Motor Exam:  moves all extremities well and symmetrically  Except for limited exam of right upper extremity     Neuropsychiatric:   General: normal, calm, and normal eye contact  Level of consciousness: alert / normal  Affect: a little irritable, seems uncomfortable     Skin:   Swelling of right shoulder with redness and warmth           Data:   All laboratory data reviewed  All cardiac studies reviewed by me.  All imaging studies reviewed by me.    CTA CHEST/ABDOMEN/PELVIS:  LUNGS AND PLEURA: Prominent subpleural opacities in both lung bases, right greater than left, favored to be due to atelectasis. Small bilateral pleural effusions. Calcified pleural plaques compatible with prior asbestos exposure.     MEDIASTINUM/AXILLAE: Evaluation of the aorta is somewhat limited by the technique. There is evidence for an acute aortic dissection. Motion artifact limits evaluation of the ascending thoracic aorta. There is some suspicion the dissection could involve   the right lateral wall of the ascending thoracic aorta although difficult to be definitive. Dissection definitely involves the aortic arch and descending thoracic aorta and may extend to involve the right innominate artery. The dissection flap is clearly   seen to the level of the upper abdominal aorta near the celiac artery but is poorly visualized in the mid abdominal aorta. However, a subtle dissection flap is again seen in the distal abdominal aorta extending into the left proximal common iliac   artery. Emergent vascular surgery consultation is recommended. If clinical scenario permits, repeat CT scan using the dissection protocol would be helpful for greater definition of this dissection. Mild aneurysmal dilatation of the ascending thoracic   aorta measuring approximately 4.4 cm. Aortic arch measures about 4.4 cm. Proximal descending thoracic aorta measures 4.1 cm. Distal thoracic aorta measures about 3.8 cm. Abdominal aorta is normal in caliber.     Cardiac enlargement. No pericardial effusion. No adenopathy. Esophagus is grossly negative.     CORONARY ARTERY CALCIFICATION: Moderate.     HEPATOBILIARY: Subcentimeter low-attenuation lesion in the left hepatic lobe of doubtful significance. No calcified gallstones or biliary dilatation.     PANCREAS: Normal.     SPLEEN: Normal.      ADRENAL GLANDS: Normal.     KIDNEYS/BLADDER: Small benign right renal cyst with no follow-up needed. Kidneys are otherwise negative. No hydronephrosis. Bladder is unremarkable.     BOWEL: Normal.     LYMPH NODES: Normal.     VASCULATURE: See above for aortic details. Mesenteric and bilateral renal arteries are opacified.     PELVIC ORGANS: Normal.     MUSCULOSKELETAL: Recent postoperative changes right shoulder arthroplasty. Prominent ill-defined complex air and fluid collection seen in the soft tissues anterior to the right shoulder suspicious for a postoperative hematoma versus complex seroma.   Postoperative changes left shoulder arthroplasty. Marked hypertrophic and degenerative changes throughout the spine. Fat-containing umbilical hernia.                                                                      IMPRESSION:  1.  Acute aortic dissection. Detail is limited by the technique of the exam and some motion artifact at the level of the ascending thoracic aorta. There is suspicion the dissection may involve the ascending thoracic aorta making this a type A dissection.   Dissection clearly extends to the upper abdominal aorta, however, is less well visualized in the mid abdominal aorta but a subtle dissection flap appears to extend to the distal abdominal aorta and left common iliac artery. There is also suspicion for   dissection involving the right brachiocephalic artery. Ascending thoracic aorta is mildly aneurysmal at 4.4 cm. Recommend emergent vascular consultation for further management. If time and patient conditions permit, CTA using the dissection protocol   would be helpful for better definition of these findings.     2.  Recent postoperative changes right shoulder arthroplasty. Prominent ill-defined complex air and fluid collection in the soft tissues anterior to the right shoulder is nonspecific but could be seen with hematoma or complex seroma in the postoperative   setting. Clinical correlation  needed.     3.  Bilateral pleural effusions with prominent bibasilar atelectasis. Nothing definitive for pneumonitis.     4.  Calcified pleural plaques bilaterally suggestive of asbestos exposure.     5.  No significant findings in the abdomen or pelvis outside of the vascular findings described above.     6.  Findings called to Dr. Kwon on 08/10/2024, 5:34 AM CDT.

## 2024-08-10 NOTE — CONSULTS
Vascular Surgery Consult  August 10, 2024    Noe Morales  : 1940    Date of Service: 8/10/2024 10:36 AM    Reason for Consult: Type B aortic dissection    Assessment and Plan:  Noe Morales is a 84 year old male with PMH notable for HTN, history of alcohol abuse, COPD, CAD, GERD, as well as recent right shoulder replacement 3 days ago who presented to the ED at [facility] last evening with shoulder pain some right sided chest pain, dizziness, and confusion.  CT was obtained which demonstrated type B aortic dissection which appears to originate at the level of the innominate artery and extended to the mid aorta distal to the celiac artery, however unable to clearly assess distal extent given not CTA. Pt currently denies chest pain, is oriented to self and situation, and on exam he has palpable DP and radial pulses b/l.  No abdominal pain or signs of malperfusion. Given this, will plan for medical management with anti-impulse therapy at this time.     -Recommend antiimpulse therapy with IV antihypertensives to meet goal SBP less than 120 and goal HR of less than 70.   -IV esmolol and IV nicardipine drips titrated to above goals  -Patient is on home atenolol, will add amlodipine 10 mg daily  -Wean off IV medications as able as p.o. antihypertensives take effect, can add diltiazem 15mg QID if minimal response to amlodipine, however amlodipine will take 2 days to reach max effect so watch for now.  - continue ASA/statin  -Will plan for  CTA tomorrow to evaluate full extent of dissection (ordered)  -Will order echo  -Agree with orthopedic surgery consult recommendation in setting of recent shoulder surgery  -Vascular surgery will continue to follow along    - Please alert vascular surgery STAT with any increase in chest pain, changes in pulse exam, new abdominal pain, or other major changes in clinical status.     Seen and discussed with Dr. Bravo, who is in agreement with the above    Bubba  MD Anastasia  Vascular Surgery PGY4  To reach vascular surgery [place] team on call, please go to [facility] and from the drop down find the following:   VASCULAR SURGERY/[facility]  Then page the person listed to the right of day/night call. Can click the pager to page.           History of Present Illness:     Noe Morales is a 84 year old male with PMH notable for HTN, history of alcohol abuse, COPD, CAD, GERD, as well as recent right shoulder replacement 3 days ago who presented to the ED at [facility] last evening with shoulder pain some right sided chest pain, dizziness, and confusion.  He reports he had right sided chest pain which began after his shoulder surgery about 2 days ago and kept getting worse. Denies vision changes, nausea, vomiting, weakness/numbness of any extremity, abdominal pain, pain in any extremity.     He lives at home with his wife and is somewhat active at home, reports being able to go up stairs without issue, and complete projects around the house with occasional shortness of breath.     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    COPD (chronic obstructive pulmonary disease) (H)     COPD exacerbation (H) 02/27/2023    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    COVID-19 virus infection 02/09/2024    Depression     Depression, major, single episode, severe (H) 01/12/2022    Esophageal reflux 01/17/2007    Essential hypertension 01/17/2007    Fatty liver     GERD (gastroesophageal  reflux disease)     Gout     Hard of hearing 04/19/2024    Hay fever 01/17/2007    Hepatic steatosis 05/22/2013    History of alcohol abuse 05/20/2019    History of sarcoma     Hyperlipidemia 07/28/2015    Hypertension     Hypoxia 02/09/2024    Insomnia 04/19/2024    Intractable back pain 09/05/2013    Left rotator cuff tear arthropathy 07/28/2016    Mixed hyperlipidemia     Morbid obesity (H)     Morbid obesity with BMI of 40.0-44.9, adult (H) 01/17/2007    Personal history of DVT (deep vein thrombosis)     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   History of alcohol abuse however has been sober    Medications:  No current outpatient medications on file.       Allergies:     Allergies   Allergen Reactions    Nkda [No Known Drug Allergy]        Review of Symptoms:  A 10 point review of symptoms has been conducted and is negative except for that mentioned in the above HPI.    Physical Exam:    Blood pressure 116/85, pulse 85, temperature 99.5  F (37.5  C), temperature source Axillary, resp. rate (!) 36, SpO2 (!) 88%.  Gen:    Lying in bed in NAD, A&OX3  HEENT: Normocephalic and atraumatic  CV:  RRR  Pulm:  Non-labored breathing on RA  Chest wall:  Bruising on R chest wall extending from axilla to under R breast, and somewhat extending onto abdomen.  Abd:  Abdomen distended however no tenderness on palpation, soft. Umbilical hernia with no overlying skin changes, reducible.  Ext:  Warm and well perfused, no obvious deformities  Vascular:  Palpable DP pulses b/l. Palpable radial pulses b/l.    Neuro:  Sensation in tact to light touch in BLE, able to wiggle toes equally BLE.    Labs:  CBC RESULTS:   Recent Labs   Lab Test 08/10/24  0343    WBC 11.2*   HGB 11.5*   *     Last Basic Metabolic Panel:  Lab Results   Component Value Date    POTASSIUM 4.0 08/10/2024    POTASSIUM 4.3 09/25/2021    POTASSIUM 3.8 09/01/2016     Lab Results   Component Value Date    CR 0.98 08/10/2024    CR 1.20 05/21/2019       Imaging:  CT reviewed, difficult to assess true extent of dissection given motion as well as not CTA, however pt with Type B dissection which may start as proximally as the innominate artery however certainly involves the descending aorta to the level of the viseral levels, appreciated to at least distal to the celiac and proximal to SMA. Unclear distal extent however celiac artery and SMA are patent and fill appropriately. Both renal arteries patent, both iliac arteries appear patent as well.       STAFF: Patient examined with Dr. Romo shortly after arriving at [facility] ICU.  Fairly comfortable at this time.  He is on an esmolol drip and his blood pressure and pulse are well-controlled    CTA reviewed-somewhat poor quality affected by motion artifact.  Appears to be a type B dissection starting at the level of the left main artery.  This extends into the distal aorta with difficult to visualize dissection flap beyond this point.  No AAA or significant disease.  No evidence of remedying or organ malperfusion    Patient is somewhat confused but this is not related to the dissection.  No abdominal pain.  Normal CMS with good distal pulses.      IMPRESSION: Type B noncomplicated aortic dissection.  Some question whether this involves the ascending aorta distally and we appreciate CV surgery input.  All agree that conservative management is indicated with strict blood pressure and pulse control.  Would repeat a for better understanding hours.    We discussed situation with patient and also with his wife and son present.  He notices a critical situation but patient remains stable at this time      Over 60 minutes with patient  and family today follow.    Papito Bravo MD

## 2024-08-11 NOTE — PLAN OF CARE
"Goal Outcome Evaluation:      Plan of Care Reviewed With: patient, spouse    Overall Patient Progress: no changeOverall Patient Progress: no change    Outcome Evaluation: Pt had periods of calm confusion in AM during which he was able to take PO meds crushed w/ applesauce but became increasingly confused in PM. Dex incr 0.8->1.0 and ativan given x1 for CIWA=20 w/ some improvement. Requiring incr O2, alternaing between 15mL oxymask and bipap @55%/12/6 ro maintain SpO2>88%, given 20mg IV lasix x1 per MD. No c/o pain exp w/ repositioning. SBP<120 maintained w/ nicard 10->15 and PO BP meds. 1 loose stool this shift. Kennedy in place for retention and diuresis.      Problem: Adult Inpatient Plan of Care  Goal: Plan of Care Review  Description: The Plan of Care Review/Shift note should be completed every shift.  The Outcome Evaluation is a brief statement about your assessment that the patient is improving, declining, or no change.  This information will be displayed automatically on your shift  note.  Outcome: Not Progressing  Flowsheets (Taken 8/11/2024 1751)  Outcome Evaluation: Pt had periods of calm confusion in AM during which he was able to take PO meds crushed w/ applesauce but became increasingly confused in PM. Dex incr 0.8->1.0 and ativan given x1 for CIWA=20 w/ some improvement. Requiring incr O2, alternaing between 15mL oxymask and bipap @55%/12/6 ro maintain SpO2>88%, given 20mg IV lasix x1 per MD. No c/o pain exp w/ repositioning. SBP<120 maintained w/ nicard 10->15 and PO BP meds. 1 loose stool this shift. Kennedy in place for retention and diuresis.  Plan of Care Reviewed With:   patient   spouse  Overall Patient Progress: no change  Goal: Patient-Specific Goal (Individualized)  Description: You can add care plan individualizations to a care plan. Examples of Individualization might be:  \"Parent requests to be called daily at 9am for status\", \"I have a hard time hearing out of my right ear\", or \"Do not touch " "me to wake me up as it startles  me\".  Outcome: Not Progressing  Goal: Absence of Hospital-Acquired Illness or Injury  Outcome: Not Progressing  Intervention: Identify and Manage Fall Risk  Recent Flowsheet Documentation  Taken 8/11/2024 1600 by Ximena Stanton RN  Safety Promotion/Fall Prevention:   activity supervised   clutter free environment maintained   increased rounding and observation   increase visualization of patient   lighting adjusted   nonskid shoes/slippers when out of bed   patient and family education   room near nurse's station   room organization consistent   safety round/check completed   supervised activity  Taken 8/11/2024 1200 by Ximena Stanton RN  Safety Promotion/Fall Prevention:   activity supervised   clutter free environment maintained   increased rounding and observation   increase visualization of patient   lighting adjusted   nonskid shoes/slippers when out of bed   patient and family education   room near nurse's station   room organization consistent   safety round/check completed   supervised activity  Taken 8/11/2024 0800 by Ximena Stanton RN  Safety Promotion/Fall Prevention:   activity supervised   clutter free environment maintained   increased rounding and observation   increase visualization of patient   lighting adjusted   nonskid shoes/slippers when out of bed   patient and family education   room near nurse's station   room organization consistent   safety round/check completed   supervised activity  Intervention: Prevent Skin Injury  Recent Flowsheet Documentation  Taken 8/11/2024 1600 by Ximena Stanton RN  Body Position:   turned   side-lying   heels elevated   upper extremity elevated   weight shifting  Skin Protection:   adhesive use limited   pulse oximeter probe site changed   silicone foam dressing in place   skin to device areas padded   skin to skin areas padded   transparent dressing maintained   incontinence pads utilized  Device Skin Pressure " Protection:   absorbent pad utilized/changed   adhesive use limited   pressure points protected   skin-to-device areas padded   tubing/devices free from skin contact  Taken 8/11/2024 1400 by Ximena Stanton RN  Body Position:   turned   side-lying   heels elevated   upper extremity elevated   weight shifting  Taken 8/11/2024 1200 by Ximena Stanton RN  Body Position:   turned   side-lying   heels elevated   upper extremity elevated   weight shifting  Skin Protection:   adhesive use limited   pulse oximeter probe site changed   silicone foam dressing in place   skin to device areas padded   skin to skin areas padded   transparent dressing maintained   incontinence pads utilized  Device Skin Pressure Protection:   absorbent pad utilized/changed   adhesive use limited   pressure points protected   skin-to-device areas padded   tubing/devices free from skin contact  Taken 8/11/2024 0800 by Ximena Stanton RN  Body Position:   turned   side-lying   heels elevated   upper extremity elevated   weight shifting  Skin Protection:   adhesive use limited   pulse oximeter probe site changed   silicone foam dressing in place   skin to device areas padded   skin to skin areas padded   transparent dressing maintained   incontinence pads utilized  Device Skin Pressure Protection:   absorbent pad utilized/changed   adhesive use limited   pressure points protected   skin-to-device areas padded   tubing/devices free from skin contact  Intervention: Prevent and Manage VTE (Venous Thromboembolism) Risk  Recent Flowsheet Documentation  Taken 8/11/2024 1600 by Ximena Stanton RN  VTE Prevention/Management: SCDs on (sequential compression devices)  Taken 8/11/2024 1200 by Ximena Stanton RN  VTE Prevention/Management: SCDs on (sequential compression devices)  Taken 8/11/2024 0800 by Ximena Stanton RN  VTE Prevention/Management: SCDs on (sequential compression devices)  Intervention: Prevent Infection  Recent Flowsheet  Documentation  Taken 8/11/2024 1600 by Ximena Stanton RN  Infection Prevention:   environmental surveillance performed   equipment surfaces disinfected   hand hygiene promoted   personal protective equipment utilized   rest/sleep promoted   single patient room provided  Taken 8/11/2024 1200 by Ximena Stanton RN  Infection Prevention:   environmental surveillance performed   equipment surfaces disinfected   hand hygiene promoted   personal protective equipment utilized   rest/sleep promoted   single patient room provided  Taken 8/11/2024 0800 by Ximena Stanton RN  Infection Prevention:   environmental surveillance performed   equipment surfaces disinfected   hand hygiene promoted   personal protective equipment utilized   rest/sleep promoted   single patient room provided  Goal: Optimal Comfort and Wellbeing  Outcome: Not Progressing  Intervention: Provide Person-Centered Care  Recent Flowsheet Documentation  Taken 8/11/2024 1600 by Ximena Stanton RN  Trust Relationship/Rapport:   care explained   choices provided   emotional support provided   questions answered   empathic listening provided   questions encouraged   reassurance provided   thoughts/feelings acknowledged  Taken 8/11/2024 1200 by Ximena Stanton RN  Trust Relationship/Rapport:   care explained   choices provided   emotional support provided   questions answered   empathic listening provided   questions encouraged   reassurance provided   thoughts/feelings acknowledged  Taken 8/11/2024 0800 by Ximena Stanton RN  Trust Relationship/Rapport:   care explained   choices provided   emotional support provided   questions answered   empathic listening provided   questions encouraged   reassurance provided   thoughts/feelings acknowledged  Goal: Readiness for Transition of Care  Outcome: Not Progressing

## 2024-08-11 NOTE — PROGRESS NOTES
Cardiac surgery    I reviewed the repeat CTA. While there may be some intramural hematoma extending to the root, I still recommend medical management with the greatest hope for survival and preservation of quality of life. Given his comorbidites and declining mental status and respiratory status (which are completely unrelated to the dissection), I do not recommend surgery. While recovery from surgery would temporarily mitigate the respiratory failure and delirium through mechanical ventilation, sedation and restraint, the obstacles to meaningful long term recovery would be insurmountable.    Jt Dubose MD

## 2024-08-11 NOTE — PROGRESS NOTES
[facility]   MICU Progress Note  Noe Morales MRN: [id number]  1940  Date of Admission:8/10/2024    Noe Morales MRN# [id number]   Age: 84 year old YOB: 1940     Date of Admission: 8/10/2024  Date of Service: 08/11/2024              Summary of hospitalization                  Noe Morales is a 84 year old White male with a past medical history significant for HTN, ETOH abuse, COPD, CAD, GERD, HODA (CPAP at night) OA, right shoulder replacement 8/7/24 (3 days ago) who was admitted 8/10 as a transfer from [facility] ED with a type B aortic dissection.  He presented to the ED at [facility] 8/9 evening with right shoulder pain and right sided chest pain, dizziness, and confusion.  CT revealed aortic dissection.  Admitted to the ICU for invasive hemodynamic monitoring and blood pressure control.  Confused requiring precedex, started on CIWA protocol given heavy ETOH use.  Hypoxic respiratory failure, on BiPAP overnight.  Weaned off esmolol, decreasing dose of nicardipine to maintain SBP<120 and HR<70 per vascular recommendations.         Assessment & Plan                  Noe Morales is a 84 year old White male with a past medical history significant for HTN, ETOH abuse, COPD, CAD, GERD, OA, right shoulder replacement 8/7/24 (3 days ago) who presents as a transfer from [facility] ED with a type B aortic dissection.  Admitted to the ICU for invasive hemodynamic monitoring and blood pressure control.  Currently requiring nicardipine to meet BP goals.  Confused requiring precedex, CIWA.  Started on BiPAP yesterday due to hypoxia.  Not significantly hypercarbic.    ------------------------------------------------------------------------------------------------------------------    Changes today:  - check ABG  - check UA  - iCal and replace prn  - CTA per vascular surgery, read pending  - ortho recs pending evaluation      NEURO/PSYCH:   # Sedation: Precedex gtt  #  Pain/Agitation: did not respond to haldol overnight for agitation, put in 4 point restraints with a 1:1 overnight; tylenol, tramadol, robaxin, dilaudid    Confusion/Agitation   - check UA   - ABG   - continue precedex   - CIWA   - minimize narcotics    History of ETOH abuse (6-8 drinks/day until 3 weeks ago)               - per family, no history of withdrawal sx, seizures               - CIWA with precedex               - gabapentin               - folate/thiamine     History of chronic low back pain (PTA on Norco):                - pain control as above               - heat packs prn               - PTA gabapentin     ANTHONY with panic attack:               - PTA duloxetine     Depression:                - PTA duloxetine  ------------------------------------------------------------------------------------------------------------------  PULMONARY:  Hypoxic respiratory failure:    - Bipap overnight, this morning maintaining sat>90% on 6L oxymask, wean as able   - Recheck ABG this am   - Getting a CT this morning to eval dissection    History of COPD, not on O2 at home:                - PTA Breo/Incruse ellipta               - prn albuterol               - Supplemental O2 prn     History of HODA: uses CPAP at night               - CPAP at night vs BiPAP as needed               - RT consult    ------------------------------------------------------------------------------------------------------------------  CARDIOLOGY:   Type B aortic dissection:                - Vascular and CT surgery following               - SBP<120               - HR<70               - atenolol 25 BID               - Amlodipine 10mg daily               - nicardipine gtt, esmolol if needed   - clonidine 0.1mg Q8H (as part of CIWA)               - ECHO pending               - Asa/statin               - CTA today               - Bedrest with bathroom priviliges     History of HTN:                - PTA atenolol in addition to above     HLD:                 - PTA statin  ------------------------------------------------------------------------------------------------------------------  RENAL:  Urinary retention   - Brent placed 8/10   - Check UA/UC    Hypocalcemia   - monitor and replace as needed    CKD, baseline Cr appears to be 1-1.2: Cr stable at 0.98                - Monitor Cr daily               - avoid nephrotoxic meds     Chronic BLE edema:               - 1/2 dose PTA lasix (40 daily)     BPH:                - PTA tamsulosin, finasteride, doxazosin    **I/O last 24hrs:   Intake/Output Summary (Last 24 hours) at 8/11/2024 0824  Last data filed at 8/11/2024 0600  Gross per 24 hour   Intake 1329.15 ml   Output 1700 ml   Net -370.85 ml     ------------------------------------------------------------------------------------------------------------------  INFECTIOUS DISEASE:  Recent shoulder replacement with erythema:                - Orthopedics consult               - Low suspicion for postop infection per ortho    **Antibiotics: None  **Cultures: 8/10 blood cx NGTD  ------------------------------------------------------------------------------------------------------------------  GASTROINTESTINAL/GENITOURINARY:  GERD:                - PPI     **Nutrition: CLD   - Bowel regimen  ------------------------------------------------------------------------------------------------------------------  ENDOCRINE:  No acute issues, BGs <180  ------------------------------------------------------------------------------------------------------------------  HEMATOLOGIC:  History of anemia:                - Iron supplementation               - Monitor daily CBCs   - Hbg 10.6 (11.5)     Thrombocytopenia, chronic:               - Continue to monitor   - Plts 148  ------------------------------------------------------------------------------------------------------------------  RHEUMATOLOGIC:  History of Gout:                - PTA  allopurinol  ------------------------------------------------------------------------------------------------------------------  SKIN/MUSCULOSKELETAL:  Right shoulder replacement 8/7/24:                - Orthopedic surgery consult               - PT/OT consults               - bedrest with bathroom privileges for now  ------------------------------------------------------------------------------------------------------------------  Prophylaxis:     -GI: PPI    -DVT: SQH    FEN: CLD  Consults: CV surg, Vascular surg, Ortho   Family: Wife, Joana, and son, updated 8/10  Code status: Full  Disposition: ICU  ===================================================  Patient discussed with ICU staff, Dr. Salter.    Mona Llanos MD  ICU Fellow           Interval History   Nursing notes reviewed  Overnight History: Agitated, didn't improve with haldol, placed in restraints and 1:1.  Off esmolol, weaning nicardipine.  Confused this am.  Urinary retention yesterday requiring aldana placement.           Medications     Current Facility-Administered Medications   Medication Dose Route Frequency Provider Last Rate Last Admin    acetaminophen (TYLENOL) tablet 975 mg  975 mg Oral Q8H Mona Llanos MD   975 mg at 08/10/24 1935    albuterol (PROVENTIL HFA/VENTOLIN HFA) inhaler  2 puff Inhalation Q4H PRN Priscila Martinez PA-C        allopurinol (ZYLOPRIM) tablet 300 mg  300 mg Oral Daily Priscila Martinez PA-C   300 mg at 08/10/24 1208    amLODIPine (NORVASC) tablet 10 mg  10 mg Oral Daily Prudence Oconnor MD   10 mg at 08/10/24 1207    aspirin EC tablet 81 mg  81 mg Oral BID Priscila Martinez PA-C   81 mg at 08/10/24 1208    atenolol (TENORMIN) tablet 25 mg  25 mg Oral BID Mona Llanos MD        atorvastatin (LIPITOR) tablet 10 mg  10 mg Oral At Bedtime Priscila Martinez PA-C        calcium carbonate (TUMS) chewable tablet 1,000 mg  1,000 mg Oral 4x Daily PRN Priscila Martinez PA-C        cloNIDine (CATAPRES) tablet 0.1 mg   0.1 mg Oral Q8H Mona Llanos MD        dexmedeTOMIDine (PRECEDEX) 4 mcg/mL in sodium chloride 0.9 % 100 mL infusion  0.1-1.2 mcg/kg/hr Intravenous Continuous Mona Llanos MD 25.4 mL/hr at 08/11/24 0808 0.8 mcg/kg/hr at 08/11/24 0808    glucose gel 15-30 g  15-30 g Oral Q15 Min PRN Mona Llanos MD        Or    dextrose 50 % injection 25-50 mL  25-50 mL Intravenous Q15 Min PRN Mona Llanos MD        Or    glucagon injection 1 mg  1 mg Subcutaneous Q15 Min PRN Mona Llanos MD        doxazosin (CARDURA) tablet 2 mg  2 mg Oral At Bedtime Mona Llanos MD        DULoxetine (CYMBALTA) DR capsule 120 mg  120 mg Oral Daily Priscila Martinez PA-C   120 mg at 08/10/24 1156    esmolol 20 mg/mL (BREVIBLOC) infusion 100 mL   mcg/kg/min Intravenous Continuous Mona Llanos MD   Paused at 08/10/24 1332    ferrous sulfate (FEROSUL) tablet 325 mg  325 mg Oral Daily with breakfast Priscila Martinez PA-C        finasteride (PROSCAR) tablet 5 mg  5 mg Oral Daily Priscila Martinez PA-C   5 mg at 08/10/24 1209    flumazenil (ROMAZICON) injection 0.2 mg  0.2 mg Intravenous q1 min prn Mona Llanos MD        fluticasone (FLONASE) 50 MCG/ACT spray 2 spray  2 spray Both Nostrils Daily Priscila Martinez PA-C        fluticasone-vilanterol (BREO ELLIPTA) 100-25 MCG/ACT inhaler 1 puff  1 puff Inhalation Daily Priscila Martinez PA-C        And    umeclidinium (INCRUSE ELLIPTA) 62.5 MCG/ACT inhaler 1 puff  1 puff Inhalation Daily Priscila Martinez PA-C        [START ON 8/13/2024] folic acid (FOLVITE) tablet 1 mg  1 mg Oral Daily Mona Llanos MD        folic acid injection 1 mg  1 mg Intravenous Daily Mona Llanos MD        furosemide (LASIX) tablet 40 mg  40 mg Oral Daily Mona Llanos MD        gabapentin (NEURONTIN) capsule 1,200 mg  1,200 mg Oral At Bedtime Priscila Martinez PA-C        gabapentin (NEURONTIN) capsule 600 mg  600 mg Oral BID Priscila Martinez PA-C   600 mg at 08/10/24 1208    haloperidol lactate (HALDOL) injection  2.5-5 mg  2.5-5 mg Intravenous Q4H PRN Mona Llanos MD   5 mg at 08/10/24 1952    heparin ANTICOAGULANT injection 5,000 Units  5,000 Units Subcutaneous Q8H Priscila Martinez PA-C   5,000 Units at 08/10/24 2343    hydrALAZINE (APRESOLINE) tablet 10 mg  10 mg Oral Q4H PRN Prudence Oconnor MD        Or    hydrALAZINE (APRESOLINE) injection 10 mg  10 mg Intravenous Q4H PRN Prudence Oconnor MD        HYDROmorphone (DILAUDID) injection 0.2 mg  0.2 mg Intravenous Q2H PRN Mona Llanos MD   0.2 mg at 08/10/24 2118    IF patient diabetic - HOLD: ALL ORAL HYPOGLYCEMICS: glipizide, glyburide, glimepiride, gliclazide, metformin (Glucophage), any metformin (Glucophage) containing medication, rosiglitazone (Avandia), pioglitazone (Actos), or sitagliptin (Januvia) on day of the procedure   Does not apply HOLD Mona Llanos MD        lidocaine (LMX4) cream   Topical Q1H PRN Priscila Martinez PA-C        lidocaine 1 % 0.1-1 mL  0.1-1 mL Other Q1H PRN Priscila Martinez PA-C        LORazepam (ATIVAN) tablet 1-2 mg  1-2 mg Oral Q30 Min PRN Mona Llanos MD        Or    LORazepam (ATIVAN) injection 1-2 mg  1-2 mg Intravenous Q30 Min PRN Mona Llanos MD   2 mg at 08/11/24 0649    methocarbamol (ROBAXIN) tablet 500 mg  500 mg Oral 4x Daily PRN Priscila Martinez PA-C        multivitamin  with lutein (OCUVITE WITH LUTEIN) per capsule 1 capsule  1 capsule Oral QAM Priscila Martinez PA-C        multivitamin w/minerals (THERA-VIT-M) tablet 1 tablet  1 tablet Oral Daily Mona Llanos MD        mupirocin (BACTROBAN) 2 % ointment   Topical BID Priscila Martinez PA-C        naloxone (NARCAN) injection 0.2 mg  0.2 mg Intravenous Q2 Min PRN Mp Salter DO        Or    naloxone (NARCAN) injection 0.4 mg  0.4 mg Intravenous Q2 Min PRN Mp Salter DO        Or    naloxone (NARCAN) injection 0.2 mg  0.2 mg Intramuscular Q2 Min PRN Mp Salter DO        Or    naloxone (NARCAN) injection 0.4  mg  0.4 mg Intramuscular Q2 Min PRN Mp Salter DO        niCARdipine 40 mg in 200 mL NS (CARDENE) infusion  0.5-15 mg/hr Intravenous Continuous Mona Llanos MD 25 mL/hr at 08/11/24 0803 5 mg/hr at 08/11/24 0803    ondansetron (ZOFRAN ODT) ODT tab 4 mg  4 mg Oral Q6H PRN Mona Llanos MD        Or    ondansetron (ZOFRAN) injection 4 mg  4 mg Intravenous Q6H PRN Mona Llanos MD        pantoprazole (PROTONIX) EC tablet 40 mg  40 mg Oral QAM AC Priscila Martinez PA-C   40 mg at 08/10/24 1207    polyethylene glycol (MIRALAX) Packet 17 g  17 g Oral Daily PRN Mona Llanos MD        psyllium (METAMUCIL/KONSYL) Packet 1 packet  1 packet Oral Daily Priscila Martinez PA-C        senna-docusate (SENOKOT-S/PERICOLACE) 8.6-50 MG per tablet 1 tablet  1 tablet Oral BID PRN Mona Llanos MD        Or    senna-docusate (SENOKOT-S/PERICOLACE) 8.6-50 MG per tablet 2 tablet  2 tablet Oral BID PRN Mona Llanos MD        senna-docusate (SENOKOT-S/PERICOLACE) 8.6-50 MG per tablet 1-2 tablet  1-2 tablet Oral BID Priscila Martinez PA-C        sodium chloride (PF) 0.9% PF flush 10 mL  10 mL Intravenous Q10 Min PRN Mona Llanos MD        sodium chloride (PF) 0.9% PF flush 3 mL  3 mL Intracatheter Q8H Priscila Martinez PA-C   3 mL at 08/11/24 0810    sodium chloride (PF) 0.9% PF flush 3 mL  3 mL Intracatheter q1 min prn Priscila Martinez PA-C        tamsulosin (FLOMAX) capsule 0.4 mg  0.4 mg Oral Daily Priscila Martinez PA-C   0.4 mg at 08/10/24 1208    thiamine (B-1) injection 200 mg  200 mg Intravenous TID Mona Llanos MD   200 mg at 08/10/24 2301    [START ON 8/12/2024] thiamine (B-1) tablet 100 mg  100 mg Oral TID Mona Llanos MD        [START ON 8/17/2024] thiamine (B-1) tablet 100 mg  100 mg Oral Daily Mona Llanos MD        traMADol (ULTRAM) half-tab 50 mg  50 mg Oral Q6H PRN Priscila Martinez PA-C        vitamin D3 (CHOLECALCIFEROL) tablet 50 mcg  50 mcg Oral QA Priscila Martinez, PA-C                      Objective      Physical Exam  Temp:  [98.2  F (36.8  C)-98.7  F (37.1  C)] 98.2  F (36.8  C)  Pulse:  [61-93] 63  Resp:  [11-37] 19  BP: ()/(55-85) 104/55  MAP:  [26 mmHg-289 mmHg] 77 mmHg  Arterial Line BP: ()/(23-88) 121/59  FiO2 (%):  [55 %] 55 %  SpO2:  [83 %-99 %] 96 %    FiO2 (%): 55 %  Resp: 19      General: Alert, confused, redirectable  Eyes: Eyes are clear, pupils are reactive  HEENT: Oropharynx is clear and moist  Cardiovascular: Regular rate and rhythm. Mild BLE edema  Respiratory: Clear to auscultation bilaterally. NLB on oxymask, sat 90% on monitor  GI: Soft, non-tender, rotund, reducible umbilical hernia  Genitourinary: Deferred  Musculoskeletal: R shoulder dressing in place, some erythema and edema upper arm- stable, ecchymosis to shoulder and chest wall  Skin: Warm and dry, no rashes.   Neurologic: No focal deficits    Drains and Tubes: No drains or tubes present  Intravascular Access and Device: Date arterial line was placed: 8/10/24      Intake/Output Summary (Last 24 hours) at 8/11/2024 0824  Last data filed at 8/11/2024 0600  Gross per 24 hour   Intake 1329.15 ml   Output 1700 ml   Net -370.85 ml     Arterial Blood Gas  Recent Labs   Lab 08/10/24  1328   PH 7.41   PCO2 44   PO2 64*   HCO3 28   O2PER 6       Venous Blood Gas   Recent Labs   Lab 08/10/24  1328   O2PER 6              Data:      CMP  Recent Labs   Lab 08/11/24  0412 08/11/24  0410 08/11/24  0132 08/10/24  1550 08/10/24  1249 08/10/24  0753 08/10/24  0343 08/08/24  0523 08/07/24  1004     --   --   --   --   --  132*  --   --    POTASSIUM 4.3  --   --   --   --   --  4.0  --  4.0   CHLORIDE 100  --   --   --   --   --  96*  --   --    CO2 27  --   --   --   --   --  27  --   --    ANIONGAP 11  --   --   --   --   --  9  --   --    * 153* 158* 131*  --    < > 117*   < >  --    BUN 22.1  --   --   --   --   --  17.1  --   --    CR 0.98  --   --   --   --   --  0.98  --  0.99   GFRESTIMATED 77  --   --   --   --  "  --  77  --  76   CHERY 8.7*  --   --   --   --   --  8.9  --   --    MAG 2.3  --   --   --  2.1  --   --   --   --    PHOS 3.1  --   --   --  2.4*  --   --   --   --    PROTTOTAL  --   --   --   --   --   --  6.2*  --   --    ALBUMIN  --   --   --   --   --   --  3.6  --   --    BILITOTAL  --   --   --   --   --   --  0.8  --   --    ALKPHOS  --   --   --   --   --   --  54  --   --    AST  --   --   --   --   --   --  24  --   --    ALT  --   --   --   --   --   --  10  --   --     < > = values in this interval not displayed.       CBC  Recent Labs   Lab 08/11/24  0412 08/10/24  0343 08/08/24  0523 08/07/24  1004   WBC 9.5 11.2*  --  6.1   RBC 3.44* 3.68*  --  4.30*   HGB 10.6* 11.5* 13.1* 13.7   HCT 32.0* 35.1*  --  39.7*   MCV 93 95  --  92   MCH 30.8 31.3  --  31.9   MCHC 33.1 32.8  --  34.5   RDW 13.2 13.4  --  13.5   * 129*  --  176       INR  No lab results found in last 7 days.    Lactic Acid  Recent Labs   Lab 08/10/24  0343   LACT 0.9       Microbiology:  All cultures:  No results for input(s): \"CULT\" in the last 168 hours.    Urine Studies:    Recent Labs   Lab Test 02/09/24  2237 09/01/16  0353 08/02/16  1646   LEUKEST Negative Negative Large*   NITRITE Negative Negative Negative   WBCU <1  --  18*   RBCU 1  --  5*       Imaging:  No results found for this or any previous visit (from the past 24 hour(s)).        "

## 2024-08-11 NOTE — CONSULTS
[facility]    Orthopedic Consultation    Noe Morales MRN# [id number]   Age: 84 year old YOB: 1940     Date of Admission:  8/10/2024    Reason for consult: Concern for infection regarding right shoulder status post 3 days from total arthroplasty       Requesting physician: Mona Llanos MD        Level of consult: One-time consult to assist in determining a diagnosis and to recommend an appropriate treatment plan           Assessment and Plan:   Assessment:   Status post right reverse total shoulder arthroplasty by Dr. Rush (O) at [facility]      Plan:   -Physical therapy order was placed to assist with pendulum exercises postoperatively.  Due to patient's mental status/cognition yesterday physical therapy has delayed exercise.  -I evaluated the surgical incision today which is looking healthy.  It is clean, dry and intact with no dehiscence or drainage.  No active concern for infection from an orthopedic standpoint.  -Please continue with his medical stabilization and medical cares.  I will include his surgical recommendations below    Nonweightbearing of the right upper extremity.  Gentle range of motion is encouraged.  Pendulum exercises.  Therapy to assist with this.  Aspirin 81 mg twice daily x 42 days is recommended for DVT prophylaxis from an orthopedic standpoint however defer anticoagulation management to primary team managing and consideration of his medical complications.  Continue pain control with oxycodone, Tylenol, Robaxin.      Please contact orthopedic trauma team if any questions or concerns arise.           Chief Complaint:   Status post right shoulder arthroplasty.  Date of surgery 8/7/2024         History of Present Illness:   This patient is a 84 year old male who presents with the following condition requiring a hospital admission:    Noe Morales is a 84 year old male with a past medical history significant for HTN,  ETOH abuse, COPD, CAD, GERD, HODA (CPAP at night) OA, right shoulder replacement 8/7/24 (4 days ago) who was admitted 8/10 as a transfer from [facility] with a type B aortic dissection.  He presented to the ED at [facility] 8/9 evening with right shoulder pain and right sided chest pain, dizziness, and confusion.  CT revealed aortic dissection.  Admitted to the ICU for invasive hemodynamic monitoring and blood pressure control.  Confused requiring precedex, started on CIWA protocol given heavy ETOH use.  Hypoxic respiratory failure, on BiPAP overnight.  Weaned off esmolol, decreasing dose of nicardipine to maintain SBP<120 and HR<70 per vascular recommendations.     Our orthopedic trauma team was consulted to provide recommendations regarding the right shoulder.  On examination the surgical site is looking healthy, the incision is clean dry and intact.  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    COPD (chronic obstructive pulmonary disease) (H)     COPD exacerbation (H) 02/27/2023    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    COVID-19 virus infection 02/09/2024    Depression     Depression, major, single episode, severe (H) 01/12/2022    Esophageal reflux 01/17/2007    Essential hypertension 01/17/2007    Fatty liver     GERD (gastroesophageal reflux disease)     Gout     Hard of hearing 04/19/2024    Hay fever 01/17/2007     Hepatic steatosis 2013    History of alcohol abuse 2019    History of sarcoma     Hyperlipidemia 2015    Hypertension     Hypoxia 2024    Insomnia 2024    Intractable back pain 2013    Left rotator cuff tear arthropathy 2016    Mixed hyperlipidemia     Morbid obesity (H)     Morbid obesity with BMI of 40.0-44.9, adult (H) 2007    Personal history of DVT (deep vein thrombosis)     Right knee DJD 2019    S/p reverse total shoulder arthroplasty 2016    Sleep apnea 03/15/2007    Umbilical hernia without obstruction and without gangrene 2024             Past Surgical History:     Past Surgical History:   Procedure Laterality Date    AMPUTATE FINGER(S) Left 1/10/2024    Procedure: LEFT MIDDLE FINGER AMPUTATION WITH FILLET FLAP RECONSTRUCTION OF THE LEFT PALM WOUND;  Surgeon: Sean Carlos MD;  Location: [facility] OR    ARTHROPLASTY KNEE Right 2019    Procedure: ARTHROPLASTY, KNEE;  Surgeon: Reynaldo Barnes MD;  Location: [facility] OR    ARTHROTOMY SHOULDER, ROTATOR CUFF REPAIR, COMBINED  2012    Procedure: COMBINED ARTHROTOMY SHOULDER, ROTATOR CUFF REPAIR;  Right shoulder biceps tendodesis;  Surgeon: Reynaldo Barnes MD;  Location: [facility] OR    HAND SURGERY Right     IRRIGATION AND DEBRIDEMENT HAND, COMBINED Left 1/10/2024    Procedure: LEFT PALM WOUND IRRIGATION AND DEBRIDEMENT,;  Surgeon: Sean Carlos MD;  Location: [facility] OR    REVERSE ARTHROPLASTY SHOULDER Right 2024    Procedure: Reverse Total Shoulder Arthroplasty;  Surgeon: Sean Rush MD;  Location: [facility] OR             Social History:     Social History     Tobacco Use    Smoking status: Former     Current packs/day: 0.00     Types: Cigarettes     Quit date:      Years since quittin.6     Passive exposure: Never    Smokeless tobacco: Never   Substance Use Topics    Alcohol use: Yes     Comment: occa             Family History:     Family History   Problem Relation  Age of Onset    Hypertension Daughter     Pneumonia Daughter     Chronic Obstructive Pulmonary Disease Daughter     Anesthesia Reaction No family hx of     Thrombosis No family hx of              Immunizations:     VACCINE/DOSE   Diptheria   DPT   DTAP   HBIG   Hepatitis A   Hepatitis B   HIB   Influenza   Measles   Meningococcal   MMR   Mumps   Pneumococcal   Polio   Rubella   Small Pox   TDAP   Varicella   Zoster             Allergies:     Allergies   Allergen Reactions    Nkda [No Known Drug Allergy]              Medications:     Current Facility-Administered Medications   Medication Dose Route Frequency Provider Last Rate Last Admin    acetaminophen (TYLENOL) tablet 975 mg  975 mg Oral Q8H Mona Llanos MD   975 mg at 08/10/24 1935    albuterol (PROVENTIL HFA/VENTOLIN HFA) inhaler  2 puff Inhalation Q4H PRN Priscila Martinez PA-C        allopurinol (ZYLOPRIM) tablet 300 mg  300 mg Oral Daily Priscila Martinez PA-C   300 mg at 08/10/24 1208    amLODIPine (NORVASC) tablet 10 mg  10 mg Oral Daily Prudence Oconnor MD   10 mg at 08/11/24 0918    aspirin EC tablet 81 mg  81 mg Oral BID Priscila Martinez PA-C   81 mg at 08/11/24 0919    atenolol (TENORMIN) tablet 25 mg  25 mg Oral BID Mona Llanos MD   25 mg at 08/11/24 0919    atorvastatin (LIPITOR) tablet 10 mg  10 mg Oral At Bedtime Priscila Martinez PA-C        calcium carbonate (TUMS) chewable tablet 1,000 mg  1,000 mg Oral 4x Daily PRN Priscila Martinez PA-C        cloNIDine (CATAPRES) tablet 0.1 mg  0.1 mg Oral Q8H Mona Llanos MD        dexmedeTOMIDine (PRECEDEX) 4 mcg/mL in sodium chloride 0.9 % 100 mL infusion  0.1-1.2 mcg/kg/hr Intravenous Continuous Mona Llanos MD 25.4 mL/hr at 08/11/24 0808 0.8 mcg/kg/hr at 08/11/24 0808    glucose gel 15-30 g  15-30 g Oral Q15 Min PRN Mona Llanos MD        Or    dextrose 50 % injection 25-50 mL  25-50 mL Intravenous Q15 Min PRN Mona Llanos MD        Or    glucagon injection 1 mg  1 mg Subcutaneous Q15 Min PRN  Mona Llanos MD        doxazosin (CARDURA) tablet 2 mg  2 mg Oral At Bedtime Mona Llanos MD        DULoxetine (CYMBALTA) DR capsule 120 mg  120 mg Oral Daily Priscila Martinez PA-C   120 mg at 08/11/24 0920    esmolol 20 mg/mL (BREVIBLOC) infusion 100 mL   mcg/kg/min Intravenous Continuous Mona Llanos MD   Paused at 08/10/24 1332    ferrous sulfate (FEROSUL) tablet 325 mg  325 mg Oral Daily with breakfast Priscila Martinez PA-C   325 mg at 08/11/24 0929    finasteride (PROSCAR) tablet 5 mg  5 mg Oral Daily Priscila Martinez PA-C   5 mg at 08/11/24 0919    flumazenil (ROMAZICON) injection 0.2 mg  0.2 mg Intravenous q1 min prn Mona Llanos MD        fluticasone (FLONASE) 50 MCG/ACT spray 2 spray  2 spray Both Nostrils Daily Priscila Martinez PA-C        fluticasone-vilanterol (BREO ELLIPTA) 100-25 MCG/ACT inhaler 1 puff  1 puff Inhalation Daily Priscila Martinez PA-C        And    umeclidinium (INCRUSE ELLIPTA) 62.5 MCG/ACT inhaler 1 puff  1 puff Inhalation Daily Priscila Martinez PA-C        [START ON 8/13/2024] folic acid (FOLVITE) tablet 1 mg  1 mg Oral Daily Mona Llanos MD        folic acid injection 1 mg  1 mg Intravenous Daily Mona Llanos MD        furosemide (LASIX) tablet 40 mg  40 mg Oral Daily Mona Llanos MD   40 mg at 08/11/24 0911    gabapentin (NEURONTIN) capsule 1,200 mg  1,200 mg Oral At Bedtime Priscila Martinez PA-C        gabapentin (NEURONTIN) capsule 600 mg  600 mg Oral BID Priscila Martinez PA-C   600 mg at 08/11/24 0928    haloperidol lactate (HALDOL) injection 2.5-5 mg  2.5-5 mg Intravenous Q4H PRN Mona Llanos MD   5 mg at 08/10/24 1952    heparin ANTICOAGULANT injection 5,000 Units  5,000 Units Subcutaneous Q8H Priscila Martinez PA-C   5,000 Units at 08/10/24 7722    hydrALAZINE (APRESOLINE) tablet 10 mg  10 mg Oral Q4H PRN Prudence Oconnor MD        Or    hydrALAZINE (APRESOLINE) injection 10 mg  10 mg Intravenous Q4H PRN Prudence Oconnor MD        HYDROmorphone  (DILAUDID) injection 0.2 mg  0.2 mg Intravenous Q2H PRN Mona Llanos MD   0.2 mg at 08/10/24 2118    IF patient diabetic - HOLD: ALL ORAL HYPOGLYCEMICS: glipizide, glyburide, glimepiride, gliclazide, metformin (Glucophage), any metformin (Glucophage) containing medication, rosiglitazone (Avandia), pioglitazone (Actos), or sitagliptin (Januvia) on day of the procedure   Does not apply HOLD Mona Llanos MD        lidocaine (LMX4) cream   Topical Q1H PRN Priscila Martinez PA-C        lidocaine 1 % 0.1-1 mL  0.1-1 mL Other Q1H PRN Priscila Martinez PA-C        LORazepam (ATIVAN) tablet 1-2 mg  1-2 mg Oral Q30 Min PRN Mona Llanos MD        Or    LORazepam (ATIVAN) injection 1-2 mg  1-2 mg Intravenous Q30 Min PRN Mona Llanos MD   2 mg at 08/11/24 0649    methocarbamol (ROBAXIN) tablet 500 mg  500 mg Oral 4x Daily PRN Priscila Martinez PA-C        multivitamin  with lutein (OCUVITE WITH LUTEIN) per capsule 1 capsule  1 capsule Oral QAM Priscila Martinez PA-C        multivitamin w/minerals (THERA-VIT-M) tablet 1 tablet  1 tablet Oral Daily Mona Llanos MD        mupirocin (BACTROBAN) 2 % ointment   Topical BID Priscila Martinez PA-C   Given at 08/11/24 0946    naloxone (NARCAN) injection 0.2 mg  0.2 mg Intravenous Q2 Min PRN Mp Salter DO        Or    naloxone (NARCAN) injection 0.4 mg  0.4 mg Intravenous Q2 Min PRN Mp Salter DO        Or    naloxone (NARCAN) injection 0.2 mg  0.2 mg Intramuscular Q2 Min PRN Mp Salter DO        Or    naloxone (NARCAN) injection 0.4 mg  0.4 mg Intramuscular Q2 Min PRN Mp Salter DO        niCARdipine 40 mg in 200 mL NS (CARDENE) infusion  0.5-15 mg/hr Intravenous Continuous Mona Llanos MD 62.5 mL/hr at 08/11/24 0932 12.5 mg/hr at 08/11/24 0932    ondansetron (ZOFRAN ODT) ODT tab 4 mg  4 mg Oral Q6H PRN Mona Llanos MD        Or    ondansetron (ZOFRAN) injection 4 mg  4 mg Intravenous Q6H PRN Viet  MD Mona        pantoprazole (PROTONIX) EC tablet 40 mg  40 mg Oral QAM AC Priscila Martinez PA-C   40 mg at 08/11/24 0929    polyethylene glycol (MIRALAX) Packet 17 g  17 g Oral Daily PRN Mona Llanos MD        psyllium (METAMUCIL/KONSYL) Packet 1 packet  1 packet Oral Daily Priscila Martinez PA-C        senna-docusate (SENOKOT-S/PERICOLACE) 8.6-50 MG per tablet 1 tablet  1 tablet Oral BID PRN Mona Llanos MD        Or    senna-docusate (SENOKOT-S/PERICOLACE) 8.6-50 MG per tablet 2 tablet  2 tablet Oral BID PRMona Mehta MD        senna-docusate (SENOKOT-S/PERICOLACE) 8.6-50 MG per tablet 1-2 tablet  1-2 tablet Oral BID Priscila Martinez PA-C        sodium chloride (PF) 0.9% PF flush 10 mL  10 mL Intravenous Q10 Min PRN Mona Llanos MD        sodium chloride (PF) 0.9% PF flush 3 mL  3 mL Intracatheter Q8H Priscila Martinez PA-C   3 mL at 08/11/24 0810    sodium chloride (PF) 0.9% PF flush 3 mL  3 mL Intracatheter q1 min prn Priscila Martinez PA-C        tamsulosin (FLOMAX) capsule 0.4 mg  0.4 mg Oral Daily Priscila Martinez PA-C   0.4 mg at 08/11/24 0919    thiamine (B-1) injection 200 mg  200 mg Intravenous TID Mona Llanos MD   200 mg at 08/10/24 2301    [START ON 8/12/2024] thiamine (B-1) tablet 100 mg  100 mg Oral TID Mona Llanos MD        [START ON 8/17/2024] thiamine (B-1) tablet 100 mg  100 mg Oral Daily Mona Llanos MD        traMADol (ULTRAM) half-tab 50 mg  50 mg Oral Q6H PRN Priscila Martinez PA-C        vitamin D3 (CHOLECALCIFEROL) tablet 50 mcg  50 mcg Oral QAM Martinez, Priscila N, PA-C                   Physical Exam:   All vitals have been reviewed  Patient Vitals for the past 24 hrs:   BP Temp Temp src Pulse Resp SpO2   08/11/24 0919 128/62 -- -- 69 -- --   08/11/24 0918 134/64 -- -- -- -- --   08/11/24 0715 -- 97.8  F (36.6  C) Axillary -- -- --   08/11/24 0615 -- -- -- 63 19 96 %   08/11/24 0600 -- -- -- 67 23 93 %   08/11/24 0545 -- -- -- 63 18 95 %   08/11/24 0530 -- -- -- 63 19 95 %    08/11/24 0515 -- -- -- 64 17 96 %   08/11/24 0500 -- -- -- 65 28 93 %   08/11/24 0445 -- -- -- 64 17 95 %   08/11/24 0430 -- -- -- 64 16 94 %   08/11/24 0415 -- -- -- 64 12 94 %   08/11/24 0400 -- 98.2  F (36.8  C) Axillary 64 15 94 %   08/11/24 0345 104/55 -- -- 64 18 95 %   08/11/24 0330 -- -- -- 64 20 96 %   08/11/24 0315 -- -- -- 64 17 97 %   08/11/24 0300 -- -- -- 65 19 95 %   08/11/24 0245 -- -- -- 65 20 94 %   08/11/24 0230 -- -- -- 64 17 96 %   08/11/24 0215 -- -- -- 64 14 96 %   08/11/24 0200 -- -- -- 63 12 94 %   08/11/24 0145 -- -- -- 63 17 96 %   08/11/24 0130 -- -- -- 62 20 94 %   08/11/24 0115 -- -- -- 62 18 96 %   08/11/24 0100 -- -- -- 64 15 94 %   08/11/24 0045 -- -- -- 62 21 96 %   08/11/24 0030 -- -- -- 62 18 93 %   08/11/24 0015 -- -- -- 62 25 92 %   08/11/24 0000 -- 98.5  F (36.9  C) Axillary 61 14 95 %   08/10/24 2345 -- -- -- 61 14 91 %   08/10/24 2330 -- -- -- 63 13 90 %   08/10/24 2315 -- -- -- 62 14 91 %   08/10/24 2300 -- -- -- 62 16 91 %   08/10/24 2245 -- -- -- 62 15 91 %   08/10/24 2230 -- -- -- 63 13 91 %   08/10/24 2215 -- -- -- 63 15 (!) 88 %   08/10/24 2200 -- -- -- 66 16 (!) 88 %   08/10/24 2145 -- -- -- 67 18 90 %   08/10/24 2130 -- -- -- 67 -- (!) 88 %   08/10/24 2115 -- -- -- 70 -- 91 %   08/10/24 2100 -- -- -- 71 -- (!) 88 %   08/10/24 2045 -- -- -- 73 -- (!) 86 %   08/10/24 2030 -- -- -- 73 -- (!) 88 %   08/10/24 2015 -- -- -- 75 -- (!) 89 %   08/10/24 2000 -- 98.7  F (37.1  C) Axillary 75 -- (!) 88 %   08/10/24 1945 -- -- -- 70 12 (!) 83 %   08/10/24 1930 -- -- -- 67 19 90 %   08/10/24 1915 -- -- -- 66 29 (!) 89 %   08/10/24 1900 -- -- -- 66 14 93 %   08/10/24 1845 -- -- -- 67 12 90 %   08/10/24 1830 -- -- -- 77 21 (!) 88 %   08/10/24 1815 -- -- -- 64 17 97 %   08/10/24 1800 -- -- -- 64 15 98 %   08/10/24 1745 -- -- -- 64 17 97 %   08/10/24 1730 -- -- -- 64 12 97 %   08/10/24 1715 -- -- -- 65 14 97 %   08/10/24 1700 -- -- -- 65 16 98 %   08/10/24 1645 -- -- -- 65 17 98 %    08/10/24 1630 -- -- -- 65 16 97 %   08/10/24 1615 -- -- -- 66 22 97 %   08/10/24 1600 -- 98.4  F (36.9  C) Axillary 67 13 97 %   08/10/24 1545 -- -- -- 66 22 98 %   08/10/24 1530 -- -- -- 65 14 98 %   08/10/24 1515 -- -- -- 65 11 99 %   08/10/24 1500 -- -- -- 66 14 98 %   08/10/24 1445 -- -- -- 67 14 98 %   08/10/24 1430 -- -- -- 68 15 98 %   08/10/24 1415 -- -- -- 70 16 98 %   08/10/24 1400 -- -- -- 71 16 96 %   08/10/24 1345 -- -- -- 72 17 97 %   08/10/24 1330 -- -- -- 75 -- (!) 87 %   08/10/24 1315 -- -- -- 75 -- 93 %   08/10/24 1300 -- -- -- 85 -- --   08/10/24 1245 -- -- -- 81 -- --   08/10/24 1230 -- -- -- 78 11 (!) 89 %   08/10/24 1215 -- -- -- 78 24 (!) 84 %   08/10/24 1200 -- 98.5  F (36.9  C) Axillary 77 16 (!) 88 %   08/10/24 1145 -- -- -- 78 20 90 %   08/10/24 1130 -- -- -- 79 (!) 37 90 %   08/10/24 1115 -- -- -- 80 16 94 %   08/10/24 1100 -- -- -- 78 15 93 %   08/10/24 1045 -- -- -- 80 17 93 %   08/10/24 1030 -- -- -- 81 16 93 %       Intake/Output Summary (Last 24 hours) at 8/11/2024 1022  Last data filed at 8/11/2024 0800  Gross per 24 hour   Intake 1329.15 ml   Output 1755 ml   Net -425.85 ml       Constitutional: Pleasant, alert, appropriate, following commands.  HEENT: Head atraumatic normocephalic. Pupils equal round and reactive.  Respiratory: Unlabored breathing no audible wheeze  Cardiovascular: Regular rate and rhythm per pulses.  Skin: No rashes, no cyanosis, no edema.  Musculoskeletal:  Dressing is clean, dry, and intact.  Surgical site is clean dry and intact.  No dehiscence and no drainage.  Minimal erythema of the surrounding skin.   Right upper extremity is NVI.  Digits are warm.  Capillary refill <2 seconds.  Sensation intact to light touch in the axillary, median, ulnar, and radial nerve distributions.                Data:   All laboratory data reviewed  Results for orders placed or performed during the hospital encounter of 08/10/24   Glucose by meter     Status: Abnormal   Result  Value Ref Range    GLUCOSE BY METER POCT 114 (H) 70 - 99 mg/dL   Magnesium     Status: Normal   Result Value Ref Range    Magnesium 2.1 1.7 - 2.3 mg/dL   Phosphorus     Status: Abnormal   Result Value Ref Range    Phosphorus 2.4 (L) 2.5 - 4.5 mg/dL   Blood gas arterial     Status: Abnormal   Result Value Ref Range    pH Arterial 7.41 7.35 - 7.45    pCO2 Arterial 44 35 - 45 mm Hg    pO2 Arterial 64 (L) 80 - 105 mm Hg    FIO2 6     Bicarbonate Arterial 28 21 - 28 mmol/L    Base Excess/Deficit Arterial 3.2 (H) -3.0 - 3.0 mmol/L    Bishnu's Test Artline     Oxyhemoglobin Arterial 91 (L) 92 - 100 %    O2 Sat, Arterial 93.0 (L) 95.0 - 96.0 %    Narrative    In healthy individuals, oxyhemoglobin (O2Hb) and oxygen saturation (SO2) are approximately equal. In the presence of dyshemoglobins, oxyhemoglobin can be considerably lower than oxygen saturation.   Glucose by meter     Status: Abnormal   Result Value Ref Range    GLUCOSE BY METER POCT 131 (H) 70 - 99 mg/dL   CBC with platelets     Status: Abnormal   Result Value Ref Range    WBC Count 9.5 4.0 - 11.0 10e3/uL    RBC Count 3.44 (L) 4.40 - 5.90 10e6/uL    Hemoglobin 10.6 (L) 13.3 - 17.7 g/dL    Hematocrit 32.0 (L) 40.0 - 53.0 %    MCV 93 78 - 100 fL    MCH 30.8 26.5 - 33.0 pg    MCHC 33.1 31.5 - 36.5 g/dL    RDW 13.2 10.0 - 15.0 %    Platelet Count 148 (L) 150 - 450 10e3/uL   Basic metabolic panel     Status: Abnormal   Result Value Ref Range    Sodium 138 135 - 145 mmol/L    Potassium 4.3 3.4 - 5.3 mmol/L    Chloride 100 98 - 107 mmol/L    Carbon Dioxide (CO2) 27 22 - 29 mmol/L    Anion Gap 11 7 - 15 mmol/L    Urea Nitrogen 22.1 8.0 - 23.0 mg/dL    Creatinine 0.98 0.67 - 1.17 mg/dL    GFR Estimate 77 >60 mL/min/1.73m2    Calcium 8.7 (L) 8.8 - 10.4 mg/dL    Glucose 157 (H) 70 - 99 mg/dL   Magnesium     Status: Normal   Result Value Ref Range    Magnesium 2.3 1.7 - 2.3 mg/dL   Phosphorus     Status: Normal   Result Value Ref Range    Phosphorus 3.1 2.5 - 4.5 mg/dL   Glucose  by meter     Status: Abnormal   Result Value Ref Range    GLUCOSE BY METER POCT 158 (H) 70 - 99 mg/dL   Glucose by meter     Status: Abnormal   Result Value Ref Range    GLUCOSE BY METER POCT 153 (H) 70 - 99 mg/dL   Glucose by meter     Status: Abnormal   Result Value Ref Range    GLUCOSE BY METER POCT 142 (H) 70 - 99 mg/dL   EKG 12-lead, tracing only     Status: None (Preliminary result)   Result Value Ref Range    Systolic Blood Pressure  mmHg    Diastolic Blood Pressure  mmHg    Ventricular Rate 77 BPM    Atrial Rate 77 BPM    MN Interval 188 ms    QRS Duration 112 ms     ms    QTc 450 ms    P Axis 45 degrees    R AXIS 68 degrees    T Axis 61 degrees    Interpretation ECG       Sinus rhythm  Normal ECG  When compared with ECG of 18-Dec-1995 07:38,  MANUAL COMPARISON REQUIRED PREVIOUS ECG IS INCOMPATIBLE            Attestation:  Amount of time performed on this consult: 50 minutes.    Bria Abdul PA-C  [facility]

## 2024-08-11 NOTE — PLAN OF CARE
"  Problem: Adult Inpatient Plan of Care  Goal: Plan of Care Review  Description: The Plan of Care Review/Shift note should be completed every shift.  The Outcome Evaluation is a brief statement about your assessment that the patient is improving, declining, or no change.  This information will be displayed automatically on your shift  note.  Outcome: Progressing  Flowsheets (Taken 8/11/2024 0606)  Outcome Evaluation: Pt calm at the beginning of the shift. Became increasingly agitated and actively swinging legs over bed attempting to climb out. Not redirectable. Dex @ 1.2, gave x1 haldol w/o effect, continued to climb out of bed, pulling at lines and oxymask, soft 4 point restraints initiated for patient safety. Alert to self and place, able to tell the year. Follows some commands when calm. Refused PO meds. On a clear liquid diet. CIWA per protocol. C/o back pain, managed with PRN dilaudid. SBP <120, HR<70, nicardipine gtt continued, weaned as able. L ART line. X2 PIV's. X1 small loose BM. Kennedy in place.  Goal: Patient-Specific Goal (Individualized)  Description: You can add care plan individualizations to a care plan. Examples of Individualization might be:  \"Parent requests to be called daily at 9am for status\", \"I have a hard time hearing out of my right ear\", or \"Do not touch me to wake me up as it startles  me\".  Outcome: Progressing  Goal: Absence of Hospital-Acquired Illness or Injury  Outcome: Progressing  Intervention: Identify and Manage Fall Risk  Recent Flowsheet Documentation  Taken 8/11/2024 0400 by Gracy Dominguez RN  Safety Promotion/Fall Prevention:   activity supervised   clutter free environment maintained   increased rounding and observation   increase visualization of patient   lighting adjusted   nonskid shoes/slippers when out of bed   patient and family education   room near nurse's station   room organization consistent   safety round/check completed   supervised activity  Taken " 8/11/2024 0000 by Gracy Dominguez RN  Safety Promotion/Fall Prevention:   activity supervised   clutter free environment maintained   increased rounding and observation   increase visualization of patient   lighting adjusted   nonskid shoes/slippers when out of bed   patient and family education   room near nurse's station   room organization consistent   safety round/check completed   supervised activity  Taken 8/10/2024 2000 by Gracy Dominguez RN  Safety Promotion/Fall Prevention:   activity supervised   clutter free environment maintained   increased rounding and observation   increase visualization of patient   lighting adjusted   nonskid shoes/slippers when out of bed   patient and family education   room near nurse's station   room organization consistent   safety round/check completed   supervised activity  Intervention: Prevent Skin Injury  Recent Flowsheet Documentation  Taken 8/11/2024 0400 by Gracy Dominguez RN  Body Position:   turned   side-lying   heels elevated   upper extremity elevated   weight shifting  Skin Protection:   adhesive use limited   pulse oximeter probe site changed   silicone foam dressing in place   skin to device areas padded   skin to skin areas padded   transparent dressing maintained  Device Skin Pressure Protection:   absorbent pad utilized/changed   adhesive use limited   pressure points protected   skin-to-device areas padded   skin-to-skin areas padded   tubing/devices free from skin contact  Taken 8/11/2024 0200 by Gracy Dominguez RN  Body Position:   turned   side-lying   heels elevated   upper extremity elevated   weight shifting  Taken 8/11/2024 0000 by Gracy Dominguez RN  Body Position:   turned   side-lying   heels elevated   upper extremity elevated   weight shifting  Skin Protection:   adhesive use limited   pulse oximeter probe site changed   silicone foam dressing in place   skin to device areas padded    skin to skin areas padded   transparent dressing maintained  Device Skin Pressure Protection:   absorbent pad utilized/changed   adhesive use limited   pressure points protected   skin-to-device areas padded   skin-to-skin areas padded   tubing/devices free from skin contact  Taken 8/10/2024 2200 by Gracy Dominguez RN  Body Position:   turned   side-lying   heels elevated   upper extremity elevated   weight shifting  Taken 8/10/2024 2000 by Gracy Dominguez RN  Body Position:   turned   side-lying   heels elevated   upper extremity elevated   weight shifting  Skin Protection:   adhesive use limited   pulse oximeter probe site changed   silicone foam dressing in place   skin to device areas padded   skin to skin areas padded   transparent dressing maintained  Device Skin Pressure Protection:   absorbent pad utilized/changed   adhesive use limited   pressure points protected   skin-to-device areas padded   skin-to-skin areas padded   tubing/devices free from skin contact  Intervention: Prevent and Manage VTE (Venous Thromboembolism) Risk  Recent Flowsheet Documentation  Taken 8/11/2024 0400 by Gracy Dominguez RN  VTE Prevention/Management: SCDs on (sequential compression devices)  Taken 8/11/2024 0000 by Gracy Dominguez RN  VTE Prevention/Management: SCDs on (sequential compression devices)  Taken 8/10/2024 2000 by Gracy Dominguez RN  VTE Prevention/Management: SCDs on (sequential compression devices)  Intervention: Prevent Infection  Recent Flowsheet Documentation  Taken 8/11/2024 0400 by Gracy Dominguez RN  Infection Prevention:   environmental surveillance performed   equipment surfaces disinfected   hand hygiene promoted   personal protective equipment utilized   rest/sleep promoted   single patient room provided  Taken 8/11/2024 0000 by Gracy Dominguez RN  Infection Prevention:   environmental surveillance performed   equipment  surfaces disinfected   hand hygiene promoted   personal protective equipment utilized   rest/sleep promoted   single patient room provided  Taken 8/10/2024 2000 by Gracy Dominguez RN  Infection Prevention:   environmental surveillance performed   equipment surfaces disinfected   hand hygiene promoted   personal protective equipment utilized   rest/sleep promoted   single patient room provided  Goal: Optimal Comfort and Wellbeing  Outcome: Progressing  Intervention: Monitor Pain and Promote Comfort  Recent Flowsheet Documentation  Taken 8/11/2024 0400 by Gracy Dominguez RN  Pain Management Interventions:   care clustered   repositioned  Taken 8/11/2024 0000 by Gracy Dominguez RN  Pain Management Interventions:   care clustered   repositioned  Intervention: Provide Person-Centered Care  Recent Flowsheet Documentation  Taken 8/11/2024 0400 by Gracy Dominguez RN  Trust Relationship/Rapport:   care explained   choices provided   emotional support provided   questions answered   empathic listening provided   questions encouraged   reassurance provided   thoughts/feelings acknowledged  Taken 8/11/2024 0000 by Gracy Dominguez RN  Trust Relationship/Rapport:   care explained   choices provided   emotional support provided   questions answered   empathic listening provided   questions encouraged   reassurance provided   thoughts/feelings acknowledged  Taken 8/10/2024 2000 by Gracy Dominguez RN  Trust Relationship/Rapport:   care explained   choices provided   emotional support provided   questions answered   empathic listening provided   questions encouraged   reassurance provided   thoughts/feelings acknowledged  Goal: Readiness for Transition of Care  Outcome: Progressing   Goal Outcome Evaluation:                 Outcome Evaluation: Pt calm at the beginning of the shift. Became increasingly agitated and actively swinging legs over bed attempting to  climb out. Not redirectable. Dex @ 1.2, gave x1 haldol w/o effect, continued to climb out of bed, pulling at lines and oxymask, soft 4 point restraints initiated for patient safety. Alert to self and place, able to tell the year. Follows some commands when calm. Refused PO meds. On a clear liquid diet. CIWA per protocol. C/o back pain, managed with PRN dilaudid. SBP <120, HR<70, nicardipine gtt continued, weaned as able. L ART line. X2 PIV's. X1 small loose BM. Kennedy in place.

## 2024-08-11 NOTE — PROGRESS NOTES
ICU X cover    Pt actively climbing out of bed, pulling lines, 4 point restraints soft ordered,   while attempt redirect and titrate medical management.  On precedex 1.2, does not respond to haldol.      Noe Wilson MD

## 2024-08-11 NOTE — PROGRESS NOTES
Vascular Surgery Progress Note  08/11/2024       Subjective:  Resting comfortably in bed this am and alert to self, answering questions appropriately. Note concern for significant confusion overnight requiring restraints, precedex. Was started on CIWA given h/o alcohol use.  On nicardipine 5 at the moment.     Objective:  Temp:  [97.8  F (36.6  C)-98.7  F (37.1  C)] 97.8  F (36.6  C)  Pulse:  [61-77] 63  Resp:  [11-32] 17  BP: (104-134)/(55-64) 128/62  MAP:  [64 mmHg-102 mmHg] 76 mmHg  Arterial Line BP: (101-137)/(47-73) 124/55  FiO2 (%):  [55 %] 55 %  SpO2:  [83 %-99 %] 94 %    I/O last 3 completed shifts:  In: 1329.15 [P.O.:50; I.V.:1279.15]  Out: 1700 [Urine:1700]      Gen: Awake, alert, NAD  CV: RRR per radial pulse  Resp: NLB on oxymask  Abd: distended, however compressible, soft, no evidence of peritonitis, reports some mild tenderness on first palpation, however not recreated on further palpation of abdomen.   Vascular: palpable DP pulses b/l.      Labs:  Recent Labs   Lab 08/11/24  0412 08/10/24  0343 08/08/24  0523 08/07/24  1004   WBC 9.5 11.2*  --  6.1   HGB 10.6* 11.5* 13.1* 13.7   * 129*  --  176       Recent Labs   Lab 08/11/24  1206 08/11/24  0914 08/11/24  0412 08/10/24  1550 08/10/24  1249 08/10/24  0753 08/10/24  0343 08/08/24  0523 08/07/24  1004   NA  --   --  138  --   --   --  132*  --   --    POTASSIUM  --   --  4.3  --   --   --  4.0  --  4.0   CHLORIDE  --   --  100  --   --   --  96*  --   --    CO2  --   --  27  --   --   --  27  --   --    BUN  --   --  22.1  --   --   --  17.1  --   --    CR  --   --  0.98  --   --   --  0.98  --  0.99   * 142* 157*   < >  --    < > 117*   < >  --    CHERY  --   --  8.7*  --   --   --  8.9  --   --    MAG  --   --  2.3  --  2.1  --   --   --   --    PHOS  --   --  3.1  --  2.4*  --   --   --   --     < > = values in this interval not displayed.       Imaging:  New CTA reviewed, demonstrates certainly a Type B dissection that extends at least  to the innominate artery, difficult to assess proximal extent, may extend into arch however not very clear. Distally, dissection extends to L JONATHAN, lumen patent with flow channel open into L JONATHAN.  Celiac, SMA, and both renals appear to originate off of true lumen and remain patent and well perfused.     Assessment/Plan:   84 year old male with PMH notable for HTN, history of alcohol abuse, COPD, CAD, GERD, as well as recent right shoulder replacement 3 days ago who presented to the ED at [facility] last evening with shoulder pain some right sided chest pain, dizziness, and confusion.  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     - -Recommend antiimpulse therapy with IV antihypertensives to meet goal SBP less than 120 and goal HR of less than 70.   -IV esmolol and IV nicardipine drips titrated to above goals  -Patient is on home atenolol, continue  - Continue with amlodipine 10mg, may consider uptitrating PO tomorrow however continue for now given 48 hours for amlodipine to take effect.  -Wean off IV medications as able as p.o. antihypertensives take effect,  - continue ASA/statin  -follow-up echo  -Does not appear to be larissa type A dissection, though this is difficult to assess.   - Discussed with CVTS PA who was going to review imaging with Dr. Dubose and discuss.   -Vascular surgery will continue to follow along        Discussed with vascular surgery staff, who is in agreement with the above.  - - - - - - - - - - - - - - - - - -  Bubba Oconnor, PGY-4  Vascular Surgery Resident

## 2024-08-12 NOTE — PLAN OF CARE
"  Problem: Adult Inpatient Plan of Care  Goal: Plan of Care Review  Description: The Plan of Care Review/Shift note should be completed every shift.  The Outcome Evaluation is a brief statement about your assessment that the patient is improving, declining, or no change.  This information will be displayed automatically on your shift  note.  Outcome: Not Progressing  Flowsheets (Taken 8/12/2024 0632)  Outcome Evaluation: Restless and agitated at times. Oriented to self, more confused throughout the night. Having hallucinations at times. Ativan given per CIWA per protocol. Follows some commands. PERRL. L ART line. Nicardipine gtt and dex continued. SBP<120, HR <70. Bipap on when sleeping. Kennedy in place. No BM.  Goal: Patient-Specific Goal (Individualized)  Description: You can add care plan individualizations to a care plan. Examples of Individualization might be:  \"Parent requests to be called daily at 9am for status\", \"I have a hard time hearing out of my right ear\", or \"Do not touch me to wake me up as it startles  me\".  Outcome: Not Progressing  Goal: Absence of Hospital-Acquired Illness or Injury  Outcome: Not Progressing  Intervention: Identify and Manage Fall Risk  Recent Flowsheet Documentation  Taken 8/12/2024 0400 by Gracy Dominguez, RN  Safety Promotion/Fall Prevention:   activity supervised   clutter free environment maintained   increased rounding and observation   increase visualization of patient   patient and family education   room door open   room near nurse's station   room organization consistent   safety round/check completed   supervised activity  Taken 8/12/2024 0000 by Gracy Dominguez, RN  Safety Promotion/Fall Prevention:   activity supervised   clutter free environment maintained   increased rounding and observation   increase visualization of patient   patient and family education   room door open   room near nurse's station   room organization consistent   safety " round/check completed   supervised activity  Taken 8/11/2024 2000 by Gracy Dominguez RN  Safety Promotion/Fall Prevention:   activity supervised   clutter free environment maintained   increased rounding and observation   increase visualization of patient   patient and family education   room door open   room near nurse's station   room organization consistent   safety round/check completed   supervised activity  Intervention: Prevent Skin Injury  Recent Flowsheet Documentation  Taken 8/12/2024 0600 by Gracy Dominguez RN  Body Position:   turned   side-lying   heels elevated   upper extremity elevated   weight shifting  Taken 8/12/2024 0400 by Gracy Dominguez RN  Body Position:   turned   side-lying   heels elevated   upper extremity elevated   weight shifting  Skin Protection:   adhesive use limited   incontinence pads utilized   pulse oximeter probe site changed   silicone foam dressing in place   skin to device areas padded   skin to skin areas padded   transparent dressing maintained  Device Skin Pressure Protection:   absorbent pad utilized/changed   adhesive use limited   pressure points protected   skin-to-device areas padded   tubing/devices free from skin contact   skin-to-skin areas padded   positioning supports utilized  Taken 8/12/2024 0200 by Gracy Dominguez RN  Body Position:   turned   side-lying   heels elevated   upper extremity elevated   weight shifting  Taken 8/12/2024 0000 by Gracy Dominguez RN  Body Position:   turned   side-lying   heels elevated   upper extremity elevated   weight shifting  Skin Protection:   adhesive use limited   incontinence pads utilized   pulse oximeter probe site changed   silicone foam dressing in place   skin to device areas padded   skin to skin areas padded   transparent dressing maintained  Device Skin Pressure Protection:   absorbent pad utilized/changed   adhesive use limited   pressure points  protected   skin-to-device areas padded   tubing/devices free from skin contact   skin-to-skin areas padded   positioning supports utilized  Taken 8/11/2024 2200 by Gracy Dominguez RN  Body Position:   turned   side-lying   heels elevated   upper extremity elevated   weight shifting  Taken 8/11/2024 2000 by Gracy Dominguez RN  Body Position:   turned   side-lying   heels elevated   upper extremity elevated   weight shifting  Skin Protection:   adhesive use limited   incontinence pads utilized   pulse oximeter probe site changed   silicone foam dressing in place   skin to device areas padded   skin to skin areas padded   transparent dressing maintained  Device Skin Pressure Protection:   absorbent pad utilized/changed   adhesive use limited   pressure points protected   skin-to-device areas padded   tubing/devices free from skin contact   skin-to-skin areas padded   positioning supports utilized  Intervention: Prevent and Manage VTE (Venous Thromboembolism) Risk  Recent Flowsheet Documentation  Taken 8/12/2024 0400 by Gracy Dominguez RN  VTE Prevention/Management: SCDs on (sequential compression devices)  Taken 8/12/2024 0000 by Gracy Dominguez RN  VTE Prevention/Management: SCDs on (sequential compression devices)  Taken 8/11/2024 2000 by Gracy Dominguez RN  VTE Prevention/Management: SCDs on (sequential compression devices)  Intervention: Prevent Infection  Recent Flowsheet Documentation  Taken 8/12/2024 0400 by Gracy Dominguez RN  Infection Prevention:   environmental surveillance performed   equipment surfaces disinfected   personal protective equipment utilized   rest/sleep promoted   single patient room provided  Taken 8/12/2024 0000 by Gracy Dominguez RN  Infection Prevention:   environmental surveillance performed   equipment surfaces disinfected   personal protective equipment utilized   rest/sleep promoted   single patient  room provided  Taken 8/11/2024 2000 by Gracy Dominguez RN  Infection Prevention:   environmental surveillance performed   equipment surfaces disinfected   personal protective equipment utilized   rest/sleep promoted   single patient room provided  Goal: Optimal Comfort and Wellbeing  Outcome: Not Progressing  Intervention: Monitor Pain and Promote Comfort  Recent Flowsheet Documentation  Taken 8/12/2024 0000 by Gracy Dominguez RN  Pain Management Interventions:   care clustered   repositioned  Intervention: Provide Person-Centered Care  Recent Flowsheet Documentation  Taken 8/12/2024 0400 by Gracy Dominguez RN  Trust Relationship/Rapport:   care explained   choices provided   emotional support provided   empathic listening provided   questions answered   questions encouraged   reassurance provided   thoughts/feelings acknowledged  Taken 8/12/2024 0000 by Gracy Dominguez RN  Trust Relationship/Rapport:   care explained   choices provided   emotional support provided   empathic listening provided   questions answered   questions encouraged   reassurance provided   thoughts/feelings acknowledged  Taken 8/11/2024 2000 by Gracy Dominguez RN  Trust Relationship/Rapport:   care explained   choices provided   emotional support provided   empathic listening provided   questions answered   questions encouraged   reassurance provided   thoughts/feelings acknowledged  Goal: Readiness for Transition of Care  Outcome: Not Progressing   Goal Outcome Evaluation:                 Outcome Evaluation: Restless and agitated at times. Oriented to self, more confused throughout the night. Having hallucinations at times. Ativan given per CIWA per protocol. Follows some commands. PERRL. L ART line. Nicardipine gtt and dex continued. SBP<120, HR <70. Bipap on when sleeping. Kennedy in place. No BM.

## 2024-08-12 NOTE — PROGRESS NOTES
Change of Code Status to DNR/DNI     Discussion of the goals of care with patient and his wife Joana Morales.  He has a health care directive.  Patient has a complex medical/surgical history including multiple comorbidities.  He was admitted to the ICU with Aortic dissection Type B that now it has extended towards the aortic root (now also has a type A dissection). Vascular and Cardiothoracic Surgery are following. Per surgical teams, surgical intervention would not benefit him.      Patient is delirious and wife as his surrogate for decisions would like all ICU treatments but he would not like to be intubated and not to have chest compressions if he has a cardiac arrest.   His code status was updated to DNR/DNI.    Kana Benedict MD, Critical Care Attending was updated.    Reny Yanez MD, FACS  Surgical Critical Care Fellow

## 2024-08-12 NOTE — PROVIDER NOTIFICATION
Patient remaining on BiPAP for the better part of the day, tolerating short breaks. Mask changed Q4 with mepilex on the bridge of the patients nose. Skin intact. Neb tx started QID. RT will continue to follow    Matthias Martinez RT on 8/12/2024 at 5:02 PM

## 2024-08-12 NOTE — PLAN OF CARE
Problem: Adult Inpatient Plan of Care  Goal: Plan of Care Review  Description: The Plan of Care Review/Shift note should be completed every shift.  The Outcome Evaluation is a brief statement about your assessment that the patient is improving, declining, or no change.  This information will be displayed automatically on your shift  note.  Flowsheets (Taken 8/12/2024 9234)  Outcome Evaluation: pt restless and agitated and unable to answer orientation questions and did not open eyes often. seems to be having hallucinations at times and mumbles to himself. ativan given per [facility] protocol. ng post pyloric placed at 1730 with another rn and confirmed by xray. able to give pt some oral medication. pt unable to swallow medications when evaluated at noon. pt was able to swallow water but would not follow commands and rn hesitant to feed pt pills and applesauce for fear of aspiration-ng tube then discussed and ordered. pt had one small clear bm/gas. pt remains in restraints-ankle restraints added due to pt able to swing legs over side of bed attempting to climb out of bed and sit up and also attempting to kick care providers while performing cares and tasks. pt restarted on esmolol and nicardipine throughtout the day. hydralizine given x1 for 1 large episode of agiation. pt stayed on bipap majority of day due to noon attempt to wean to oximask 15liters which resuted in pt desating to 70s and becoming very restless and agitated and using accessory muscles. pt wife had long discussion with intensivist regarding goals of cares, the advanced directive the pt has and the patient's wishes-code status changed to dnr/dni. pt hr remained nsr except tachy during agitation late midday. pt has aldana in place with adequate output. bs remains wnl while npo. abdomen large/distended and slightly firm-md aware.  Plan of Care Reviewed With: patient  Overall Patient Progress: declining  Goal: Absence of Hospital-Acquired Illness or  Injury  Intervention: Identify and Manage Fall Risk  Recent Flowsheet Documentation  Taken 8/12/2024 1600 by Mariann Collins RN  Safety Promotion/Fall Prevention:   activity supervised   clutter free environment maintained   increased rounding and observation   increase visualization of patient   patient and family education   room door open   room near nurse's station   room organization consistent   safety round/check completed   supervised activity  Taken 8/12/2024 1200 by Mariann Collins RN  Safety Promotion/Fall Prevention:   activity supervised   clutter free environment maintained   increased rounding and observation   increase visualization of patient   patient and family education   room door open   room near nurse's station   room organization consistent   safety round/check completed   supervised activity  Taken 8/12/2024 0800 by Mariann Collins RN  Safety Promotion/Fall Prevention:   activity supervised   clutter free environment maintained   increased rounding and observation   increase visualization of patient   patient and family education   room door open   room near nurse's station   room organization consistent   safety round/check completed   supervised activity  Intervention: Prevent Skin Injury  Recent Flowsheet Documentation  Taken 8/12/2024 1800 by Mariann Collins RN  Body Position:   turned   heels elevated  Taken 8/12/2024 1600 by Mariann Collins RN  Body Position:   turned   heels elevated  Skin Protection:   adhesive use limited   incontinence pads utilized   pulse oximeter probe site changed   silicone foam dressing in place   skin to device areas padded   skin to skin areas padded   transparent dressing maintained  Device Skin Pressure Protection:   absorbent pad utilized/changed   adhesive use limited   pressure points protected   skin-to-device areas padded   tubing/devices free from skin contact   skin-to-skin areas padded   positioning supports utilized  Taken 8/12/2024 1400 by  Mariann Collins RN  Body Position:   turned   heels elevated  Taken 8/12/2024 1200 by Mariann Collins RN  Body Position:   turned   heels elevated  Skin Protection:   adhesive use limited   incontinence pads utilized   pulse oximeter probe site changed   silicone foam dressing in place   skin to device areas padded   skin to skin areas padded   transparent dressing maintained  Device Skin Pressure Protection:   absorbent pad utilized/changed   adhesive use limited   pressure points protected   skin-to-device areas padded   tubing/devices free from skin contact   skin-to-skin areas padded   positioning supports utilized  Taken 8/12/2024 0800 by Mariann Collins RN  Body Position:   turned   heels elevated  Skin Protection:   adhesive use limited   incontinence pads utilized   pulse oximeter probe site changed   silicone foam dressing in place   skin to device areas padded   skin to skin areas padded   transparent dressing maintained  Device Skin Pressure Protection:   absorbent pad utilized/changed   adhesive use limited   pressure points protected   skin-to-device areas padded   tubing/devices free from skin contact   skin-to-skin areas padded   positioning supports utilized  Intervention: Prevent and Manage VTE (Venous Thromboembolism) Risk  Recent Flowsheet Documentation  Taken 8/12/2024 1600 by Mariann Collins RN  VTE Prevention/Management: SCDs on (sequential compression devices)  Taken 8/12/2024 1200 by Mariann Collins RN  VTE Prevention/Management: SCDs on (sequential compression devices)  Taken 8/12/2024 0800 by Mariann Collins RN  VTE Prevention/Management: SCDs on (sequential compression devices)  Intervention: Prevent Infection  Recent Flowsheet Documentation  Taken 8/12/2024 1600 by Mariann Collins RN  Infection Prevention:   environmental surveillance performed   equipment surfaces disinfected   personal protective equipment utilized   rest/sleep promoted   single patient room provided  Taken  8/12/2024 1200 by Mariann Collins RN  Infection Prevention:   environmental surveillance performed   equipment surfaces disinfected   personal protective equipment utilized   rest/sleep promoted   single patient room provided  Taken 8/12/2024 0800 by Mariann Collins RN  Infection Prevention:   environmental surveillance performed   equipment surfaces disinfected   personal protective equipment utilized   rest/sleep promoted   single patient room provided  Goal: Optimal Comfort and Wellbeing  Intervention: Monitor Pain and Promote Comfort  Recent Flowsheet Documentation  Taken 8/12/2024 0815 by Mariann Collins RN  Pain Management Interventions: medication (see MAR)  Intervention: Provide Person-Centered Care  Recent Flowsheet Documentation  Taken 8/12/2024 1600 by Mariann Collins RN  Trust Relationship/Rapport:   care explained   choices provided   emotional support provided   empathic listening provided   questions answered   questions encouraged   reassurance provided   thoughts/feelings acknowledged  Taken 8/12/2024 1200 by Mariann Collins RN  Trust Relationship/Rapport:   care explained   choices provided   emotional support provided   empathic listening provided   questions answered   questions encouraged   reassurance provided   thoughts/feelings acknowledged  Taken 8/12/2024 0800 by Mariann Collins RN  Trust Relationship/Rapport:   care explained   choices provided   emotional support provided   empathic listening provided   questions answered   questions encouraged   reassurance provided   thoughts/feelings acknowledged     Problem: Risk for Delirium  Goal: Optimal Coping  Intervention: Optimize Psychosocial Adjustment to Delirium  Recent Flowsheet Documentation  Taken 8/12/2024 1600 by Mariann Collins RN  Supportive Measures: active listening utilized  Taken 8/12/2024 1200 by Mariann Collins RN  Supportive Measures: active listening utilized  Taken 8/12/2024 0800 by Mariann Collins RN  Supportive  Measures: active listening utilized  Goal: Improved Behavioral Control  Intervention: Prevent and Manage Agitation  Recent Flowsheet Documentation  Taken 8/12/2024 1600 by Mariann Collins RN  Environment Familiarity/Consistency: daily routine followed  Taken 8/12/2024 1200 by Mariann Collins RN  Environment Familiarity/Consistency: daily routine followed  Taken 8/12/2024 0800 by Mariann Collins RN  Environment Familiarity/Consistency: daily routine followed  Intervention: Minimize Safety Risk  Recent Flowsheet Documentation  Taken 8/12/2024 1600 by Mariann Collins RN  Communication Enhancement Strategies:   call light answered in person   extra time allowed for response  Enhanced Safety Measures:   pain management   review medications for side effects with activity   room near unit station    at bedside  Trust Relationship/Rapport:   care explained   choices provided   emotional support provided   empathic listening provided   questions answered   questions encouraged   reassurance provided   thoughts/feelings acknowledged  Taken 8/12/2024 1200 by Mariann Collins RN  Communication Enhancement Strategies:   call light answered in person   extra time allowed for response  Enhanced Safety Measures:   pain management   review medications for side effects with activity   room near unit station    at bedside  Trust Relationship/Rapport:   care explained   choices provided   emotional support provided   empathic listening provided   questions answered   questions encouraged   reassurance provided   thoughts/feelings acknowledged  Taken 8/12/2024 0800 by Mariann Collins RN  Communication Enhancement Strategies:   call light answered in person   extra time allowed for response  Enhanced Safety Measures:   pain management   review medications for side effects with activity   room near unit station    at bedside  Trust Relationship/Rapport:   care explained   choices  provided   emotional support provided   empathic listening provided   questions answered   questions encouraged   reassurance provided   thoughts/feelings acknowledged  Goal: Improved Attention and Thought Clarity  Intervention: Maximize Cognitive Function  Recent Flowsheet Documentation  Taken 8/12/2024 1600 by Mariann Collins RN  Sensory Stimulation Regulation:   care clustered   lighting decreased   quiet environment promoted  Reorientation Measures:   calendar in view   clock in view   reorientation provided  Taken 8/12/2024 1200 by Mariann Collins RN  Sensory Stimulation Regulation:   care clustered   lighting decreased   quiet environment promoted  Reorientation Measures:   calendar in view   clock in view   reorientation provided  Taken 8/12/2024 0800 by Mariann Collins RN  Sensory Stimulation Regulation:   care clustered   lighting decreased   quiet environment promoted  Reorientation Measures:   calendar in view   clock in view   reorientation provided  Goal: Improved Sleep  Intervention: Promote Sleep  Recent Flowsheet Documentation  Taken 8/12/2024 1600 by Mariann Collins RN  Sleep/Rest Enhancement:   awakenings minimized   comfort measures   consistent schedule promoted   noise level reduced   regular sleep/rest pattern promoted   relaxation techniques promoted   room darkened  Taken 8/12/2024 1200 by Mariann Collins RN  Sleep/Rest Enhancement:   awakenings minimized   comfort measures   consistent schedule promoted   noise level reduced   regular sleep/rest pattern promoted   relaxation techniques promoted   room darkened  Taken 8/12/2024 0800 by Mariann Collins RN  Sleep/Rest Enhancement:   awakenings minimized   comfort measures   consistent schedule promoted   noise level reduced   regular sleep/rest pattern promoted   relaxation techniques promoted   room darkened     Problem: Alcohol Withdrawal  Goal: Alcohol Withdrawal Symptom Control  Intervention: Minimize or Manage Alcohol Withdrawal  Symptoms  Recent Flowsheet Documentation  Taken 8/12/2024 1600 by Mariann Collins RN  Sensory Stimulation Regulation:   care clustered   lighting decreased   quiet environment promoted  Aspiration Precautions:   awake/alert before oral intake   upright posture maintained  Taken 8/12/2024 1200 by Mariann Collins RN  Sensory Stimulation Regulation:   care clustered   lighting decreased   quiet environment promoted  Aspiration Precautions:   awake/alert before oral intake   upright posture maintained  Taken 8/12/2024 0800 by Mariann Collins RN  Sensory Stimulation Regulation:   care clustered   lighting decreased   quiet environment promoted  Aspiration Precautions:   awake/alert before oral intake   upright posture maintained  Goal: Optimal Neurologic Function  Intervention: Minimize or Manage Acute Neurologic Symptoms  Recent Flowsheet Documentation  Taken 8/12/2024 1600 by Mariann Collins RN  Sensory Stimulation Regulation:   care clustered   lighting decreased   quiet environment promoted  Airway/Ventilation Management: pulmonary hygiene promoted  Cerebral Perfusion Promotion:   blood pressure monitored   normothermia promoted  Taken 8/12/2024 1200 by Mariann Collins RN  Sensory Stimulation Regulation:   care clustered   lighting decreased   quiet environment promoted  Airway/Ventilation Management: pulmonary hygiene promoted  Cerebral Perfusion Promotion:   blood pressure monitored   normothermia promoted  Taken 8/12/2024 0800 by Mariann Collins RN  Sensory Stimulation Regulation:   care clustered   lighting decreased   quiet environment promoted  Airway/Ventilation Management: pulmonary hygiene promoted  Cerebral Perfusion Promotion:   blood pressure monitored   normothermia promoted  Goal: Readiness for Change Identified  Intervention: Partner to Facilitate Behavior Change  Recent Flowsheet Documentation  Taken 8/12/2024 1600 by Mariann Collins RN  Supportive Measures: active listening utilized  Taken  8/12/2024 1200 by Mariann Collins RN  Supportive Measures: active listening utilized  Taken 8/12/2024 0800 by Mariann Collins RN  Supportive Measures: active listening utilized     Problem: Restraint, Nonviolent  Goal: Absence of Harm or Injury  Intervention: Implement Least Restrictive Safety Strategies  Recent Flowsheet Documentation  Taken 8/12/2024 1600 by Mariann Collins RN  Medical Device Protection:   tubing secured   IV pole/bag removed from visual field   torso covered  Taken 8/12/2024 1200 by Mariann Collins RN  Medical Device Protection:   tubing secured   IV pole/bag removed from visual field   torso covered  Taken 8/12/2024 0800 by Mariann Collins RN  Medical Device Protection:   tubing secured   IV pole/bag removed from visual field   torso covered  Intervention: Protect Dignity, Rights and Personal Wellbeing  Recent Flowsheet Documentation  Taken 8/12/2024 1600 by Mariann Collins RN  Trust Relationship/Rapport:   care explained   choices provided   emotional support provided   empathic listening provided   questions answered   questions encouraged   reassurance provided   thoughts/feelings acknowledged  Taken 8/12/2024 1200 by Mariann Collins RN  Trust Relationship/Rapport:   care explained   choices provided   emotional support provided   empathic listening provided   questions answered   questions encouraged   reassurance provided   thoughts/feelings acknowledged  Taken 8/12/2024 0800 by Mariann Collins RN  Trust Relationship/Rapport:   care explained   choices provided   emotional support provided   empathic listening provided   questions answered   questions encouraged   reassurance provided   thoughts/feelings acknowledged  Intervention: Protect Skin and Joint Integrity  Recent Flowsheet Documentation  Taken 8/12/2024 1800 by Mariann Collins RN  Body Position:   turned   heels elevated  Taken 8/12/2024 1600 by Mariann Collins RN  Body Position:   turned   heels elevated  Skin  Protection:   adhesive use limited   incontinence pads utilized   pulse oximeter probe site changed   silicone foam dressing in place   skin to device areas padded   skin to skin areas padded   transparent dressing maintained  Taken 8/12/2024 1400 by Mariann Collins RN  Body Position:   turned   heels elevated  Taken 8/12/2024 1200 by Mariann Collins RN  Body Position:   turned   heels elevated  Skin Protection:   adhesive use limited   incontinence pads utilized   pulse oximeter probe site changed   silicone foam dressing in place   skin to device areas padded   skin to skin areas padded   transparent dressing maintained  Taken 8/12/2024 0800 by Mariann Collins RN  Body Position:   turned   heels elevated  Skin Protection:   adhesive use limited   incontinence pads utilized   pulse oximeter probe site changed   silicone foam dressing in place   skin to device areas padded   skin to skin areas padded   transparent dressing maintained     Problem: Aortic Aneurysm/Dissection Repair  Goal: Anesthesia/Sedation Recovery  Intervention: Optimize Anesthesia Recovery  Recent Flowsheet Documentation  Taken 8/12/2024 1600 by Mariann Collins RN  Safety Promotion/Fall Prevention:   activity supervised   clutter free environment maintained   increased rounding and observation   increase visualization of patient   patient and family education   room door open   room near nurse's station   room organization consistent   safety round/check completed   supervised activity  Administration (IS): (has bipap/cpap and unwilling to follow all commands) unable to perform  Reorientation Measures:   calendar in view   clock in view   reorientation provided  Taken 8/12/2024 1200 by Mariann Collins RN  Safety Promotion/Fall Prevention:   activity supervised   clutter free environment maintained   increased rounding and observation   increase visualization of patient   patient and family education   room door open   room near nurse's  station   room organization consistent   safety round/check completed   supervised activity  Administration (IS): (has bipap/cpap and unwilling to follow all commands) unable to perform  Reorientation Measures:   calendar in view   clock in view   reorientation provided  Taken 8/12/2024 0800 by Mariann Collins RN  Safety Promotion/Fall Prevention:   activity supervised   clutter free environment maintained   increased rounding and observation   increase visualization of patient   patient and family education   room door open   room near nurse's station   room organization consistent   safety round/check completed   supervised activity  Administration (IS): (has bipap/cpap and unwilling to follow all commands) unable to perform  Reorientation Measures:   calendar in view   clock in view   reorientation provided  Goal: Acceptable Pain Control  Intervention: Prevent or Manage Pain  Recent Flowsheet Documentation  Taken 8/12/2024 0815 by Mariann Collins RN  Pain Management Interventions: medication (see MAR)  Goal: Effective Oxygenation and Ventilation  Intervention: Optimize Oxygenation and Ventilation  Recent Flowsheet Documentation  Taken 8/12/2024 1800 by Mariann Collins RN  Head of Bed (HOB) Positioning: HOB at 30 degrees  Taken 8/12/2024 1600 by Mariann Collins RN  Administration (IS): (has bipap/cpap and unwilling to follow all commands) unable to perform  Cough And Deep Breathing: done independently per patient  Airway/Ventilation Management: pulmonary hygiene promoted  Head of Bed (HOB) Positioning: HOB at 30 degrees  Taken 8/12/2024 1400 by Mariann Collins RN  Head of Bed (HOB) Positioning: HOB at 30 degrees  Taken 8/12/2024 1200 by Mariann Collins RN  Administration (IS): (has bipap/cpap and unwilling to follow all commands) unable to perform  Cough And Deep Breathing: done independently per patient  Airway/Ventilation Management: pulmonary hygiene promoted  Head of Bed (HOB) Positioning: HOB at 30  degrees  Taken 8/12/2024 0800 by Mariann Collins RN  Administration (IS): (has bipap/cpap and unwilling to follow all commands) unable to perform  Cough And Deep Breathing: done independently per patient  Airway/Ventilation Management: pulmonary hygiene promoted  Head of Bed (HOB) Positioning: HOB at 30 degrees  Goal: Effective Peripheral Tissue Perfusion  Intervention: Optimize Tissue Perfusion  Recent Flowsheet Documentation  Taken 8/12/2024 1600 by Mariann Collins RN  Pressure Reduction Techniques:   heels elevated off bed   pressure points protected   weight shift assistance provided   frequent weight shift encouraged  Activity Management: activity adjusted per tolerance  Taken 8/12/2024 1200 by Mariann Collins RN  Pressure Reduction Techniques:   heels elevated off bed   pressure points protected   weight shift assistance provided   frequent weight shift encouraged  Activity Management: activity adjusted per tolerance  Taken 8/12/2024 0800 by Mariann Collins RN  Pressure Reduction Techniques:   heels elevated off bed   pressure points protected   weight shift assistance provided   frequent weight shift encouraged  Activity Management: activity adjusted per tolerance   Goal Outcome Evaluation:      Plan of Care Reviewed With: patient    Overall Patient Progress: decliningOverall Patient Progress: declining    Outcome Evaluation: pt restless and agitated and unable to answer orientation questions and did not open eyes often. seems to be having hallucinations at times and mumbles to himself. ativan given per [facility] protocol. ng post pyloric placed at 1730 with another rn and confirmed by xray. able to give pt some oral medication. pt unable to swallow medications when evaluated at noon. pt was able to swallow water but would not follow commands and rn hesitant to feed pt pills and applesauce for fear of aspiration-ng tube then discussed and ordered. pt had one small clear bm/gas. pt remains in restraints-ankle  restraints added due to pt able to swing legs over side of bed attempting to climb out of bed and sit up and also attempting to kick care providers while performing cares and tasks. pt restarted on esmolol and nicardipine throughtout the day. hydralizine given x1 for 1 large episode of agiation. pt stayed on bipap majority of day due to noon attempt to wean to oximask 15liters which resuted in pt desating to 70s and becoming very restless and agitated and using accessory muscles. pt wife had long discussion with intensivist regarding goals of cares, the advanced directive the pt has and the patient's wishes-code status changed to dnr/dni. pt hr remained nsr except tachy during agitation late midday. pt has aldana in place with adequate output. bs remains wnl while npo. abdomen large/distended and slightly firm-md aware.

## 2024-08-12 NOTE — PROGRESS NOTES
[facility]   ICU Progress Note  Noe Morales MRN: [id number]  1940  Date of Admission:8/10/2024    Noe Morales MRN# [id number]   Age: 84 year old YOB: 1940     Date of Admission: 8/10/2024  Date of Service: 08/12/2024              Summary of hospitalization                  Noe Morales is a 84 year old White male with a past medical history significant for HTN, ETOH abuse, COPD, CAD, GERD, HODA (CPAP at night) OA, right shoulder replacement 8/7/24 (3 days ago) who was admitted 8/10 as a transfer from [facility] ED with a type B aortic dissection.  He presented to the ED at [facility] 8/9 evening with right shoulder pain and right sided chest pain, dizziness, and confusion.  CT revealed aortic dissection.  Admitted to the ICU for invasive hemodynamic monitoring and blood pressure control.  Confused requiring precedex, started on CIWA protocol given heavy ETOH use.  Hypoxic respiratory failure, on BiPAP overnight.  Weaned off esmolol, decreasing dose of nicardipine to maintain SBP<120 and HR<70 per vascular recommendations.         Assessment & Plan                  Noe oMrales is a 84 year old White male with a past medical history significant for HTN, ETOH abuse, COPD, CAD, GERD, OA, right shoulder replacement 8/7/24 (5 days ago) who presents as a transfer from [facility] ED with a type B aortic dissection.  Admitted to the ICU for invasive hemodynamic monitoring and blood pressure control.  Currently requiring nicardipine to meet BP goals.  Confused requiring precedex, CIWA.  Started on BiPAP 2 days ago due to hypoxia.  Not significantly hypercarbic.    ------------------------------------------------------------------------------------------------------------------    Changes today:  - Remains agitated  - Start Seroquel for agitation  - Continues on BIPAP  - NJ Feeding tube placement (delirious, to avoid aspiration) for PO meds  - Code status changed to DNI/DNR  after conversation with wife    NEURO/PSYCH:   # Sedation: Precedex gtt  # Pain/Agitation: 4 point restraints with a 1:1 overnight; Tylenol, Tramadol, Robaxin, Dilaudid    Confusion/Agitation   - Adding Seroquel 25 mg TID per NJ tube   - continue Precedex   - CIWA   - Minimize narcotics    History of ETOH abuse (6-8 drinks/day until 3 weeks ago)               - per family, no history of withdrawal sx, seizures               - CIWA with precedex               - gabapentin               - folate/thiamine     History of chronic low back pain (PTA on Norco):                - pain control as above               - heat packs prn               - PTA gabapentin     ANTHONY with panic attack:               - PTA duloxetine     Depression:                - PTA duloxetine  ------------------------------------------------------------------------------------------------------------------  PULMONARY:  Hypoxic respiratory failure:    - Bipap overnight, this morning maintaining sat>90% on 6L oxymask, wean as able   - Recheck ABG this am   - Getting a CT this morning to eval dissection   - DNI today     History of COPD, not on O2 at home:                - PTA Breo/Incruse ellipta               - prn albuterol               - Supplemental O2 prn     History of HODA: uses CPAP at night               - CPAP at night vs BiPAP as needed               - RT consult    ------------------------------------------------------------------------------------------------------------------  CARDIOLOGY:   Type B aortic dissection: plus Type A Aortic dissection               - Vascular and CT surgery: Surgical intervention would not benfit patient, recommended medical management               - SBP<120               - HR<70               - atenolol 25 BID               - Amlodipine 10mg daily               - nicardipine gtt, esmolol if needed   - clonidine 0.1mg Q8H (as part of CIWA)               - ECHO: LV EF 55-60%                - Asa/statin                - Bedrest with bathroom priviliges     History of HTN:                - PTA atenolol in addition to above     HLD:                - PTA statin  ------------------------------------------------------------------------------------------------------------------  RENAL:  Urinary retention   - Kennedy placed 8/10   - Check UA/UC: no growth up to date    Hypocalcemia   - monitor and replace as needed    CKD, baseline Cr appears to be 1-1.2: Cr stable at 0.98                - Monitor Cr daily               - avoid nephrotoxic meds     Chronic BLE edema:               - 1/2 dose PTA lasix (40 daily)     BPH:                - PTA tamsulosin, finasteride    **I/O last 24hrs:     Intake/Output Summary (Last 24 hours) at 8/12/2024 1823  Last data filed at 8/12/2024 1800  Gross per 24 hour   Intake 2454.21 ml   Output 1530 ml   Net 924.21 ml         ------------------------------------------------------------------------------------------------------------------  INFECTIOUS DISEASE:  Recent shoulder replacement with erythema:                - Orthopedics consult               - Low suspicion for postop infection per ortho    **Antibiotics: None  **Cultures: 8/10 blood cx NGTD  ------------------------------------------------------------------------------------------------------------------  GASTROINTESTINAL/GENITOURINARY:  GERD:                - PPI     **Nutrition: CLD   - Bowel regimen  ------------------------------------------------------------------------------------------------------------------  ENDOCRINE:  No acute issues, BGs <180  ------------------------------------------------------------------------------------------------------------------  HEMATOLOGIC:  History of anemia:                - Iron supplementation               - Monitor daily CBCs   - Hbg 10.2 (11.5)     Thrombocytopenia, chronic:               - Continue to monitor   - Plts  187  ------------------------------------------------------------------------------------------------------------------  RHEUMATOLOGIC:  History of Gout:                - PTA allopurinol  ------------------------------------------------------------------------------------------------------------------  SKIN/MUSCULOSKELETAL:  Right shoulder replacement 8/7/24:                - Orthopedic surgery consult               - PT/OT consults               - bedrest with bathroom privileges for now  ------------------------------------------------------------------------------------------------------------------  Prophylaxis:     -GI: PPI    -DVT: SQH    FEN: CLD  Consults: CV surg, Vascular surg, Ortho   Family: Wife, Joana, and son, updated 8/10  Code status: DNR/DNI  Disposition: ICU  ===================================================  Patient discussed with ICU staff, Dr. Benedict, Critical Care Attending    Reny Yanez MD  Surgical Critical Care Fellow           Interval History   Nursing notes reviewed  Overnight History: Agitated, didn't improve with haldol, placed in restraints and 1:1.  Off esmolol, weaning nicardipine.  Confused this am.  Urinary retention yesterday requiring aldana placement.           Medications     Current Facility-Administered Medications   Medication Dose Route Frequency Provider Last Rate Last Admin    acetaminophen (TYLENOL) tablet 975 mg  975 mg Oral Q8H Mona Llanos MD   975 mg at 08/11/24 2139    albuterol (PROVENTIL HFA/VENTOLIN HFA) inhaler  2 puff Inhalation Q4H PRN Priscila Martinez PA-C        allopurinol (ZYLOPRIM) tablet 300 mg  300 mg Oral Daily Priscila Martinez PA-C   300 mg at 08/10/24 1208    amLODIPine (NORVASC) tablet 10 mg  10 mg Oral Daily Prudence Oconnor MD   10 mg at 08/11/24 0918    aspirin EC tablet 81 mg  81 mg Oral BID Priscila Martinez PA-C   81 mg at 08/11/24 2139    atenolol (TENORMIN) tablet 25 mg  25 mg Oral BID Mona Llanos MD   25 mg at  08/11/24 2139    atorvastatin (LIPITOR) tablet 10 mg  10 mg Oral At Bedtime Priscila Martinez PA-C   10 mg at 08/11/24 2140    calcium carbonate (TUMS) chewable tablet 1,000 mg  1,000 mg Oral 4x Daily PRN Priscila Martinez PA-C        cloNIDine (CATAPRES) tablet 0.1 mg  0.1 mg Oral Q8H Mona Llanos MD   0.1 mg at 08/11/24 2139    dexmedeTOMIDine (PRECEDEX) 4 mcg/mL in sodium chloride 0.9 % 100 mL infusion  0.1-1.2 mcg/kg/hr Intravenous Continuous Mona Llanos MD 38.1 mL/hr at 08/12/24 0812 1.2 mcg/kg/hr at 08/12/24 0812    glucose gel 15-30 g  15-30 g Oral Q15 Min PRN Mona Llanos MD        Or    dextrose 50 % injection 25-50 mL  25-50 mL Intravenous Q15 Min PRN Mona Llanos MD        Or    glucagon injection 1 mg  1 mg Subcutaneous Q15 Min PRN Mona Llanos MD        doxazosin (CARDURA) tablet 2 mg  2 mg Oral At Bedtime Mona Llanos MD   2 mg at 08/11/24 2140    DULoxetine (CYMBALTA) DR capsule 120 mg  120 mg Oral Daily Priscila Martinez PA-C   120 mg at 08/11/24 0920    esmolol 20 mg/mL (BREVIBLOC) infusion 100 mL   mcg/kg/min Intravenous Continuous Mona Llanos MD   Paused at 08/10/24 1332    ferrous sulfate (FEROSUL) tablet 325 mg  325 mg Oral Daily with breakfast Priscila Martinez PA-C   325 mg at 08/11/24 0929    finasteride (PROSCAR) tablet 5 mg  5 mg Oral Daily Priscila Martinez PA-C   5 mg at 08/11/24 0919    flumazenil (ROMAZICON) injection 0.2 mg  0.2 mg Intravenous q1 min prn Mona Llanos MD        fluticasone (FLONASE) 50 MCG/ACT spray 2 spray  2 spray Both Nostrils Daily Priscila Martinez PA-C        fluticasone-vilanterol (BREO ELLIPTA) 100-25 MCG/ACT inhaler 1 puff  1 puff Inhalation Daily Priscila Martinez PA-C        And    umeclidinium (INCRUSE ELLIPTA) 62.5 MCG/ACT inhaler 1 puff  1 puff Inhalation Daily Priscila Martinez PA-C        [START ON 8/13/2024] folic acid (FOLVITE) tablet 1 mg  1 mg Oral Daily Mona Llanos MD        folic acid injection 1 mg  1 mg Intravenous Daily Viet  MD Mona   1 mg at 08/11/24 1053    furosemide (LASIX) tablet 40 mg  40 mg Oral Daily Mona Llanos MD   40 mg at 08/11/24 0911    gabapentin (NEURONTIN) capsule 1,200 mg  1,200 mg Oral At Bedtime Priscila Martinez PA-C   1,200 mg at 08/11/24 2138    gabapentin (NEURONTIN) capsule 600 mg  600 mg Oral BID Priscila Martinez PA-C   600 mg at 08/11/24 0928    haloperidol lactate (HALDOL) injection 2.5-5 mg  2.5-5 mg Intravenous Q4H PRN Mona Llanos MD   5 mg at 08/10/24 1952    heparin ANTICOAGULANT injection 5,000 Units  5,000 Units Subcutaneous Q8H Priscila Martinez PA-C   5,000 Units at 08/12/24 0225    hydrALAZINE (APRESOLINE) tablet 10 mg  10 mg Oral Q4H PRN Prudence Oconnor MD        Or    hydrALAZINE (APRESOLINE) injection 10 mg  10 mg Intravenous Q4H PRN Prudence Oconnor MD        HYDROmorphone (DILAUDID) injection 0.2 mg  0.2 mg Intravenous Q2H PRN Mona Llanos MD   0.2 mg at 08/10/24 2118    IF patient diabetic - HOLD: ALL ORAL HYPOGLYCEMICS: glipizide, glyburide, glimepiride, gliclazide, metformin (Glucophage), any metformin (Glucophage) containing medication, rosiglitazone (Avandia), pioglitazone (Actos), or sitagliptin (Januvia) on day of the procedure   Does not apply HOLD Mona Llanos MD        lidocaine (LMX4) cream   Topical Q1H PRN Priscila Martinez PA-C        lidocaine 1 % 0.1-1 mL  0.1-1 mL Other Q1H PRN Priscila Martinez PA-C        LORazepam (ATIVAN) tablet 1-2 mg  1-2 mg Oral Q30 Min PRN Mona Llanos MD        Or    LORazepam (ATIVAN) injection 1-2 mg  1-2 mg Intravenous Q30 Min PRN Mona Llanos MD   2 mg at 08/12/24 0820    methocarbamol (ROBAXIN) tablet 500 mg  500 mg Oral 4x Daily PRN Priscila Martinez PA-C        multivitamin  with lutein (OCUVITE WITH LUTEIN) per capsule 1 capsule  1 capsule Oral QAM Priscila Martinez PA-C        multivitamin w/minerals (THERA-VIT-M) tablet 1 tablet  1 tablet Oral Daily Mona Llanos MD        mupirocin (BACTROBAN) 2 % ointment   Topical BID  Priscila Martinez PA-C   Given at 08/11/24 2135    naloxone (NARCAN) injection 0.2 mg  0.2 mg Intravenous Q2 Min PRN Mp Salter DO        Or    naloxone (NARCAN) injection 0.4 mg  0.4 mg Intravenous Q2 Min PRN Mp Salter DO        Or    naloxone (NARCAN) injection 0.2 mg  0.2 mg Intramuscular Q2 Min PRN Mp Salter DO        Or    naloxone (NARCAN) injection 0.4 mg  0.4 mg Intramuscular Q2 Min PRN Mp Salter DO        niCARdipine 40 mg in 200 mL NS (CARDENE) infusion  0.5-15 mg/hr Intravenous Continuous Mona Llanos MD 75 mL/hr at 08/12/24 0937 15 mg/hr at 08/12/24 0937    ondansetron (ZOFRAN ODT) ODT tab 4 mg  4 mg Oral Q6H PRN Mona Llanos MD        Or    ondansetron (ZOFRAN) injection 4 mg  4 mg Intravenous Q6H PRN Mona Llanos MD        pantoprazole (PROTONIX) EC tablet 40 mg  40 mg Oral QAM AC Priscila Martinez PA-C   40 mg at 08/11/24 0929    polyethylene glycol (MIRALAX) Packet 17 g  17 g Oral Daily PRN Mona Llanos MD        psyllium (METAMUCIL/KONSYL) Packet 1 packet  1 packet Oral Daily Priscila Martinez PA-C        QUEtiapine (SEROquel) tablet 50 mg  50 mg Oral Once Reny Yanez MD        senna-docusate (SENOKOT-S/PERICOLACE) 8.6-50 MG per tablet 1 tablet  1 tablet Oral BID PRN Mona Llanos MD        Or    senna-docusate (SENOKOT-S/PERICOLACE) 8.6-50 MG per tablet 2 tablet  2 tablet Oral BID PRN Mona Llanos MD        senna-docusate (SENOKOT-S/PERICOLACE) 8.6-50 MG per tablet 1-2 tablet  1-2 tablet Oral BID Priscila Martinez PA-C        sodium chloride (PF) 0.9% PF flush 10 mL  10 mL Intravenous Q10 Min PRN Mona Llanos MD        sodium chloride (PF) 0.9% PF flush 3 mL  3 mL Intracatheter Q8H Priscila Martinez PA-C   3 mL at 08/11/24 1532    sodium chloride (PF) 0.9% PF flush 3 mL  3 mL Intracatheter q1 min prn Priscila Martinez PA-C        tamsulosin (FLOMAX) capsule 0.4 mg  0.4 mg Oral Daily Priscila Martinez  THELMA HURST   0.4 mg at 08/11/24 0919    thiamine (B-1) injection 200 mg  200 mg Intravenous TID Mona Llanos MD   200 mg at 08/11/24 2131    thiamine (B-1) tablet 100 mg  100 mg Oral TID Mona Llanos MD        [START ON 8/17/2024] thiamine (B-1) tablet 100 mg  100 mg Oral Daily Mona Llanos MD        traMADol (ULTRAM) half-tab 50 mg  50 mg Oral Q6H PRN Priscila Martinez PA-C        vitamin D3 (CHOLECALCIFEROL) tablet 50 mcg  50 mcg Oral QAM Priscila Martinez PA-C                     Objective      Physical Exam  Temp:  [98  F (36.7  C)-98.8  F (37.1  C)] 98.5  F (36.9  C)  Pulse:  [61-94] 72  Resp:  [14-32] 15  BP: ()/(49-79) 104/61  MAP:  [39 mmHg-95 mmHg] 72 mmHg  Arterial Line BP: ()/(29-71) 112/55  FiO2 (%):  [45 %-55 %] 45 %  SpO2:  [82 %-96 %] 90 %    FiO2 (%): 45 %  Resp: 15      General: Alert, confused, agitated  Eyes: Eyes are clear, pupils are reactive  HEENT: Oropharynx is clear and moist  Cardiovascular: Regular rate and rhythm. Mild BLE edema  Respiratory: Clear to auscultation bilaterally. NLB on oxymask, sat 90% on monitor  GI: Soft, non-tender, rotund, reducible umbilical hernia  Genitourinary: Deferred  Musculoskeletal: R shoulder dressing in place, some erythema and edema upper arm- stable, ecchymosis to shoulder and chest wall  Skin: Warm and dry, no rashes.   Neurologic: No focal deficits    Drains and Tubes: No drains or tubes present  Intravascular Access and Device: Date arterial line was placed: 8/10/24        Intake/Output Summary (Last 24 hours) at 8/12/2024 1826  Last data filed at 8/12/2024 1800  Gross per 24 hour   Intake 2454.21 ml   Output 1530 ml   Net 924.21 ml         Arterial Blood Gas  Recent Labs   Lab 08/12/24  1246 08/11/24  1202 08/10/24  1328   PH 7.45 7.45 7.41   PCO2 35 38 44   PO2 60* 91 64*   HCO3 24 26 28   O2PER 15 55 6       Venous Blood Gas   Recent Labs   Lab 08/12/24  1246 08/11/24  1202 08/10/24  1328   O2PER 15 55 6              Data:      CMP  Recent  Labs   Lab 08/12/24  1643 08/12/24  1249 08/12/24  1247 08/12/24  0401 08/12/24  0400 08/11/24  0914 08/11/24  0412 08/10/24  1550 08/10/24  1249 08/10/24  0753 08/10/24  0343 08/08/24  0523 08/07/24  1004   NA  --   --  137  --   --   --  138  --   --   --  132*  --   --    POTASSIUM  --   --  3.6 4.4  --   --  4.3  --   --   --  4.0  --  4.0   CHLORIDE  --   --  103  --   --   --  100  --   --   --  96*  --   --    CO2  --   --  24  --   --   --  27  --   --   --  27  --   --    ANIONGAP  --   --  10  --   --   --  11  --   --   --  9  --   --    * 167* 153*  --  137*   < > 157*   < >  --    < > 117*   < >  --    BUN  --   --  35.9*  --   --   --  22.1  --   --   --  17.1  --   --    CR  --   --  1.16  --   --   --  0.98  --   --   --  0.98  --  0.99   GFRESTIMATED  --   --  63  --   --   --  77  --   --   --  77  --  76   CHERY  --   --  8.7*  --   --   --  8.7*  --   --   --  8.9  --   --    MAG  --   --   --  2.4*  --   --  2.3  --  2.1  --   --   --   --    PHOS  --   --  3.4 3.4  --   --  3.1  --  2.4*  --   --   --   --    PROTTOTAL  --   --   --   --   --   --   --   --   --   --  6.2*  --   --    ALBUMIN  --   --  3.4*  --   --   --   --   --   --   --  3.6  --   --    BILITOTAL  --   --   --   --   --   --   --   --   --   --  0.8  --   --    ALKPHOS  --   --   --   --   --   --   --   --   --   --  54  --   --    AST  --   --   --   --   --   --   --   --   --   --  24  --   --    ALT  --   --   --   --   --   --   --   --   --   --  10  --   --     < > = values in this interval not displayed.       CBC  Recent Labs   Lab 08/12/24  1014 08/11/24  0412 08/10/24  0343 08/08/24  0523 08/07/24  1004   WBC 9.6 9.5 11.2*  --  6.1   RBC 3.27* 3.44* 3.68*  --  4.30*   HGB 10.2* 10.6* 11.5* 13.1* 13.7   HCT 29.9* 32.0* 35.1*  --  39.7*   MCV 91 93 95  --  92   MCH 31.2 30.8 31.3  --  31.9   MCHC 34.1 33.1 32.8  --  34.5   RDW 13.2 13.2 13.4  --  13.5    148* 129*  --  176       INR  No lab results  "found in last 7 days.    Lactic Acid  Recent Labs   Lab 08/10/24  0343   LACT 0.9       Microbiology:  All cultures:  No results for input(s): \"CULT\" in the last 168 hours.    Urine Studies:    Recent Labs   Lab Test 24  1111 24  2237 16  0353 16  1646   LEUKEST Trace* Negative Negative Large*   NITRITE Negative Negative Negative Negative   WBCU 89* <1  --  18*   RBCU 7* 1  --  5*       Imaging:  Recent Results (from the past 24 hour(s))   Echocardiogram Complete   Result Value    LVEF  55-60%    Narrative    [id number]  [id number]  [id number]  [id number]^ROGER^KIMBERLY     [facility]  Echocardiography Laboratory  [address] [place], [place] [address]     Name: ANDRIA SOLORZANO  MRN: [id number]  : 1940  Study Date: 2024 08:10 AM  Age: 84 yrs  Gender: Male  Patient Location: [facility]  Reason For Study: Aortic Dissection  Ordering Physician: KIMBERLY DURAN  Referring Physician: AGUSTIN MICHELLE  Performed By: Edvin Ford RDCS     BSA: 2.4 m2  Height: 67 in  Weight: 301 lb  HR: 77  BP: 106/48 mmHg  ______________________________________________________________________________  Procedure  Complete Portable Echo Adult. Optison (NDC #0914-6134) given intravenously.  ______________________________________________________________________________  Interpretation Summary     Technically extremely difficult study with very limited interpretable images.  Left ventricular systolic function is normal. The visual ejection fraction is  55-60%. Regional wall motion abnormalities cannot be excluded due to limited  visualization.  Rv very poorly seen bu ton limited views appears mldly dilated with mild  systoli dysfunction.  Valves were not well seen.  Ascending aorta dilatation is present. 4.0 cm.  No prior study.  ______________________________________________________________________________  Left Ventricle  The left ventricle is normal in size. Left ventricular " systolic function is  normal. The visual ejection fraction is 55-60%. Regional wall motion  abnormalities cannot be excluded due to limited visualization.     Right Ventricle  The right ventricle is not well visualized.     Atria  The left atrium is not well visualized. Right atrium not well visualized.     Mitral Valve  The mitral valve leaflets appear normal. There is no evidence of stenosis,  fluttering, or prolapse.     Aortic Valve  The aortic valve is not well visualized.     Pulmonic Valve  The pulmonic valve is not well visualized.     Vessels  The aortic root is not well visualized. Ascending aorta dilatation is present.  Inferior vena cava not well visualized for estimation of right atrial  pressure.     Pericardium  There is no pericardial effusion.     ______________________________________________________________________________  MMode/2D Measurements & Calculations  IVSd: 1.1 cm  LVIDd: 5.8 cm  LVIDs: 3.6 cm  LVPWd: 1.3 cm  FS: 38.0 %  LV mass(C)d: 299.5 grams  LV mass(C)dI: 124.5 grams/m2  asc Aorta Diam: 4.0 cm  LVOT diam: 2.2 cm  LVOT area: 3.9 cm2  Asc Ao diam index BSA (cm/m2): 1.7     Asc Ao diam index Ht(cm/m): 2.4  RWT: 0.45  TAPSE: 2.4 cm     Doppler Measurements & Calculations  MV E max eitan: 99.2 cm/sec  MV A max eitan: 80.2 cm/sec  MV E/A: 1.2  MV dec time: 0.16 sec  E/E' av.0  Lateral E/e': 11.0  Medial E/e': 13.0     ______________________________________________________________________________  Report approved by: Janae Barron 2024 12:26 PM

## 2024-08-12 NOTE — PROGRESS NOTES
Vascular Surgery Progress Note  08/12/2024       Subjective:  BP elevated this am 130s/80s when seen, per report pt had just been thrashing around. Increased confusion overnight, on BiPAP 45% FiO2 with SpO2 94.      Objective:  Temp:  [98  F (36.7  C)-98.8  F (37.1  C)] 98.6  F (37  C)  Pulse:  [61-94] 94  Resp:  [14-32] 29  BP: ()/(49-79) 104/61  MAP:  [39 mmHg-95 mmHg] 95 mmHg  Arterial Line BP: ()/(29-71) 150/71  FiO2 (%):  [45 %-55 %] 45 %  SpO2:  [82 %-96 %] 94 %    I/O last 3 completed shifts:  In: 2139.29 [P.O.:50; I.V.:2089.29]  Out: 1530 [Urine:1530]      Gen: Awake, alert, NAD  CV: RRR per radial pulse  Resp: NLB on BiPAP  Abd: distended, however compressible, soft, no evidence of peritonitis  Vascular: palpable DP pulses b/l.      Labs:  Recent Labs   Lab 08/11/24  0412 08/10/24  0343 08/08/24  0523 08/07/24  1004   WBC 9.5 11.2*  --  6.1   HGB 10.6* 11.5* 13.1* 13.7   * 129*  --  176       Recent Labs   Lab 08/12/24  0401 08/12/24  0400 08/11/24  2359 08/11/24  1944 08/11/24  0914 08/11/24  0412 08/10/24  1550 08/10/24  1249 08/10/24  0753 08/10/24  0343 08/08/24  0523 08/07/24  1004   NA  --   --   --   --   --  138  --   --   --  132*  --   --    POTASSIUM 4.4  --   --   --   --  4.3  --   --   --  4.0  --  4.0   CHLORIDE  --   --   --   --   --  100  --   --   --  96*  --   --    CO2  --   --   --   --   --  27  --   --   --  27  --   --    BUN  --   --   --   --   --  22.1  --   --   --  17.1  --   --    CR  --   --   --   --   --  0.98  --   --   --  0.98  --  0.99   GLC  --  137* 155* 161*   < > 157*   < >  --    < > 117*   < >  --    CHERY  --   --   --   --   --  8.7*  --   --   --  8.9  --   --    MAG 2.4*  --   --   --   --  2.3  --  2.1  --   --   --   --    PHOS 3.4  --   --   --   --  3.1  --  2.4*  --   --   --   --     < > = values in this interval not displayed.       Imaging:  New CTA reviewed, demonstrates certainly a Type B dissection that extends at least to the  innominate artery, difficult to assess proximal extent, may extend into arch however not very clear. Distally, dissection extends to L JONATHAN, lumen patent with flow channel open into L JONATHAN.  Celiac, SMA, and both renals appear to originate off of true lumen and remain patent and well perfused.     Assessment/Plan:   84 year old male with PMH notable for HTN, history of alcohol abuse, COPD, CAD, GERD, as well as recent right shoulder replacement 3 days ago who presented to the ED at [facility] last evening with shoulder pain some right sided chest pain, dizziness, and confusion.  Found to have likely aortic dissection which appears to originate at the level of the innominate artery and L JONATHAN with all visceral vessels originating off of the true lumen and well perfused. CTA performed yesterday which actually demonstrates some intramural hematoma extending to level of aortic root making this now a Type A aortic dissection, cardiothoracic surgery following and reviewed, do not feel patient is a surgical candidate and recommend ongoing tight BP control. He now has a worsening respiratory status and worsening confusion as well. From vascular standpoint, would recommend continued anti-impulse therapy.     - -Recommend antiimpulse therapy with IV antihypertensives to meet goal SBP less than 120 and goal HR of less than 70.   -IV esmolol and IV nicardipine drips titrated to above goals  -Pt with fluctuating blood pressures with agitation and confusion, consider addition 25mg hydralazine TID if BP remains elevated when calm.   -Patient is on home atenolol, continue  - Continue with amlodipine 10mg  -Wean off IV medications as able as p.o. antihypertensives take effect,  - continue ASA/statin  -Vascular surgery will continue to follow along        Discussed with vascular surgery staff, who is in agreement with the above.  - - - - - - - - - - - - - - - - - -  Bubba Oocnnor, PGY-4  Vascular Surgery Resident    STAFF: As above.   On high flow oxygen.  Very agitated at times but does not appear to be related to any discomfort particular abdominal.    Blood pressure and pulse fairly well-controlled with medications--does increase temporarily when agitated    Repeat CTA reviewed which did give better images.  Dissection does involve the ascending aorta continues down to the left common iliac artery.  Does not compress any of the arch vessels.  No involvement of coronary arteries.  Celiac-SMA-renal arteries, the true lumen.  Felt this was stable from the initial referral CTA.    With patient's comorbidities CV surgery does not plan any intervention.  No intervention required per vascular surgery either.  Continue with aggressive pulse and blood pressure control.  Pulmonary status may be the most critical factor.    25 minutes with patient today.  Will update family when they arrive today.       Papito Bravo MD

## 2024-08-13 NOTE — PLAN OF CARE
Problem: Adult Inpatient Plan of Care  Goal: Plan of Care Review  Description: The Plan of Care Review/Shift note should be completed every shift.  The Outcome Evaluation is a brief statement about your assessment that the patient is improving, declining, or no change.  This information will be displayed automatically on your shift  note.  Outcome: Not Progressing  Flowsheets (Taken 8/13/2024 0622)  Outcome Evaluation: Pt confused does not follow. On bipap increased from 50% fio2 to 100% overnight. sat falling as low as 70% and in 80's for hours: MD aware. Deep suctioned 2x. Restless and agitated overnight. Ativan 2x. Haldol 1x. [id number]. Dilaudid 1x. Pt on 4 point restraints. Restarted and stopped esmolol overnight. Continue dex and nicardipine.  Plan of Care Reviewed With: patient  Overall Patient Progress: declining     Problem: Risk for Delirium  Goal: Improved Attention and Thought Clarity  Outcome: Not Progressing  Intervention: Maximize Cognitive Function  Flowsheets  Taken 8/13/2024 0622  Sensory Stimulation Regulation:   care clustered   lighting decreased   quiet environment promoted  Reorientation Measures:   clock in view   calendar in view   hearing device use encouraged   reorientation provided  Taken 8/13/2024 0600  Sensory Stimulation Regulation:   care clustered   lighting decreased   quiet environment promoted  Reorientation Measures:   clock in view   calendar in view   hearing device use encouraged   reorientation provided  Taken 8/13/2024 0400  Sensory Stimulation Regulation:   care clustered   lighting decreased   quiet environment promoted  Reorientation Measures:   clock in view   calendar in view   hearing device use encouraged   reorientation provided  Taken 8/13/2024 0200  Sensory Stimulation Regulation:   care clustered   lighting decreased   quiet environment promoted  Reorientation Measures:   clock in view   calendar in view   hearing device use encouraged   reorientation  provided  Taken 8/13/2024 0000  Sensory Stimulation Regulation:   care clustered   lighting decreased   quiet environment promoted  Reorientation Measures:   clock in view   calendar in view   hearing device use encouraged   reorientation provided   Goal Outcome Evaluation:      Plan of Care Reviewed With: patient    Overall Patient Progress: decliningOverall Patient Progress: declining    Outcome Evaluation: Pt confused does not follow. On bipap increased from 50% fio2 to 100% overnight. sat falling as low as 70% and in 80's for hours: MD aware. Deep suctioned 2x. Restless and agitated overnight. Ativan 2x. Haldol 1x. [id number]. Dilaudid 1x. Pt on 4 point restraints. Restarted and stopped esmolol overnight. Continue dex and nicardipine.

## 2024-08-13 NOTE — PLAN OF CARE
Problem: Adult Inpatient Plan of Care  Goal: Plan of Care Review  Description: The Plan of Care Review/Shift note should be completed every shift.  The Outcome Evaluation is a brief statement about your assessment that the patient is improving, declining, or no change.  This information will be displayed automatically on your shift  note.  Flowsheets (Taken 8/13/2024 0278)  Outcome Evaluation: pt confused and does not follow but is more awake than previous shift. pt weaned off dex per intensivist request and only pain medication given. intensivist goal to have him on little to no sedation if possible and sitting up high so pt can ventilate as well as possible. pt remains on oral and iv medication for blood pressure control. oral medications are being increased in hope to wean off iv bp medication. pt started on antibiotic. pt remains afebrile. pt able to move all four limbs and remains strong. pt remains on bipap at 100%. pt on 2 point restraints-lowers removed. pt deep suctioned by rt x1. pt having increase in edema in upper limbs with clear yellow fluid leaking out of new IV placed in right arm by IV team. pt havin additional bruising on right arm as well. md notified of bruising and increase in edema. upper limbs raised on pillows and dose of lasix given. pt had no bm. pt turned and boosted q 2 hours and oral cares as well. pt family updated at bedside.  Plan of Care Reviewed With: patient  Overall Patient Progress: declining  Goal: Absence of Hospital-Acquired Illness or Injury  Intervention: Identify and Manage Fall Risk  Recent Flowsheet Documentation  Taken 8/13/2024 1600 by Mariann Collins RN  Safety Promotion/Fall Prevention:   activity supervised   assistive device/personal items within reach   clutter free environment maintained   increased rounding and observation   increase visualization of patient   nonskid shoes/slippers when out of bed   patient and family education   room door open   room near  nurse's station   room organization consistent   safety round/check completed   supervised activity  Taken 8/13/2024 1200 by Mariann Collins RN  Safety Promotion/Fall Prevention:   activity supervised   assistive device/personal items within reach   clutter free environment maintained   increased rounding and observation   increase visualization of patient   nonskid shoes/slippers when out of bed   patient and family education   room door open   room near nurse's station   room organization consistent   safety round/check completed   supervised activity  Taken 8/13/2024 0800 by Mariann Collins RN  Safety Promotion/Fall Prevention:   activity supervised   assistive device/personal items within reach   clutter free environment maintained   increased rounding and observation   increase visualization of patient   nonskid shoes/slippers when out of bed   patient and family education   room door open   room near nurse's station   room organization consistent   safety round/check completed   supervised activity  Intervention: Prevent Skin Injury  Recent Flowsheet Documentation  Taken 8/13/2024 1600 by Mariann Collins RN  Body Position:   turned   heels elevated   side-lying   weight shifting   upper extremity elevated  Device Skin Pressure Protection:   skin-to-skin areas padded   skin-to-device areas padded  Taken 8/13/2024 1400 by Mariann Collins RN  Body Position:   turned   heels elevated   side-lying   weight shifting   upper extremity elevated  Taken 8/13/2024 1200 by Mariann Collins RN  Body Position:   turned   heels elevated   side-lying   weight shifting   upper extremity elevated  Device Skin Pressure Protection:   skin-to-skin areas padded   skin-to-device areas padded  Taken 8/13/2024 0800 by Mariann Collins RN  Body Position:   turned   heels elevated   side-lying   weight shifting   upper extremity elevated  Device Skin Pressure Protection:   skin-to-skin areas padded   skin-to-device areas  padded  Intervention: Prevent and Manage VTE (Venous Thromboembolism) Risk  Recent Flowsheet Documentation  Taken 8/13/2024 1600 by Mariann Collins RN  VTE Prevention/Management: SCDs on (sequential compression devices)  Taken 8/13/2024 1200 by Mariann Collins RN  VTE Prevention/Management: SCDs on (sequential compression devices)  Taken 8/13/2024 0800 by Mariann Collins RN  VTE Prevention/Management: SCDs on (sequential compression devices)  Intervention: Prevent Infection  Recent Flowsheet Documentation  Taken 8/13/2024 1600 by Mariann Collins RN  Infection Prevention:   environmental surveillance performed   equipment surfaces disinfected   personal protective equipment utilized   rest/sleep promoted   single patient room provided  Taken 8/13/2024 1200 by Mariann Collins RN  Infection Prevention:   environmental surveillance performed   equipment surfaces disinfected   personal protective equipment utilized   rest/sleep promoted   single patient room provided  Taken 8/13/2024 0800 by Mariann Collins RN  Infection Prevention:   environmental surveillance performed   equipment surfaces disinfected   personal protective equipment utilized   rest/sleep promoted   single patient room provided  Goal: Optimal Comfort and Wellbeing  Intervention: Monitor Pain and Promote Comfort  Recent Flowsheet Documentation  Taken 8/13/2024 0800 by Mariann Collins RN  Pain Management Interventions:   medication (see MAR)   quiet environment facilitated   therapeutic touch  Intervention: Provide Person-Centered Care  Recent Flowsheet Documentation  Taken 8/13/2024 1600 by Mariann Collins RN  Trust Relationship/Rapport:   care explained   reassurance provided  Taken 8/13/2024 1200 by Mariann Collins RN  Trust Relationship/Rapport:   care explained   reassurance provided  Taken 8/13/2024 0800 by Mariann Collins RN  Trust Relationship/Rapport:   care explained   reassurance provided     Problem: Risk for Delirium  Goal: Optimal  Coping  Intervention: Optimize Psychosocial Adjustment to Delirium  Recent Flowsheet Documentation  Taken 8/13/2024 1600 by Mariann Collins RN  Supportive Measures: positive reinforcement provided  Taken 8/13/2024 1200 by Mariann Collins RN  Supportive Measures: positive reinforcement provided  Taken 8/13/2024 0800 by Mariann Collins RN  Supportive Measures: positive reinforcement provided  Goal: Improved Behavioral Control  Intervention: Prevent and Manage Agitation  Recent Flowsheet Documentation  Taken 8/13/2024 1600 by Mariann Collins RN  Environment Familiarity/Consistency: daily routine followed  Taken 8/13/2024 1200 by Mariann Collins RN  Environment Familiarity/Consistency: daily routine followed  Taken 8/13/2024 0800 by Mariann Collins RN  Environment Familiarity/Consistency: daily routine followed  Intervention: Minimize Safety Risk  Recent Flowsheet Documentation  Taken 8/13/2024 1600 by Mariann Collins RN  Communication Enhancement Strategies:   extra time allowed for response   one-step directions provided   repetition utilized  Enhanced Safety Measures:   pain management   review medications for side effects with activity   room near unit station    at bedside  Trust Relationship/Rapport:   care explained   reassurance provided  Taken 8/13/2024 1200 by Mariann Collins RN  Communication Enhancement Strategies:   extra time allowed for response   one-step directions provided   repetition utilized  Enhanced Safety Measures:   pain management   review medications for side effects with activity   room near unit station    at bedside  Trust Relationship/Rapport:   care explained   reassurance provided  Taken 8/13/2024 0800 by Mariann Collins RN  Communication Enhancement Strategies:   extra time allowed for response   one-step directions provided   repetition utilized  Enhanced Safety Measures:   pain management   review medications for side effects with activity    room near unit station    at bedside  Trust Relationship/Rapport:   care explained   reassurance provided  Goal: Improved Attention and Thought Clarity  Intervention: Maximize Cognitive Function  Recent Flowsheet Documentation  Taken 8/13/2024 1600 by Mariann Collins RN  Sensory Stimulation Regulation:   care clustered   lighting decreased   quiet environment promoted  Reorientation Measures:   clock in view   calendar in view   hearing device use encouraged   reorientation provided  Taken 8/13/2024 1200 by Mariann Collins RN  Sensory Stimulation Regulation:   care clustered   lighting decreased   quiet environment promoted  Reorientation Measures:   clock in view   calendar in view   hearing device use encouraged   reorientation provided  Taken 8/13/2024 0800 by Mariann Collins RN  Sensory Stimulation Regulation:   care clustered   lighting decreased   quiet environment promoted  Reorientation Measures:   clock in view   calendar in view   hearing device use encouraged   reorientation provided  Goal: Improved Sleep  Intervention: Promote Sleep  Recent Flowsheet Documentation  Taken 8/13/2024 1600 by Mariann Collins RN  Sleep/Rest Enhancement:   awakenings minimized   comfort measures   consistent schedule promoted   noise level reduced   regular sleep/rest pattern promoted   relaxation techniques promoted   room darkened   therapeutic touch utilized  Taken 8/13/2024 1200 by Mariann Collins RN  Sleep/Rest Enhancement:   awakenings minimized   comfort measures   consistent schedule promoted   noise level reduced   regular sleep/rest pattern promoted   relaxation techniques promoted   room darkened   therapeutic touch utilized  Taken 8/13/2024 0800 by Mariann Collins RN  Sleep/Rest Enhancement:   awakenings minimized   comfort measures   consistent schedule promoted   noise level reduced   regular sleep/rest pattern promoted   relaxation techniques promoted   room darkened   therapeutic touch  utilized     Problem: Alcohol Withdrawal  Goal: Alcohol Withdrawal Symptom Control  Intervention: Minimize or Manage Alcohol Withdrawal Symptoms  Recent Flowsheet Documentation  Taken 8/13/2024 1600 by Mariann Collins RN  Sensory Stimulation Regulation:   care clustered   lighting decreased   quiet environment promoted  Taken 8/13/2024 1200 by Mariann Collins RN  Sensory Stimulation Regulation:   care clustered   lighting decreased   quiet environment promoted  Taken 8/13/2024 0800 by Mariann Collins RN  Sensory Stimulation Regulation:   care clustered   lighting decreased   quiet environment promoted  Goal: Optimal Neurologic Function  Intervention: Minimize or Manage Acute Neurologic Symptoms  Recent Flowsheet Documentation  Taken 8/13/2024 1600 by Mariann Collins RN  Sensory Stimulation Regulation:   care clustered   lighting decreased   quiet environment promoted  Airway/Ventilation Management: pulmonary hygiene promoted  Cerebral Perfusion Promotion:   blood pressure monitored   normothermia promoted  Taken 8/13/2024 1200 by Mariann Collins RN  Sensory Stimulation Regulation:   care clustered   lighting decreased   quiet environment promoted  Airway/Ventilation Management: pulmonary hygiene promoted  Cerebral Perfusion Promotion:   blood pressure monitored   normothermia promoted  Taken 8/13/2024 0800 by Mariann Collins RN  Sensory Stimulation Regulation:   care clustered   lighting decreased   quiet environment promoted  Airway/Ventilation Management: pulmonary hygiene promoted  Cerebral Perfusion Promotion:   blood pressure monitored   normothermia promoted  Goal: Readiness for Change Identified  Intervention: Partner to Facilitate Behavior Change  Recent Flowsheet Documentation  Taken 8/13/2024 1600 by Mariann Collnis RN  Supportive Measures: positive reinforcement provided  Taken 8/13/2024 1200 by Mariann Collins RN  Supportive Measures: positive reinforcement provided  Taken 8/13/2024 0800 by  Mariann Collins RN  Supportive Measures: positive reinforcement provided     Problem: Restraint, Nonviolent  Goal: Absence of Harm or Injury  Intervention: Implement Least Restrictive Safety Strategies  Recent Flowsheet Documentation  Taken 8/13/2024 1600 by Mariann Collins RN  Medical Device Protection:   tubing secured   torso covered   IV pole/bag removed from visual field  Taken 8/13/2024 1200 by Mariann Collins RN  Medical Device Protection:   tubing secured   torso covered   IV pole/bag removed from visual field  Taken 8/13/2024 0800 by Mariann Collins RN  Medical Device Protection:   tubing secured   torso covered   IV pole/bag removed from visual field  Intervention: Protect Dignity, Rights and Personal Wellbeing  Recent Flowsheet Documentation  Taken 8/13/2024 1600 by Mariann Collins RN  Trust Relationship/Rapport:   care explained   reassurance provided  Taken 8/13/2024 1200 by Mariann Collins RN  Trust Relationship/Rapport:   care explained   reassurance provided  Taken 8/13/2024 0800 by Mariann Collins RN  Trust Relationship/Rapport:   care explained   reassurance provided  Intervention: Protect Skin and Joint Integrity  Recent Flowsheet Documentation  Taken 8/13/2024 1600 by Mariann Collins RN  Body Position:   turned   heels elevated   side-lying   weight shifting   upper extremity elevated  Taken 8/13/2024 1400 by Mariann Collins RN  Body Position:   turned   heels elevated   side-lying   weight shifting   upper extremity elevated  Taken 8/13/2024 1200 by Mariann Collins RN  Body Position:   turned   heels elevated   side-lying   weight shifting   upper extremity elevated  Taken 8/13/2024 0800 by Mariann Collins RN  Body Position:   turned   heels elevated   side-lying   weight shifting   upper extremity elevated     Problem: Aortic Aneurysm/Dissection Repair  Goal: Anesthesia/Sedation Recovery  Intervention: Optimize Anesthesia Recovery  Recent Flowsheet Documentation  Taken 8/13/2024 1600  by Hammer, Mariann, RN  Safety Promotion/Fall Prevention:   activity supervised   assistive device/personal items within reach   clutter free environment maintained   increased rounding and observation   increase visualization of patient   nonskid shoes/slippers when out of bed   patient and family education   room door open   room near nurse's station   room organization consistent   safety round/check completed   supervised activity  Reorientation Measures:   clock in view   calendar in view   hearing device use encouraged   reorientation provided  Taken 8/13/2024 1200 by Mariann Collins RN  Safety Promotion/Fall Prevention:   activity supervised   assistive device/personal items within reach   clutter free environment maintained   increased rounding and observation   increase visualization of patient   nonskid shoes/slippers when out of bed   patient and family education   room door open   room near nurse's station   room organization consistent   safety round/check completed   supervised activity  Reorientation Measures:   clock in view   calendar in view   hearing device use encouraged   reorientation provided  Taken 8/13/2024 0800 by Mariann Collins RN  Safety Promotion/Fall Prevention:   activity supervised   assistive device/personal items within reach   clutter free environment maintained   increased rounding and observation   increase visualization of patient   nonskid shoes/slippers when out of bed   patient and family education   room door open   room near nurse's station   room organization consistent   safety round/check completed   supervised activity  Reorientation Measures:   clock in view   calendar in view   hearing device use encouraged   reorientation provided  Goal: Acceptable Pain Control  Intervention: Prevent or Manage Pain  Recent Flowsheet Documentation  Taken 8/13/2024 0800 by Mariann Collins RN  Pain Management Interventions:   medication (see MAR)   quiet environment facilitated    therapeutic touch  Goal: Effective Oxygenation and Ventilation  Intervention: Optimize Oxygenation and Ventilation  Recent Flowsheet Documentation  Taken 8/13/2024 1600 by Mariann Collins RN  Cough And Deep Breathing: done independently per patient  Airway/Ventilation Management: pulmonary hygiene promoted  Head of Bed (HOB) Positioning: HOB at 30 degrees  Taken 8/13/2024 1400 by Mariann Collins RN  Head of Bed (HOB) Positioning: HOB at 30 degrees  Taken 8/13/2024 1200 by Mariann Collins RN  Cough And Deep Breathing: done independently per patient  Airway/Ventilation Management: pulmonary hygiene promoted  Head of Bed (HOB) Positioning: HOB at 30 degrees  Taken 8/13/2024 0800 by Mariann Collins RN  Cough And Deep Breathing: done independently per patient  Airway/Ventilation Management: pulmonary hygiene promoted  Head of Bed (HOB) Positioning: HOB at 30 degrees  Goal: Effective Peripheral Tissue Perfusion  Intervention: Optimize Tissue Perfusion  Recent Flowsheet Documentation  Taken 8/13/2024 1600 by Mariann Collins RN  Pressure Reduction Techniques: pressure points protected  Activity Management: activity adjusted per tolerance  Taken 8/13/2024 1200 by Mariann Collins RN  Pressure Reduction Techniques: pressure points protected  Activity Management: activity adjusted per tolerance  Taken 8/13/2024 0800 by Mariann Collins RN  Pressure Reduction Techniques: pressure points protected  Activity Management: activity adjusted per tolerance   Goal Outcome Evaluation:      Plan of Care Reviewed With: patient    Overall Patient Progress: decliningOverall Patient Progress: declining    Outcome Evaluation: pt confused and does not follow but is more awake than previous shift. pt weaned off dex per intensivist request and only pain medication given. intensivist goal to have him on little to no sedation if possible and sitting up high so pt can ventilate as well as possible. pt remains on oral and iv medication for  blood pressure control. oral medications are being increased in hope to wean off iv bp medication. pt started on antibiotic. pt remains afebrile. pt able to move all four limbs and remains strong. pt remains on bipap at 100%. pt on 2 point restraints-lowers removed. pt deep suctioned by rt x1. pt having increase in edema in upper limbs with clear yellow fluid leaking out of new IV placed in right arm by IV team. pt havin additional bruising on right arm as well. md notified of bruising and increase in edema. upper limbs raised on pillows and dose of lasix given. pt had no bm. pt turned and boosted q 2 hours and oral cares as well. pt family updated at bedside. Pt slim dampened and removed at 1818 an replace by PA. New labs placed and drawn and stat bilateral arm ultrasound ordered due to new arm bruising and  worsening jocelyne upper extremity edema.

## 2024-08-13 NOTE — PROGRESS NOTES
Critical Care Progress Note      08/13/2024    Name: Noe Morales MRN#: [id number]   Age: 84 year old YOB: 1940     [facility]    [facility] DAY #             Problem List:   Active Problems:    Dissection of aorta (H)    Aortic dissection (H)    Aortic dissection, type B but may also involve proximal aorta. Per surgery, conservative management as surgical risk and chance of survival nil if he required surgery.   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 He is on bipap and will continue lasix now per IV. He is on bronchodilators especially with wheezing on my exam today. He is on more oxygen now (upwards towards 100%) and more pressures.   HTN- on IV nicardipine and esmolol as needed; oral meds increased   Alcoholism and delirium- on IV Precedex as needed; seroquel.  DNR/DNI    He is delirious and at times agitated, but will be will ventilate better and be more well served the more awake he can be. We will work with BP meds to address BP with agitation while decreasing sedation to try and improve respiratory status.          Summary/Hospital Course:           Assessment and plan :     Noe Morales IS a 84 year old male admitted on 8/10/2024 for aortic dissection.   I have personally reviewed the daily labs, imaging studies, cultures and discussed the case with referring physician and consulting physicians.     My assessment and plan by system for this patient is as follows:    Neurology/Psychiatry:   1. Moves extremities spontaneously     Cardiovascular:   1.Hemodynamics - compensated off support; Nicardipine for HTN    Pulmonary/Ventilator Management:   1. Doing poorly on bipap and will continue to address all issues to optimize respiratory function     GI and Nutrition :   Deferred today    Renal/Fluids/Electrolytes:   1.  Creatinine 1.24 which is worrisome and will monitor closely.     Infectious  Disease:   1. Start Zosyn as he will likely develop infection as some point unless his respiratory status improves.     Endocrine:   1. Glucoses ok     Hematology/Oncology:   1. Hb 10.3     IV/Access:   1. Venous access -   2. Arterial access -   3.  Plan  - central access required and necessary      ICU Prophylaxis:   1. DVT: Hep Subq/ LMWH/mechanical  2. VAP: HOB 30 degrees, chlorhexidine rinse  3. Stress Ulcer: PPI/H2 blocker  4. Restraints: Nonviolent soft two point restraints required and necessary for patient safety and continued cares and good effect as patient continues to pull at necessary lines, tubes despite education and distraction. Will readdress daily.   5. IV Access - central access required and necessary for continued patient cares  6. Feeding - deferred   7. Family Update: deferred today   8. Disposition - ICU care         Key goals for next 24 hours:   1.  2.  3.               Interim History:              Key Medications:     Current Facility-Administered Medications   Medication Dose Route Frequency Provider Last Rate Last Admin    acetaminophen (TYLENOL) tablet 975 mg  975 mg Oral Q8H Mona Llanos MD   975 mg at 08/13/24 1110    allopurinol (ZYLOPRIM) tablet 300 mg  300 mg Oral Daily Priscila Martinez PA-C   300 mg at 08/13/24 1035    amLODIPine (NORVASC) tablet 10 mg  10 mg Oral Daily Prudence Oconnor MD   10 mg at 08/13/24 0810    aspirin EC tablet 81 mg  81 mg Oral BID Priscila Martinez PA-C   81 mg at 08/13/24 0810    atenolol (TENORMIN) tablet 50 mg  50 mg Oral BID Aquilino Benedict MD        atorvastatin (LIPITOR) tablet 10 mg  10 mg Oral At Bedtime Priscila Martinez PA-C   10 mg at 08/12/24 2210    cloNIDine (CATAPRES) tablet 0.2 mg  0.2 mg Oral Q8H Aquilino Benedict MD   0.2 mg at 08/13/24 1408    DULoxetine (CYMBALTA) DR capsule 120 mg  120 mg Oral Daily Priscila Martinez PA-C   120 mg at 08/13/24 0810    ferrous sulfate (FEROSUL) tablet 325 mg  325 mg Oral  Daily with breakfast Priscila Martinez PA-C   325 mg at 08/13/24 0810    finasteride (PROSCAR) tablet 5 mg  5 mg Oral Daily Priscila Martinez PA-C   5 mg at 08/13/24 1035    fluticasone (FLONASE) 50 MCG/ACT spray 2 spray  2 spray Both Nostrils Daily Priscila Martinez PA-C        fluticasone-vilanterol (BREO ELLIPTA) 100-25 MCG/ACT inhaler 1 puff  1 puff Inhalation Daily Priscila Martinez PA-C        And    umeclidinium (INCRUSE ELLIPTA) 62.5 MCG/ACT inhaler 1 puff  1 puff Inhalation Daily Priscila Martinez PA-C        folic acid (FOLVITE) tablet 1 mg  1 mg Oral Daily Mona Llanos MD   1 mg at 08/13/24 0810    furosemide (LASIX) injection 40 mg  40 mg Intravenous Q12H Aquilino Benedict MD        gabapentin (NEURONTIN) capsule 1,200 mg  1,200 mg Oral At Bedtime Priscila Martinez PA-C   1,200 mg at 08/12/24 2209    gabapentin (NEURONTIN) capsule 600 mg  600 mg Oral BID Priscila Martinez PA-C   600 mg at 08/13/24 1111    heparin ANTICOAGULANT injection 7,500 Units  7,500 Units Subcutaneous Q8H Reny Yanez MD   7,500 Units at 08/13/24 1242    hydrALAZINE (APRESOLINE) tablet 25 mg  25 mg Oral or Feeding Tube TID Aquilino Benedict MD   25 mg at 08/13/24 1540    ipratropium - albuterol 0.5 mg/2.5 mg/3 mL (DUONEB) neb solution 3 mL  3 mL Nebulization 4x daily Aquilino Benedict MD   3 mL at 08/13/24 1506    multivitamin  with lutein (OCUVITE WITH LUTEIN) per capsule 1 capsule  1 capsule Oral QAM Priscila Martinez PA-C        multivitamin w/minerals (THERA-VIT-M) tablet 1 tablet  1 tablet Oral Daily Mona Llanos MD   1 tablet at 08/13/24 0810    mupirocin (BACTROBAN) 2 % ointment   Topical BID Priscila Martinez PA-C   Given at 08/12/24 2038    pantoprazole (PROTONIX) EC tablet 40 mg  40 mg Oral QAM AC Priscila Martinez PA-C   40 mg at 08/13/24 0811    piperacillin-tazobactam (ZOSYN) 3.375 g vial to attach to  mL bag  3.375 g Intravenous Q6H Aquilino Benedict MD 0  mL/hr at 08/13/24 1054 3.375 g at 08/13/24 1540    psyllium (METAMUCIL/KONSYL) Packet 1 packet  1 packet Oral Daily Priscila Martinez PA-C        QUEtiapine (SEROquel) tablet 25 mg  25 mg Oral or Feeding Tube TID Reny Yanez MD   25 mg at 08/13/24 1540    senna-docusate (SENOKOT-S/PERICOLACE) 8.6-50 MG per tablet 1-2 tablet  1-2 tablet Oral BID Priscila Martinez PA-C   2 tablet at 08/13/24 0810    sodium chloride (PF) 0.9% PF flush 3 mL  3 mL Intracatheter Q8H Priscila Martinez PA-C   3 mL at 08/13/24 1541    tamsulosin (FLOMAX) capsule 0.4 mg  0.4 mg Oral Daily Priscila Martinez PA-C   0.4 mg at 08/13/24 0810    thiamine (B-1) tablet 100 mg  100 mg Oral TID Mona Llanos MD   100 mg at 08/13/24 1540    [START ON 8/17/2024] thiamine (B-1) tablet 100 mg  100 mg Oral Daily Mona Llanos MD        vitamin D3 (CHOLECALCIFEROL) tablet 50 mcg  50 mcg Oral QAM Priscila Martinez PA-C   50 mcg at 08/13/24 0811     Current Facility-Administered Medications   Medication Dose Route Frequency Provider Last Rate Last Admin    dexmedeTOMIDine (PRECEDEX) 4 mcg/mL in sodium chloride 0.9 % 100 mL infusion  0.1-1.2 mcg/kg/hr Intravenous Continuous Mona Llanos MD   Paused at 08/13/24 1145    esmolol 20 mg/mL (BREVIBLOC) infusion 100 mL   mcg/kg/min Intravenous Continuous Reny Yanez MD   Paused at 08/13/24 1442    niCARdipine 40 mg in 200 mL NS (CARDENE) infusion  0.5-15 mg/hr Intravenous Continuous Mona Llanos MD 75 mL/hr at 08/13/24 1332 15 mg/hr at 08/13/24 1332               Physical Examination:   Temp:  [98.1  F (36.7  C)-99.2  F (37.3  C)] 98.3  F (36.8  C)  Pulse:  [64-85] 78  Resp:  [16-35] 22  BP: (108-126)/(53-56) 126/54  MAP:  [58 mmHg-109 mmHg] 85 mmHg  Arterial Line BP: ()/(42-94) 109/68  FiO2 (%):  [50 %-100 %] 100 %  SpO2:  [82 %-95 %] 90 %    Intake/Output Summary (Last 24 hours) at 8/13/2024 1543  Last data filed at 8/13/2024 1400  Gross per 24 hour   Intake 4108.46 ml    Output 740 ml   Net 3368.46 ml     Wt Readings from Last 4 Encounters:   08/13/24 136.9 kg (301 lb 13 oz)   08/10/24 127 kg (280 lb)   08/07/24 128.8 kg (284 lb)   04/08/24 128.8 kg (284 lb)     Arterial Line BP: ()/(42-94) 109/68  MAP:  [58 mmHg-109 mmHg] 85 mmHg  FiO2 (%): 100 %, Resp: 22  Recent Labs   Lab 08/13/24  0830 08/12/24  1246 08/11/24  1202 08/10/24  1328   PH 7.45 7.45 7.45 7.41   PCO2 29* 35 38 44   PO2 79* 60* 91 64*   HCO3 20* 24 26 28   O2PER 100 15 55 6       GEN: on bipap; compensated   HEENT: head ncat, sclera anicteric, OP patent, trachea midline   PULM: compensated synchronous with bipap,     CV/COR: RRR S1S2 no gallop,  distant sounds  ABD: soft nontender, morbidly obese   EXT:  mod edema   warm  NEURO: trace movement of extremities   SKIN: no obvious rash  LINES: clean, dry intact         Data:   All data and imaging reviewed     ROUTINE ICU LABS (Last four results)  CMP  Recent Labs   Lab 08/13/24  1415 08/13/24  0843 08/13/24  0359 08/12/24  1943 08/12/24  1249 08/12/24  1247 08/12/24  0401 08/11/24  0914 08/11/24  0412 08/10/24  1550 08/10/24  1249 08/10/24  0753 08/10/24  0343   NA  --   --  139  --   --  137  --   --  138  --   --   --  132*   POTASSIUM  --   --  3.5  --   --  3.6 4.4  --  4.3  --   --   --  4.0   CHLORIDE  --   --  105  --   --  103  --   --  100  --   --   --  96*   CO2  --   --  21*  --   --  24  --   --  27  --   --   --  27   ANIONGAP  --   --  13  --   --  10  --   --  11  --   --   --  9   * 151* 143* 159*   < > 153*  --    < > 157*   < >  --    < > 117*   BUN  --   --  40.9*  --   --  35.9*  --   --  22.1  --   --   --  17.1   CR  --   --  1.24*  --   --  1.16  --   --  0.98  --   --   --  0.98   GFRESTIMATED  --   --  58*  --   --  63  --   --  77  --   --   --  77   CHERY  --   --  8.6*  --   --  8.7*  --   --  8.7*  --   --   --  8.9   MAG  --   --  2.5*  --   --   --  2.4*  --  2.3  --  2.1  --   --    PHOS  --   --  3.6  --   --  3.4 3.4  --   "3.1  --  2.4*   < >  --    PROTTOTAL  --   --  6.0*  --   --   --   --   --   --   --   --   --  6.2*   ALBUMIN  --   --  3.3*  --   --  3.4*  --   --   --   --   --   --  3.6   BILITOTAL  --   --  0.9  --   --   --   --   --   --   --   --   --  0.8   ALKPHOS  --   --  59  --   --   --   --   --   --   --   --   --  54   AST  --   --  24  --   --   --   --   --   --   --   --   --  24   ALT  --   --  10  --   --   --   --   --   --   --   --   --  10    < > = values in this interval not displayed.     CBC  Recent Labs   Lab 08/13/24  0359 08/12/24  1014 08/11/24  0412 08/10/24  0343   WBC 10.8 9.6 9.5 11.2*   RBC 3.28* 3.27* 3.44* 3.68*   HGB 10.3* 10.2* 10.6* 11.5*   HCT 30.2* 29.9* 32.0* 35.1*   MCV 92 91 93 95   MCH 31.4 31.2 30.8 31.3   MCHC 34.1 34.1 33.1 32.8   RDW 13.4 13.2 13.2 13.4    187 148* 129*     INRNo lab results found in last 7 days.  Arterial Blood Gas  Recent Labs   Lab 08/13/24  0830 08/12/24  1246 08/11/24  1202 08/10/24  1328   PH 7.45 7.45 7.45 7.41   PCO2 29* 35 38 44   PO2 79* 60* 91 64*   HCO3 20* 24 26 28   O2PER 100 15 55 6       All cultures:  No results for input(s): \"CULT\" in the last 168 hours.  Recent Results (from the past 24 hour(s))   XR Abdomen Port 1 View    Narrative    EXAM: XR ABDOMEN PORT 1 VIEW  LOCATION: [facility]  DATE: 8/12/2024    INDICATION: Confirm feeding tube placement.  COMPARISON: CT 11/20/2024      Impression    IMPRESSION: Nasoenteric feeding tube tip in the proximal fourth portion of the duodenum. This could be advanced approximately 7 cm to be at the ligament of Treitz. Nonobstructive bowel gas pattern.   XR Chest Port 1 View    Narrative    CHEST ONE VIEW  8/13/2024 9:18 AM     HISTORY: change in respiratory status    COMPARISON: CTA CAP 8/11/2024.      Impression    IMPRESSION: Cardiac silhouette appears mildly enlarged. Feeding tube  courses below the diaphragm with distal tip beyond the inferior field  of view. Pulmonary " vascular congestion. Small layering right pleural  effusion with associated atelectasis. Trace left pleural effusion. No  discernible pneumothorax. Partially imaged bilateral shoulder  arthroplasties.    CASSI ENGLISH MD         SYSTEM ID:  [id number]       MD Ravin    Billing: This patient is critically ill: Yes. Total critical care time today 40 min.

## 2024-08-13 NOTE — PROGRESS NOTES
Patient remained in BiPAP 12/6 40% with desaturation episodes throughout the night. Patient is currently on 90% FiO2 with sats in mid 80s.   Mepilex in place on bridge of nose. Skin intact.  Will continue to monitor.  8/13/2024  Jose Aguilar, RRT

## 2024-08-13 NOTE — PROGRESS NOTES
Patient has been on BiPAP ST entire shift. Settings below. Patient has skin barrier over bridge of nose, face mask interface has been changed Q4 as tolerated. NT suctioning was performed once around mid-day of thick brown/creamy secretions. Nebulizer given as ordered. SpO2 ranging today 85-93% on 100% FiO2. ABG obtained this morning on 100% FiO2.       CPAP/BiPAP Settings  IPAP: 16  EPAP: 12  Set rate: 16  FiO2: 100%  Timed Inspiration (sec): 0.8    Last Arterial Blood Gas:  pH Arterial   Date Value Ref Range Status   08/13/2024 7.45 7.35 - 7.45 Final     pCO2 Arterial   Date Value Ref Range Status   08/13/2024 29 (L) 35 - 45 mm Hg Final     pO2 Arterial   Date Value Ref Range Status   08/13/2024 79 (L) 80 - 105 mm Hg Final     Bicarbonate Arterial   Date Value Ref Range Status   08/13/2024 20 (L) 21 - 28 mmol/L Final     O2 Sat, Arterial   Date Value Ref Range Status   08/13/2024 96.9 (H) 95.0 - 96.0 % Final     Base Excess/Deficit Arterial   Date Value Ref Range Status   08/13/2024 -2.9 -3.0 - 3.0 mmol/L Final         RT will continue to follow.     Ralph Perla, RT  8/13/2024

## 2024-08-13 NOTE — PLAN OF CARE
"  Problem: Adult Inpatient Plan of Care  Goal: Plan of Care Review  Description: The Plan of Care Review/Shift note should be completed every shift.  The Outcome Evaluation is a brief statement about your assessment that the patient is improving, declining, or no change.  This information will be displayed automatically on your shift  note.  Outcome: Not Progressing  Flowsheets (Taken 8/12/2024 2244)  Outcome Evaluation: Continues on BIPAP. Restless at times. L ART line, SBP<120, HR<70. NG in place, clamped. Kennedy, darrel UOP. Esmolol stopped @ 2120. Nicardipine and dex continued. No BM.  Goal: Patient-Specific Goal (Individualized)  Description: You can add care plan individualizations to a care plan. Examples of Individualization might be:  \"Parent requests to be called daily at 9am for status\", \"I have a hard time hearing out of my right ear\", or \"Do not touch me to wake me up as it startles  me\".  Outcome: Not Progressing  Goal: Absence of Hospital-Acquired Illness or Injury  Outcome: Not Progressing  Intervention: Identify and Manage Fall Risk  Recent Flowsheet Documentation  Taken 8/12/2024 2000 by Gracy Dominguez RN  Safety Promotion/Fall Prevention:   activity supervised   assistive device/personal items within reach   clutter free environment maintained   increased rounding and observation   increase visualization of patient   nonskid shoes/slippers when out of bed   patient and family education   room door open   room near nurse's station   room organization consistent   safety round/check completed   supervised activity  Intervention: Prevent Skin Injury  Recent Flowsheet Documentation  Taken 8/12/2024 2200 by Gracy Dominguez RN  Body Position:   turned   heels elevated   side-lying   weight shifting   upper extremity elevated  Taken 8/12/2024 2000 by Gracy Dominguez RN  Body Position:   turned   heels elevated   side-lying   weight shifting   upper extremity " elevated  Intervention: Prevent and Manage VTE (Venous Thromboembolism) Risk  Recent Flowsheet Documentation  Taken 8/12/2024 2000 by Gracy Dominguez RN  VTE Prevention/Management: SCDs on (sequential compression devices)  Intervention: Prevent Infection  Recent Flowsheet Documentation  Taken 8/12/2024 2000 by Gracy Dominguez RN  Infection Prevention:   environmental surveillance performed   equipment surfaces disinfected   personal protective equipment utilized   rest/sleep promoted   single patient room provided  Goal: Optimal Comfort and Wellbeing  Outcome: Not Progressing  Intervention: Monitor Pain and Promote Comfort  Recent Flowsheet Documentation  Taken 8/12/2024 2000 by Gracy Dominguez RN  Pain Management Interventions:   care clustered   repositioned  Intervention: Provide Person-Centered Care  Recent Flowsheet Documentation  Taken 8/12/2024 2000 by Gracy Dominguez RN  Trust Relationship/Rapport:   care explained   empathic listening provided   thoughts/feelings acknowledged   reassurance provided   emotional support provided  Goal: Readiness for Transition of Care  Outcome: Not Progressing   Goal Outcome Evaluation:                 Outcome Evaluation: Continues on BIPAP. Restless at times. L ART line, SBP<120, HR<70. NG in place, clamped. Kennedy, darrel UOP. Esmolol stopped @ 2120. Nicardipine and dex continued. No BM.

## 2024-08-13 NOTE — PROCEDURES
[facility]    Arterial line placement    Date/Time: 8/13/2024 6:42 PM    Performed by: Aric Wallace PA-C  Authorized by: Aric Wallace PA-C      UNIVERSAL PROTOCOL   Site Marked: BRIDGETTE  Prior Images Obtained and Reviewed:  NA  Required items: Required blood products, implants, devices and special equipment available    Patient identity confirmed:  Arm band, hospital-assigned identification number and provided demographic data  Patient was reevaluated immediately before administering moderate or deep sedation or anesthesia  Confirmation Checklist:  Patient's identity using two indicators, relevant allergies, procedure was appropriate and matched the consent or emergent situation and correct equipment/implants were available  Time out: Immediately prior to the procedure a time out was called    Universal Protocol: the Joint Commission Universal Protocol was followed    Preparation: Patient was prepped and draped in usual sterile fashion    Indication:  AAA and hypertension management multiple ABGs respiratory failure hemodynamic monitoring  Location:  Left radial       ANESTHESIA    Local Anesthetic:  Lidocaine 1% without epinephrine      SEDATION    Patient Sedated: No      PROCEDURE DETAILS  Bishnu's Test Normal?: Bishnu's test not abnormal    Seldinger technique: Seldinger technique used    Number of Attempts:  1  Post-procedure:  Line sutured and dressing applied  CMS: normal    PROCEDURE    Length of time physician/provider present for 1:1 monitoring during sedation: 0      Aric Wallace PA-C on 8/13/2024 at 6:43 PM

## 2024-08-14 NOTE — PLAN OF CARE
"Neuro: Alert to self only, PERRLA, opens eyes spontaneously, inconsistently follows commands, MUSA.    CV: SR. Esmolol, nicardipine infusing.    Resp: Lung sounds coarse. BiPAP, FiO2 100%    GI: Bowel sounds normoactive, clear liquid BMs this shift. Diet clear liquid.    : Kennedy in place, adequate UOP.    Skin: Extremity edema. Large bruising to R shoulder, arm, chest, abdomen.    Activity: Not OOB this shift.    Other: Sitter at bedside. Tmax 100.9        Problem: Adult Inpatient Plan of Care  Goal: Plan of Care Review  Description: The Plan of Care Review/Shift note should be completed every shift.  The Outcome Evaluation is a brief statement about your assessment that the patient is improving, declining, or no change.  This information will be displayed automatically on your shift  note.  Outcome: Not Progressing  Goal: Patient-Specific Goal (Individualized)  Description: You can add care plan individualizations to a care plan. Examples of Individualization might be:  \"Parent requests to be called daily at 9am for status\", \"I have a hard time hearing out of my right ear\", or \"Do not touch me to wake me up as it startles  me\".  Outcome: Not Progressing  Goal: Absence of Hospital-Acquired Illness or Injury  Outcome: Not Progressing  Intervention: Identify and Manage Fall Risk  Recent Flowsheet Documentation  Taken 8/14/2024 0000 by Reddy Pantoja RN  Safety Promotion/Fall Prevention:   activity supervised   clutter free environment maintained   increased rounding and observation   increase visualization of patient   nonskid shoes/slippers when out of bed   patient and family education   room door open   room near nurse's station   room organization consistent   safety round/check completed   supervised activity   bedside attendant   treat reversible contributory factors   treat underlying cause  Taken 8/13/2024 2000 by Reddy Pantoja RN  Safety Promotion/Fall Prevention:   activity supervised   clutter free " environment maintained   increased rounding and observation   increase visualization of patient   nonskid shoes/slippers when out of bed   patient and family education   room door open   room near nurse's station   room organization consistent   safety round/check completed   supervised activity   bedside attendant   treat reversible contributory factors   treat underlying cause  Intervention: Prevent Skin Injury  Recent Flowsheet Documentation  Taken 8/14/2024 0000 by Reddy Pantoja RN  Body Position:   left   turned   heels elevated   weight shifting  Skin Protection:   adhesive use limited   incontinence pads utilized   pulse oximeter probe site changed   silicone foam dressing in place   skin to device areas padded   skin to skin areas padded   transparent dressing maintained  Device Skin Pressure Protection:   skin-to-device areas padded   absorbent pad utilized/changed   adhesive use limited   positioning supports utilized   pressure points protected   tubing/devices free from skin contact   skin-to-skin areas padded  Taken 8/13/2024 2200 by Reddy Pantoja RN  Body Position:   turned   heels elevated   weight shifting   right  Taken 8/13/2024 2000 by Reddy Pantoja RN  Body Position:   left   turned   heels elevated   weight shifting  Skin Protection:   adhesive use limited   incontinence pads utilized   pulse oximeter probe site changed   silicone foam dressing in place   skin to device areas padded   skin to skin areas padded   transparent dressing maintained  Device Skin Pressure Protection:   skin-to-device areas padded   absorbent pad utilized/changed   adhesive use limited   positioning supports utilized   pressure points protected   tubing/devices free from skin contact   skin-to-skin areas padded  Intervention: Prevent and Manage VTE (Venous Thromboembolism) Risk  Recent Flowsheet Documentation  Taken 8/14/2024 0000 by Reddy Pantoja RN  VTE Prevention/Management: SCDs on (sequential  compression devices)  Taken 8/13/2024 2000 by Reddy Pantoja RN  VTE Prevention/Management: SCDs on (sequential compression devices)  Intervention: Prevent Infection  Recent Flowsheet Documentation  Taken 8/14/2024 0000 by Reddy Pantoja RN  Infection Prevention:   equipment surfaces disinfected   hand hygiene promoted   personal protective equipment utilized   rest/sleep promoted   single patient room provided  Taken 8/13/2024 2000 by Reddy Pantoja RN  Infection Prevention:   equipment surfaces disinfected   hand hygiene promoted   personal protective equipment utilized   rest/sleep promoted   single patient room provided  Goal: Readiness for Transition of Care  Outcome: Not Progressing     Problem: Risk for Delirium  Goal: Optimal Coping  Outcome: Not Progressing  Intervention: Optimize Psychosocial Adjustment to Delirium  Recent Flowsheet Documentation  Taken 8/14/2024 0000 by Reddy Pantoja RN  Supportive Measures:   positive reinforcement provided   relaxation techniques promoted  Taken 8/13/2024 2000 by Reddy Pantoja RN  Supportive Measures:   positive reinforcement provided   relaxation techniques promoted  Goal: Improved Behavioral Control  Outcome: Not Progressing  Intervention: Minimize Safety Risk  Recent Flowsheet Documentation  Taken 8/14/2024 0000 by Reddy Pantoja RN  Communication Enhancement Strategies:   extra time allowed for response   one-step directions provided   repetition utilized   verbal and visual cues paired  Enhanced Safety Measures:   pain management   review medications for side effects with activity   room near unit station    at bedside   assistive devices when indicated   monitor patients coagulation values  Trust Relationship/Rapport:   care explained   reassurance provided  Taken 8/13/2024 2000 by Reddy Pantoja RN  Communication Enhancement Strategies:   extra time allowed for response   one-step directions provided   repetition utilized    verbal and visual cues paired  Enhanced Safety Measures:   pain management   review medications for side effects with activity   room near unit station    at bedside   assistive devices when indicated   monitor patients coagulation values  Trust Relationship/Rapport:   care explained   reassurance provided  Goal: Improved Attention and Thought Clarity  Outcome: Not Progressing  Intervention: Maximize Cognitive Function  Recent Flowsheet Documentation  Taken 8/14/2024 0000 by Reddy Pantoja RN  Sensory Stimulation Regulation:   care clustered   lighting decreased   quiet environment promoted   auditory stimulation minimized  Reorientation Measures:   clock in view   calendar in view   hearing device use encouraged   reorientation provided  Taken 8/13/2024 2000 by Reddy Pantoja RN  Sensory Stimulation Regulation:   care clustered   lighting decreased   quiet environment promoted   auditory stimulation minimized  Reorientation Measures:   clock in view   calendar in view   hearing device use encouraged   reorientation provided  Goal: Improved Sleep  Outcome: Not Progressing  Intervention: Promote Sleep  Recent Flowsheet Documentation  Taken 8/14/2024 0000 by Reddy Pantoja RN  Sleep/Rest Enhancement:   awakenings minimized   comfort measures   noise level reduced   regular sleep/rest pattern promoted   relaxation techniques promoted   room darkened   therapeutic touch utilized  Taken 8/13/2024 2000 by Reddy Pantoja RN  Sleep/Rest Enhancement:   awakenings minimized   comfort measures   noise level reduced   regular sleep/rest pattern promoted   relaxation techniques promoted   room darkened   therapeutic touch utilized     Problem: Alcohol Withdrawal  Goal: Alcohol Withdrawal Symptom Control  Outcome: Not Progressing  Intervention: Minimize or Manage Alcohol Withdrawal Symptoms  Recent Flowsheet Documentation  Taken 8/14/2024 0000 by Reddy Pantoja RN  Sensory Stimulation Regulation:    care clustered   lighting decreased   quiet environment promoted   auditory stimulation minimized  Aspiration Precautions:   awake/alert before oral intake   distractions minimized during oral intake   medication route adjusted   NPO pending swallow screening/evaluation   oral hygiene care promoted   respiratory status monitored   tube feeding placement verified   upright posture maintained  Taken 8/13/2024 2000 by Reddy Pantoja RN  Sensory Stimulation Regulation:   care clustered   lighting decreased   quiet environment promoted   auditory stimulation minimized  Aspiration Precautions:   awake/alert before oral intake   distractions minimized during oral intake   medication route adjusted   NPO pending swallow screening/evaluation   oral hygiene care promoted   respiratory status monitored   tube feeding placement verified   upright posture maintained  Goal: Optimal Neurologic Function  Outcome: Not Progressing  Intervention: Minimize or Manage Acute Neurologic Symptoms  Recent Flowsheet Documentation  Taken 8/14/2024 0000 by Reddy Pantoja RN  Sensory Stimulation Regulation:   care clustered   lighting decreased   quiet environment promoted   auditory stimulation minimized  Airway/Ventilation Management:   calming measures promoted   pulmonary hygiene promoted  Cerebral Perfusion Promotion:   blood pressure monitored   normothermia promoted  Taken 8/13/2024 2000 by Reddy Pantoja RN  Sensory Stimulation Regulation:   care clustered   lighting decreased   quiet environment promoted   auditory stimulation minimized  Airway/Ventilation Management:   calming measures promoted   pulmonary hygiene promoted  Cerebral Perfusion Promotion:   blood pressure monitored   normothermia promoted  Goal: Readiness for Change Identified  Outcome: Not Progressing  Intervention: Partner to Facilitate Behavior Change  Recent Flowsheet Documentation  Taken 8/14/2024 0000 by Reddy Pantoja RN  Supportive Measures:    positive reinforcement provided   relaxation techniques promoted  Taken 8/13/2024 2000 by Reddy Pantoja RN  Supportive Measures:   positive reinforcement provided   relaxation techniques promoted     Problem: Restraint, Nonviolent  Goal: Absence of Harm or Injury  Outcome: Not Progressing  Intervention: Implement Least Restrictive Safety Strategies  Recent Flowsheet Documentation  Taken 8/14/2024 0000 by Reddy Pantoja RN  Medical Device Protection:   IV pole/bag removed from visual field   torso covered   tubing secured  Taken 8/13/2024 2000 by Reddy Pantoja RN  Medical Device Protection:   IV pole/bag removed from visual field   torso covered   tubing secured  Intervention: Protect Dignity, Rights and Personal Wellbeing  Recent Flowsheet Documentation  Taken 8/14/2024 0000 by Reddy Pantoja RN  Trust Relationship/Rapport:   care explained   reassurance provided  Taken 8/13/2024 2000 by Reddy Pantoja RN  Trust Relationship/Rapport:   care explained   reassurance provided  Intervention: Protect Skin and Joint Integrity  Recent Flowsheet Documentation  Taken 8/14/2024 0000 by Reddy Pantoja RN  Body Position:   left   turned   heels elevated   weight shifting  Skin Protection:   adhesive use limited   incontinence pads utilized   pulse oximeter probe site changed   silicone foam dressing in place   skin to device areas padded   skin to skin areas padded   transparent dressing maintained  Range of Motion: active ROM (range of motion) encouraged  Taken 8/13/2024 2200 by Reddy Pantoja RN  Body Position:   turned   heels elevated   weight shifting   right  Taken 8/13/2024 2000 by Reddy Pantoja RN  Body Position:   left   turned   heels elevated   weight shifting  Skin Protection:   adhesive use limited   incontinence pads utilized   pulse oximeter probe site changed   silicone foam dressing in place   skin to device areas padded   skin to skin areas padded   transparent dressing  maintained  Range of Motion: active ROM (range of motion) encouraged     Problem: Aortic Aneurysm/Dissection Repair  Goal: Effective Bowel Elimination  Outcome: Not Progressing  Goal: Absence of Infection Signs and Symptoms  Outcome: Not Progressing  Intervention: Prevent or Manage Infection  Recent Flowsheet Documentation  Taken 8/14/2024 0000 by Reddy Pantoja RN  Infection Management: aseptic technique maintained  Taken 8/13/2024 2000 by Reddy Pantoja RN  Infection Management: aseptic technique maintained  Goal: Anesthesia/Sedation Recovery  Outcome: Not Progressing  Intervention: Optimize Anesthesia Recovery  Recent Flowsheet Documentation  Taken 8/14/2024 0000 by Reddy Pantoja RN  Safety Promotion/Fall Prevention:   activity supervised   clutter free environment maintained   increased rounding and observation   increase visualization of patient   nonskid shoes/slippers when out of bed   patient and family education   room door open   room near nurse's station   room organization consistent   safety round/check completed   supervised activity   bedside attendant   treat reversible contributory factors   treat underlying cause  Reorientation Measures:   clock in view   calendar in view   hearing device use encouraged   reorientation provided  Taken 8/13/2024 2000 by Reddy Pantoja RN  Safety Promotion/Fall Prevention:   activity supervised   clutter free environment maintained   increased rounding and observation   increase visualization of patient   nonskid shoes/slippers when out of bed   patient and family education   room door open   room near nurse's station   room organization consistent   safety round/check completed   supervised activity   bedside attendant   treat reversible contributory factors   treat underlying cause  Reorientation Measures:   clock in view   calendar in view   hearing device use encouraged   reorientation provided  Goal: Nausea and Vomiting Relief  Outcome: Not  Progressing  Goal: Effective Urinary Elimination  Outcome: Not Progressing  Goal: Effective Oxygenation and Ventilation  Outcome: Not Progressing  Intervention: Optimize Oxygenation and Ventilation  Recent Flowsheet Documentation  Taken 8/14/2024 0000 by Reddy Pantoja RN  Cough And Deep Breathing: done independently per patient  Airway/Ventilation Management:   calming measures promoted   pulmonary hygiene promoted  Head of Bed (HOB) Positioning: HOB at 60-90 degrees  Taken 8/13/2024 2200 by Reddy Pantoja RN  Head of Bed (HOB) Positioning: HOB at 60-90 degrees  Taken 8/13/2024 2000 by Reddy Pantoja RN  Cough And Deep Breathing: done independently per patient  Airway/Ventilation Management:   calming measures promoted   pulmonary hygiene promoted  Head of Bed (HOB) Positioning: HOB at 60-90 degrees  Goal: Effective Peripheral Tissue Perfusion  Outcome: Not Progressing  Intervention: Optimize Tissue Perfusion  Recent Flowsheet Documentation  Taken 8/14/2024 0000 by Reddy Pantoja RN  Pressure Reduction Techniques:   frequent weight shift encouraged   heels elevated off bed   pressure points protected   weight shift assistance provided  Activity Management: activity adjusted per tolerance  Taken 8/13/2024 2000 by Reddy Pantoja RN  Pressure Reduction Techniques:   frequent weight shift encouraged   heels elevated off bed   pressure points protected   weight shift assistance provided  Activity Management: activity adjusted per tolerance     Problem: Adult Inpatient Plan of Care  Goal: Optimal Comfort and Wellbeing  Outcome: Progressing  Intervention: Monitor Pain and Promote Comfort  Recent Flowsheet Documentation  Taken 8/13/2024 2000 by Reddy Pantoja RN  Pain Management Interventions: medication (see MAR)  Intervention: Provide Person-Centered Care  Recent Flowsheet Documentation  Taken 8/14/2024 0000 by Reddy Pantoja RN  Trust Relationship/Rapport:   care explained   reassurance  provided  Taken 8/13/2024 2000 by Reddy Pantoja RN  Trust Relationship/Rapport:   care explained   reassurance provided     Problem: Aortic Aneurysm/Dissection Repair  Goal: Absence of Bleeding  Outcome: Progressing  Intervention: Monitor and Manage Bleeding  Recent Flowsheet Documentation  Taken 8/14/2024 0000 by Reddy Pantoja RN  Bleeding Management: dressing monitored  Taken 8/13/2024 2000 by Reddy Pantoja RN  Bleeding Management: dressing monitored  Goal: Vital Signs Remain in Desired Range  Outcome: Progressing  Goal: Acceptable Pain Control  Outcome: Progressing  Intervention: Prevent or Manage Pain  Recent Flowsheet Documentation  Taken 8/13/2024 2000 by Reddy Pantoja RN  Pain Management Interventions: medication (see MAR)

## 2024-08-14 NOTE — CONSULTS
"SPIRITUAL HEALTH SERVICES - Consult Note  [facility] ICU    Referral Source/Reason for Visit: Palliative/family request for  support    Visited with John's wife, Joana, at bedside.    Joana reflects on the seriousness of John's condition and acknowledges feeling \"no hope\" for the future. They have been  for 39 years and had been planning a trip to [place] for their 40th anniversary. This is all very tender this afternoon.    Joana and John have a blended family - including three children between them. Their Buddhist kwame is important to them, and Joana names it as central to her coping at this time.    Joana requests prayer, and we share in prayer together at John's bedside.    Plan: Spiritual Health remains available. Please contact as needs arise.    Taylor Wolf  Associate     [facility] available 24/7 for emergent requests/referrals, either by paging the on-call  or by entering an ASAP/STAT consult in Epic (this will also page the on-call ).   "

## 2024-08-14 NOTE — PROGRESS NOTES
Patient remains on cont BiPAP 14/10 FiO2 100% with SpO2 in the low 90's. BiPAP mask interface changed Q4H with skin barriers in place. There was some blanchable redness on the bridge of the nose earlier today, which resolved when full face mask was switched out. Total face mask was trialed, but pt only tolerated it for about an hr.  Will cont to monitor.  8/14/2024  Berna Kate, RT

## 2024-08-14 NOTE — PHARMACY-VANCOMYCIN DOSING SERVICE
"Pharmacy Vancomycin Initial Note  Date of Service 2024  Patient's  1940  84 year old, male    Indication: [facility] Pneumonia    Current estimated CrCl = Estimated Creatinine Clearance: 52.4 mL/min (A) (based on SCr of 1.45 mg/dL (H)).    Creatinine for last 3 days  2024:  4:12 AM Creatinine 0.98 mg/dL  2024: 12:47 PM Creatinine 1.16 mg/dL  2024:  3:59 AM Creatinine 1.24 mg/dL;  7:27 PM Creatinine 1.45 mg/dL    Recent Vancomycin Level(s) for last 3 days  No results found for requested labs within last 3 days.      Vancomycin IV Administrations (past 72 hours)        No vancomycin orders with administrations in past 72 hours.                    Nephrotoxins and other renal medications (From now, onward)      Start     Dose/Rate Route Frequency Ordered Stop    24 0030  Vancomycin (VANCOCIN) 2,000 mg in 0.9% NaCl 500 mL intermittent infusion         2,000 mg  250 mL/hr over 2 Hours Intravenous ONCE 24 0018      24 0017  vancomycin place avery - receiving intermittent dosing         1 each Intravenous SEE ADMIN INSTRUCTIONS 24 0018      24 1000  piperacillin-tazobactam (ZOSYN) 3.375 g vial to attach to  mL bag        Note to Pharmacy: For [facility], [facility] and [facility]: For Zosyn-naive patients, use the \"Zosyn initial dose + extended infusion\" order panel.    3.375 g  over 30 Minutes Intravenous EVERY 6 HOURS 24 0944              Contrast Orders - past 72 hours (72h ago, onward)      Start     Dose/Rate Route Frequency Stop    24 1000  perflutren diluted 1mL to 2mL with saline (OPTISON) diluted injection 3 mL         3 mL Intravenous ONCE 24 0939    24 1000  iopamidol (ISOVUE-370) solution 72 mL         72 mL Intravenous ONCE 24 0958                Plan:  Start vancomycin 2000 mg IV x1, then dose intermittently due to DURAN  Vancomycin monitoring method: AUC  Vancomycin therapeutic monitoring goal: 400-600 mg*h/L  Pharmacy " will check vancomycin levels as appropriate in 1-3 Days.    Serum creatinine levels will be ordered daily for the first week of therapy and at least twice weekly for subsequent weeks.      Esha Abdi, PharmD, BCPS

## 2024-08-14 NOTE — PROGRESS NOTES
Critical Care Progress Note      08/14/2024    Name: Noe Morales MRN#: [id number]   Age: 84 year old YOB: 1940     [facility]  [facility] #    MV DAY #             Problem List:   Active Problems:    Dissection of aorta (H)    Aortic dissection (H)    Aortic dissection, type B  also likely involving proximal aorta. Per surgery, conservative management as surgical risk and chance of survival nil if he required surgery.   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 He is on bipap, requiring more oxygen support. He is on bronchodilators especially with some wheezing on exam. He is on more oxygen now (upwards towards 100%) and more pressure. We will give one more dose of IV Bumex today.   HTN- on IV nicardipine and esmolol; oral meds increased again today  Alcoholism and delirium- on IV Precedex as needed; seroquel. He was somewhat more alert today but in general is not improved.   DNR/DNI    Overall, he remains critical. We had a long talk with wife and family at bedside today. He remains critical with poor prognosis but we will continue to give care and monitor closely.         Summary/Hospital Course:           Assessment and plan :     Noe Morales IS a 84 year old male admitted on 8/10/2024 for acute respiratory failure.   I have personally reviewed the daily labs, imaging studies, cultures and discussed the case with referring physician and consulting physicians.     My assessment and plan by system for this patient is as follows:    Neurology/Psychiatry:   1. Moves extremities when stimulated     Cardiovascular:   1.Hemodynamics - compensated; seems to be coming down on IV HTN meds slowly as oral/ng meds increase    Pulmonary/Ventilator Management:   1. Remains on bipap on 100% oxygen and high support     GI and Nutrition :   1. Deferred today     Renal/Fluids/Electrolytes:   1. Increased creatinine to  1.57 worrisome and will follow expectantly.     Infectious Disease:   1. Continues on vanco and zosyn; cultures negative; MRSA swap positive    Endocrine:   1. Glucoses ok    Hematology/Oncology:   1. Hb 9.8     IV/Access:   1. Venous access -   2. Arterial access -   3.  Plan  - central access required and necessary      ICU Prophylaxis:   1. DVT: Hep Subq/ LMWH/mechanical  2. VAP: HOB 30 degrees, chlorhexidine rinse  3. Stress Ulcer: PPI/H2 blocker  4. Restraints: Nonviolent soft two point restraints required and necessary for patient safety and continued cares and good effect as patient continues to pull at necessary lines, tubes despite education and distraction. Will readdress daily.   5. IV Access - central access required and necessary for continued patient cares  6. Feeding - deferred   7. Family Update: as noted discussed with wife and family at bedside   8. Disposition - ICU care        Key goals for next 24 hours:   1.  2.  3.               Interim History:              Key Medications:     Current Facility-Administered Medications   Medication Dose Route Frequency Provider Last Rate Last Admin    acetaminophen (TYLENOL) tablet 975 mg  975 mg Oral Q8H Mona Llanos MD   975 mg at 08/14/24 1209    allopurinol (ZYLOPRIM) tablet 300 mg  300 mg Oral Daily Priscila Martinez PA-C   300 mg at 08/14/24 0850    amLODIPine (NORVASC) tablet 10 mg  10 mg Oral Daily Prudence Oconnor MD   10 mg at 08/14/24 0805    aspirin EC tablet 81 mg  81 mg Oral BID Priscila Martinez PA-C   81 mg at 08/14/24 0815    atenolol (TENORMIN) tablet 100 mg  100 mg Oral BID Aquilino Benedict MD        atorvastatin (LIPITOR) tablet 10 mg  10 mg Oral At Bedtime Priscila Martinez PA-C   10 mg at 08/13/24 2101    cloNIDine (CATAPRES) tablet 0.2 mg  0.2 mg Oral Q8H Aquilino Benedict MD   0.2 mg at 08/14/24 1652    DULoxetine (CYMBALTA) DR capsule 120 mg  120 mg Oral Daily Priscila Martinez PA-C   120 mg at 08/14/24 0806     ferrous sulfate (FEROSUL) tablet 325 mg  325 mg Oral Daily with breakfast Priscila Martinez PA-C   325 mg at 08/14/24 0815    finasteride (PROSCAR) tablet 5 mg  5 mg Oral Daily Priscila Martinez PA-C   5 mg at 08/14/24 0842    fluticasone (FLONASE) 50 MCG/ACT spray 2 spray  2 spray Both Nostrils Daily Priscila Martinez PA-C        fluticasone-vilanterol (BREO ELLIPTA) 100-25 MCG/ACT inhaler 1 puff  1 puff Inhalation Daily Priscila Martinez PA-C        And    umeclidinium (INCRUSE ELLIPTA) 62.5 MCG/ACT inhaler 1 puff  1 puff Inhalation Daily Priscila Martinez PA-C        folic acid (FOLVITE) tablet 1 mg  1 mg Oral Daily Mona Llanos MD   1 mg at 08/14/24 0806    gabapentin (NEURONTIN) capsule 1,200 mg  1,200 mg Oral At Bedtime Priscila Martinez PA-C   1,200 mg at 08/13/24 2101    gabapentin (NEURONTIN) capsule 600 mg  600 mg Oral BID Priscila Martinez PA-C   600 mg at 08/14/24 1209    heparin ANTICOAGULANT injection 7,500 Units  7,500 Units Subcutaneous Q8H Reny Yanez MD   7,500 Units at 08/14/24 1410    hydrALAZINE (APRESOLINE) tablet 50 mg  50 mg Oral or Feeding Tube TID Aquilino Benedict MD        ipratropium - albuterol 0.5 mg/2.5 mg/3 mL (DUONEB) neb solution 3 mL  3 mL Nebulization 4x daily Aquilino Benedict MD   3 mL at 08/14/24 1559    multivitamin  with lutein (OCUVITE WITH LUTEIN) per capsule 1 capsule  1 capsule Oral QAM Priscila Martinez PA-C   1 capsule at 08/14/24 0802    multivitamin w/minerals (THERA-VIT-M) tablet 1 tablet  1 tablet Oral Daily Mona Llanos MD   1 tablet at 08/14/24 0814    mupirocin (BACTROBAN) 2 % ointment   Topical BID Priscila Martinez PA-C   Given at 08/12/24 2038    pantoprazole (PROTONIX) 2 mg/mL suspension 40 mg  40 mg Oral QAM Dwayne Nickerson MD   40 mg at 08/14/24 0657    pantoprazole (PROTONIX) EC tablet 40 mg  40 mg Oral QAM Priscila Choudhury PA-C   40 mg at 08/13/24 0811    piperacillin-tazobactam (ZOSYN) 3.375 g vial to  attach to  mL bag  3.375 g Intravenous Q6H Aquilino Benedict MD 0 mL/hr at 08/13/24 1054 3.375 g at 08/14/24 1712    psyllium (METAMUCIL/KONSYL) Packet 1 packet  1 packet Oral Daily Priscila Martinez PA-C        QUEtiapine (SEROquel) tablet 25 mg  25 mg Oral or Feeding Tube TID Reny Yanez MD   25 mg at 08/14/24 1718    senna-docusate (SENOKOT-S/PERICOLACE) 8.6-50 MG per tablet 1-2 tablet  1-2 tablet Oral BID Priscila Martinez PA-C   1 tablet at 08/14/24 0815    sodium chloride (PF) 0.9% PF flush 3 mL  3 mL Intracatheter Q8H Priscila Martinez PA-C   3 mL at 08/14/24 1719    tamsulosin (FLOMAX) capsule 0.4 mg  0.4 mg Oral Daily Priscila Martinez PA-C   0.4 mg at 08/14/24 0815    thiamine (B-1) tablet 100 mg  100 mg Oral TID Mona Llanos MD   100 mg at 08/14/24 1718    [START ON 8/17/2024] thiamine (B-1) tablet 100 mg  100 mg Oral Daily Mona Llanos MD        vancomycin place avery - receiving intermittent dosing  1 each Intravenous See Admin Instructions Esha Abdi, [facility]        vitamin D3 (CHOLECALCIFEROL) tablet 50 mcg  50 mcg Oral QAM Priscila Martinez PA-C   50 mcg at 08/14/24 0806     Current Facility-Administered Medications   Medication Dose Route Frequency Provider Last Rate Last Admin    dexmedeTOMIDine (PRECEDEX) 4 mcg/mL in sodium chloride 0.9 % 100 mL infusion  0.1-0.2 mcg/kg/hr Intravenous Continuous Aquilino Benedict MD   Paused at 08/13/24 1145    esmolol 20 mg/mL (BREVIBLOC) infusion 100 mL   mcg/kg/min Intravenous Continuous Reny Yanez MD 19.1 mL/hr at 08/14/24 1758 50 mcg/kg/min at 08/14/24 1758    niCARdipine 40 mg in 200 mL NS (CARDENE) infusion  0.5-15 mg/hr Intravenous Continuous Mona Llanos MD 37.5 mL/hr at 08/14/24 1350 7.5 mg/hr at 08/14/24 1350               Physical Examination:   Temp:  [97.5  F (36.4  C)-100.9  F (38.3  C)] 97.5  F (36.4  C)  Pulse:  [72-95] 76  Resp:  [10-33] 16  BP: (106-115)/(57-60) 115/57  MAP:  [41  mmHg-86 mmHg] 67 mmHg  Arterial Line BP: ()/(20-81) 111/49  FiO2 (%):  [100 %] 100 %  SpO2:  [87 %-96 %] 91 %    Intake/Output Summary (Last 24 hours) at 8/14/2024 1824  Last data filed at 8/14/2024 1809  Gross per 24 hour   Intake 1823.5 ml   Output 1675 ml   Net 148.5 ml     Wt Readings from Last 4 Encounters:   08/14/24 137.7 kg (303 lb 9.2 oz)   08/10/24 127 kg (280 lb)   08/07/24 128.8 kg (284 lb)   04/08/24 128.8 kg (284 lb)     Arterial Line BP: ()/(20-81) 111/49  MAP:  [41 mmHg-86 mmHg] 67 mmHg  FiO2 (%): 100 %, Resp: 16  Recent Labs   Lab 08/13/24  0830 08/12/24  1246 08/11/24  1202 08/10/24  1328   PH 7.45 7.45 7.45 7.41   PCO2 29* 35 38 44   PO2 79* 60* 91 64*   HCO3 20* 24 26 28   O2PER 100 15 55 6       GEN: mod resp distress but compensated on bipap on exam   HEENT: head ncat, sclera anicteric, OP patent, trachea midline   PULM: compensated synchronous with bipap, mild wheezing noted anteriorly    CV/COR: RRR S1S2 no gallop,  No rub, no murmur  ABD: soft nontender, obese  EXT:  mild edema   warm  NEURO: grossly intact albeit weak  SKIN: no obvious rash  LINES: clean, dry intact         Data:   All data and imaging reviewed     ROUTINE ICU LABS (Last four results)  CMP  Recent Labs   Lab 08/14/24  1257 08/14/24  0807 08/14/24  0355 08/14/24  0142 08/14/24  0030 08/13/24  2000 08/13/24  1927 08/13/24  0843 08/13/24  0359 08/12/24  1249 08/12/24  1247 08/12/24  0401 08/11/24  0914 08/11/24  0412 08/10/24  0753 08/10/24  0343   NA  --   --  143  --   --   --  142  --  139  --  137  --   --  138  --  132*   POTASSIUM  --   --  3.8 3.5  --   --  3.4  --  3.5  --  3.6 4.4  --  4.3  --  4.0   CHLORIDE  --   --  109*  --   --   --  108*  --  105  --  103  --   --  100  --  96*   CO2  --   --  22  --   --   --  22  --  21*  --  24  --   --  27  --  27   ANIONGAP  --   --  12  --   --   --  12  --  13  --  10  --   --  11  --  9   * 131* 141*  --  133*   < > 147*   < > 143*   < > 153*  --    <  "> 157*   < > 117*   BUN  --   --  43.7*  --   --   --  44.2*  --  40.9*  --  35.9*  --   --  22.1  --  17.1   CR  --   --  1.57*  --   --   --  1.45*  --  1.24*  --  1.16  --   --  0.98  --  0.98   GFRESTIMATED  --   --  44*  --   --   --  48*  --  58*  --  63  --   --  77  --  77   CHERY  --   --  8.4*  --   --   --  8.5*  --  8.6*  --  8.7*  --   --  8.7*  --  8.9   MAG  --   --  2.4*  --   --   --   --   --  2.5*  --   --  2.4*  --  2.3   < >  --    PHOS  --   --  4.2  --   --   --   --   --  3.6  --  3.4 3.4  --  3.1   < >  --    PROTTOTAL  --   --  6.0*  --   --   --   --   --  6.0*  --   --   --   --   --   --  6.2*   ALBUMIN  --   --  3.2*  --   --   --   --   --  3.3*  --  3.4*  --   --   --   --  3.6   BILITOTAL  --   --  1.0  --   --   --   --   --  0.9  --   --   --   --   --   --  0.8   ALKPHOS  --   --  62  --   --   --   --   --  59  --   --   --   --   --   --  54   AST  --   --  20  --   --   --   --   --  24  --   --   --   --   --   --  24   ALT  --   --  11  --   --   --   --   --  10  --   --   --   --   --   --  10    < > = values in this interval not displayed.     CBC  Recent Labs   Lab 08/14/24  0355 08/13/24  0359 08/12/24  1014 08/11/24  0412   WBC 12.2* 10.8 9.6 9.5   RBC 3.20* 3.28* 3.27* 3.44*   HGB 9.8* 10.3* 10.2* 10.6*   HCT 29.3* 30.2* 29.9* 32.0*   MCV 92 92 91 93   MCH 30.6 31.4 31.2 30.8   MCHC 33.4 34.1 34.1 33.1   RDW 13.5 13.4 13.2 13.2    227 187 148*     INRNo lab results found in last 7 days.  Arterial Blood Gas  Recent Labs   Lab 08/13/24  0830 08/12/24  1246 08/11/24  1202 08/10/24  1328   PH 7.45 7.45 7.45 7.41   PCO2 29* 35 38 44   PO2 79* 60* 91 64*   HCO3 20* 24 26 28   O2PER 100 15 55 6       All cultures:  No results for input(s): \"CULT\" in the last 168 hours.  Recent Results (from the past 24 hour(s))   US Upper Extremity Venous Duplex Bilat    Narrative    EXAM: US UPPER EXTREMITY VENOUS DUPLEX BILATERAL  LOCATION: [facility]  DATE: " 8/13/2024    INDICATION: Severe right upper extremity swelling, moderate left upper extremity swelling  COMPARISON: None.  TECHNIQUE: Venous Duplex ultrasound of both upper extremities with (when possible) and without compression, augmentation, and duplex. Color flow and spectral Doppler with waveform analysis performed.    FINDINGS: Ultrasound includes evaluation of the internal jugular veins, innominate veins, subclavian veins, axillary veins, and brachial veins. The superficial cephalic and basilic veins were also evaluated where seen.    RIGHT: No deep venous thrombosis. No superficial thrombophlebitis.    LEFT: No deep venous thrombosis. No superficial thrombophlebitis.       Impression    IMPRESSION:  No deep venous thrombosis in the bilateral upper extremities.       MD Ravin    Billing: This patient is critically ill: Yes. Total critical care time today 45 min.

## 2024-08-14 NOTE — PROGRESS NOTES
Vascular surgery progress note    Visited patient at bedside today, as well as his son and wife.  Discussed with them that from an aortic dissection standpoint, his dissection is stable and management continues to be antiimpulse therapy with blood pressure goals of less than 120 systolic and heart rate goals of less than 70.  He continues to require IV medications to meet these goals.  However, a major challenge for the patient has been ongoing issues with oxygenation as well as delirium.  He has been saturating in the 80s intermittently throughout the past day or 2 despite being on BiPAP 100% FiO2.  He is clear has been clear that he would not want to be intubated.  With this he has had ongoing agitation and delirium often requiring restraints, and this does elevate his blood pressures as well.     Unfortunately, the major issue for Noe right now is his ongoing need to oxygenate.  If he were to decompensate he would not be intubated, discussions have been ongoing with family regarding best course forward for John.     From a vascular surgery perspective, we would recommend ongoing antiimpulse therapy, no plan for placement of TEVAR, as this would not help with situation with respiratory status.  Discussed this with patient's son and wife at bedside.     Vascular surgery will continue to follow along peripherally.  Continue to manage blood pressure and heart rate with goals as above.    Please do not hesitate to reach out with questions or concerns.    Bubba Oconnor MD  Vascular Surgery PGY4  To reach vascular surgery [place] team on call, please go to [facility] and from the drop down find the following:   VASCULAR SURGERY/[facility]  Then page the person listed to the right of day/night call. Can click the pager to page.

## 2024-08-14 NOTE — PROGRESS NOTES
Patient remained on BiPAP ST overnight, with settings 16/12, 100% FiO2. Nebulizer treatments given as ordered. Patient has mepilex skin barrier over bridge of nose. Mask interfaces has been changed Q4 as tolerated.   Will continue to monitor.    8/14/2024  Jose Aguilar, RRT

## 2024-08-14 NOTE — CONSULTS
Palliative Care Consultation Note  [facility]      Patient: Noe Goetz [facility]  Date of Admission:  8/10/2024    Requesting Clinician / Team: Reny Yanez MD / Critical Care  Reason for consult: Decisional support  Patient and family support       Recommendations & Counseling     GOALS OF CARE:   Restorative with limits   Per Joana's request we discussed what a transition to comfort care would be like now or in a future scenario that John is able to gain some stability.   Joana also asked about hospice at discharge or in the hospital. We discussed John would have to meet certain criteria to qualify for inpatient hospice below, but we would be able to care for his comfort even if he does not qualify.  If he is stable enough to discharge, care management will assist with finding appropriate care with hospice support in the community and we would do our best to make that transition a comfortable one.   GIP may also be provided at the end of an acute hospital stay if there is a need for pain control or symptom management which cannot be feasibly provided in the home or nursing home setting at hospital discharge.  The following examples of patient status triggers that may lead to a change to GIP level of care:  Pain or symptom crisis not managed by changes in treatment in the current setting or that requires frequent medication adjustments and monitoring  Intractable nausea/vomiting  Advanced open wounds requiring changes in treatment and close monitoring  Unmanageable respiratory distress  Delirium with behavior issues  Sudden decline necessitating intensive nursing intervention  Imminent death - only if skilled nursing needs are present    ADVANCE CARE PLANNING:  Patient has an advance directive dated 7/24/2014.  Primary Health Care Agent Spouse Joana.  Alternate(s) Daughter Kamini.   There is no POLST form on file, recommend to complete prior to DC.  Code status: No CPR- Do NOT  "Intubate    MEDICAL MANAGEMENT:   We are not actively managing symptoms at this time.    PSYCHOSOCIAL/SPIRITUAL SUPPORT:  Family Noe has a large blended family.  He lives with spouse of 39 years Joana.  There are several children, grandchildren and great grand children all residing in different areas of [place].   [place] [facility]: [facility] requests  support  Noe's Daughter-in-law lost her mother due to cancer on hospice earlier this week.   It is helpful for Joana to receive all information straight forward about current condition and relevant information to plan for future.    Palliative  will follow for support when available.       Palliative Care will continue to follow. Thank you for the consult and allowing us to aid in the care of Noe Morales.    MARCY Lujan CNP  [facility], Palliative Care  Securely message with the Vocera Web Console (learn more here) or  Text page via [facility] Paging/Directory         Assessment      Noe Morales is a 84 year old male with a past medical history of HTN, alcohol abuse, COPD, CAD, GERD, as well as recent right shoulder replacement 3 days prior to admission who presented from [facility] ED with increasing back pain and delirium and new finding on aortic dissection.  He is not a surgical candidate, current medical management has been recommended for \"greatest hope of survival and quality of life.\"  Since admission Noe's condition has decompensated with acute respiratory failure and worsening delirium thought d/t metabolic encephalopathy, alcoholism,  COPD, and/or atelectasis and small effusions.  He is currently requiring BiPAP support and IV blood pressure management in ICU.    Today, the patient was seen for:  Patient and family support    History of Present Illness   Met with Noe's wife Joana, Son, Daughter-in-law and granddaughter at [place] bedside.  Noe was unable to participate.   Introduced the role of " palliative care as an interdisciplinary team that cares for patients with serious illness to help support symptom management, communication, coping for patients and their families as well as support with medical decision making.    Prognosis, Goals, & Planning:   Functional Status just prior to this current hospitalization:  ECOG2 (Ambulatory and capable of all selfcare but unable to carry out any work activities; may need help with IADLs up and about > 50% of waking hours)  No recent weight loss of functional decline.   Recent hospitalizations elective.    Prognosis, Goals, and/or Advance Care Planning:  Noe's family has a good understanding of his diagnosis and unclear prognosis.   Joana asks about hospice and education provided on transition to comfort-focused goals of care would be while hospitalized including discontinuation of IV fluids, cardiac monitoring, labs, tube feeding,  and other interventions that do not directly promote comfort.  Anticipatory guidance was given regarding symptom management specific to John's situation at end of life. We discussed utilization of medications to ease air hunger, agitation and restlessness if Bipap is withdrawn.  Discussed that this process is very purposeful in terms of ensuring patient is as comfortable as possible and that family wishes are honored.  If John becomes stable enough to discharge to hospice, education was provided regarding hospice philosophy, prognostic,and eligibility criteria. Discussed what services are provided and those that are not,  Discussed common misconceptions. We explored the various disposition options where they can receive hospice care (home, residential hospice homes, LTC with hospice).  John's family is familiar with hospice as his brother was on hospice and son's mother-in-law recently  of cancer while on hospice.    Code Status was addressed today:   Yes, reviewed and confirmed previous decision for DNR/DNI.      Patient's decision  making preferences: unable to assess        Patient has decision-making capacity today for complex decisions: Unreliable          Coping, Meaning, & Spirituality:   Mood, coping, and/or meaning in the context of serious illness were addressed today: Yes   Joana requests  support and anticipates need for counseling / grief support.  Joana is currently seeking to understand all information, possible outcomes and options available for Noe's care.     Social:   Living situation:lives with significant other/spouse  Important relationships/caregivers:Spouse Joana, adult children, 6 grandchildren and 8 great grandchildren all residing in [place].  Areas of fulfillment/elicia: John loved  spending time on the computer, bird watching and gardening.     Medications:  Reviewed this patient's medication profile and medications from this hospitalization. Notable medications PTA :Multiple prescriptions for Vicodin or Oxycodone over the past year in small supply #12-#30 on average monthly presumably for chronic shoulder pain given history of recent shoulder arthroplasty.  Gabapentin   Duloxetine  Robaxin  Senna    [place] Board of Pharmacy Data Base Reviewed: Yes:   reviewed - controlled substances reflected in medication list.    Current medications:  Tylenol  Cymbalta  Gabapentin  Seroquel 25mg TID  Precedex drip  Tramadol 150mg total yesterday.  IV Dialudid PRN - 1.4mg in past 24 hours = 28OME    ROS:  Comprehensive ROS is reviewed and is negative except as here & per HPI: Unable    Physical Exam   Vital Signs with Ranges  Temp:  [98.5  F (36.9  C)-100.9  F (38.3  C)] 98.5  F (36.9  C)  Pulse:  [72-95] 76  Resp:  [10-33] 16  BP: (106-121)/(57-60) 109/60  MAP:  [41 mmHg-109 mmHg] 67 mmHg  Arterial Line BP: ()/(20-94) 111/49  FiO2 (%):  [100 %] 100 %  SpO2:  [85 %-96 %] 91 %  Wt Readings from Last 10 Encounters:   08/14/24 137.7 kg (303 lb 9.2 oz)   08/10/24 127 kg (280 lb)   08/07/24 128.8 kg (284 lb)    04/08/24 128.8 kg (284 lb)   02/09/24 130.6 kg (288 lb)   12/06/22 124.7 kg (275 lb)   05/28/19 123.8 kg (273 lb)   05/20/19 112.5 kg (248 lb)   07/30/12 112.5 kg (248 lb)   09/10/11 118.4 kg (261 lb)     303 lbs 9.17 oz    PHYSICAL EXAM:  GEN:  somnolent, mild restlessness.   HEENT:  Normocephalic/atraumatic, BiPAP on, NG tube.  LUNGS:  non labored breathing on BiPAP.  Symmetric chest rise on inhalation noted.  ABD:  Obese  SKIN:  Dry to touch, no exanthems noted in the visualized areas.     Data reviewed:  Recent Labs   Lab 08/14/24  1257 08/14/24  0807 08/14/24  0355 08/14/24  0142 08/13/24  2000 08/13/24  1927 08/13/24  0843 08/13/24  0359 08/12/24  1247 08/12/24  1014   WBC  --   --  12.2*  --   --   --   --  10.8  --  9.6   HGB  --   --  9.8*  --   --   --   --  10.3*  --  10.2*   MCV  --   --  92  --   --   --   --  92  --  91   PLT  --   --  271  --   --   --   --  227  --  187   NA  --   --  143  --   --  142  --  139   < >  --    POTASSIUM  --   --  3.8 3.5  --  3.4  --  3.5   < >  --    CHLORIDE  --   --  109*  --   --  108*  --  105   < >  --    CO2  --   --  22  --   --  22  --  21*   < >  --    BUN  --   --  43.7*  --   --  44.2*  --  40.9*   < >  --    CR  --   --  1.57*  --   --  1.45*  --  1.24*   < >  --    ANIONGAP  --   --  12  --   --  12  --  13   < >  --    CHERY  --   --  8.4*  --   --  8.5*  --  8.6*   < >  --    * 131* 141*  --    < > 147*   < > 143*   < >  --    ALBUMIN  --   --  3.2*  --   --   --   --  3.3*   < >  --    PROTTOTAL  --   --  6.0*  --   --   --   --  6.0*  --   --    BILITOTAL  --   --  1.0  --   --   --   --  0.9  --   --    ALKPHOS  --   --  62  --   --   --   --  59  --   --    ALT  --   --  11  --   --   --   --  10  --   --    AST  --   --  20  --   --   --   --  24  --   --     < > = values in this interval not displayed.     EXAM: CTA CHEST ABDOMEN PELVIS W CONTRAST  LOCATION: [facility]  DATE: 8/11/2024     INDICATION: Pain, altered  mental status, aortic dissection on recent CT, evaluate extent  COMPARISON: CT 8/10/2024  TECHNIQUE: CT angiogram chest abdomen pelvis during arterial phase of injection of IV contrast. 2D and 3D MIP reconstructions were performed by the CT technologist. Dose reduction techniques were used.   CONTRAST: 72 mL Isovue 370     FINDINGS:   CT ANGIOGRAM CHEST, ABDOMEN, AND PELVIS: Acute aortic syndrome is again noted with likely penetrating ulcer in the descending thoracic aorta (series 6 image 268) and associated intramural hematoma which extends superiorly through the aortic arch to the   level of the aortic root. Aneurysmal dilation of the ascending thoracic aorta measuring up to 4.5 cm, stable from prior exam when remeasured for consistency. Intramural hematoma also continues into the right brachiocephalic artery (series 6 image 114)   without significant associated narrowing. The visualized portions of the great vessels are otherwise unremarkable. Coronary arteries are grossly patent and there is no significant pericardial effusion. Pulmonary arteries are dilated without definite   embolus. Distinct dissection flap is seen just above the diaphragmatic hiatus with true lumen supplying the celiac, superior mesenteric, and bilateral renal arteries which are grossly patent. Inferior mesenteric artery is supplied from the false lumen.   Dissection extends into the left common and external iliac arteries without evidence of critical stenosis or occlusion. Visualized portions of the common and superficial femoral arteries are widely patent in the proximal thighs. No evidence of active   peritoneal or retroperitoneal hemorrhage.     LUNGS AND PLEURA: Interval enlargement of bilateral pleural effusions with associated compressive atelectasis. No pneumothorax. Calcified pleural plaques, compatible with asbestos exposure.     MEDIASTINUM/AXILLAE: No suspicious lymphadenopathy.     CORONARY ARTERY CALCIFICATION: Mild.      HEPATOBILIARY: Cholelithiasis without evidence of acute cholecystitis or biliary obstruction.     PANCREAS: Normal.     SPLEEN: Normal.     ADRENAL GLANDS: Normal.     KIDNEYS/BLADDER: No hydronephrosis. Urinary bladder is decompressed with a Kennedy catheter but otherwise unremarkable.     BOWEL: Diverticulosis of the colon. No acute inflammatory change. No obstruction.      LYMPH NODES: No suspicious lymphadenopathy.     PELVIC ORGANS: Normal.     MUSCULOSKELETAL: No acute bony abnormality. Stable postsurgical changes status post recent right shoulder arthroplasty with unchanged gas and fluid collection. Left shoulder arthroplasty is unchanged. Small fat-containing periumbilical hernia without   evidence of acute complication.                                                                      IMPRESSION:  1.  Type A acute aortic syndrome with likely penetrating ulcer in the descending thoracic aorta and intramural hematoma extending proximally to the level of the aortic root. There is likely involvement with the proximal right brachiocephalic artery but   no evidence of significant stenosis or occlusion of the great vessels. Coronary arteries appear grossly patent and there is no pericardial effusion.  2.  Dissection flap extends into the left external iliac artery with no evidence of critical stenosis or occlusion in the abdomen or pelvis.  3.  Overall extent and involvement of the dissection/intramural hematoma is likely stable from most recent CT.  4.  Enlargement of bilateral pleural effusions.    Medical Decision Making       Please see A&P for additional details of medical decision making.  MANAGEMENT DISCUSSED with the following over the past 24 hours: Intesivist   NOTE(S)/MEDICAL RECORDS REVIEWED over the past 24 hours: Intesivist, Vascular surgery, Thoracic Surgery, Ortho, nursing   Tests REVIEWED in the past 24 hours:  - See lab/imaging results included in the data section of the note  SUPPLEMENTAL  HISTORY, in addition to the patient's history, over the past 24 hours obtained from:   - Spouse or significant other  - Adult Son   Medical complexity over the past 24 hours:  - Confirmation of DNR/DNI

## 2024-08-14 NOTE — PROGRESS NOTES
[facility]   ICU Progress Note  Noe Morales MRN: [id number]  1940  Date of Admission:8/10/2024    Noe Morales MRN# [id number]   Age: 84 year old YOB: 1940     Date of Admission: 8/10/2024  Date of Service: 08/14/2024              Summary of hospitalization                  Noe Morales is a 84 year old White male with a past medical history significant for HTN, ETOH abuse, COPD, CAD, GERD, HODA (CPAP at night) OA, right shoulder replacement 8/7/24 (3 days ago) who was admitted 8/10 as a transfer from [facility] ED with a type A aortic dissection.  He presented to the ED at [facility] 8/9 evening with right shoulder pain and right sided chest pain, dizziness, and confusion.  CT revealed aortic dissection.  Admitted to the ICU for invasive hemodynamic monitoring and blood pressure control.  Confused requiring precedex, started on CIWA protocol given heavy ETOH use.  Hypoxic respiratory failure, on BiPAP overnight.  Weaned off esmolol, decreasing dose of nicardipine to maintain SBP<120 and HR<70 per vascular recommendations.         Assessment & Plan     Noe Morales is a 84 year old White male with a past medical history significant for HTN, ETOH abuse, COPD, CAD, GERD, OA, right shoulder replacement on 8/7/24 who presented as a transfer from [facility] ED. Diagnosed with Type A aortic dissection.  Admitted to the ICU for invasive hemodynamic monitoring and blood pressure control.  Currently requiring nicardipine to meet BP goals.  Confused requiring precedex, CIWA.  Started on BiPAP 2 days ago due to hypoxia.  Not significantly hypercarbic.    ------------------------------------------------------------------------------------------------------------    Changes today:  - Remains agitated, confused  - Continues Seroquel for agitation  - Continues on BIPAP  - NJ Feeding tube placed 2 days ago (delirious, to avoid aspiration) for PO meds  - Code status changed to  DNI/DNR 2 days ago after conversation with wife  - Palliative Care Consult today. Wife would like to learn about Hospice Care.    NEURO/PSYCH:   # Sedation: Precedex gtt  # Sedaton/Pain/Agitation: 4 point restraints with a 1:1 overnight; Tylenol, Tramadol, Robaxin, Dilaudid    Confusion/Agitation   - Adding Seroquel 25 mg TID per NJ tube   - CIWA   - Minimize narcotics    History of ETOH abuse (6-8 drinks/day until 3 weeks ago)               - per family, no history of withdrawal sx, seizures               - CIWA               - gabapentin               - folate/thiamine     History of chronic low back pain (PTA on Norco):                - pain control as above               - heat packs prn               - PTA gabapentin     ANTHOYN with panic attack:               - PTA duloxetine     Depression:                - PTA duloxetine  ------------------------------------------------------------------------------------------------------------------  PULMONARY:  Hypoxic respiratory failure:    - Continues to require Bipap   - Do not intubate 2 days ago    History of COPD, not on O2 at home:                - PTA Breo/Incruse ellipta               - prn albuterol               - Supplemental O2 prn     History of HODA: uses CPAP at night               - CPAP at night vs BiPAP as needed               - RT consult    ------------------------------------------------------------------------------------------------------------------  CARDIOLOGY:   Type A aortic dissection               - Vascular and CT surgery: Surgical intervention would not benfit patient, recommended medical management               - SBP<120               - HR<70               - atenolol 100 BID               - Amlodipine 10mg daily               - nicardipine gtt, esmolol if needed   - clonidine 0.1mg Q8H (as part of CIWA)               - ECHO: LV EF 55-60%                - Asa/statin     History of HTN:                - PTA atenolol in addition to above      HLD:                - PTA statin  ------------------------------------------------------------------------------------------------------------------  RENAL:  Urinary retention   - Brent placed 8/10   - Check UA/UC: no growth up to date    Hypocalcemia   - monitor and replace as needed    Acute on CKD, baseline Cr appears to be 1-1.2: Cr stable at 1.57               - Monitor Cr daily               - avoid nephrotoxic meds     Chronic BLE edema:               - 1/2 dose PTA lasix (40 daily)     BPH:                - PTA tamsulosin, finasteride    **I/O last 24hrs:       Intake/Output Summary (Last 24 hours) at 8/14/2024 1955  Last data filed at 8/14/2024 1809  Gross per 24 hour   Intake 1823.5 ml   Output 1675 ml   Net 148.5 ml     ------------------------------------------------------------------------------------------------------------------  INFECTIOUS DISEASE:  Recent shoulder replacement with erythema:                - Orthopedics consult               - Low suspicion for postop infection per ortho    **Antibiotics: None  **Cultures: 8/10 blood cx NGTD  ------------------------------------------------------------------------------------------------------------------  GASTROINTESTINAL/GENITOURINARY:  GERD:                - PPI     **Nutrition: Nutrition Consult for TFs as patient remains delirious.   - Bowel regimen  ------------------------------------------------------------------------------------------------------------------  ENDOCRINE:  No acute issues, BGs <180  ------------------------------------------------------------------------------------------------------------------  HEMATOLOGIC:  History of anemia:                - Iron supplementation               - Monitor daily CBCs   - Hbg 9.8 from 10.2 (11.5)     Thrombocytopenia, chronic:               - Continue to monitor   - Plts  271  ------------------------------------------------------------------------------------------------------------------  RHEUMATOLOGIC:  History of Gout:                - PTA allopurinol  ------------------------------------------------------------------------------------------------------------------  SKIN/MUSCULOSKELETAL:  Right shoulder replacement 8/7/24:                - Orthopedic surgery consult               - PT/OT consults               - bedrest with bathroom privileges for now  ------------------------------------------------------------------------------------------------------------------  Prophylaxis:     -GI: PPI    -DVT: SQH    FEN: CLD  Consults: CV surg, Vascular surg, Ortho   Family: Wife, Joana, and son, updated 8/10  Code status: DNR/DNI  Disposition: ICU  ===================================================  Patient discussed with ICU staff, Dr. Benedict, Critical Care Attending    Reny Yanez MD  Surgical Critical Care Fellow           Interval History   Nursing notes reviewed  Overnight History: Agitated, didn't improve with haldol, placed in restraints and 1:1.  Off esmolol, weaning nicardipine.  Confused this am.  Urinary retention yesterday requiring aldana placement.           Medications     Current Facility-Administered Medications   Medication Dose Route Frequency Provider Last Rate Last Admin    acetaminophen (TYLENOL) tablet 975 mg  975 mg Oral Q8H Mona Llanos MD   975 mg at 08/14/24 0340    albuterol (PROVENTIL HFA/VENTOLIN HFA) inhaler  2 puff Inhalation Q4H PRN Priscila Martinez PA-C        allopurinol (ZYLOPRIM) tablet 300 mg  300 mg Oral Daily Priscila Martinez PA-C   300 mg at 08/14/24 0850    amLODIPine (NORVASC) tablet 10 mg  10 mg Oral Daily Prudence Oconnor MD   10 mg at 08/14/24 0805    aspirin EC tablet 81 mg  81 mg Oral BID Priscila Martinez PA-C   81 mg at 08/14/24 0815    atenolol (TENORMIN) tablet 50 mg  50 mg Oral BID Aquilino Benedict MD    50 mg at 08/14/24 0850    atorvastatin (LIPITOR) tablet 10 mg  10 mg Oral At Bedtime Priscila Martinez PA-C   10 mg at 08/13/24 2101    calcium carbonate (TUMS) chewable tablet 1,000 mg  1,000 mg Oral 4x Daily PRN Priscila Martinez PA-C        cloNIDine (CATAPRES) tablet 0.2 mg  0.2 mg Oral Q8H Aquilino Benedict MD   0.2 mg at 08/14/24 0549    dexmedeTOMIDine (PRECEDEX) 4 mcg/mL in sodium chloride 0.9 % 100 mL infusion  0.1-1.2 mcg/kg/hr Intravenous Continuous Mona Llanos MD   Paused at 08/13/24 1145    glucose gel 15-30 g  15-30 g Oral Q15 Min PRN Mona Llanos MD        Or    dextrose 50 % injection 25-50 mL  25-50 mL Intravenous Q15 Min PRN Mona Llanos MD        Or    glucagon injection 1 mg  1 mg Subcutaneous Q15 Min PRN Mona Llanos MD        DULoxetine (CYMBALTA) DR capsule 120 mg  120 mg Oral Daily Priscila Martinez PA-C   120 mg at 08/14/24 0806    esmolol 20 mg/mL (BREVIBLOC) infusion 100 mL   mcg/kg/min Intravenous Continuous Reny Yanez MD 38.1 mL/hr at 08/14/24 0956 100 mcg/kg/min at 08/14/24 0956    ferrous sulfate (FEROSUL) tablet 325 mg  325 mg Oral Daily with breakfast Priscila Martinez PA-C   325 mg at 08/14/24 0815    finasteride (PROSCAR) tablet 5 mg  5 mg Oral Daily Priscila Martinez PA-C   5 mg at 08/14/24 0842    flumazenil (ROMAZICON) injection 0.2 mg  0.2 mg Intravenous q1 min prn Mona Llanos MD        fluticasone (FLONASE) 50 MCG/ACT spray 2 spray  2 spray Both Nostrils Daily Priscila Martinez PA-C        fluticasone-vilanterol (BREO ELLIPTA) 100-25 MCG/ACT inhaler 1 puff  1 puff Inhalation Daily Priscila Martinez PA-C        And    umeclidinium (INCRUSE ELLIPTA) 62.5 MCG/ACT inhaler 1 puff  1 puff Inhalation Daily Priscila Martinez PA-C        folic acid (FOLVITE) tablet 1 mg  1 mg Oral Daily Mona Llanos MD   1 mg at 08/14/24 0806    gabapentin (NEURONTIN) capsule 1,200 mg  1,200 mg Oral At Bedtime Priscila Martinez PA-C   1,200 mg at 08/13/24 2101     gabapentin (NEURONTIN) capsule 600 mg  600 mg Oral BID Priscila Martinez PA-C   600 mg at 08/14/24 0814    haloperidol lactate (HALDOL) injection 2.5-5 mg  2.5-5 mg Intravenous Q4H PRN Mona Llanos MD   5 mg at 08/13/24 0154    heparin ANTICOAGULANT injection 7,500 Units  7,500 Units Subcutaneous Q8H Reny Yanez MD   7,500 Units at 08/14/24 0340    hydrALAZINE (APRESOLINE) tablet 10 mg  10 mg Oral Q4H PRN Prudence Oconnor MD        Or    hydrALAZINE (APRESOLINE) injection 10 mg  10 mg Intravenous Q4H PRN Prudence Oconnor MD   10 mg at 08/13/24 0136    hydrALAZINE (APRESOLINE) tablet 25 mg  25 mg Oral or Feeding Tube TID Aquilino Benedict MD   25 mg at 08/14/24 0806    HYDROmorphone (DILAUDID) injection 0.2 mg  0.2 mg Intravenous Q2H PRN Mona Llanos MD   0.2 mg at 08/14/24 0951    IF patient diabetic - HOLD: ALL ORAL HYPOGLYCEMICS: glipizide, glyburide, glimepiride, gliclazide, metformin (Glucophage), any metformin (Glucophage) containing medication, rosiglitazone (Avandia), pioglitazone (Actos), or sitagliptin (Januvia) on day of the procedure   Does not apply HOLD Mona Llanos MD        ipratropium - albuterol 0.5 mg/2.5 mg/3 mL (DUONEB) neb solution 3 mL  3 mL Nebulization 4x daily Aquilino Benedict MD   3 mL at 08/14/24 0756    lidocaine (LMX4) cream   Topical Q1H PRN Priscila Martinez PA-C        lidocaine 1 % 0.1-1 mL  0.1-1 mL Other Q1H PRN Priscila Martinez PA-C        LORazepam (ATIVAN) tablet 1-2 mg  1-2 mg Oral Q30 Min PRN Mona Llanos MD        Or    LORazepam (ATIVAN) injection 1-2 mg  1-2 mg Intravenous Q30 Min PRN Mona Llanos MD   2 mg at 08/13/24 0848    methocarbamol (ROBAXIN) tablet 500 mg  500 mg Oral 4x Daily PRN Priscila Martinez PA-C   500 mg at 08/14/24 0815    multivitamin  with lutein (OCUVITE WITH LUTEIN) per capsule 1 capsule  1 capsule Oral QAM Priscila Martinez PA-C   1 capsule at 08/14/24 0802    multivitamin w/minerals (THERA-VIT-M) tablet 1  tablet  1 tablet Oral Daily Mona Llanos MD   1 tablet at 08/14/24 0814    mupirocin (BACTROBAN) 2 % ointment   Topical BID Priscila Martinez PA-C   Given at 08/12/24 2038    naloxone (NARCAN) injection 0.2 mg  0.2 mg Intravenous Q2 Min PRN Mp Salter DO        Or    naloxone (NARCAN) injection 0.4 mg  0.4 mg Intravenous Q2 Min PRN Mp Salter DO        Or    naloxone (NARCAN) injection 0.2 mg  0.2 mg Intramuscular Q2 Min PRN Mp Salter DO        Or    naloxone (NARCAN) injection 0.4 mg  0.4 mg Intramuscular Q2 Min PRN Mp Salter DO        niCARdipine 40 mg in 200 mL NS (CARDENE) infusion  0.5-15 mg/hr Intravenous Continuous Mona Llanos MD 37.5 mL/hr at 08/14/24 0814 7.5 mg/hr at 08/14/24 0814    ondansetron (ZOFRAN ODT) ODT tab 4 mg  4 mg Oral Q6H PRN Mona Llanos MD        Or    ondansetron (ZOFRAN) injection 4 mg  4 mg Intravenous Q6H PRN Mona Llanos MD        pantoprazole (PROTONIX) 2 mg/mL suspension 40 mg  40 mg Oral QAM AC Dwayne Mejia MD   40 mg at 08/14/24 0657    pantoprazole (PROTONIX) EC tablet 40 mg  40 mg Oral QAM AC Priscila Martinez PA-C   40 mg at 08/13/24 0811    piperacillin-tazobactam (ZOSYN) 3.375 g vial to attach to  mL bag  3.375 g Intravenous Q6H Aquilino Benedict MD 0 mL/hr at 08/13/24 1054 3.375 g at 08/14/24 0916    polyethylene glycol (MIRALAX) Packet 17 g  17 g Oral Daily PRN Mona Llanos MD   17 g at 08/13/24 1900    psyllium (METAMUCIL/KONSYL) Packet 1 packet  1 packet Oral Daily Priscila Martinez PA-C        QUEtiapine (SEROquel) tablet 25 mg  25 mg Oral or Feeding Tube TID Reny Yanez MD   25 mg at 08/14/24 0804    senna-docusate (SENOKOT-S/PERICOLACE) 8.6-50 MG per tablet 1 tablet  1 tablet Oral BID Mona Sharpe MD        Or    senna-docusate (SENOKOT-S/PERICOLACE) 8.6-50 MG per tablet 2 tablet  2 tablet Oral BID Mona Sharpe MD        senna-docgiorgiote  (SENOKOT-S/PERICOLACE) 8.6-50 MG per tablet 1-2 tablet  1-2 tablet Oral BID Priscila Martinez PA-C   1 tablet at 08/14/24 0815    sodium chloride (PF) 0.9% PF flush 10 mL  10 mL Intravenous Q10 Min PRN Mona Llanos MD        sodium chloride (PF) 0.9% PF flush 3 mL  3 mL Intracatheter Q8H Priscila Martinez PA-C   3 mL at 08/14/24 0845    sodium chloride (PF) 0.9% PF flush 3 mL  3 mL Intracatheter q1 min prn Priscila Martinez PA-C        tamsulosin (FLOMAX) capsule 0.4 mg  0.4 mg Oral Daily Priscila Martinez PA-C   0.4 mg at 08/14/24 0815    thiamine (B-1) tablet 100 mg  100 mg Oral TID Mona Llanos MD   100 mg at 08/14/24 0805    [START ON 8/17/2024] thiamine (B-1) tablet 100 mg  100 mg Oral Daily Mona Llanos MD        traMADol (ULTRAM) half-tab 50 mg  50 mg Oral Q6H PRN Priscila Martinez PA-C   50 mg at 08/14/24 0340    vancomycin place avery - receiving intermittent dosing  1 each Intravenous See Admin Instructions Esha Abdi, [facility]        vitamin D3 (CHOLECALCIFEROL) tablet 50 mcg  50 mcg Oral QAM Priscila Martinez PA-C   50 mcg at 08/14/24 0806                 Objective      Physical Exam  Temp:  [98.3  F (36.8  C)-100.9  F (38.3  C)] 98.6  F (37  C)  Pulse:  [72-95] 76  Resp:  [10-35] 16  BP: (106-121)/(57-60) 109/60  MAP:  [41 mmHg-109 mmHg] 67 mmHg  Arterial Line BP: ()/(20-94) 111/49  FiO2 (%):  [100 %] 100 %  SpO2:  [85 %-96 %] 94 %    FiO2 (%): 100 %, Resp: 16      General: Alert, confused, agitated  Eyes: Eyes are clear, pupils are reactive  HEENT: Oropharynx is clear and moist  Cardiovascular: Regular rate and rhythm. Mild BLE edema  Respiratory: Clear to auscultation bilaterally. NLB on oxymask, sat 90% on monitor  GI: Soft, non-tender, rotund, reducible umbilical hernia  Genitourinary: Deferred  Musculoskeletal: R shoulder dressing in place, some erythema and edema upper arm- stable, ecchymosis to shoulder and chest wall  Skin: Warm and dry, no rashes.   Neurologic: No focal  deficits    Drains and Tubes: No drains or tubes present  Intravascular Access and Device: Date arterial line was placed: 8/10/24        Intake/Output Summary (Last 24 hours) at 8/12/2024 1826  Last data filed at 8/12/2024 1800  Gross per 24 hour   Intake 2454.21 ml   Output 1530 ml   Net 924.21 ml         Arterial Blood Gas  Recent Labs   Lab 08/13/24  0830 08/12/24  1246 08/11/24  1202 08/10/24  1328   PH 7.45 7.45 7.45 7.41   PCO2 29* 35 38 44   PO2 79* 60* 91 64*   HCO3 20* 24 26 28   O2PER 100 15 55 6       Venous Blood Gas   Recent Labs   Lab 08/13/24  0830 08/12/24  1246 08/11/24  1202 08/10/24  1328   O2PER 100 15 55 6              Data:      CMP  Recent Labs   Lab 08/14/24  0807 08/14/24  0355 08/14/24  0142 08/14/24  0030 08/13/24 2000 08/13/24  1927 08/13/24  0843 08/13/24  0359 08/12/24  1249 08/12/24  1247 08/12/24  0401 08/11/24  0914 08/11/24  0412 08/10/24  0753 08/10/24  0343   NA  --  143  --   --   --  142  --  139  --  137  --   --  138  --  132*   POTASSIUM  --  3.8 3.5  --   --  3.4  --  3.5  --  3.6 4.4  --  4.3  --  4.0   CHLORIDE  --  109*  --   --   --  108*  --  105  --  103  --   --  100  --  96*   CO2  --  22  --   --   --  22  --  21*  --  24  --   --  27  --  27   ANIONGAP  --  12  --   --   --  12  --  13  --  10  --   --  11  --  9   * 141*  --  133* 139* 147*   < > 143*   < > 153*  --    < > 157*   < > 117*   BUN  --  43.7*  --   --   --  44.2*  --  40.9*  --  35.9*  --   --  22.1  --  17.1   CR  --  1.57*  --   --   --  1.45*  --  1.24*  --  1.16  --   --  0.98  --  0.98   GFRESTIMATED  --  44*  --   --   --  48*  --  58*  --  63  --   --  77  --  77   CHERY  --  8.4*  --   --   --  8.5*  --  8.6*  --  8.7*  --   --  8.7*  --  8.9   MAG  --  2.4*  --   --   --   --   --  2.5*  --   --  2.4*  --  2.3   < >  --    PHOS  --  4.2  --   --   --   --   --  3.6  --  3.4 3.4  --  3.1   < >  --    PROTTOTAL  --  6.0*  --   --   --   --   --  6.0*  --   --   --   --   --   --  6.2*  "  ALBUMIN  --  3.2*  --   --   --   --   --  3.3*  --  3.4*  --   --   --   --  3.6   BILITOTAL  --  1.0  --   --   --   --   --  0.9  --   --   --   --   --   --  0.8   ALKPHOS  --  62  --   --   --   --   --  59  --   --   --   --   --   --  54   AST  --  20  --   --   --   --   --  24  --   --   --   --   --   --  24   ALT  --  11  --   --   --   --   --  10  --   --   --   --   --   --  10    < > = values in this interval not displayed.       CBC  Recent Labs   Lab 08/14/24  0355 08/13/24  0359 08/12/24  1014 08/11/24  0412   WBC 12.2* 10.8 9.6 9.5   RBC 3.20* 3.28* 3.27* 3.44*   HGB 9.8* 10.3* 10.2* 10.6*   HCT 29.3* 30.2* 29.9* 32.0*   MCV 92 92 91 93   MCH 30.6 31.4 31.2 30.8   MCHC 33.4 34.1 34.1 33.1   RDW 13.5 13.4 13.2 13.2    227 187 148*       INR  No lab results found in last 7 days.    Lactic Acid  Recent Labs   Lab 08/10/24  0343   LACT 0.9       Microbiology:  All cultures:  No results for input(s): \"CULT\" in the last 168 hours.    Urine Studies:    Recent Labs   Lab Test 08/11/24  1111 02/09/24  2237 09/01/16  0353 08/02/16  1646   LEUKEST Trace* Negative Negative Large*   NITRITE Negative Negative Negative Negative   WBCU 89* <1  --  18*   RBCU 7* 1  --  5*       Imaging:  Recent Results (from the past 24 hour(s))   US Upper Extremity Venous Duplex Bilat    Narrative    EXAM: US UPPER EXTREMITY VENOUS DUPLEX BILATERAL  LOCATION: [facility]  DATE: 8/13/2024    INDICATION: Severe right upper extremity swelling, moderate left upper extremity swelling  COMPARISON: None.  TECHNIQUE: Venous Duplex ultrasound of both upper extremities with (when possible) and without compression, augmentation, and duplex. Color flow and spectral Doppler with waveform analysis performed.    FINDINGS: Ultrasound includes evaluation of the internal jugular veins, innominate veins, subclavian veins, axillary veins, and brachial veins. The superficial cephalic and basilic veins were also " evaluated where seen.    RIGHT: No deep venous thrombosis. No superficial thrombophlebitis.    LEFT: No deep venous thrombosis. No superficial thrombophlebitis.       Impression    IMPRESSION:  No deep venous thrombosis in the bilateral upper extremities.

## 2024-08-15 NOTE — PROCEDURES
[facility]    Triple Lumen PICC Placement    Date/Time: 8/15/2024 10:07 AM    Performed by: Taniya Castro RN  Authorized by: Aquilino Benedict MD  Indications: vascular access      UNIVERSAL PROTOCOL   Site Marked: Yes  Prior Images Obtained and Reviewed:  Yes  Required items: Required blood products, implants, devices and special equipment available    Patient identity confirmed:  Arm band, provided demographic data, anonymous protocol, patient vented/unresponsive and hospital-assigned identification number  NA - No sedation, light sedation, or local anesthesia  Confirmation Checklist:  Patient's identity using two indicators, relevant allergies, procedure was appropriate and matched the consent or emergent situation and correct equipment/implants were available  Time out: Immediately prior to the procedure a time out was called    Universal Protocol: the Joint Commission Universal Protocol was followed    Preparation: Patient was prepped and draped in usual sterile fashion    ESBL (mL):  3     ANESTHESIA    Anesthesia:  Local infiltration  Local Anesthetic:  Lidocaine 1% without epinephrine  Anesthetic Total (mL):  1      SEDATION    Patient Sedated: No        Preparation: skin prepped with ChloraPrep  Skin prep agent: skin prep agent completely dried prior to procedure  Sterile barriers: maximum sterile barriers were used: cap, mask, sterile gown, sterile gloves, and large sterile sheet  Hand hygiene: hand hygiene performed prior to central venous catheter insertion  Type of line used: PICC  Catheter type: triple lumen  Lumen type: power PICC  Lumen Identification: Gray, Red and White  Catheter size: 5 Fr  Brand: Bard  Lot number: [id number]  Placement method: venipuncture, MST, ultrasound and tip navigation system  Number of attempts: 1  Difficulty threading catheter: yes  Successful placement: yes  Orientation: left    Location: cephalic vein  Arm circumference: adults  10 cm  Extremity circumference: 44  Visible catheter length: 0  Total catheter length: 48  Dressing and securement: thrombin hemostasis patch applied, blood cleaned with CHG, gauze, hemostatic agent, occlusive dressing applied, securement device, site cleansed, sterile dressing applied, statlock and transparent dressing  Post procedure assessment: blood return through all ports, free fluid flow and placement verified by 3CG technology  PROCEDURE   Patient Tolerance:  Patient tolerated the procedure well with no immediate complicationsDescribe Procedure: Successful placement of triple lumen PICC LUE Cephalic vein. Difficulty getting PICC to drop down into SVC. Unable to tell if PICC in correct position via 3CG. Will obtain CXR to verify placement. Bedside RN notified.  Disposal: sharps and needle count correct at the end of procedure, needles and guidewire disposed in sharps container

## 2024-08-15 NOTE — PLAN OF CARE
Goal Outcome Evaluation:       Patient remains alert and following commands, but confused. PRN ativan given for CIWAS from 7-16. Continues on bipap. Restraints still needed as patient tries to take mask off. Esmolol titrated off. Kennedy intact with good output. NG intact. Very edematous and weepy. Potassium replaced this morning.         Problem: Adult Inpatient Plan of Care  Goal: Plan of Care Review  Description:   Outcome: Not Progressing     Problem: Adult Inpatient Plan of Care  Goal: Absence of Hospital-Acquired Illness or Injury  Intervention: Identify and Manage Fall Risk  Recent Flowsheet Documentation  Taken 8/15/2024 0400 by Bharti Graham, RN  Safety Promotion/Fall Prevention:   activity supervised   clutter free environment maintained   increased rounding and observation   increase visualization of patient   nonskid shoes/slippers when out of bed   patient and family education   room door open   room near nurse's station   room organization consistent   safety round/check completed   supervised activity   bedside attendant   treat reversible contributory factors   treat underlying cause  Taken 8/15/2024 0000 by Bharti Graham RN  Safety Promotion/Fall Prevention:   activity supervised   clutter free environment maintained   increased rounding and observation   increase visualization of patient   nonskid shoes/slippers when out of bed   patient and family education   room door open   room near nurse's station   room organization consistent   safety round/check completed   supervised activity   bedside attendant   treat reversible contributory factors   treat underlying cause  Taken 8/14/2024 2000 by Bharti Graham, RN  Safety Promotion/Fall Prevention:   activity supervised   clutter free environment maintained   increased rounding and observation   increase visualization of patient   nonskid shoes/slippers when out of bed   patient and family education   room door open   room near nurse's  station   room organization consistent   safety round/check completed   supervised activity   bedside attendant   treat reversible contributory factors   treat underlying cause  Intervention: Prevent Skin Injury  Recent Flowsheet Documentation  Taken 8/15/2024 0400 by Bhrati Graham RN  Body Position:   right   turned   heels elevated   upper extremity elevated  Skin Protection:   adhesive use limited   incontinence pads utilized   pulse oximeter probe site changed   silicone foam dressing in place   skin to device areas padded   skin to skin areas padded   transparent dressing maintained  Device Skin Pressure Protection:   skin-to-device areas padded   absorbent pad utilized/changed   adhesive use limited   positioning supports utilized   pressure points protected   tubing/devices free from skin contact   skin-to-skin areas padded  Taken 8/15/2024 0200 by Bharti Graham RN  Body Position:   turned   heels elevated   upper extremity elevated   left  Taken 8/15/2024 0000 by Bharti Graham RN  Body Position:   right   turned   heels elevated   upper extremity elevated  Skin Protection:   adhesive use limited   incontinence pads utilized   pulse oximeter probe site changed   silicone foam dressing in place   skin to device areas padded   skin to skin areas padded   transparent dressing maintained  Device Skin Pressure Protection:   skin-to-device areas padded   absorbent pad utilized/changed   adhesive use limited   positioning supports utilized   pressure points protected   tubing/devices free from skin contact   skin-to-skin areas padded  Taken 8/14/2024 2200 by Bharti Graham RN  Body Position:   turned   heels elevated   upper extremity elevated   left  Taken 8/14/2024 2000 by Bharti Graham RN  Body Position:   right   turned   heels elevated   upper extremity elevated  Skin Protection:   adhesive use limited   incontinence pads utilized   pulse oximeter probe site changed   silicone foam dressing  in place   skin to device areas padded   skin to skin areas padded   transparent dressing maintained  Device Skin Pressure Protection:   skin-to-device areas padded   absorbent pad utilized/changed   adhesive use limited   positioning supports utilized   pressure points protected   tubing/devices free from skin contact   skin-to-skin areas padded  Intervention: Prevent and Manage VTE (Venous Thromboembolism) Risk  Recent Flowsheet Documentation  Taken 8/15/2024 0400 by Bharti Graham RN  VTE Prevention/Management: SCDs off (sequential compression devices)  Taken 8/15/2024 0000 by Bharti Graham RN  VTE Prevention/Management: SCDs off (sequential compression devices)  Taken 8/14/2024 2000 by Bharti Graham RN  VTE Prevention/Management: SCDs on (sequential compression devices)  Intervention: Prevent Infection  Recent Flowsheet Documentation  Taken 8/15/2024 0400 by Bharti Graham RN  Infection Prevention:   equipment surfaces disinfected   hand hygiene promoted   personal protective equipment utilized   rest/sleep promoted   single patient room provided  Taken 8/15/2024 0000 by Bharti Graham RN  Infection Prevention:   equipment surfaces disinfected   hand hygiene promoted   personal protective equipment utilized   rest/sleep promoted   single patient room provided  Taken 8/14/2024 2000 by Bharti Graham RN  Infection Prevention:   equipment surfaces disinfected   hand hygiene promoted   personal protective equipment utilized   rest/sleep promoted   single patient room provided  Goal: Optimal Comfort and Wellbeing  Intervention: Provide Person-Centered Care  Recent Flowsheet Documentation  Taken 8/15/2024 0400 by Bharti Graham RN  Trust Relationship/Rapport:   care explained   reassurance provided  Taken 8/15/2024 0000 by Bharti Graham RN  Trust Relationship/Rapport:   care explained   reassurance provided  Taken 8/14/2024 2000 by Bharti Graham RN  Trust Relationship/Rapport:   care  explained   reassurance provided     Problem: Risk for Delirium  Goal: Optimal Coping  Intervention: Optimize Psychosocial Adjustment to Delirium  Recent Flowsheet Documentation  Taken 8/15/2024 0400 by Bharti Graham RN  Supportive Measures:   positive reinforcement provided   relaxation techniques promoted  Taken 8/15/2024 0000 by Bharti Graham RN  Supportive Measures:   positive reinforcement provided   relaxation techniques promoted  Taken 8/14/2024 2000 by Bharti Graham RN  Supportive Measures:   positive reinforcement provided   relaxation techniques promoted  Goal: Improved Behavioral Control  Intervention: Minimize Safety Risk  Recent Flowsheet Documentation  Taken 8/15/2024 0400 by Bharti Graham RN  Communication Enhancement Strategies:   extra time allowed for response   one-step directions provided   repetition utilized   verbal and visual cues paired  Enhanced Safety Measures:   pain management   review medications for side effects with activity   room near unit station    at bedside   assistive devices when indicated   monitor patients coagulation values  Trust Relationship/Rapport:   care explained   reassurance provided  Taken 8/15/2024 0000 by Bharti Graham RN  Communication Enhancement Strategies:   extra time allowed for response   one-step directions provided   repetition utilized   verbal and visual cues paired  Enhanced Safety Measures:   pain management   review medications for side effects with activity   room near unit station    at bedside   assistive devices when indicated   monitor patients coagulation values  Trust Relationship/Rapport:   care explained   reassurance provided  Taken 8/14/2024 2000 by Bharti Graham RN  Communication Enhancement Strategies:   extra time allowed for response   one-step directions provided   repetition utilized   verbal and visual cues paired  Enhanced Safety Measures:   pain management   review  medications for side effects with activity   room near unit station    at bedside   assistive devices when indicated   monitor patients coagulation values  Trust Relationship/Rapport:   care explained   reassurance provided  Goal: Improved Attention and Thought Clarity  Intervention: Maximize Cognitive Function  Recent Flowsheet Documentation  Taken 8/15/2024 0400 by Bharti Graham RN  Sensory Stimulation Regulation:   care clustered   lighting decreased   quiet environment promoted   auditory stimulation minimized  Reorientation Measures:   clock in view   calendar in view   hearing device use encouraged   reorientation provided  Taken 8/15/2024 0000 by Bharti Graham RN  Sensory Stimulation Regulation:   care clustered   lighting decreased   quiet environment promoted   auditory stimulation minimized  Reorientation Measures:   clock in view   calendar in view   hearing device use encouraged   reorientation provided  Taken 8/14/2024 2000 by Bharti Graham RN  Sensory Stimulation Regulation:   care clustered   lighting decreased   quiet environment promoted   auditory stimulation minimized  Reorientation Measures:   clock in view   calendar in view   hearing device use encouraged   reorientation provided  Goal: Improved Sleep  Intervention: Promote Sleep  Recent Flowsheet Documentation  Taken 8/15/2024 0400 by Bharti Graham RN  Sleep/Rest Enhancement:   awakenings minimized   comfort measures   noise level reduced   regular sleep/rest pattern promoted   relaxation techniques promoted   room darkened   therapeutic touch utilized  Taken 8/15/2024 0000 by Bharti Graham RN  Sleep/Rest Enhancement:   awakenings minimized   comfort measures   noise level reduced   regular sleep/rest pattern promoted   relaxation techniques promoted   room darkened   therapeutic touch utilized  Taken 8/14/2024 2000 by Bharti Graham RN  Sleep/Rest Enhancement:   awakenings minimized   comfort measures    noise level reduced   regular sleep/rest pattern promoted   relaxation techniques promoted   room darkened   therapeutic touch utilized     Problem: Alcohol Withdrawal  Goal: Alcohol Withdrawal Symptom Control  Intervention: Minimize or Manage Alcohol Withdrawal Symptoms  Recent Flowsheet Documentation  Taken 8/15/2024 0400 by Bharti Graham RN  Sensory Stimulation Regulation:   care clustered   lighting decreased   quiet environment promoted   auditory stimulation minimized  Aspiration Precautions:   awake/alert before oral intake   distractions minimized during oral intake   medication route adjusted   NPO pending swallow screening/evaluation   oral hygiene care promoted   respiratory status monitored   tube feeding placement verified   upright posture maintained  Taken 8/15/2024 0000 by Bharti Graham RN  Sensory Stimulation Regulation:   care clustered   lighting decreased   quiet environment promoted   auditory stimulation minimized  Aspiration Precautions:   awake/alert before oral intake   distractions minimized during oral intake   medication route adjusted   NPO pending swallow screening/evaluation   oral hygiene care promoted   respiratory status monitored   tube feeding placement verified   upright posture maintained  Taken 8/14/2024 2000 by Bharti Graham RN  Sensory Stimulation Regulation:   care clustered   lighting decreased   quiet environment promoted   auditory stimulation minimized  Aspiration Precautions:   awake/alert before oral intake   distractions minimized during oral intake   medication route adjusted   NPO pending swallow screening/evaluation   oral hygiene care promoted   respiratory status monitored   tube feeding placement verified   upright posture maintained  Goal: Optimal Neurologic Function  Intervention: Minimize or Manage Acute Neurologic Symptoms  Recent Flowsheet Documentation  Taken 8/15/2024 0400 by Bharti Graham RN  Sensory Stimulation Regulation:   care  clustered   lighting decreased   quiet environment promoted   auditory stimulation minimized  Airway/Ventilation Management:   calming measures promoted   pulmonary hygiene promoted  Cerebral Perfusion Promotion:   blood pressure monitored   normothermia promoted  Taken 8/15/2024 0000 by Bharti Graham RN  Sensory Stimulation Regulation:   care clustered   lighting decreased   quiet environment promoted   auditory stimulation minimized  Airway/Ventilation Management:   calming measures promoted   pulmonary hygiene promoted  Cerebral Perfusion Promotion:   blood pressure monitored   normothermia promoted  Taken 8/14/2024 2000 by Bharti Graham RN  Sensory Stimulation Regulation:   care clustered   lighting decreased   quiet environment promoted   auditory stimulation minimized  Airway/Ventilation Management:   calming measures promoted   pulmonary hygiene promoted  Cerebral Perfusion Promotion:   blood pressure monitored   normothermia promoted  Goal: Readiness for Change Identified  Intervention: Partner to Facilitate Behavior Change  Recent Flowsheet Documentation  Taken 8/15/2024 0400 by Bharti Graham RN  Supportive Measures:   positive reinforcement provided   relaxation techniques promoted  Taken 8/15/2024 0000 by Bharti Graham RN  Supportive Measures:   positive reinforcement provided   relaxation techniques promoted  Taken 8/14/2024 2000 by Bharti Graham RN  Supportive Measures:   positive reinforcement provided   relaxation techniques promoted     Problem: Restraint, Nonviolent  Goal: Absence of Harm or Injury  Intervention: Implement Least Restrictive Safety Strategies  Recent Flowsheet Documentation  Taken 8/15/2024 0400 by Bharti Graham RN  Medical Device Protection:   IV pole/bag removed from visual field   torso covered   tubing secured  Taken 8/15/2024 0000 by Bharti Graham RN  Medical Device Protection:   IV pole/bag removed from visual field   torso covered   tubing  secured  Taken 8/14/2024 2000 by Bharti Graham RN  Medical Device Protection:   IV pole/bag removed from visual field   torso covered   tubing secured  Intervention: Protect Dignity, Rights and Personal Wellbeing  Recent Flowsheet Documentation  Taken 8/15/2024 0400 by Bharti Graham RN  Trust Relationship/Rapport:   care explained   reassurance provided  Taken 8/15/2024 0000 by Bharti Graham RN  Trust Relationship/Rapport:   care explained   reassurance provided  Taken 8/14/2024 2000 by Bharti Graham RN  Trust Relationship/Rapport:   care explained   reassurance provided  Intervention: Protect Skin and Joint Integrity  Recent Flowsheet Documentation  Taken 8/15/2024 0400 by Bharti Graham RN  Body Position:   right   turned   heels elevated   upper extremity elevated  Skin Protection:   adhesive use limited   incontinence pads utilized   pulse oximeter probe site changed   silicone foam dressing in place   skin to device areas padded   skin to skin areas padded   transparent dressing maintained  Range of Motion: active ROM (range of motion) encouraged  Taken 8/15/2024 0200 by Bharti Graham RN  Body Position:   turned   heels elevated   upper extremity elevated   left  Taken 8/15/2024 0000 by Bharti Graham RN  Body Position:   right   turned   heels elevated   upper extremity elevated  Skin Protection:   adhesive use limited   incontinence pads utilized   pulse oximeter probe site changed   silicone foam dressing in place   skin to device areas padded   skin to skin areas padded   transparent dressing maintained  Range of Motion: active ROM (range of motion) encouraged  Taken 8/14/2024 2200 by Bharti Graham RN  Body Position:   turned   heels elevated   upper extremity elevated   left  Taken 8/14/2024 2000 by Bharti Graham RN  Body Position:   right   turned   heels elevated   upper extremity elevated  Skin Protection:   adhesive use limited   incontinence pads utilized   pulse  oximeter probe site changed   silicone foam dressing in place   skin to device areas padded   skin to skin areas padded   transparent dressing maintained  Range of Motion: active ROM (range of motion) encouraged     Problem: Aortic Aneurysm/Dissection Repair  Goal: Absence of Bleeding  Intervention: Monitor and Manage Bleeding  Recent Flowsheet Documentation  Taken 8/15/2024 0400 by Bharti Graham RN  Bleeding Management: dressing monitored  Taken 8/15/2024 0000 by Bharti Graham RN  Bleeding Management: dressing monitored  Taken 8/14/2024 2000 by Bharti Graham RN  Bleeding Management: dressing monitored  Goal: Absence of Infection Signs and Symptoms  Intervention: Prevent or Manage Infection  Recent Flowsheet Documentation  Taken 8/15/2024 0400 by Bharti Graham RN  Infection Management: aseptic technique maintained  Taken 8/15/2024 0000 by Bharti Graham RN  Infection Management: aseptic technique maintained  Taken 8/14/2024 2000 by Bharti Graham RN  Infection Management: aseptic technique maintained  Goal: Anesthesia/Sedation Recovery  Intervention: Optimize Anesthesia Recovery  Recent Flowsheet Documentation  Taken 8/15/2024 0400 by Bharti Graham RN  Safety Promotion/Fall Prevention:   activity supervised   clutter free environment maintained   increased rounding and observation   increase visualization of patient   nonskid shoes/slippers when out of bed   patient and family education   room door open   room near nurse's station   room organization consistent   safety round/check completed   supervised activity   bedside attendant   treat reversible contributory factors   treat underlying cause  Reorientation Measures:   clock in view   calendar in view   hearing device use encouraged   reorientation provided  Taken 8/15/2024 0000 by Bharti Graham RN  Safety Promotion/Fall Prevention:   activity supervised   clutter free environment maintained   increased rounding and observation    increase visualization of patient   nonskid shoes/slippers when out of bed   patient and family education   room door open   room near nurse's station   room organization consistent   safety round/check completed   supervised activity   bedside attendant   treat reversible contributory factors   treat underlying cause  Reorientation Measures:   clock in view   calendar in view   hearing device use encouraged   reorientation provided  Taken 8/14/2024 2000 by Bharti Graham RN  Safety Promotion/Fall Prevention:   activity supervised   clutter free environment maintained   increased rounding and observation   increase visualization of patient   nonskid shoes/slippers when out of bed   patient and family education   room door open   room near nurse's station   room organization consistent   safety round/check completed   supervised activity   bedside attendant   treat reversible contributory factors   treat underlying cause  Reorientation Measures:   clock in view   calendar in view   hearing device use encouraged   reorientation provided  Goal: Effective Oxygenation and Ventilation  Intervention: Optimize Oxygenation and Ventilation  Recent Flowsheet Documentation  Taken 8/15/2024 0400 by Bharti Graham RN  Cough And Deep Breathing: done independently per patient  Airway/Ventilation Management:   calming measures promoted   pulmonary hygiene promoted  Head of Bed (HOB) Positioning: HOB at 30 degrees  Taken 8/15/2024 0200 by Bharti Graham RN  Head of Bed (HOB) Positioning: HOB at 30 degrees  Taken 8/15/2024 0000 by Bharti Graham RN  Cough And Deep Breathing: done independently per patient  Airway/Ventilation Management:   calming measures promoted   pulmonary hygiene promoted  Head of Bed (HOB) Positioning: HOB at 30 degrees  Taken 8/14/2024 2200 by Bharti Graham RN  Head of Bed (HOB) Positioning: HOB at 30 degrees  Taken 8/14/2024 2000 by Bharti Graham RN  Cough And Deep Breathing: done  independently per patient  Airway/Ventilation Management:   calming measures promoted   pulmonary hygiene promoted  Head of Bed (HOB) Positioning: HOB at 30 degrees  Goal: Effective Peripheral Tissue Perfusion  Intervention: Optimize Tissue Perfusion  Recent Flowsheet Documentation  Taken 8/15/2024 0400 by Bharti Graham RN  Pressure Reduction Techniques:   frequent weight shift encouraged   heels elevated off bed   pressure points protected   weight shift assistance provided  Activity Management: activity adjusted per tolerance  Taken 8/15/2024 0200 by Bharti Graham RN  Activity Management: activity adjusted per tolerance  Taken 8/15/2024 0000 by Bharti Graham RN  Pressure Reduction Techniques:   frequent weight shift encouraged   heels elevated off bed   pressure points protected   weight shift assistance provided  Activity Management: activity adjusted per tolerance  Taken 8/14/2024 2000 by Bharti Graham RN  Pressure Reduction Techniques:   frequent weight shift encouraged   heels elevated off bed   pressure points protected   weight shift assistance provided  Activity Management: activity adjusted per tolerance

## 2024-08-15 NOTE — CONSULTS
"CLINICAL NUTRITION SERVICES  -  ASSESSMENT NOTE      Recommendations Ordered by Registered Dietitian (RD): Promote at 75 mL/hr = 1800 kcal, 112 g protein, 234 g CHO, 0 g fiber, 1510 mL H2O  Add ProSource TF20 1 pkt BID = 160 kcal and 40 g protein  Total = 1960 kcal (15 kcal/kg), 152 g protein (2.3 g/kg)  Flushes 60 mL every 4 hours per MD    Malnutrition: % Weight Loss:  None noted  % Intake:  </= 50% for >/= 5 days (severe malnutrition)  Subcutaneous Fat Loss:  None observed  Muscle Loss:  None observed  Fluid Retention:  Mild - likely not nutritional     Malnutrition Diagnosis: Patient does not meet two of the above criteria necessary for diagnosing malnutrition        REASON FOR ASSESSMENT  Noe Goetz [facility] is a 84 year old male seen by Registered Dietitian for Provider Order - Registered Dietitian to Assess and Order TF per Medical Nutrition Therapy Protocol      NUTRITION HISTORY  - Unable to obtain nutrition history as patient intubated and unable to provide.   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 (now also has a type A dissection)   Concern for infection regarding right shoulder status post 3 days from total arthroplasty   Acute respiratory failure       CURRENT NUTRITION ORDERS  Diet Order:     NPO   Asked to see patient for TF initiation     Current Intake/Tolerance:  N/A      NUTRITION FOCUSED PHYSICAL ASSESSMENT FOR DIAGNOSING MALNUTRITION)  Yes (visual only as patient having a bedside procedure)          Observed:    No nutrition-related physical findings observed    Obtained from Chart/Interdisciplinary Team:  Edema 1+ trace (generalized)    ANTHROPOMETRICS  Height: 5'7\"  Weight: 134.5 kg (296#)(8/10)  Body mass index is 46.4 kg/m   Weight Status:  Obesity Grade III BMI >40  IBW: 67.3 kg   % IBW: 200%  Weight History:   Wt Readings from Last 10 Encounters:   08/15/24 141 kg (310 lb 13.6 oz)   08/10/24 127 kg (280 lb) "   08/07/24 128.8 kg (284 lb)   04/08/24 128.8 kg (284 lb)   02/09/24 130.6 kg (288 lb)   12/06/22 124.7 kg (275 lb)   05/28/19 123.8 kg (273 lb)   05/20/19 112.5 kg (248 lb)   07/30/12 112.5 kg (248 lb)   09/10/11 118.4 kg (261 lb)     No wt loss noted     LABS  Na 147 (H)  BUN 45.2 (H), Cr 1.68 (H) - DURAN on CKD     MEDICATIONS  MVI, Thiamine, Folic Acid - ETOH protocol   Vitamin D3 for supplementation       ASSESSED NUTRITION NEEDS PER APPROVED PRACTICE GUIDELINES:    Dosing Weight 134.5 kg (energy) and 67.3 kg (protein)  Estimated Energy Needs: 7609-9244 kcals (11-14 Kcal/Kg)  Justification: obese and vented  Estimated Protein Needs: 135-170 grams protein (2-2.5 g pro/Kg)  Justification: hypercatabolism with critical illness and obesity guidelines   Estimated Fluid Needs: 1900 mL (1 mL/Kcal)  Justification: or per MD     MALNUTRITION:  % Weight Loss:  None noted  % Intake:  </= 50% for >/= 5 days (severe malnutrition)  Subcutaneous Fat Loss:  None observed  Muscle Loss:  None observed  Fluid Retention:  Mild - likely not nutritional     Malnutrition Diagnosis: Patient does not meet two of the above criteria necessary for diagnosing malnutrition    NUTRITION DIAGNOSIS:  Inadequate protein-energy intake related to NPO with plans to start TF today as evidenced by meeting 0% protein and energy needs       NUTRITION INTERVENTIONS  Recommendations / Nutrition Prescription  Promote at 75 mL/hr = 1800 kcal, 112 g protein, 234 g CHO, 0 g fiber, 1510 mL H2O  Add ProSource TF20 1 pkt BID = 160 kcal and 40 g protein  Total = 1960 kcal (15 kcal/kg), 152 g protein (2.3 g/kg)  Flushes 60 mL every 4 hours per MD     Implementation  Nutrition education: Not appropriate at this time due to patient condition  EN Composition, EN Schedule, Medical Food Supplement: Orders written to begin Promote at 15 mL/hr and increase every 8 hours by 20 mL to goal. ProSource TF20 as above.   Collaboration and Referral of Nutrition care: Patient  discussed today during interdisciplinary bedside rounds    Nutrition Goals  Goal TF regimen will meet % needs     MONITORING AND EVALUATION:  Progress towards goals will be monitored and evaluated per protocol and Practice Guidelines    Nani Guevara RD, LD, CNSC   Clinical Dietitian - [facility]

## 2024-08-15 NOTE — PROGRESS NOTES
OT/PT:  Orders received. Chart reviewed and discussed with care team.? Pt admitted due to Type A aortic dissection. Spoke with RN, pt on bipap or hi [id number] FiO2. Per chart, pt is not a good surgical candidate. Pt hasn't been appropriate for therapy for extended period of time. Will complete therapy orders. Please reorder therapy when pt appropriate for therapy.

## 2024-08-15 NOTE — PROGRESS NOTES
PALLIATIVE CARE PROGRESS NOTE  [facility]     Patient Name: Noe Goetz [facility]  Date of Admission: 8/10/2024      Recommendations & Counseling     GOALS OF CARE:   Restorative with limits   Joana has expressed she doesn't know how long she can watch Noe go through this and we discussed we also wonder how long we should continue this type of care if Noe continues to not show improvement in his respiratory status.  If not, we should strongly consider a transition to comfort in the next day or 2.  We again reviewed what a transition to comfort care would look like from this acute state.  There is some confusion about why hospice is not on board now based on prognosis. We reviewed the need for comfort goals qualifying for hospice.       ADVANCE CARE PLANNING:  Patient has an advance directive dated 2014.  Primary Health Care Agent Spouse Joana.  Alternate(s) Daughter Kamini.   There is no POLST form on file, recommend to complete prior to DC.  Code status: No CPR- Do NOT Intubate    MEDICAL MANAGEMENT:   We are not actively managing symptoms at this time.     PSYCHOSOCIAL/SPIRITUAL:  Family Noe has a large blended family.  He lives with spouse of 39 years Joana.  There are several children, grandchildren and great grand children all residing in different areas of [place].   [place] community: [place]  Appreciate  support  Noe's Daughter-in-law lost her mother due to cancer on hospice and family  was today.  Joana finds it helpful to receive all information straight forward about current condition and relevant information to plan for future, she is absorbing only some of information provided, Son is supportive.   Palliative  will follow for support when available.     Palliative Care will continue to follow. Thank you for the consult and allowing us to aid in the care of Noe Goetz [facility].    Bren Mitchell, APRN CNP  [facility], Palliative  "Care  Securely message with the [employer] Web Console (learn more here) or  Text page via [facility] Paging/Directory        Assessment          Noe Goetz [facility] is a 84 year old male with a past medical history of HTN, alcohol abuse, COPD, CAD, GERD, as well as recent right shoulder replacement 3 days prior to admission who presented from [facility] ED with increasing back pain and delirium and new finding on aortic dissection.  He is not a surgical candidate, current medical management has been recommended for \"greatest hope of survival and quality of life.\"  Since admission Noe's condition has decompensated with acute respiratory failure and worsening delirium thought d/t metabolic encephalopathy, alcoholism,  COPD, and/or atelectasis and small effusions.  He is currently requiring BiPAP support and IV blood pressure management in ICU.       Interval History:     Multidisciplinary collaboration:  Discussed case with Critical Care Fellow Mona Llanos MD.  There is concern of continued high respiratory support requirements without improvement. Continues on BiPAP with 100%Oxygen requirement.    Notable medications:  0.6mg IV dilaudid over past 24 hours   Gabapentin dose appropriately reduced with reduced crcl    Patient/family narrative  Met with Spouse Joana and son at bedside, John is unable to participate.  Joana expresses concern that she doesn't know how long she can watch Noe go through this.  We reviewed the concern about Noe's respiratory status has not shown signs of improvement.  We also have the question of how long we should continue to do this if Noe continues to show no improvement.  If not, we should come together in the next day or two and consider a transition to comfort. Joana asks about the roll of the BiPAP, we discussed he requiring 100%oxygen in this case, the BiPAP is a \"life support, \" it is keeping him alive, his lungs would not function enough on their own to sustain his life if we " removed the BiPAPnow, even though he would still breath.  They ask what would happen if we removed the BiPAP, we again discussed what comfort care would look like from this acute state with a prognosis of hours to days, short weeks.   We reviewed why hospice is appropriate if Noe's goals shift to comfort but not appropriate now unless goals are for comfort. It has been confusing to Joana why hospice is not appropriate now while we are intervening for restoration as she has been told he may be eligible for hospice in the hospital.    Joana and family are tired. Joana is grieving the loss of her , and worried how she will manage alone.  There is added stress with a recent death in the family and a daughter who cannot be present as she is in rehab.   Joana is considering taking a break tomorrow, or finding a way her son (her ) could get a break.   Review of Systems:     unable       Physical Exam:   Temp:  [98.5  F (36.9  C)-99.7  F (37.6  C)] 99.7  F (37.6  C)  Pulse:  [70-84] 73  Resp:  [10-31] 16  BP: ()/(48-87) 108/66  FiO2 (%):  [100 %] 100 %  SpO2:  [85 %-97 %] 94 %  310 lbs 13.58 oz    Physical Exam  GEN:  somnolent, restless.   HEENT:  Normocephalic/atraumatic, BiPAP on, NG tube.  LUNGS:  non labored breathing on BiPAP.  Symmetric chest rise on inhalation noted.  ABD:  Obese  SKIN:  Dry to touch, flushed, no exanthems noted in the visualized areas.              Data Reviewed:     Results for orders placed or performed during the hospital encounter of 08/10/24 (from the past 24 hour(s))   Glucose by meter   Result Value Ref Range    GLUCOSE BY METER POCT 109 (H) 70 - 99 mg/dL   Vancomycin level   Result Value Ref Range    Vancomycin 16.1   ug/mL   Magnesium   Result Value Ref Range    Magnesium 2.5 (H) 1.7 - 2.3 mg/dL   Phosphorus   Result Value Ref Range    Phosphorus 3.8 2.5 - 4.5 mg/dL   CBC with platelets   Result Value Ref Range    WBC Count 12.4 (H) 4.0 - 11.0 10e3/uL    RBC Count  3.06 (L) 4.40 - 5.90 10e6/uL    Hemoglobin 9.3 (L) 13.3 - 17.7 g/dL    Hematocrit 27.8 (L) 40.0 - 53.0 %    MCV 91 78 - 100 fL    MCH 30.4 26.5 - 33.0 pg    MCHC 33.5 31.5 - 36.5 g/dL    RDW 13.9 10.0 - 15.0 %    Platelet Count 281 150 - 450 10e3/uL   Basic metabolic panel   Result Value Ref Range    Sodium 147 (H) 135 - 145 mmol/L    Potassium 3.4 3.4 - 5.3 mmol/L    Chloride 113 (H) 98 - 107 mmol/L    Carbon Dioxide (CO2) 20 (L) 22 - 29 mmol/L    Anion Gap 14 7 - 15 mmol/L    Urea Nitrogen 45.2 (H) 8.0 - 23.0 mg/dL    Creatinine 1.68 (H) 0.67 - 1.17 mg/dL    GFR Estimate 40 (L) >60 mL/min/1.73m2    Calcium 8.5 (L) 8.8 - 10.4 mg/dL    Glucose 125 (H) 70 - 99 mg/dL   Glucose by meter   Result Value Ref Range    GLUCOSE BY METER POCT 111 (H) 70 - 99 mg/dL   XR Abdomen Port 1 View    Narrative    ABDOMEN ONE VIEW PORTABLE  8/15/2024 9:32 AM     HISTORY: Desire to start post-pyloric tube feeds, evaluate feeding  tube position.    COMPARISON: Abdominal radiograph 8/12/2024.       Impression    IMPRESSION: Feeding tube tip is postpyloric near the ligament of  Treitz. Similar prominent loop of bowel within the mid abdomen likely  reflecting redundant sigmoid colon. No dilated small bowel within the  field-of-view.    SHEREEN HARLEY MD         SYSTEM ID:  [id number]   XR Chest Port 1 View    Narrative    CHEST ONE VIEW  8/15/2024 9:33 AM     HISTORY: Respiratory failure, on BiPAP, recent pneumonia, worsening  hypoxia.    COMPARISON: Chest radiograph 8/13/2024.      Impression    IMPRESSION: Feeding tube courses below the diaphragm, tip not  visualized. Improved right pleural effusion and basilar opacity.  Persistent pulmonary vascular congestion and probable minimal  perihilar edema. No new focal consolidation or pneumothorax.  Cardiomegaly. Bilateral shoulder arthroplasties.    SHEREEN HARLEY MD         SYSTEM ID:  [id number]   Triple Lumen PICC Placement    Berna Salmon, RN     8/15/2024  10:27 AM  [facility]    Triple Lumen PICC Placement    Date/Time: 8/15/2024 10:07 AM    Performed by: Taniya Castro RN  Authorized by: Aquilino Benedict MD  Indications: vascular access      UNIVERSAL PROTOCOL   Site Marked: Yes  Prior Images Obtained and Reviewed:  Yes  Required items: Required blood products, implants, devices and special   equipment available    Patient identity confirmed:  Arm band, provided demographic data,   anonymous protocol, patient vented/unresponsive and hospital-assigned   identification number  NA - No sedation, light sedation, or local anesthesia  Confirmation Checklist:  Patient's identity using two indicators, relevant   allergies, procedure was appropriate and matched the consent or emergent   situation and correct equipment/implants were available  Time out: Immediately prior to the procedure a time out was called    Universal Protocol: the Joint Commission Universal Protocol was followed    Preparation: Patient was prepped and draped in usual sterile fashion    ESBL (mL):  3     ANESTHESIA    Anesthesia:  Local infiltration  Local Anesthetic:  Lidocaine 1% without epinephrine  Anesthetic Total (mL):  1      SEDATION    Patient Sedated: No        Preparation: skin prepped with ChloraPrep  Skin prep agent: skin prep agent completely dried prior to procedure  Sterile barriers: maximum sterile barriers were used: cap, mask, sterile   gown, sterile gloves, and large sterile sheet  Hand hygiene: hand hygiene performed prior to central venous catheter   insertion  Type of line used: PICC  Catheter type: triple lumen  Lumen type: power PICC  Lumen Identification: Gray, Red and White  Catheter size: 5 Fr  Brand: Bard  Lot number: [id number]  Placement method: venipuncture, MST, ultrasound and tip navigation system  Number of attempts: 1  Difficulty threading catheter: yes  Successful placement: yes  Orientation: left    Location: cephalic vein  Arm  circumference: adults 10 cm  Extremity circumference: 44  Visible catheter length: 0  Total catheter length: 48  Dressing and securement: thrombin hemostasis patch applied, blood cleaned   with CHG, gauze, hemostatic agent, occlusive dressing applied, securement   device, site cleansed, sterile dressing applied, statlock and transparent   dressing  Post procedure assessment: blood return through all ports, free fluid flow   and placement verified by 3CG technology  PROCEDURE   Patient Tolerance:  Patient tolerated the procedure well with no immediate   complicationsDescribe Procedure: Successful placement of triple lumen PICC   LUE Cephalic vein. Difficulty getting PICC to drop down into SVC. Unable   to tell if PICC in correct position via 3CG. Will obtain CXR to verify   placement. Bedside RN notified.  Disposal: sharps and needle count correct at the end of procedure, needles   and guidewire disposed in sharps container   XR Chest Port 1 View    Narrative    XR CHEST PORT 1 VIEW 8/15/2024 11:11 AM    HISTORY: RN placed PICC - verify tip placement    COMPARISON: Earlier today at 9:00 AM.      Impression    IMPRESSION: Left arm PICC has been placed with tip at the SVC/right  atrial junction. No pneumothorax. Remainder unchanged.    DASHAWN ZARAGOZA MD         SYSTEM ID:  [id number]   Blood gas arterial   Result Value Ref Range    pH Arterial 7.40 7.35 - 7.45    pCO2 Arterial 37 35 - 45 mm Hg    pO2 Arterial 81 80 - 105 mm Hg    FIO2 100     Bicarbonate Arterial 23 21 - 28 mmol/L    Base Excess/Deficit Arterial -1.9 -3.0 - 3.0 mmol/L    Bishnu's Test Yes     Oxyhemoglobin Arterial 96 92 - 100 %    O2 Sat, Arterial 97.0 (H) 95.0 - 96.0 %    Narrative    In healthy individuals, oxyhemoglobin (O2Hb) and oxygen saturation (SO2) are approximately equal. In the presence of dyshemoglobins, oxyhemoglobin can be considerably lower than oxygen saturation.   Glucose by meter   Result Value Ref Range    GLUCOSE BY METER POCT 94 70 -  99 mg/dL         Medical Decision Making       Please see A&P for additional details of medical decision making.  MANAGEMENT DISCUSSED with the following over the past 24 hours: Intesivist, Nursing   NOTE(S)/MEDICAL RECORDS REVIEWED over the past 24 hours: Intensivist, nursing,   Tests REVIEWED in the past 24 hours:  - See lab/imaging results included in the data section of the note  SUPPLEMENTAL HISTORY, in addition to the patient's history, over the past 24 hours obtained from:   - Spouse or significant other  - Adult Son

## 2024-08-15 NOTE — PHARMACY-VANCOMYCIN DOSING SERVICE
Pharmacy Empiric Dose Change Per Policy  Original Dose Ordered: Intermittent dosing Vancomycin   Dose Changed To: Vancomycin 1 gm x 1  This dose change was based on the pharmacist's assessment of this patient's age, weight, concurrent drug therapy, treatment goals, whether patient's creatinine clearance adequately indicates renal function (factoring in age, muscle mass, fluid and clinical status), and, if applicable, prior pharmacokinetic data.    Creatinine Clearance=     Estimated Creatinine Clearance: 48.6 mL/min (A) (based on SCr of 1.57 mg/dL (H)).  Will continue to follow and modify dosage according to levels, organ function and clinical condition   Per RN - pt family declined lab draw for Vancomycin level. Per Insights, Dose of 1 gm q24h should result in AUC ~ 527 (Goal 400-600) - although with changing renal function exposure estimates may be inaccurate.   Vancomycin 1 gm x 1 dose ordered 24 hours after initial loading dose   Will check level/renal function 8/15 as appropriate

## 2024-08-15 NOTE — PROGRESS NOTES
[facility]   ICU Progress Note  Noe Morales MRN: [id number]  1940  Date of Admission:8/10/2024    Noe Morales MRN# [id number]   Age: 84 year old YOB: 1940     Date of Admission: 8/10/2024  Date of Service: 08/15/2024              Summary of hospitalization                  Noe Morales is a 84 year old White male with a past medical history significant for HTN, ETOH abuse, COPD, CAD, GERD, HODA (CPAP at night) OA, right shoulder replacement 8/7/24 (3 days ago) who was admitted 8/10 as a transfer from [facility] ED with a type A aortic dissection.  He presented to the ED at [facility] 8/9 evening with right shoulder pain and right sided chest pain, dizziness, and confusion.  CT revealed aortic dissection.  Admitted to the ICU for invasive hemodynamic monitoring and blood pressure control, now weaned off drips, meeting SBP goal with enteral antihypertensives.  Confused, started on CIWA protocol and precedex given heavy ETOH use.  Hypoxic respiratory failure requiring BiPAP.  Started on vanc/zosyn 8/13 for empiric treatment of possible pneumonia seen on CXR.  Palliative care involved, changed to DNR/I, family considering next steps.         Assessment & Plan     Noe Morales is a 84 year old White male with a past medical history significant for HTN, ETOH abuse, COPD, CAD, GERD, OA, right shoulder replacement on 8/7/24 who presented as a transfer from [facility] ED. Diagnosed with Type A aortic dissection.  Admitted to the ICU for invasive hemodynamic monitoring and blood pressure control. Now off gtts but still requiring BiPAP for hypoxic respiratory failure.    ------------------------------------------------------------------------------------------------------------    Changes today:  - Minimize narcotics, decrease gabapentin dosing  - Continues on BIPAP, increase to 16/12 and recheck ABG  - Start NJ TFs (AXR verified NJ placement postpyloric)  - Hypernatremia,  suspect hypovolemia in the setting of recent diuresis: FWF, recheck BMP this afternoon  - PICC placement  - switch meds to liquid form as able  - Will discuss next steps with palliative care    NEURO/PSYCH:   # Sedation: Precedex gtt  # Sedaton/Pain/Agitation: 4 point restraints with a 1:1, seroquel TID    Confusion/Agitation   - Seroquel 25 mg TID per NJ tube   - CIWA   - Minimize narcotics (tramadol, dilaudid)    History of ETOH abuse (6-8 drinks/day until 3 weeks ago)               - per family, no history of withdrawal sx, seizures               - CIWA               - gabapentin (decrease dose with DURAN)               - folate/thiamine     History of chronic low back pain (PTA on Norco):                - pain control as above               - heat packs prn               - PTA gabapentin   - narcs prn, robaxin     ANTHONY with panic attack:               - PTA duloxetine     Depression:                - PTA duloxetine  ------------------------------------------------------------------------------------------------------------------  PULMONARY:  Hypoxic respiratory failure:    - Continues to require Bipap, increase pressure and recheck ABG   - Do not intubate    Presumed pneumonia:  - Vanc/zosyn, continue    History of COPD, not on O2 at home:                - PTA Breo/Incruse ellipta               - prn albuterol               - Supplemental O2 prn     History of HODA: uses CPAP at night               - CPAP at night vs BiPAP as needed               - RT consult    ------------------------------------------------------------------------------------------------------------------  CARDIOLOGY:   Type A aortic dissection               - Vascular and CT surgery: Surgical intervention would not benfit patient, recommended medical management               - SBP<120               - HR<70               - atenolol 100 BID               - Amlodipine 10mg daily               - nicardipine gtt, esmolol if needed (not currently  needing)   - clonidine 0.2mg Q8H (started as part of CIWA)               - ECHO: LV EF 55-60%                - Asa/statin     History of HTN:                - PTA atenolol in addition to above     HLD:                - PTA statin  ------------------------------------------------------------------------------------------------------------------  RENAL:  Urinary retention   - Kennedy placed 8/10   - Check UA/UC: no growth    Hypocalcemia   - monitor and replace as needed    Acute on CKD, baseline Cr appears to be 1-1.2: Cr stable slightly increased at 1.68 from 1.57 yesterday, presume hypovolemic in setting of recent diuresis   - hold diuresis               - Monitor Cr daily               - avoid nephrotoxic meds     Hypernatremia:  - FWF 60mL Q4H, recheck BID    Chronic BLE edema:               - PTA lasix (40 daily)- hold with worsening DURAN, hypernatremia     BPH:                - PTA tamsulosin, finasteride    **I/O last 24hrs:       Intake/Output Summary (Last 24 hours) at 8/15/2024 1122  Last data filed at 8/15/2024 1041  Gross per 24 hour   Intake 1527.45 ml   Output 2690 ml   Net -1162.55 ml         ------------------------------------------------------------------------------------------------------------------  INFECTIOUS DISEASE:  Recent shoulder replacement with erythema:                - Orthopedics consulted               - Low suspicion for postop infection per ortho    **Antibiotics: None  **Cultures: 8/10 blood cx NGTD  8/11 urine cx NGTD  ------------------------------------------------------------------------------------------------------------------  GASTROINTESTINAL/GENITOURINARY:  GERD:                - PPI     **Nutrition: Nutrition Consulted for TFs as patient remains delirious.   - Bowel regimen  ------------------------------------------------------------------------------------------------------------------  ENDOCRINE:  No acute issues, BGs  <180  ------------------------------------------------------------------------------------------------------------------  HEMATOLOGIC:  History of anemia:                - Iron supplementation (held while NPO)               - Monitor daily CBCs   - Hbg 9.3 from 9.8     Thrombocytopenia, chronic: Improving               - Continue to monitor   - Plts 281  ------------------------------------------------------------------------------------------------------------------  RHEUMATOLOGIC:  History of Gout:                - PTA allopurinol  ------------------------------------------------------------------------------------------------------------------  SKIN/MUSCULOSKELETAL:  Right shoulder replacement 8/7/24:                - Orthopedic surgery consult               - PT/OT signed off due to ongoing BiPAP needs, will reconsult as appropriate  ------------------------------------------------------------------------------------------------------------------  Prophylaxis:     -GI: PPI    -DVT: SQH    FEN: NPO, start TFs  Consults: CV surg, Vascular surg, Ortho, Palliative care  Family: WifeJoana, updated 8/15  Code status: DNR/DNI  Disposition: ICU  ===================================================  Patient discussed with ICU staff, Dr. Benedict.    Mona Llanos MD  Surgical Critical Care Fellow           Interval History   Nursing notes reviewed  Overnight History: Off nicardipine, ongoing agitation.           Medications     Current Facility-Administered Medications   Medication Dose Route Frequency Provider Last Rate Last Admin    acetaminophen (TYLENOL) tablet 975 mg  975 mg Oral Q8H Mona Llanos MD   975 mg at 08/15/24 0426    albuterol (PROVENTIL HFA/VENTOLIN HFA) inhaler  2 puff Inhalation Q4H PRN Priscila Martinez PA-C        allopurinol (ZYLOPRIM) tablet 300 mg  300 mg Oral Daily Priscila Martinez PA-C   300 mg at 08/15/24 0915    amLODIPine (NORVASC) tablet 10 mg  10 mg Oral Daily Prudence Oconnor MD   10  mg at 08/15/24 0919    aspirin EC tablet 81 mg  81 mg Oral BID Priscila Martinez PA-C   81 mg at 08/15/24 0922    atenolol (TENORMIN) tablet 100 mg  100 mg Oral BID Aquilino Benedict MD   100 mg at 08/14/24 2011    atorvastatin (LIPITOR) tablet 10 mg  10 mg Oral At Bedtime Priscila Martinez PA-C   10 mg at 08/14/24 2212    calcium carbonate (TUMS) chewable tablet 1,000 mg  1,000 mg Oral 4x Daily PRN Priscila Martinez PA-C        cloNIDine (CATAPRES) tablet 0.2 mg  0.2 mg Oral Q8H Aquilino Benedict MD   0.2 mg at 08/14/24 1652    dexmedeTOMIDine (PRECEDEX) 4 mcg/mL in sodium chloride 0.9 % 100 mL infusion  0.1-0.2 mcg/kg/hr Intravenous Continuous Aquilino Benedict MD   Paused at 08/13/24 1145    glucose gel 15-30 g  15-30 g Oral Q15 Min PRN Mona Llanos MD        Or    dextrose 50 % injection 25-50 mL  25-50 mL Intravenous Q15 Min PRN Mona Llanos MD        Or    glucagon injection 1 mg  1 mg Subcutaneous Q15 Min PRN Mona Llanos MD        DULoxetine (CYMBALTA) DR capsule 120 mg  120 mg Oral Daily Priscila Martinez PA-C   120 mg at 08/15/24 0921    finasteride (PROSCAR) tablet 5 mg  5 mg Oral Daily Mona Llanos MD   5 mg at 08/15/24 0916    flumazenil (ROMAZICON) injection 0.2 mg  0.2 mg Intravenous q1 min prn Mona Llanos MD        fluticasone (FLONASE) 50 MCG/ACT spray 2 spray  2 spray Both Nostrils Daily Priscila Martinez PA-C        fluticasone-vilanterol (BREO ELLIPTA) 100-25 MCG/ACT inhaler 1 puff  1 puff Inhalation Daily Priscila Martinez PA-C        And    umeclidinium (INCRUSE ELLIPTA) 62.5 MCG/ACT inhaler 1 puff  1 puff Inhalation Daily Priscila Martinez PA-C        gabapentin (NEURONTIN) capsule 1,200 mg  1,200 mg Oral At Bedtime Priscila Martinez PA-C   1,200 mg at 08/14/24 2211    gabapentin (NEURONTIN) capsule 600 mg  600 mg Oral BID Priscila Martinez PA-C   600 mg at 08/15/24 0916    haloperidol lactate (HALDOL) injection 2.5-5 mg  2.5-5 mg Intravenous Q4H FRANCHESKAN Viet  MD Mona   5 mg at 08/13/24 0154    heparin ANTICOAGULANT injection 7,500 Units  7,500 Units Subcutaneous Q8H Reny Yanez MD   7,500 Units at 08/15/24 0433    hydrALAZINE (APRESOLINE) tablet 10 mg  10 mg Oral Q4H PRN Prudence Oconnor MD        Or    hydrALAZINE (APRESOLINE) injection 10 mg  10 mg Intravenous Q4H PRN Prudence Oconnor MD   10 mg at 08/13/24 0136    hydrALAZINE (APRESOLINE) tablet 50 mg  50 mg Oral or Feeding Tube TID Aquilino Benedict MD   50 mg at 08/15/24 0919    HYDROmorphone (DILAUDID) injection 0.2 mg  0.2 mg Intravenous Q2H PRN Mona Llanos MD   0.2 mg at 08/15/24 0904    IF patient diabetic - HOLD: ALL ORAL HYPOGLYCEMICS: glipizide, glyburide, glimepiride, gliclazide, metformin (Glucophage), any metformin (Glucophage) containing medication, rosiglitazone (Avandia), pioglitazone (Actos), or sitagliptin (Januvia) on day of the procedure   Does not apply HOLD Mona Llanos MD        ipratropium - albuterol 0.5 mg/2.5 mg/3 mL (DUONEB) neb solution 3 mL  3 mL Nebulization 4x daily Aquilino Benedict MD   3 mL at 08/15/24 0733    lidocaine (LMX4) cream   Topical Q1H PRN Mona Llanos MD        lidocaine (LMX4) cream   Topical Q1H PRN Aquilino Benedict MD        lidocaine (LMX4) cream   Topical Q1H PRN Priscila Martinez PA-C        lidocaine 1 % 0.1-1 mL  0.1-1 mL Other Q1H PRN Priscila Martinez PA-C        lidocaine 1 % 0.1-5 mL  0.1-5 mL Other Q1H PRN Mona Llanos MD        lidocaine 1 % 0.1-5 mL  0.1-5 mL Other Q1H PRN Aquilino Benedict MD        LORazepam (ATIVAN) tablet 1-2 mg  1-2 mg Oral Q30 Min PRN Mona Llanos MD        Or    LORazepam (ATIVAN) injection 1-2 mg  1-2 mg Intravenous Q30 Min PRN Mona Llanos MD   2 mg at 08/15/24 0939    methocarbamol (ROBAXIN) tablet 500 mg  500 mg Oral 4x Daily PRN Priscila Martinez PA-C   500 mg at 08/14/24 0815    mupirocin (BACTROBAN) 2 % ointment   Topical BID Priscila Martinez PA-C   Given at  08/14/24 2056    naloxone (NARCAN) injection 0.2 mg  0.2 mg Intravenous Q2 Min PRN Mp Salter DO        Or    naloxone (NARCAN) injection 0.4 mg  0.4 mg Intravenous Q2 Min PRN Mp Salter DO        Or    naloxone (NARCAN) injection 0.2 mg  0.2 mg Intramuscular Q2 Min PRN Mp Salter DO        Or    naloxone (NARCAN) injection 0.4 mg  0.4 mg Intramuscular Q2 Min PRN Mp Salter DO        ondansetron (ZOFRAN ODT) ODT tab 4 mg  4 mg Oral Q6H PRN Mona Llanos MD        Or    ondansetron (ZOFRAN) injection 4 mg  4 mg Intravenous Q6H PRN Mona Llanos MD        pantoprazole (PROTONIX) 2 mg/mL suspension 40 mg  40 mg Oral QAM AC Dwayne Mejia MD   40 mg at 08/15/24 0901    pantoprazole (PROTONIX) EC tablet 40 mg  40 mg Oral QAM AC Priscila Martinez PA-C   40 mg at 08/13/24 0811    piperacillin-tazobactam (ZOSYN) 3.375 g vial to attach to  mL bag  3.375 g Intravenous Q6H Aquilino Benedict MD 0 mL/hr at 08/13/24 1054 3.375 g at 08/15/24 0915    polyethylene glycol (MIRALAX) Packet 17 g  17 g Oral Daily PRN Mona Llanos MD   17 g at 08/13/24 1900    QUEtiapine (SEROquel) tablet 25 mg  25 mg Oral or Feeding Tube TID Reny Yanez MD   25 mg at 08/15/24 0922    senna-docusate (SENOKOT-S/PERICOLACE) 8.6-50 MG per tablet 1 tablet  1 tablet Oral BID PRN Mona Llanos MD        Or    senna-docusate (SENOKOT-S/PERICOLACE) 8.6-50 MG per tablet 2 tablet  2 tablet Oral BID PRN Mona Llanos MD        senna-docusate (SENOKOT-S/PERICOLACE) 8.6-50 MG per tablet 1-2 tablet  1-2 tablet Oral BID Priscila Martinez, THELMA   1 tablet at 08/15/24 0921    sodium chloride (PF) 0.9% PF flush 10 mL  10 mL Intravenous Q10 Min PRN Mona Llanos MD        sodium chloride (PF) 0.9% PF flush 10-40 mL  10-40 mL Intracatheter Once PRN Mona Llanos MD        sodium chloride (PF) 0.9% PF flush 10-40 mL  10-40 mL Intracatheter Once PRN Aquilino Benedict  MD [place]        sodium chloride (PF) 0.9% PF flush 3 mL  3 mL Intracatheter Q8H Priscila Martinez PA-C   3 mL at 08/15/24 0922    sodium chloride (PF) 0.9% PF flush 3 mL  3 mL Intracatheter q1 min prn Priscila Martinez PA-C        tamsulosin (FLOMAX) capsule 0.4 mg  0.4 mg Oral Daily Priscila Martinez PA-C   0.4 mg at 08/15/24 0922    thiamine (B-1) tablet 100 mg  100 mg Oral TID Mona Llanos MD   100 mg at 08/15/24 0922    [START ON 8/17/2024] thiamine (B-1) tablet 100 mg  100 mg Oral Daily Mona Llanos MD        traMADol (ULTRAM) half-tab 50 mg  50 mg Oral Q6H PRN Priscila Martinez PA-C   50 mg at 08/14/24 0340    vancomycin place avery - receiving intermittent dosing  1 each Intravenous See Admin Instructions Esha Abdi [facility]        vitamin D3 (CHOLECALCIFEROL) tablet 50 mcg  50 mcg Oral QAM Priscila Martinez PA-C   50 mcg at 08/15/24 0921                 Objective      Physical Exam  Temp:  [97.5  F (36.4  C)-98.9  F (37.2  C)] 98.5  F (36.9  C)  Pulse:  [70-86] 84  Resp:  [10-52] 29  BP: ()/(46-87) 121/87  MAP:  [68 mmHg-117 mmHg] 117 mmHg  Arterial Line BP: ()/() 138/122  FiO2 (%):  [100 %] 100 %  SpO2:  [84 %-97 %] 91 %    FiO2 (%): 100 %, Resp: 29      General: resting, opens eyes to voice, confused, vocalizes inappropriate words  Eyes: Eyes are clear, pupils are reactive  HEENT: BiPAP mask in place  Cardiovascular: Regular rate and rhythm. Mild BLE edema  Respiratory: Clear to auscultation bilaterally. Some increased expiratory effort on BiPAP  GI: Soft, non-tender, rotund, reducible umbilical hernia  Genitourinary: Deferred  Musculoskeletal: symmetric  Skin: Warm and dry, no rashes.   Neurologic: No focal deficits    Drains and Tubes: Kennedy  Intravascular Access and Device: PICC placed 8/15        Intake/Output Summary (Last 24 hours) at 8/15/2024 1127  Last data filed at 8/15/2024 1041  Gross per 24 hour   Intake 1527.45 ml   Output 2690 ml   Net -1162.55 ml              Arterial Blood Gas  Recent Labs   Lab 08/13/24  0830 08/12/24  1246 08/11/24  1202 08/10/24  1328   PH 7.45 7.45 7.45 7.41   PCO2 29* 35 38 44   PO2 79* 60* 91 64*   HCO3 20* 24 26 28   O2PER 100 15 55 6       Venous Blood Gas   Recent Labs   Lab 08/13/24  0830 08/12/24  1246 08/11/24  1202 08/10/24  1328   O2PER 100 15 55 6              Data:      CMP  Recent Labs   Lab 08/15/24  0856 08/15/24  0504 08/14/24 2004 08/14/24  1257 08/14/24  0807 08/14/24  0355 08/14/24  0142 08/13/24 2000 08/13/24  1927 08/13/24  0843 08/13/24  0359 08/12/24  1249 08/12/24  1247 08/12/24  0401 08/10/24  0753 08/10/24  0343   NA  --  147*  --   --   --  143  --   --  142  --  139  --  137  --    < > 132*   POTASSIUM  --  3.4  --   --   --  3.8 3.5  --  3.4  --  3.5  --  3.6 4.4   < > 4.0   CHLORIDE  --  113*  --   --   --  109*  --   --  108*  --  105  --  103  --    < > 96*   CO2  --  20*  --   --   --  22  --   --  22  --  21*  --  24  --    < > 27   ANIONGAP  --  14  --   --   --  12  --   --  12  --  13  --  10  --    < > 9   * 125* 109* 135*   < > 141*  --    < > 147*   < > 143*   < > 153*  --    < > 117*   BUN  --  45.2*  --   --   --  43.7*  --   --  44.2*  --  40.9*  --  35.9*  --    < > 17.1   CR  --  1.68*  --   --   --  1.57*  --   --  1.45*  --  1.24*  --  1.16  --    < > 0.98   GFRESTIMATED  --  40*  --   --   --  44*  --   --  48*  --  58*  --  63  --    < > 77   CHERY  --  8.5*  --   --   --  8.4*  --   --  8.5*  --  8.6*  --  8.7*  --    < > 8.9   MAG  --  2.5*  --   --   --  2.4*  --   --   --   --  2.5*  --   --  2.4*   < >  --    PHOS  --  3.8  --   --   --  4.2  --   --   --   --  3.6  --  3.4 3.4   < >  --    PROTTOTAL  --   --   --   --   --  6.0*  --   --   --   --  6.0*  --   --   --   --  6.2*   ALBUMIN  --   --   --   --   --  3.2*  --   --   --   --  3.3*  --  3.4*  --   --  3.6   BILITOTAL  --   --   --   --   --  1.0  --   --   --   --  0.9  --   --   --   --  0.8   ALKPHOS  --   --   --   " --   --  62  --   --   --   --  59  --   --   --   --  54   AST  --   --   --   --   --  20  --   --   --   --  24  --   --   --   --  24   ALT  --   --   --   --   --  11  --   --   --   --  10  --   --   --   --  10    < > = values in this interval not displayed.       CBC  Recent Labs   Lab 08/15/24  0504 08/14/24  0355 08/13/24  0359 08/12/24  1014   WBC 12.4* 12.2* 10.8 9.6   RBC 3.06* 3.20* 3.28* 3.27*   HGB 9.3* 9.8* 10.3* 10.2*   HCT 27.8* 29.3* 30.2* 29.9*   MCV 91 92 92 91   MCH 30.4 30.6 31.4 31.2   MCHC 33.5 33.4 34.1 34.1   RDW 13.9 13.5 13.4 13.2    271 227 187       INR  No lab results found in last 7 days.    Lactic Acid  Recent Labs   Lab 08/10/24  0343   LACT 0.9       Microbiology:  All cultures:  No results for input(s): \"CULT\" in the last 168 hours.    Urine Studies:    Recent Labs   Lab Test 08/11/24  1111 02/09/24  2237 09/01/16  0353 08/02/16  1646   LEUKEST Trace* Negative Negative Large*   NITRITE Negative Negative Negative Negative   WBCU 89* <1  --  18*   RBCU 7* 1  --  5*       Imaging:  No results found for this or any previous visit (from the past 24 hour(s)).          "

## 2024-08-15 NOTE — PROGRESS NOTES
Patient remained on BiPAP overnight. Settings were adjusted to 12/10 due to high tidal volumes. Patient is maintaining adequate tidal volumes on this setting. Patient remained on 100% FiO2 throughout the night, satting low 90s.  BiPAP was exchanged in early evening to heated wire circuit for humidity.   BiPAP mas interface changed Q4 with skin barriers in place.  RT will continue to monitor.    8/15/2024  Jose Aguilar, RRT

## 2024-08-15 NOTE — PHARMACY-VANCOMYCIN DOSING SERVICE
"Pharmacy Vancomycin Note  Date of Service August 15, 2024  Patient's  1940   84 year old, male    Indication: [facility]-Associated Pneumonia  Day of Therapy: 2  Current vancomycin regimen:  intermittent dosing  Current vancomycin monitoring method: AUC  Current vancomycin therapeutic monitoring goal: 400-600 mg*h/L      Current estimated CrCl = Estimated Creatinine Clearance: 46.1 mL/min (A) (based on SCr of 1.68 mg/dL (H)).    Creatinine for last 3 days  2024: 12:47 PM Creatinine 1.16 mg/dL  2024:  3:59 AM Creatinine 1.24 mg/dL;  7:27 PM Creatinine 1.45 mg/dL  2024:  3:55 AM Creatinine 1.57 mg/dL  8/15/2024:  5:04 AM Creatinine 1.68 mg/dL    Recent Vancomycin Levels (past 3 days)  8/15/2024:  5:04 AM Vancomycin 16.1 ug/mL    Vancomycin IV Administrations (past 72 hours)                     vancomycin (VANCOCIN) 1,000 mg in 200 mL dextrose intermittent infusion (mg) 1,000 mg New Bag 08/15/24 0000    Vancomycin (VANCOCIN) 2,000 mg in 0.9% NaCl 500 mL intermittent infusion (mg) 2,000 mg New Bag 24 0135                    Nephrotoxins and other renal medications (From now, onward)      Start     Dose/Rate Route Frequency Ordered Stop    24 0017  vancomycin place avery - receiving intermittent dosing         1 each Intravenous SEE ADMIN INSTRUCTIONS 24 0018      24 1000  piperacillin-tazobactam (ZOSYN) 3.375 g vial to attach to  mL bag        Note to Pharmacy: For [facility], [facility] and [facility]: For Zosyn-naive patients, use the \"Zosyn initial dose + extended infusion\" order panel.    3.375 g  over 30 Minutes Intravenous EVERY 6 HOURS 24 0944                 Contrast Orders - past 72 hours (72h ago, onward)      None            Interpretation of levels and current regimen:    Has serum creatinine changed greater than 50% in last 72 hours: No    Urine output:  good urine output    Renal Function: Worsening    InsightRX Prediction of Planned New Vancomycin Regimen  Loading " dose: N/A  Regimen: 1000 mg IV every 24 hours.  Start time: 00:00 on 08/16/2024  Exposure target: AUC24 (range)400-600 mg/L.hr   AUC24,ss: 507 mg/L.hr  Probability of AUC24 > 400: 80 %  Ctrough,ss: 17.8 mg/L  Probability of Ctrough,ss > 20: 37 %  Probability of nephrotoxicity (Lodise RUBINA 2009): 14 %      Plan:  Initiate dose of 1 gm q24h  Vancomycin monitoring method: AUC  Vancomycin therapeutic monitoring goal: 400-600 mg*h/L  Pharmacy will check vancomycin levels as appropriate in 1-3 Days.  Serum creatinine levels will be ordered daily for the first week of therapy and at least twice weekly for subsequent weeks.    Maura Lanier RPH

## 2024-08-16 NOTE — PROGRESS NOTES
Brief intensivist note  Patient struggling on 100% and 16/12 on bipap. At this point I think his prognosis is grim. I have ordered a dilaudid infusion for comfort, as he looks very uncomfortable and struggling to breathe at this time     I spoke with wife by phone to let her know he is doing poorly and will not likely survive much longer. She seemed to understand. Her children were planning on coming in today, and she said she too will come in though initially had not planned to.     Ravin benavides  August 16, 2024

## 2024-08-16 NOTE — PLAN OF CARE
Problem: Adult Inpatient Plan of Care  Goal: Plan of Care Review  Description: The Plan of Care Review/Shift note should be completed every shift.  The Outcome Evaluation is a brief statement about your assessment that the patient is improving, declining, or no change.  This information will be displayed automatically on your shift  note.  Outcome: Not Progressing  Flowsheets (Taken 8/15/2024 2002)  Outcome Evaluation: Pt able to maintain SBP WDL without the need for esmolol or nicardipine. Continues to require BiPAP on 100% FiO2 and hi amount of pressure. Disoriented x 4. PRNs given for pain and agitation. Dex gtt initated for agitation this evening. Palliative followed up with the family this evening and they still need more time to make a dicision to transition to comfort cares.  Overall Patient Progress: no change     Problem: Adult Inpatient Plan of Care  Goal: Absence of Hospital-Acquired Illness or Injury  Intervention: Identify and Manage Fall Risk  Recent Flowsheet Documentation  Taken 8/15/2024 0800 by Bubba Figueredo RN  Safety Promotion/Fall Prevention:   activity supervised   clutter free environment maintained   increased rounding and observation   increase visualization of patient   nonskid shoes/slippers when out of bed   patient and family education   room door open   room near nurse's station   room organization consistent   safety round/check completed   supervised activity   bedside attendant   treat reversible contributory factors   treat underlying cause  Intervention: Prevent Skin Injury  Recent Flowsheet Documentation  Taken 8/15/2024 1400 by Bubba Figueredo RN  Body Position:   turned   heels elevated   upper extremity elevated   position changed independently   left  Taken 8/15/2024 1200 by Bubba Figueredo RN  Body Position:   turned   heels elevated   upper extremity elevated   position changed independently   left  Taken 8/15/2024 1000 by Bubba Figueredo RN  Body Position:    turned   heels elevated   upper extremity elevated   position changed independently   left  Taken 8/15/2024 0800 by Bubba Figueredo RN  Body Position:   turned   heels elevated   upper extremity elevated   position changed independently   left  Intervention: Prevent and Manage VTE (Venous Thromboembolism) Risk  Recent Flowsheet Documentation  Taken 8/15/2024 1600 by Bubba Figueredo RN  VTE Prevention/Management: SCDs off (sequential compression devices)  Taken 8/15/2024 1200 by Bubba Figueredo RN  VTE Prevention/Management: SCDs off (sequential compression devices)  Taken 8/15/2024 0800 by Bubba Figueredo RN  VTE Prevention/Management: SCDs off (sequential compression devices)  Intervention: Prevent Infection  Recent Flowsheet Documentation  Taken 8/15/2024 0800 by Bubba Figueredo RN  Infection Prevention:   equipment surfaces disinfected   hand hygiene promoted   personal protective equipment utilized   rest/sleep promoted   single patient room provided  Goal: Optimal Comfort and Wellbeing  Intervention: Provide Person-Centered Care  Recent Flowsheet Documentation  Taken 8/15/2024 1600 by Bubba Figueredo RN  Trust Relationship/Rapport:   care explained   choices provided   emotional support provided  Taken 8/15/2024 1200 by Bubba Figueredo RN  Trust Relationship/Rapport:   care explained   choices provided   emotional support provided  Taken 8/15/2024 0800 by Bubba Figueredo RN  Trust Relationship/Rapport:   care explained   choices provided   emotional support provided     Problem: Risk for Delirium  Goal: Optimal Coping  Intervention: Optimize Psychosocial Adjustment to Delirium  Recent Flowsheet Documentation  Taken 8/15/2024 1600 by Bubba Figueredo RN  Supportive Measures:   positive reinforcement provided   relaxation techniques promoted  Taken 8/15/2024 1200 by Bubba Figueredo RN  Supportive Measures:   positive reinforcement provided   relaxation techniques promoted  Taken 8/15/2024 0800 by  Bubba Figueredo RN  Supportive Measures:   positive reinforcement provided   relaxation techniques promoted  Goal: Improved Behavioral Control  Intervention: Prevent and Manage Agitation  Recent Flowsheet Documentation  Taken 8/15/2024 1600 by Bubba Figueredo RN  Environment Familiarity/Consistency: daily routine followed  Taken 8/15/2024 1200 by Bubba Figueredo RN  Environment Familiarity/Consistency: daily routine followed  Taken 8/15/2024 0800 by Bubba Figueredo RN  Environment Familiarity/Consistency: daily routine followed  Intervention: Minimize Safety Risk  Recent Flowsheet Documentation  Taken 8/15/2024 1600 by Bubba Figueredo RN  Trust Relationship/Rapport:   care explained   choices provided   emotional support provided  Taken 8/15/2024 1200 by Bubba Figueredo RN  Trust Relationship/Rapport:   care explained   choices provided   emotional support provided  Taken 8/15/2024 0800 by Bubba Figueredo RN  Communication Enhancement Strategies:   extra time allowed for response   one-step directions provided   repetition utilized   verbal and visual cues paired  Enhanced Safety Measures:   pain management   review medications for side effects with activity   room near unit station    at bedside   assistive devices when indicated   monitor patients coagulation values  Trust Relationship/Rapport:   care explained   choices provided   emotional support provided  Goal: Improved Attention and Thought Clarity  Intervention: Maximize Cognitive Function  Recent Flowsheet Documentation  Taken 8/15/2024 0800 by Bubba Figueredo RN  Sensory Stimulation Regulation:   care clustered   lighting decreased   quiet environment promoted   auditory stimulation minimized  Reorientation Measures:   clock in view   calendar in view   hearing device use encouraged   reorientation provided     Problem: Alcohol Withdrawal  Goal: Alcohol Withdrawal Symptom Control  Intervention: Minimize or Manage Alcohol Withdrawal  Symptoms  Recent Flowsheet Documentation  Taken 8/15/2024 0800 by Bubba Figueredo RN  Sensory Stimulation Regulation:   care clustered   lighting decreased   quiet environment promoted   auditory stimulation minimized  Aspiration Precautions:   awake/alert before oral intake   distractions minimized during oral intake   medication route adjusted   NPO pending swallow screening/evaluation   oral hygiene care promoted   respiratory status monitored   tube feeding placement verified   upright posture maintained  Goal: Optimal Neurologic Function  Intervention: Minimize or Manage Acute Neurologic Symptoms  Recent Flowsheet Documentation  Taken 8/15/2024 0800 by Bubba Figueredo RN  Sensory Stimulation Regulation:   care clustered   lighting decreased   quiet environment promoted   auditory stimulation minimized  Goal: Readiness for Change Identified  Intervention: Partner to Facilitate Behavior Change  Recent Flowsheet Documentation  Taken 8/15/2024 1600 by Bubba Figueredo RN  Supportive Measures:   positive reinforcement provided   relaxation techniques promoted  Taken 8/15/2024 1200 by Bubba Figueredo RN  Supportive Measures:   positive reinforcement provided   relaxation techniques promoted  Taken 8/15/2024 0800 by Bubba Figueredo RN  Supportive Measures:   positive reinforcement provided   relaxation techniques promoted     Problem: Restraint, Nonviolent  Goal: Absence of Harm or Injury  Intervention: Implement Least Restrictive Safety Strategies  Recent Flowsheet Documentation  Taken 8/15/2024 0800 by Bubba Figueredo RN  Medical Device Protection:   IV pole/bag removed from visual field   torso covered   tubing secured  Intervention: Protect Dignity, Rights and Personal Wellbeing  Recent Flowsheet Documentation  Taken 8/15/2024 1600 by Bubba Figueredo RN  Trust Relationship/Rapport:   care explained   choices provided   emotional support provided  Taken 8/15/2024 1200 by Bubba Figueredo RN  Trust  Relationship/Rapport:   care explained   choices provided   emotional support provided  Taken 8/15/2024 0800 by Bubba Figueredo RN  Trust Relationship/Rapport:   care explained   choices provided   emotional support provided  Intervention: Protect Skin and Joint Integrity  Recent Flowsheet Documentation  Taken 8/15/2024 1600 by Bubba Figueredo RN  Range of Motion: active ROM (range of motion) encouraged  Taken 8/15/2024 1400 by Bubba Figueredo RN  Body Position:   turned   heels elevated   upper extremity elevated   position changed independently   left  Taken 8/15/2024 1200 by Bubba Figueredo RN  Body Position:   turned   heels elevated   upper extremity elevated   position changed independently   left  Range of Motion: active ROM (range of motion) encouraged  Taken 8/15/2024 1000 by Bubba Figueredo RN  Body Position:   turned   heels elevated   upper extremity elevated   position changed independently   left  Taken 8/15/2024 0800 by Bubba Figueredo RN  Body Position:   turned   heels elevated   upper extremity elevated   position changed independently   left  Range of Motion: active ROM (range of motion) encouraged     Problem: Aortic Aneurysm/Dissection Repair  Goal: Absence of Bleeding  Intervention: Monitor and Manage Bleeding  Recent Flowsheet Documentation  Taken 8/15/2024 0800 by Bubba Figueredo RN  Bleeding Management: dressing monitored  Goal: Absence of Infection Signs and Symptoms  Intervention: Prevent or Manage Infection  Recent Flowsheet Documentation  Taken 8/15/2024 0800 by Bubba Figueredo RN  Infection Management: aseptic technique maintained  Goal: Anesthesia/Sedation Recovery  Intervention: Optimize Anesthesia Recovery  Recent Flowsheet Documentation  Taken 8/15/2024 1200 by Bubba Figueredo RN  Administration (IS): (Not following commands) unable to perform  Taken 8/15/2024 0800 by Bubba Figueredo RN  Safety Promotion/Fall Prevention:   activity supervised   clutter free environment  maintained   increased rounding and observation   increase visualization of patient   nonskid shoes/slippers when out of bed   patient and family education   room door open   room near nurse's station   room organization consistent   safety round/check completed   supervised activity   bedside attendant   treat reversible contributory factors   treat underlying cause  Administration (IS): (Not following commands) unable to perform  Reorientation Measures:   clock in view   calendar in view   hearing device use encouraged   reorientation provided  Goal: Effective Oxygenation and Ventilation  Intervention: Optimize Oxygenation and Ventilation  Recent Flowsheet Documentation  Taken 8/15/2024 1600 by Bubba Figueredo RN  Cough And Deep Breathing: done independently per patient  Taken 8/15/2024 1400 by Bubba Figueredo RN  Head of Bed (HOB) Positioning: HOB at 30 degrees  Taken 8/15/2024 1200 by Bubba Figueredo RN  Administration (IS): (Not following commands) unable to perform  Cough And Deep Breathing: done independently per patient  Head of Bed (HOB) Positioning: HOB at 30 degrees  Taken 8/15/2024 1000 by Bubba Figueredo RN  Head of Bed (HOB) Positioning: HOB at 30 degrees  Taken 8/15/2024 0800 by Bubba Figueredo RN  Administration (IS): (Not following commands) unable to perform  Cough And Deep Breathing: done independently per patient  Head of Bed (HOB) Positioning: HOB at 30 degrees  Goal: Effective Peripheral Tissue Perfusion  Intervention: Optimize Tissue Perfusion  Recent Flowsheet Documentation  Taken 8/15/2024 1604 by Bubba Figueredo RN  Activity Management: bedrest  Taken 8/15/2024 1600 by Bubba Figueredo RN  Activity Management: bedrest  Taken 8/15/2024 1400 by Bubba Figueredo RN  Activity Management: bedrest  Taken 8/15/2024 1200 by Bubba Figueredo RN  Activity Management: bedrest  Taken 8/15/2024 1000 by Bubba Figueredo RN  Activity Management: bedrest  Taken 8/15/2024 0800 by Bubba Figueredo  RN  Activity Management: bedrest   Goal Outcome Evaluation:           Overall Patient Progress: no changeOverall Patient Progress: no change    Outcome Evaluation: Pt able to maintain SBP WDL without the need for esmolol or nicardipine. Continues to require BiPAP on 100% FiO2 and hi amount of pressure. Disoriented x 4. PRNs given for pain and agitation. Dex gtt initated for agitation this evening. Palliative followed up with the family this evening and they still need more time to make a dicision to transition to comfort cares.

## 2024-08-16 NOTE — PROGRESS NOTES
"Palliative chart check. Reviewed case with Dr. Benedict who states Noe is declining this AM and actively dying. Dr. Benedict has contacted pt's wife and family is en route to the hospital. Per Dr. Benedict, no need for me to check in as previously directed by my colleague, Bren Mitchell NP, but Dr. Benedict will contact me if something changes. Thanks.    Jalyn VIDAL, DIOR  Palliative Medicine   [facility]  Securely message with [employer]   *If you are having trouble locating my name, please ensure \"[facility], [facility], and [facility]\" is checked under contacts tab, within the sites button. My name will be listed as Luba Raya.    "

## 2024-08-16 NOTE — PLAN OF CARE
Goal Outcome Evaluation:       Patient remains alert but confused. Continues on Bipap and tachyphemic. Needed deep suction from RT due to build up of sputum. Ciwas 7-16, PRN ativan given. PRN dilaudid given for pain. Also continues on precedex for anxiety. Tf remains infusing and increasing towards goal. PICC intact. Adequate UOP. Critical lab results, awaiting redraw considering drastic change.

## 2024-08-16 NOTE — CONSULTS
Care Management Initial Consult    General Information  Assessment completed with: Family, VM-chart review,    Type of CM/SW Visit: Initial Assessment    Primary Care Provider verified and updated as needed: Yes   Readmission within the last 30 days:        Reason for Consult: discharge planning  Advance Care Planning:       General Information Comments:  (Anticpate transition to comfort care)    Communication Assessment  Patient's communication style: spoken language (English or Bilingual)    Hearing Difficulty or Deaf: yes   Wear Glasses or Blind: no    Cognitive  Cognitive/Neuro/Behavioral: .WDL except  Level of Consciousness: confused  Arousal Level: opens eyes spontaneously  Orientation: disoriented x 4  Mood/Behavior: calm  Best Language: 0 - No aphasia  Speech: garbled    Living Environment:   People in home: spouse     Current living Arrangements: house      Able to return to prior arrangements: no       Family/Social Support:  Care provided by: spouse/significant other, self  Provides care for:    Marital Status:   Wife  Joana       Description of Support System: Supportive, Involved    Support Assessment: Adequate family and caregiver support    Current Resources:   Patient receiving home care services:       Community Resources:    Equipment currently used at home: none  Supplies currently used at home:      Employment/Financial:  Employment Status: retired        Financial Concerns:     Referral to Financial Worker: No       Does the patient's insurance plan have a 3 day qualifying hospital stay waiver?  No    Lifestyle & Psychosocial Needs:  Social Determinants of Health     Food Insecurity: No Food Insecurity (4/19/2022)    [facility] [employer] [facility], [employer] [facility]    Food Insecurity     Worried About Running Out of Food in the Last Year: 1   Depression: Not at risk (7/31/2024)    Received from [employer]  [facility]    PHQ-2     PHQ-2 TOTAL SCORE: 1   Housing Stability: Low Risk  (4/19/2022)    Received from [employer] [facility] [employer] [facility], [facility] [employer] [facility]    Housing Stability     Unable to Pay for Housing in the Last Year: 1   Tobacco Use: Medium Risk (8/7/2024)    Patient History     Smoking Tobacco Use: Former     Smokeless Tobacco Use: Never     Passive Exposure: Never   Financial Resource Strain: Low Risk  (4/19/2022)    [facility], [facility]    Financial Resource Strain     Difficulty of Paying Living Expenses: 3     Difficulty of Paying Living Expenses: Not on file   Alcohol Use: Not on file   Transportation Needs: No Transportation Needs (4/19/2022)    [facility] from [facility], [facility]    Transportation Needs     Lack of Transportation (Medical): 1   Physical Activity: Not on file   Interpersonal Safety: Not on file   Stress: Not on file   Social Connections: Unknown (4/22/2023)    Received from [facility], [facility]    Social Connections     Frequency of Communication with Friends and Family: Not on file   Health Literacy: Not on file       Functional Status:  Prior to admission patient needed assistance:    Independent           Mental Health Status:          Chemical Dependency Status:                Values/Beliefs:  Spiritual, Cultural Beliefs, Restoration Practices, Values that affect care:                 Additional Information:  Consult for discharge planning. 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  Admitted to the ICU for invasive hemodynamic monitoring and  blood pressure control.  Currently requiring nicardipine to meet BP goals.  Confused requiring precedex, CIWA.  Started on BiPAP yesterday due to hypoxia.   Writer completed chart review and attended rounds. Patient has been in respiratory failure and prognosis is worsening. Family has met with palliative, anticipate a transition to comfort care. No discharge planning needed. CM/SW are available if any needs arise.       KAMRAN Cherry

## 2024-08-16 NOTE — DISCHARGE SUMMARY
Death Summary    Noe Morales MRN# [id number]   YOB: 1940 Age: 84 year old     Date of Admission:  8/10/2024  Date of Death:  08/16/24  Admitting Physician:  Mp Salter DO  Discharge Physician:  Aquilino Benedict MD  Discharging Service:  Critical Care     Primary Provider: Semmler, Steven Duane          Admission Diagnoses:   Aortic dissection          Discharge Diagnosis:     Active Problems:    Dissection of aorta (H)    Aortic dissection (H)    Hypoxic respiratory failure  Delirium  Pneumonia  DURAN            Consultations:     CT surgery  Vascular surgery  Palliative care  Orthopedics             Brief History of Illness:               Noe Morales is a 84 year old White male with a past medical history significant for HTN, ETOH abuse, COPD, CAD, GERD, HODA (CPAP at night) OA, 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 ED with a type A aortic dissection.          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 ED with a type A aortic dissection.  He presented to the ED at [facility] 8/9 evening with right shoulder pain and right sided chest pain, dizziness, and confusion.  CT revealed aortic dissection.  Admitted to the ICU for invasive hemodynamic monitoring and blood pressure control, now weaned off drips, meeting SBP goal with enteral antihypertensives.  Confused, started on CIWA protocol and precedex given heavy ETOH use.  Hypoxic respiratory failure requiring BiPAP.  Started on vanc/zosyn 8/13 for empiric treatment of possible pneumonia seen on CXR.  Palliative care involved, changed to DNR/I.      Over the last 3 days he remained dependent on BiPAP, unable to wean from max settings and  unable to tolerate even seconds off of BiPAP for cares despite optimizing his fluid status and treating presumed pneumonia.  Family elected to transition to comfort cares on 8/16, and he passed away at 1602.    Discussed with staff, Dr. Benedict, on day of discharge.    Mona Llanos MD  ICU Fellow

## 2024-08-16 NOTE — PROVIDER NOTIFICATION
Notified MD Church About Critical  and K of 2.6. Given drastic change from previous values, MD ordered recheck.

## 2024-08-16 NOTE — PROGRESS NOTES
Death note  Patient pronounced dead at 1602; family at bedside.     Ravin benavides  August 16, 2024

## 2024-08-16 NOTE — PROGRESS NOTES
[facility]   ICU Progress Note  Noe Morales MRN: [id number]  1940  Date of Admission:8/10/2024    Noe Morales MRN# [id number]   Age: 84 year old YOB: 1940     Date of Admission: 8/10/2024  Date of Service: 08/16/2024              Summary of hospitalization                  Noe Morales is a 84 year old White male with a past medical history significant for HTN, ETOH abuse, COPD, CAD, GERD, HODA (CPAP at night) OA, right shoulder replacement 8/7/24 (3 days ago) who was admitted 8/10 as a transfer from [facility] ED with a type A aortic dissection.  He presented to the ED at [facility] 8/9 evening with right shoulder pain and right sided chest pain, dizziness, and confusion.  CT revealed aortic dissection.  Admitted to the ICU for invasive hemodynamic monitoring and blood pressure control, now weaned off drips, meeting SBP goal with enteral antihypertensives.  Confused, started on CIWA protocol and precedex given heavy ETOH use.  Hypoxic respiratory failure requiring BiPAP.  Started on vanc/zosyn 8/13 for empiric treatment of possible pneumonia seen on CXR.  Palliative care involved, changed to DNR/I.  Over the last 2 days he has remained dependent on BiPAP, unable to wean from max settings and unable to tolerate even seconds off of BiPAP for cares despite optimizing his fluid status and treating presumed pneumonia.         Assessment & Plan     Noe Morales is a 84 year old White male with a past medical history significant for HTN, ETOH abuse, COPD, CAD, GERD, OA, right shoulder replacement on 8/7/24 who presented as a transfer from [facility] ED. Diagnosed with Type A aortic dissection.  Admitted to the ICU for invasive hemodynamic monitoring and blood pressure control. Now off gtts but ongoing hypoxic respiratory failure requiring max support on BiPAP.  No improvement thus family is coming in to see him and will discuss transition to comfort  cares.    ------------------------------------------------------------------------------------------------------------    Changes today:  - add dilaudid gtt for pain/discomfort  - Continues on BIPAP at 16/12  - Inc denia PALACIOS this afternoon  - family meeting to discuss transition to comfort cares given his worsening clinical status and limited remaining interventions    NEURO/PSYCH:   # Sedation: Precedex gtt  # Sedaton/Pain/Agitation: 4 point restraints with a 1:1, seroquel TID, dilaudid gtt    Confusion/Agitation   - Seroquel 25 mg TID per NJ tube   - CIWA   - added dilaudid gtt to address pain/discomfort    History of ETOH abuse (6-8 drinks/day until 3 weeks ago)               - per family, no history of withdrawal sx, seizures               - CIWA               - gabapentin (decrease dose with DURAN)               - folate/thiamine     History of chronic low back pain (PTA on Norco):                - pain control as above               - heat packs prn               - PTA gabapentin   - narcs prn, robaxin     ANTHONY with panic attack:               - PTA duloxetine     Depression:                - PTA duloxetine  ------------------------------------------------------------------------------------------------------------------  PULMONARY:  Hypoxic respiratory failure:    - Continues to require Bipap, doesn't tolerate decreasing settings or removing mask for cares   - Do not intubate    Presumed pneumonia:  - Vanc/zosyn, continue    History of COPD, not on O2 at home:                - PTA Breo/Incruse ellipta               - prn albuterol               - Supplemental O2 prn     History of HODA: uses CPAP at night               - CPAP at night vs BiPAP as needed               - RT consult    ------------------------------------------------------------------------------------------------------------------  CARDIOLOGY:   Type A aortic dissection               - Vascular and CT surgery: Surgical intervention would not  song patient, recommended medical management               - SBP<120               - HR<70               - atenolol 100 BID               - Amlodipine 10mg daily               - nicardipine gtt, esmolol if needed (not currently needing)   - clonidine 0.2mg Q8H (started as part of CIWA)               - ECHO: LV EF 55-60%                - Asa/statin     History of HTN:                - PTA atenolol in addition to above     HLD:                - PTA statin  ------------------------------------------------------------------------------------------------------------------  RENAL:  Urinary retention   - Kennedy placed 8/10   - Check UA/UC: no growth    Hypocalcemia   - monitor and replace as needed    Acute on CKD, baseline Cr appears to be 1-1.2: Cr improving as diuresis is held.   - hold diuresis               - Monitor Cr daily               - avoid nephrotoxic meds     Hypernatremia:  - FWF 150mL Q4H, recheck this afternoon    Chronic BLE edema:               - PTA lasix (40 daily)- held with worsening DURAN, hypernatremia     BPH:                - PTA tamsulosin, finasteride    **I/O last 24hrs:         Intake/Output Summary (Last 24 hours) at 8/16/2024 1253  Last data filed at 8/16/2024 1100  Gross per 24 hour   Intake 1302.71 ml   Output 2355 ml   Net -1052.29 ml           ------------------------------------------------------------------------------------------------------------------  INFECTIOUS DISEASE:  Recent shoulder replacement with erythema:                - Orthopedics consulted               - Low suspicion for postop infection per ortho    **Antibiotics: None  **Cultures: 8/10 blood cx NGTD  8/11 urine cx NGTD  ------------------------------------------------------------------------------------------------------------------  GASTROINTESTINAL/GENITOURINARY:  GERD:                - PPI     **Nutrition: Nutrition Consulted for TFs as patient remains delirious.   - Bowel  regimen  ------------------------------------------------------------------------------------------------------------------  ENDOCRINE:  No acute issues, BGs <180  ------------------------------------------------------------------------------------------------------------------  HEMATOLOGIC:  History of anemia:                - Iron supplementation (held while NPO)               - Monitor daily CBCs   - Hbg stable     Thrombocytopenia, chronic: Improving               - Continue to monitor   - Plts 330  ------------------------------------------------------------------------------------------------------------------  RHEUMATOLOGIC:  History of Gout:                - PTA allopurinol  ------------------------------------------------------------------------------------------------------------------  SKIN/MUSCULOSKELETAL:  Right shoulder replacement 8/7/24:                - Orthopedic surgery consult               - PT/OT signed off due to ongoing BiPAP needs, will reconsult as appropriate  ------------------------------------------------------------------------------------------------------------------  Prophylaxis:     -GI: PPI    -DVT: SQH    FEN: NPO, TFs  Consults: CV surg, Vascular surg, Ortho, Palliative care  Family: WifeJoana, updated 8/16  Code status: DNR/DNI  Disposition: ICU  ===================================================  Patient discussed with ICU staff, Dr. Benedict.    Mona Llanos MD  Surgical Critical Care Fellow           Interval History   Nursing notes reviewed  Overnight History: Desatting with any cares requiring removal of mask           Medications     Current Facility-Administered Medications   Medication Dose Route Frequency Provider Last Rate Last Admin    acetaminophen (TYLENOL) oral liquid 975 mg  975 mg Oral or Feeding Tube Q8H Mona Llanos MD   975 mg at 08/16/24 1143    albuterol (PROVENTIL HFA/VENTOLIN HFA) inhaler  2 puff Inhalation Q4H PRN Priscila Martinez, PAKimberC         allopurinol (ZYLOPRIM) tablet 300 mg  300 mg Oral or Feeding Tube Daily Mona Llanos MD   300 mg at 08/16/24 0822    amLODIPine (NORVASC) tablet 10 mg  10 mg Oral or Feeding Tube Daily Mona Llanos MD   10 mg at 08/16/24 0822    aspirin (ASA) chewable tablet 81 mg  81 mg Oral or Feeding Tube BID Mona Llanos MD   81 mg at 08/16/24 0822    atenolol (TENORMIN) tablet 100 mg  100 mg Oral or Feeding Tube BID Mona Llanos MD   100 mg at 08/16/24 0822    atorvastatin (LIPITOR) tablet 10 mg  10 mg Oral or Feeding Tube At Bedtime Mona Llanos MD   10 mg at 08/15/24 2210    calcium carbonate (TUMS) chewable tablet 1,000 mg  1,000 mg Oral 4x Daily PRN Priscila Martinez PA-C        cholecalciferol (D-VI-SOL, Vitamin D3) 10 mcg/mL (400 units/mL) liquid 50 mcg  50 mcg Oral or Feeding Tube Daily Mona Llanos MD   50 mcg at 08/16/24 0849    cloNIDine (CATAPRES) tablet 0.2 mg  0.2 mg Oral or Feeding Tube Q8H Mona Llanos MD   0.2 mg at 08/16/24 0640    dexmedeTOMIDine (PRECEDEX) 4 mcg/mL in sodium chloride 0.9 % 100 mL infusion  0.1-1.2 mcg/kg/hr Intravenous Continuous Emile Church MD 42.3 mL/hr at 08/16/24 1146 1.2 mcg/kg/hr at 08/16/24 1146    dextrose 10% infusion   Intravenous Continuous PRN Reny Yanez MD        glucose gel 15-30 g  15-30 g Oral Q15 Min PRN Mona Llanos MD        Or    dextrose 50 % injection 25-50 mL  25-50 mL Intravenous Q15 Min PRN Mona Llanos MD        Or    glucagon injection 1 mg  1 mg Subcutaneous Q15 Min PRN Mona Llanos MD        DULoxetine (CYMBALTA) DR capsule 120 mg  120 mg Oral or Feeding Tube Daily Mona Llanos MD   120 mg at 08/16/24 0822    finasteride (PROSCAR) tablet 5 mg  5 mg Oral or Feeding Tube Daily Mona Llanos MD   5 mg at 08/16/24 0847    flumazenil (ROMAZICON) injection 0.2 mg  0.2 mg Intravenous q1 min prn Mona Llanos MD        fluticasone (FLONASE) 50 MCG/ACT spray 2 spray  2 spray Both Nostrils Daily Priscila Martinez PA-C         fluticasone-vilanterol (BREO ELLIPTA) 100-25 MCG/ACT inhaler 1 puff  1 puff Inhalation Daily Priscila Martinez PA-C        And    umeclidinium (INCRUSE ELLIPTA) 62.5 MCG/ACT inhaler 1 puff  1 puff Inhalation Daily Priscila Martinez PA-C        gabapentin (NEURONTIN) solution 300 mg  300 mg Oral or Feeding Tube BID Mona Llanos MD   300 mg at 08/16/24 1155    gabapentin (NEURONTIN) solution 800 mg  800 mg Oral or Feeding Tube At Bedtime Mona Llanos MD   800 mg at 08/15/24 2257    haloperidol lactate (HALDOL) injection 2.5-5 mg  2.5-5 mg Intravenous Q4H PRN Mona Llanos MD   5 mg at 08/16/24 1143    heparin ANTICOAGULANT injection 7,500 Units  7,500 Units Subcutaneous Q8H Reny Yanez MD   7,500 Units at 08/16/24 1207    hydrALAZINE (APRESOLINE) tablet 10 mg  10 mg Oral Q4H PRN Prudence Oconnor MD        Or    hydrALAZINE (APRESOLINE) injection 10 mg  10 mg Intravenous Q4H PRN Prudence Oconnor MD   10 mg at 08/16/24 1112    hydrALAZINE (APRESOLINE) tablet 50 mg  50 mg Oral or Feeding Tube TID Mona Llanos MD   50 mg at 08/16/24 0822    hydromorphone (DILAUDID) 0.2 mg/mL bolus dose from infusion pump 1.41 mg  0.01 mg/kg Intravenous Q2H PRN Aquilino Benedict MD        HYDROmorphone (DILAUDID) 0.2 mg/mL infusion ADULT/PEDS GREATER than or EQUAL to 20 kg  0.1-0.4 mg/hr Intravenous Continuous Aquilino Benedict MD 1.5 mL/hr at 08/16/24 1158 0.3 mg/hr at 08/16/24 1158    HYDROmorphone (DILAUDID) injection 0.2 mg  0.2 mg Intravenous Q2H PRN Mona Llanos MD   0.2 mg at 08/16/24 1003    IF patient diabetic - HOLD: ALL ORAL HYPOGLYCEMICS: glipizide, glyburide, glimepiride, gliclazide, metformin (Glucophage), any metformin (Glucophage) containing medication, rosiglitazone (Avandia), pioglitazone (Actos), or sitagliptin (Januvia) on day of the procedure   Does not apply HOLD Mona Llanos MD        ipratropium - albuterol 0.5 mg/2.5 mg/3 mL (DUONEB) neb solution 3 mL  3 mL Nebulization 4x  daily Aquilino Benedict MD   3 mL at 08/16/24 1014    lidocaine (LMX4) cream   Topical Q1H PRN Mona Llanos MD        lidocaine (LMX4) cream   Topical Q1H PRN Aquilino Benedict MD        lidocaine (LMX4) cream   Topical Q1H PRN Priscila Martinez PA-C        lidocaine 1 % 0.1-1 mL  0.1-1 mL Other Q1H PRN Priscila Martinez PA-C        lidocaine 1 % 0.1-5 mL  0.1-5 mL Other Q1H PRN Mona Llanos MD        lidocaine 1 % 0.1-5 mL  0.1-5 mL Other Q1H PRN Aquilino Benedict MD   5 mL at 08/15/24 1007    LORazepam (ATIVAN) tablet 1-2 mg  1-2 mg Oral Q30 Min PRN Mona Llanos MD   2 mg at 08/16/24 1021    Or    LORazepam (ATIVAN) injection 1-2 mg  1-2 mg Intravenous Q30 Min PRN Mona Llanos MD   2 mg at 08/16/24 0209    methocarbamol (ROBAXIN) tablet 500 mg  500 mg Oral 4x Daily PRN Priscila Martinez PA-C   500 mg at 08/14/24 0815    mupirocin (BACTROBAN) 2 % ointment   Topical BID Priscila Martinez PA-C   Given at 08/16/24 0854    naloxone (NARCAN) injection 0.2 mg  0.2 mg Intravenous Q2 Min PRN Mp Salter DO        Or    naloxone (NARCAN) injection 0.4 mg  0.4 mg Intravenous Q2 Min PRN Mp Salter DO        Or    naloxone (NARCAN) injection 0.2 mg  0.2 mg Intramuscular Q2 Min PRN Mp Salter DO        Or    naloxone (NARCAN) injection 0.4 mg  0.4 mg Intramuscular Q2 Min PRN Mp Salter DO        niCARdipine 40 mg in 200 mL NS (CARDENE) infusion  0.5-15 mg/hr Intravenous Continuous Mona Llanos MD        ondansetron (ZOFRAN ODT) ODT tab 4 mg  4 mg Oral Q6H PRN Mona Llanos MD        Or    ondansetron (ZOFRAN) injection 4 mg  4 mg Intravenous Q6H PRN Mona Llanos MD        pantoprazole (PROTONIX) 2 mg/mL suspension 40 mg  40 mg Per Feeding Tube QAM AC Mona Llanos MD   40 mg at 08/16/24 0821    piperacillin-tazobactam (ZOSYN) 3.375 g vial to attach to  mL bag  3.375 g Intravenous Q6H Aquilino Benedict MD 0  mL/hr at 08/13/24 1054 3.375 g at 08/16/24 1017    polyethylene glycol (MIRALAX) Packet 17 g  17 g Oral Daily PRN Mona Llanos MD   17 g at 08/13/24 1900    Prosource TF20 ENfit Compatibl EN LIQD (PROSOURCE TF20) packet 60 mL  1 packet Per Feeding Tube BID 09 12 Reny Yanez MD   60 mL at 08/16/24 0823    QUEtiapine (SEROquel) tablet 25 mg  25 mg Oral or Feeding Tube TID Reny Yanez MD   25 mg at 08/16/24 0822    senna-docusate (SENOKOT-S/PERICOLACE) 8.6-50 MG per tablet 1 tablet  1 tablet Oral BID PRN Mona Llanos MD        Or    senna-docusate (SENOKOT-S/PERICOLACE) 8.6-50 MG per tablet 2 tablet  2 tablet Oral BID PRN Mona Llanos MD        senna-docusate (SENOKOT-S/PERICOLACE) 8.6-50 MG per tablet 1-2 tablet  1-2 tablet Oral BID Priscila Martinez PA-C   2 tablet at 08/16/24 0823    sodium chloride (PF) 0.9% PF flush 10 mL  10 mL Intravenous Q10 Min PRN Mona Llanos MD        sodium chloride (PF) 0.9% PF flush 10-40 mL  10-40 mL Intracatheter Once PRN Mona Llanos MD        sodium chloride (PF) 0.9% PF flush 3 mL  3 mL Intracatheter Q8H Priscila Martinez PA-C   3 mL at 08/16/24 0824    sodium chloride (PF) 0.9% PF flush 3 mL  3 mL Intracatheter q1 min prn Priscila Martinez PA-C        tamsulosin (FLOMAX) capsule 0.4 mg  0.4 mg Oral Daily Priscila Martinez PA-C   0.4 mg at 08/16/24 0822    thiamine (B-1) tablet 100 mg  100 mg Oral or Feeding Tube TID Mona Llanos MD   100 mg at 08/16/24 0822    [START ON 8/18/2024] thiamine (B-1) tablet 100 mg  100 mg Oral or Feeding Tube Daily Mona Llanos MD        traMADol (ULTRAM) half-tab 50 mg  50 mg Oral Q6H PRN Priscila Martinez PA-C   50 mg at 08/14/24 0340    vancomycin (VANCOCIN) 1,000 mg in 200 mL dextrose intermittent infusion  1,000 mg Intravenous Q24H Aquilino Benedict  mL/hr at 08/16/24 0017 1,000 mg at 08/16/24 0017                 Objective      Physical Exam  Temp:  [98.2  F (36.8  C)-100.7  F (38.2  C)] 100.7  F (38.2   C)  Pulse:  [68-87] 80  Resp:  [15-56] 34  BP: ()/() 140/67  FiO2 (%):  [100 %] 100 %  SpO2:  [84 %-96 %] 87 %    FiO2 (%): 100 %, Resp: (!) 34      General: resting, opens eyes to voice, confused, doesn't follow commands  Eyes: Eyes are clear  HEENT: BiPAP mask in place  Cardiovascular: Regular rate and rhythm. Mild BLE edema  Respiratory: increased effort on BiPAP, volumes 700-900cc, coarse bilaterally  GI: Soft, non-tender, rotund, reducible umbilical hernia, ecchymoses bilateral lower abdomen  Musculoskeletal: symmetric  Skin: Warm and dry  Neurologic: No focal deficits    Drains and Tubes: Kennedy  Intravascular Access and Device: PICC placed 8/15      Intake/Output Summary (Last 24 hours) at 8/16/2024 1255  Last data filed at 8/16/2024 1100  Gross per 24 hour   Intake 1302.71 ml   Output 2355 ml   Net -1052.29 ml         Arterial Blood Gas  Recent Labs   Lab 08/15/24  1118 08/13/24  0830 08/12/24  1246 08/11/24  1202   PH 7.40 7.45 7.45 7.45   PCO2 37 29* 35 38   PO2 81 79* 60* 91   HCO3 23 20* 24 26   O2PER 100 100 15 55       Venous Blood Gas   Recent Labs   Lab 08/15/24  1118 08/13/24  0830 08/12/24  1246 08/11/24  1202   O2PER 100 100 15 55              Data:      CMP  Recent Labs   Lab 08/16/24  0548 08/16/24  0440 08/15/24  1851 08/15/24  1643 08/15/24  0856 08/15/24  0504 08/14/24  0807 08/14/24  0355 08/13/24  0843 08/13/24  0359 08/12/24  1249 08/12/24  1247 08/10/24  0753 08/10/24  0343   * 163* 150*  --   --  147*  --  143   < > 139  --  137   < > 132*   POTASSIUM 3.6 2.6* 3.6  --   --  3.4  --  3.8   < > 3.5  --  3.6   < > 4.0   CHLORIDE 118* 122* 116*  --   --  113*  --  109*   < > 105  --  103   < > 96*   CO2 24 18* 23  --   --  20*  --  22   < > 21*  --  24   < > 27   ANIONGAP 9 23* 11  --   --  14  --  12   < > 13  --  10   < > 9   * 134* 132* 116*   < > 125*   < > 141*   < > 143*   < > 153*   < > 117*   BUN 32.8* 24.8* 38.5*  --   --  45.2*  --  43.7*   < > 40.9*  --   "35.9*   < > 17.1   CR 1.34* 0.96 1.44*  --   --  1.68*  --  1.57*   < > 1.24*  --  1.16   < > 0.98   GFRESTIMATED 53* 79 49*  --   --  40*  --  44*   < > 58*  --  63   < > 77   CHERY 8.7* 6.1* 8.6*  --   --  8.5*  --  8.4*   < > 8.6*  --  8.7*   < > 8.9   MAG 2.6* 1.7  --   --   --  2.5*  --  2.4*  --  2.5*  --   --    < >  --    PHOS 2.6 1.5*  --   --   --  3.8  --  4.2  --  3.6  --  3.4   < >  --    PROTTOTAL  --   --   --   --   --   --   --  6.0*  --  6.0*  --   --   --  6.2*   ALBUMIN  --   --   --   --   --   --   --  3.2*  --  3.3*  --  3.4*  --  3.6   BILITOTAL  --   --   --   --   --   --   --  1.0  --  0.9  --   --   --  0.8   ALKPHOS  --   --   --   --   --   --   --  62  --  59  --   --   --  54   AST  --   --   --   --   --   --   --  20  --  24  --   --   --  24   ALT  --   --   --   --   --   --   --  11  --  10  --   --   --  10    < > = values in this interval not displayed.       CBC  Recent Labs   Lab 08/16/24  0548 08/16/24  0440 08/15/24  0504 08/14/24  0355   WBC 12.4* 10.2 12.4* 12.2*   RBC 3.23* 2.38* 3.06* 3.20*   HGB 9.8* 7.2* 9.3* 9.8*   HCT 29.9* 22.2* 27.8* 29.3*   MCV 93 93 91 92   MCH 30.3 30.3 30.4 30.6   MCHC 32.8 32.4 33.5 33.4   RDW 14.1 14.4 13.9 13.5    226 281 271       INR  No lab results found in last 7 days.    Lactic Acid  Recent Labs   Lab 08/10/24  0343   LACT 0.9       Microbiology:  All cultures:  No results for input(s): \"CULT\" in the last 168 hours.    Urine Studies:    Recent Labs   Lab Test 08/11/24  1111 02/09/24  2237 09/01/16  0353 08/02/16  1646   LEUKEST Trace* Negative Negative Large*   NITRITE Negative Negative Negative Negative   WBCU 89* <1  --  18*   RBCU 7* 1  --  5*       Imaging:  No results found for this or any previous visit (from the past 24 hour(s)).          "

## 2024-08-16 NOTE — PROGRESS NOTES
0710: Pt has need on continuous BIPAP since 8/13, alternating between masks. Noted redness and discoloration on the top of the nose; switched to a total face mask to alleviate pressure. RT will continue to monitor for any further skin issues and ensure the effectiveness of the BIPAP therapy.     BP (!) 140/67   Pulse 80   Temp 98.2  F (36.8  C) (Axillary)   Resp (!) 34   Wt 141 kg (310 lb 13.6 oz)   SpO2 (!) 87%   BMI 48.69 kg/m

## 2024-08-16 NOTE — PLAN OF CARE
"Pt tachypneic, hypoxic, despite being on 100% BIPAP. Family decided on comfort care. BIPAP removed at 1535. Time of death 1602. Belongings sent home with family.       Problem: Adult Inpatient Plan of Care  Goal: Plan of Care Review  Description: The Plan of Care Review/Shift note should be completed every shift.  The Outcome Evaluation is a brief statement about your assessment that the patient is improving, declining, or no change.  This information will be displayed automatically on your shift  note.  8/16/2024 1807 by Ernesto Gutierrez, RN  Outcome: Not Progressing  Flowsheets (Taken 8/16/2024 1807)  Plan of Care Reviewed With: family  8/16/2024 1557 by Ernesto Gutierrez RN  Outcome: Not Progressing  Goal: Patient-Specific Goal (Individualized)  Description: You can add care plan individualizations to a care plan. Examples of Individualization might be:  \"Parent requests to be called daily at 9am for status\", \"I have a hard time hearing out of my right ear\", or \"Do not touch me to wake me up as it startles  me\".  8/16/2024 1807 by Ernesto Gutierrez, RN  Outcome: Not Progressing  8/16/2024 1557 by Ernesto Gutierrez, RN  Outcome: Not Progressing  Goal: Absence of Hospital-Acquired Illness or Injury  8/16/2024 1807 by Ernesto Gutierrez, RN  Outcome: Not Progressing  8/16/2024 1557 by Ernesto Gutierrez RN  Outcome: Not Progressing  Intervention: Identify and Manage Fall Risk  Recent Flowsheet Documentation  Taken 8/16/2024 1200 by Ernesto Gutierrez, RN  Safety Promotion/Fall Prevention:   activity supervised   clutter free environment maintained   increased rounding and observation   increase visualization of patient   nonskid shoes/slippers when out of bed   patient and family education   room door open   room near nurse's station   room organization consistent   safety round/check completed   supervised activity   bedside attendant   treat reversible contributory " factors   treat underlying cause  Taken 8/16/2024 0800 by Ernesto Gutierrez RN  Safety Promotion/Fall Prevention:   activity supervised   clutter free environment maintained   increased rounding and observation   increase visualization of patient   nonskid shoes/slippers when out of bed   patient and family education   room door open   room near nurse's station   room organization consistent   safety round/check completed   supervised activity   bedside attendant   treat reversible contributory factors   treat underlying cause  Intervention: Prevent Skin Injury  Recent Flowsheet Documentation  Taken 8/16/2024 1300 by Ernesto Gutierrez RN  Body Position:   left   turned   heels elevated   legs elevated   upper extremity elevated  Taken 8/16/2024 1000 by Ernesto Gutierrez RN  Body Position:   right   turned   heels elevated   legs elevated   upper extremity elevated  Taken 8/16/2024 0800 by Ernesto Gutierrez RN  Body Position:   left   turned   heels elevated   legs elevated   upper extremity elevated  Intervention: Prevent and Manage VTE (Venous Thromboembolism) Risk  Recent Flowsheet Documentation  Taken 8/16/2024 1200 by Ernesto Gutierrez RN  VTE Prevention/Management: SCDs on (sequential compression devices)  Taken 8/16/2024 0800 by Ernesto Gutierrez RN  VTE Prevention/Management: SCDs on (sequential compression devices)  Intervention: Prevent Infection  Recent Flowsheet Documentation  Taken 8/16/2024 1200 by Ernesto Gutierrez RN  Infection Prevention:   equipment surfaces disinfected   hand hygiene promoted   personal protective equipment utilized   rest/sleep promoted   single patient room provided  Taken 8/16/2024 0800 by Ernesto Gutierrez RN  Infection Prevention:   equipment surfaces disinfected   hand hygiene promoted   personal protective equipment utilized   rest/sleep promoted   single patient room provided  Goal: Optimal Comfort and  Wellbeing  8/16/2024 1807 by Ernesto Gutierrez RN  Outcome: Not Progressing  8/16/2024 1557 by Ernesto Gutierrez RN  Outcome: Not Progressing  Intervention: Provide Person-Centered Care  Recent Flowsheet Documentation  Taken 8/16/2024 1200 by Ernesto Gutierrez RN  Trust Relationship/Rapport:   care explained   reassurance provided  Taken 8/16/2024 0800 by Ernesto Gutierrez RN  Trust Relationship/Rapport:   care explained   reassurance provided  Goal: Readiness for Transition of Care  8/16/2024 1807 by Ernesto Gutierrez RN  Outcome: Not Progressing  8/16/2024 1557 by Ernesto Gutierrez RN  Outcome: Not Progressing     Problem: Risk for Delirium  Goal: Optimal Coping  8/16/2024 1807 by Ernesto Gutierrez RN  Outcome: Not Progressing  8/16/2024 1557 by Ernesto Gutierrez RN  Outcome: Not Progressing  Intervention: Optimize Psychosocial Adjustment to Delirium  Recent Flowsheet Documentation  Taken 8/16/2024 1200 by Ernesto Gutierrez RN  Supportive Measures:   positive reinforcement provided   relaxation techniques promoted  Taken 8/16/2024 0800 by Ernesto Gutierrez RN  Supportive Measures:   positive reinforcement provided   relaxation techniques promoted  Goal: Improved Behavioral Control  8/16/2024 1807 by Ernesto Gutierrez RN  Outcome: Not Progressing  8/16/2024 1557 by Ernesto Gutierrez RN  Outcome: Not Progressing  Intervention: Prevent and Manage Agitation  Recent Flowsheet Documentation  Taken 8/16/2024 1200 by Ernesto Gutierrez RN  Environment Familiarity/Consistency: daily routine followed  Taken 8/16/2024 0800 by Ernesto Gutierrez RN  Environment Familiarity/Consistency: daily routine followed  Intervention: Minimize Safety Risk  Recent Flowsheet Documentation  Taken 8/16/2024 1200 by Enresto Gutierrez RN  Communication Enhancement Strategies:   extra time allowed for response   one-step directions provided    repetition utilized   verbal and visual cues paired  Enhanced Safety Measures:   pain management   review medications for side effects with activity   room near unit station    at bedside   assistive devices when indicated   monitor patients coagulation values  Trust Relationship/Rapport:   care explained   reassurance provided  Taken 8/16/2024 0800 by Ernesto Gutierrez RN  Communication Enhancement Strategies:   extra time allowed for response   one-step directions provided   repetition utilized   verbal and visual cues paired  Enhanced Safety Measures:   pain management   review medications for side effects with activity   room near unit station    at bedside   assistive devices when indicated   monitor patients coagulation values  Trust Relationship/Rapport:   care explained   reassurance provided  Goal: Improved Attention and Thought Clarity  8/16/2024 1807 by Ernesto Gutierrez RN  Outcome: Not Progressing  8/16/2024 1557 by Ernesto Gutierrez RN  Outcome: Not Progressing  Intervention: Maximize Cognitive Function  Recent Flowsheet Documentation  Taken 8/16/2024 1200 by Ernesto Gutierrez RN  Sensory Stimulation Regulation:   care clustered   lighting decreased   quiet environment promoted   auditory stimulation minimized  Reorientation Measures:   clock in view   calendar in view   hearing device use encouraged   reorientation provided  Taken 8/16/2024 0800 by Ernesto Gutierrez RN  Sensory Stimulation Regulation:   care clustered   lighting decreased   quiet environment promoted   auditory stimulation minimized  Reorientation Measures:   clock in view   calendar in view   hearing device use encouraged   reorientation provided  Goal: Improved Sleep  8/16/2024 1807 by Ernesto Gutierrez RN  Outcome: Not Progressing  8/16/2024 1557 by Ernesto Gutierrez RN  Outcome: Not Progressing     Problem: Alcohol Withdrawal  Goal: Alcohol Withdrawal  Symptom Control  8/16/2024 1807 by Ernesto Gutierrez RN  Outcome: Not Progressing  8/16/2024 1557 by Ernesto Gutierrez RN  Outcome: Not Progressing  Intervention: Minimize or Manage Alcohol Withdrawal Symptoms  Recent Flowsheet Documentation  Taken 8/16/2024 1200 by Ernesto Gutierrez RN  Sensory Stimulation Regulation:   care clustered   lighting decreased   quiet environment promoted   auditory stimulation minimized  Taken 8/16/2024 0800 by Ernesto Gutierrez RN  Sensory Stimulation Regulation:   care clustered   lighting decreased   quiet environment promoted   auditory stimulation minimized  Goal: Optimal Neurologic Function  8/16/2024 1807 by Ernesto Gutierrez RN  Outcome: Not Progressing  8/16/2024 1557 by Ernesto Gutierrez RN  Outcome: Not Progressing  Intervention: Minimize or Manage Acute Neurologic Symptoms  Recent Flowsheet Documentation  Taken 8/16/2024 1200 by Ernesto Gutierrez RN  Sensory Stimulation Regulation:   care clustered   lighting decreased   quiet environment promoted   auditory stimulation minimized  Taken 8/16/2024 0800 by Ernesto Gutierrez RN  Sensory Stimulation Regulation:   care clustered   lighting decreased   quiet environment promoted   auditory stimulation minimized  Goal: Readiness for Change Identified  8/16/2024 1807 by Ernesto Gutierrez RN  Outcome: Not Progressing  8/16/2024 1557 by Ernesto Gutierrez RN  Outcome: Not Progressing  Intervention: Partner to Facilitate Behavior Change  Recent Flowsheet Documentation  Taken 8/16/2024 1200 by Ernesto Gutierrez RN  Supportive Measures:   positive reinforcement provided   relaxation techniques promoted  Taken 8/16/2024 0800 by Ernesto Gutierrez RN  Supportive Measures:   positive reinforcement provided   relaxation techniques promoted     Problem: Restraint, Nonviolent  Goal: Absence of Harm or Injury  8/16/2024 1807 by Ernesto Gutierrez  RN  Outcome: Not Progressing  8/16/2024 1557 by Ernesto Gutierrez RN  Outcome: Not Progressing  Intervention: Implement Least Restrictive Safety Strategies  Recent Flowsheet Documentation  Taken 8/16/2024 1200 by Ernesto Gutierrez RN  Medical Device Protection:   IV pole/bag removed from visual field   torso covered   tubing secured  Taken 8/16/2024 0800 by Ernesto Gutierrez RN  Medical Device Protection:   IV pole/bag removed from visual field   torso covered   tubing secured  Intervention: Protect Dignity, Rights and Personal Wellbeing  Recent Flowsheet Documentation  Taken 8/16/2024 1200 by Ernesto Gutierrez RN  Trust Relationship/Rapport:   care explained   reassurance provided  Taken 8/16/2024 0800 by Ernesto Gutierrez RN  Trust Relationship/Rapport:   care explained   reassurance provided  Intervention: Protect Skin and Joint Integrity  Recent Flowsheet Documentation  Taken 8/16/2024 1300 by Ernesto Gutierrez RN  Body Position:   left   turned   heels elevated   legs elevated   upper extremity elevated  Taken 8/16/2024 1000 by Ernesto Gutierrez RN  Body Position:   right   turned   heels elevated   legs elevated   upper extremity elevated  Taken 8/16/2024 0800 by Ernesto Gutierrez RN  Body Position:   left   turned   heels elevated   legs elevated   upper extremity elevated     Problem: Aortic Aneurysm/Dissection Repair  Goal: Absence of Bleeding  8/16/2024 1807 by Ernesto Gutierrez RN  Outcome: Not Progressing  8/16/2024 1557 by Ernesto Gutierrez RN  Outcome: Not Progressing  Goal: Effective Bowel Elimination  8/16/2024 1807 by Ernesto Gutierrez RN  Outcome: Not Progressing  8/16/2024 1557 by Ernesto Gutierrez RN  Outcome: Not Progressing  Intervention: Enhance Bowel Motility and Elimination  Recent Flowsheet Documentation  Taken 8/16/2024 1200 by Ernesto Gutierrez RN  Bowel Elimination Promotion:   adequate fluid  intake promoted   ambulation promoted  Taken 8/16/2024 0800 by Ernesto Gutierrez RN  Bowel Elimination Promotion:   adequate fluid intake promoted   ambulation promoted  Goal: Vital Signs Remain in Desired Range  8/16/2024 1807 by Ernesto Gutierrez RN  Outcome: Not Progressing  8/16/2024 1557 by Ernesto Gutierrez RN  Outcome: Not Progressing  Goal: Absence of Infection Signs and Symptoms  8/16/2024 1807 by Ernesto Gutierrez RN  Outcome: Not Progressing  8/16/2024 1557 by Ernesto Gutierrez RN  Outcome: Not Progressing  Goal: Anesthesia/Sedation Recovery  8/16/2024 1807 by Ernesto Gutierrez RN  Outcome: Not Progressing  8/16/2024 1557 by Ernesto Gutierrez RN  Outcome: Not Progressing  Intervention: Optimize Anesthesia Recovery  Recent Flowsheet Documentation  Taken 8/16/2024 1200 by Ernesto Gutierrez RN  Safety Promotion/Fall Prevention:   activity supervised   clutter free environment maintained   increased rounding and observation   increase visualization of patient   nonskid shoes/slippers when out of bed   patient and family education   room door open   room near nurse's station   room organization consistent   safety round/check completed   supervised activity   bedside attendant   treat reversible contributory factors   treat underlying cause  Administration (IS): unable to perform  Reorientation Measures:   clock in view   calendar in view   hearing device use encouraged   reorientation provided  Taken 8/16/2024 0800 by Ernesto Gutierrez RN  Safety Promotion/Fall Prevention:   activity supervised   clutter free environment maintained   increased rounding and observation   increase visualization of patient   nonskid shoes/slippers when out of bed   patient and family education   room door open   room near nurse's station   room organization consistent   safety round/check completed   supervised activity   bedside attendant   treat reversible  contributory factors   treat underlying cause  Administration (IS): unable to perform  Reorientation Measures:   clock in view   calendar in view   hearing device use encouraged   reorientation provided  Goal: Acceptable Pain Control  8/16/2024 1807 by Ernesto Gutierrez RN  Outcome: Not Progressing  8/16/2024 1557 by Ernesto Gutierrez RN  Outcome: Not Progressing  Goal: Nausea and Vomiting Relief  8/16/2024 1807 by Ernesto Gutierrez RN  Outcome: Not Progressing  8/16/2024 1557 by Ernesto Gutierrez RN  Outcome: Not Progressing  Goal: Effective Urinary Elimination  8/16/2024 1807 by Ernesto Gutierrez RN  Outcome: Not Progressing  8/16/2024 1557 by Ernesto Gutierrez RN  Outcome: Not Progressing  Goal: Effective Oxygenation and Ventilation  8/16/2024 1807 by Ernesto Gutierrez RN  Outcome: Not Progressing  8/16/2024 1557 by Ernesto Gutierrez RN  Outcome: Not Progressing  Intervention: Optimize Oxygenation and Ventilation  Recent Flowsheet Documentation  Taken 8/16/2024 1300 by Ernesto Gutierrez RN  Head of Bed ([facility]) Positioning: HOB at 30-45 degrees  Taken 8/16/2024 1200 by Ernesto Gutierrez RN  Administration (IS): unable to perform  Cough And Deep Breathing: done independently per patient  Taken 8/16/2024 1000 by Ernesto Gutierrez RN  Head of Bed (HOB) Positioning: HOB at 30 degrees  Taken 8/16/2024 0800 by Ernesto Gutierrez RN  Administration (IS): unable to perform  Cough And Deep Breathing: done independently per patient  Head of Bed ([facility]) Positioning: HOB at 30-45 degrees  Goal: Effective Peripheral Tissue Perfusion  8/16/2024 1807 by Ernesto Gutierrez RN  Outcome: Not Progressing  8/16/2024 1557 by Ernesto Gutierrez RN  Outcome: Not Progressing  Intervention: Optimize Tissue Perfusion  Recent Flowsheet Documentation  Taken 8/16/2024 1200 by Nierenhausen, Ernetso R, RN  Activity Management: bedrest  Taken 8/16/2024  0800 by Ernesto Gutierrez RN  Activity Management: bedrest   Goal Outcome Evaluation:      Plan of Care Reviewed With: family

## 2024-08-18 LAB — BACTERIA SPEC CULT: ABNORMAL

## (undated) DEVICE — PREP CHLORAPREP 26ML TINTED ORANGE  260815

## (undated) DEVICE — CAST PADDING 2" STERILE 9062S

## (undated) DEVICE — GLOVE BIOGEL PI MICRO INDICATOR UNDERGLOVE SZ 7.5 48975

## (undated) DEVICE — GLOVE PROTEXIS MICRO 7.5  2D73PM75

## (undated) DEVICE — PACK HAND WRIST SOP15HWFSP

## (undated) DEVICE — Device

## (undated) DEVICE — GLOVE BIOGEL PI MICRO SZ 7.0 48570

## (undated) DEVICE — GOWN XLG DISP 9545

## (undated) DEVICE — GLOVE BIOGEL PI MICRO SZ 8.0 48580

## (undated) DEVICE — GLOVE PROTEXIS W/NEU-THERA 8.5  2D73TE85

## (undated) DEVICE — DRSG ABDOMINAL 07 1/2X8" 7197D

## (undated) DEVICE — SU VICRYL 2-0 CT-1 36" UND J945H

## (undated) DEVICE — SOL NACL 0.9% IRRIG 3000ML BAG 07972-08

## (undated) DEVICE — SU ETHILON 4-0 FS-2 18" 662H

## (undated) DEVICE — BNDG ELASTIC 4"X5YDS UNSTERILE 6611-40

## (undated) DEVICE — SU STRATAFIX MONOCRYL 3-0 SPIRAL PS-2 30CM SXMP1B106

## (undated) DEVICE — DRSG GAUZE 4X4" TRAY 6939

## (undated) DEVICE — BNDG ELASTIC 2"X5YDS UNSTERILE 6611-20

## (undated) DEVICE — STOCKING SLEEVE COMPRESSION CALF LG

## (undated) DEVICE — SU ETHIBOND 1 CT-1 30" X425H

## (undated) DEVICE — NDL 19GA 1.5"

## (undated) DEVICE — ESU PENCIL SMOKE EVAC W/ROCKER SWITCH 0703-047-000

## (undated) DEVICE — SU MONOCRYL 4-0 PS-2 18" UND Y496G

## (undated) DEVICE — GLOVE BIOGEL PI MICRO INDICATOR UNDERGLOVE SZ 8.5 48985

## (undated) DEVICE — DRSG STERI STRIP 1/2X4" R1547

## (undated) DEVICE — BONE CLEANING TIP INTERPULSE  0210-010-000

## (undated) DEVICE — GLOVE BIOGEL PI MICRO SZ 7.5 48575

## (undated) DEVICE — GLOVE PROTEXIS W/NEU-THERA 8.0  2D73TE80

## (undated) DEVICE — PACK SHOULDER

## (undated) DEVICE — ESU CORD BIPOLAR GREEN 10-4000

## (undated) DEVICE — DRAPE IOBAN LG .375X23.5" 6648EZ

## (undated) DEVICE — DRAPE SHEET REV FOLD 3/4 9349

## (undated) DEVICE — SUCTION IRR SYSTEM W/TIP INTERPULSE

## (undated) DEVICE — DRAPE SPLIT EENT 76X124" 3X28" 9447

## (undated) DEVICE — SU MONOCRYL 5-0 P-3 18" UND Y493G

## (undated) DEVICE — SOL WATER IRRIG 1000ML BOTTLE 07139-09

## (undated) DEVICE — SUCTION MANIFOLD NEPTUNE 2 SYS 4 PORT 0702-020-000

## (undated) DEVICE — BLANKET BAIR HUGGER LOWER BODY 42568

## (undated) DEVICE — DRAPE BACK TABLE  44X90" 8377

## (undated) DEVICE — CLOSURE SYS SKIN PREMIERPRO EXOFIN FUSION 4X22CM STRL 3472

## (undated) DEVICE — ESU ELEC BLADE 4" COATED

## (undated) DEVICE — SOL NACL 0.9% IRRIG 1000ML BOTTLE 07138-09

## (undated) DEVICE — GLOVE PROTEXIS W/NEU-THERA 7.5  2D73TE75

## (undated) DEVICE — DRSG XEROFORM 1X8"

## (undated) DEVICE — DRSG AQUACEL AG 3.5X12" HYDROFIBER 420670

## (undated) DEVICE — ESU PENCIL W/COATED BLADE E2450H

## (undated) DEVICE — BLADE KNIFE SURG 10 371110

## (undated) DEVICE — IMM ALUMI HAND LG 760

## (undated) DEVICE — DRAPE STERI TOWEL SM 1000

## (undated) DEVICE — BLADE KNIFE SURG 15C 371716

## (undated) DEVICE — SU PDO 1 STRATAFIX 36X36CM CTX TAPERPOINT SXPD2B405

## (undated) DEVICE — KIT DRAIN CLOSED WOUND SUCTION MED 400ML RESVR

## (undated) DEVICE — BIT DRILL BIOMET CENTRAL SCR 3.2MM SS 405883

## (undated) DEVICE — GLOVE PROTEXIS BLUE W/NEU-THERA 8.5  2D73EB85

## (undated) DEVICE — BONE CEMENT KIT BOWL AND SPATULA STRK 6201-3-410

## (undated) DEVICE — BLADE SAW SAGITTAL STRK 18X90X1.27MM HD SYS 6 6118-127-090

## (undated) DEVICE — IMM KIT SHOULDER TMAX MASK FACE 7210559

## (undated) DEVICE — SPONGE LAP 18X18" 1515

## (undated) DEVICE — BNDG COBAN 6"X5YDS STERILE

## (undated) DEVICE — BIT DRILL BIOMET PERIPHERAL SCR 2.7MM SS 405889

## (undated) DEVICE — GOWN IMPERVIOUS SPECIALTY XLG/XLONG 32474

## (undated) DEVICE — PREP SKIN SCRUB TRAY 4461A

## (undated) DEVICE — NDL 27GA 1.25" 305136

## (undated) DEVICE — GOWN LG DISP 9515

## (undated) DEVICE — BNDG ELASTIC 3"X5YDS UNSTERILE 6611-30

## (undated) DEVICE — HOOD T4 PROTECTIVE STERI FACE SHIELD 400-800

## (undated) DEVICE — SUCTION TIP FLEXI CLEAR TIP DISP K62

## (undated) DEVICE — SU PDO 3-0 STRATAFIX 24CM FS/FS REV CUT SXPD2B419

## (undated) DEVICE — SU MONOCRYL 3-0 PS-2 27" Y427H

## (undated) DEVICE — DRAPE POUCH INSTRUMENT 3 POCKET 1018L

## (undated) DEVICE — BLADE SAW SAGITTAL STRK 21X90X1.19MM HD SYS 6 6221-119-090

## (undated) DEVICE — DRAPE ARTHROSCOPY SHOULDER BEACHCHAIR 29369

## (undated) DEVICE — CAST PADDING 3" STERILE 9043S

## (undated) RX ORDER — PROPOFOL 10 MG/ML
INJECTION, EMULSION INTRAVENOUS
Status: DISPENSED
Start: 2019-05-20

## (undated) RX ORDER — OXYCODONE HYDROCHLORIDE 5 MG/1
TABLET ORAL
Status: DISPENSED
Start: 2024-01-01

## (undated) RX ORDER — TRANEXAMIC ACID 650 MG/1
TABLET ORAL
Status: DISPENSED
Start: 2024-01-01

## (undated) RX ORDER — PROPOFOL 10 MG/ML
INJECTION, EMULSION INTRAVENOUS
Status: DISPENSED
Start: 2024-01-01

## (undated) RX ORDER — ACETAMINOPHEN 325 MG/1
TABLET ORAL
Status: DISPENSED
Start: 2024-01-01

## (undated) RX ORDER — DEXAMETHASONE SODIUM PHOSPHATE 4 MG/ML
INJECTION, SOLUTION INTRA-ARTICULAR; INTRALESIONAL; INTRAMUSCULAR; INTRAVENOUS; SOFT TISSUE
Status: DISPENSED
Start: 2019-05-20

## (undated) RX ORDER — GINSENG 100 MG
CAPSULE ORAL
Status: DISPENSED
Start: 2024-01-01

## (undated) RX ORDER — EPINEPHRINE 1 MG/ML(1)
AMPUL (ML) INJECTION
Status: DISPENSED
Start: 2019-05-20

## (undated) RX ORDER — FENTANYL CITRATE 50 UG/ML
INJECTION, SOLUTION INTRAMUSCULAR; INTRAVENOUS
Status: DISPENSED
Start: 2024-01-01

## (undated) RX ORDER — LIDOCAINE HYDROCHLORIDE 20 MG/ML
JELLY TOPICAL
Status: DISPENSED
Start: 2024-01-01

## (undated) RX ORDER — CEFAZOLIN SODIUM 1 G/3ML
INJECTION, POWDER, FOR SOLUTION INTRAMUSCULAR; INTRAVENOUS
Status: DISPENSED
Start: 2024-01-01

## (undated) RX ORDER — BUPIVACAINE HYDROCHLORIDE 5 MG/ML
INJECTION, SOLUTION EPIDURAL; INTRACAUDAL
Status: DISPENSED
Start: 2024-01-01

## (undated) RX ORDER — LIDOCAINE HYDROCHLORIDE 10 MG/ML
INJECTION, SOLUTION EPIDURAL; INFILTRATION; INTRACAUDAL; PERINEURAL
Status: DISPENSED
Start: 2024-01-01

## (undated) RX ORDER — FENTANYL CITRATE 50 UG/ML
INJECTION, SOLUTION INTRAMUSCULAR; INTRAVENOUS
Status: DISPENSED
Start: 2019-05-20

## (undated) RX ORDER — GABAPENTIN 300 MG/1
CAPSULE ORAL
Status: DISPENSED
Start: 2024-01-01

## (undated) RX ORDER — CEFAZOLIN SODIUM 2 G/100ML
INJECTION, SOLUTION INTRAVENOUS
Status: DISPENSED
Start: 2019-05-20

## (undated) RX ORDER — GLYCOPYRROLATE 0.2 MG/ML
INJECTION, SOLUTION INTRAMUSCULAR; INTRAVENOUS
Status: DISPENSED
Start: 2024-01-01

## (undated) RX ORDER — EPHEDRINE SULFATE 50 MG/ML
INJECTION, SOLUTION INTRAMUSCULAR; INTRAVENOUS; SUBCUTANEOUS
Status: DISPENSED
Start: 2024-01-01

## (undated) RX ORDER — GABAPENTIN 100 MG/1
CAPSULE ORAL
Status: DISPENSED
Start: 2019-05-20

## (undated) RX ORDER — CEFAZOLIN SODIUM/WATER 2 G/20 ML
SYRINGE (ML) INTRAVENOUS
Status: DISPENSED
Start: 2024-01-01

## (undated) RX ORDER — ONDANSETRON 2 MG/ML
INJECTION INTRAMUSCULAR; INTRAVENOUS
Status: DISPENSED
Start: 2019-05-20

## (undated) RX ORDER — FENTANYL CITRATE-0.9 % NACL/PF 10 MCG/ML
PLASTIC BAG, INJECTION (ML) INTRAVENOUS
Status: DISPENSED
Start: 2024-01-01

## (undated) RX ORDER — DEXAMETHASONE SODIUM PHOSPHATE 10 MG/ML
INJECTION, SOLUTION INTRAMUSCULAR; INTRAVENOUS
Status: DISPENSED
Start: 2024-01-01

## (undated) RX ORDER — ONDANSETRON 2 MG/ML
INJECTION INTRAMUSCULAR; INTRAVENOUS
Status: DISPENSED
Start: 2024-01-01

## (undated) RX ORDER — LIDOCAINE HYDROCHLORIDE 10 MG/ML
INJECTION, SOLUTION EPIDURAL; INFILTRATION; INTRACAUDAL; PERINEURAL
Status: DISPENSED
Start: 2019-05-20